# Patient Record
Sex: FEMALE | Race: BLACK OR AFRICAN AMERICAN | Employment: UNEMPLOYED | URBAN - METROPOLITAN AREA
[De-identification: names, ages, dates, MRNs, and addresses within clinical notes are randomized per-mention and may not be internally consistent; named-entity substitution may affect disease eponyms.]

---

## 2017-09-20 ENCOUNTER — HOSPITAL ENCOUNTER (EMERGENCY)
Age: 27
Discharge: HOME | End: 2017-09-20
Attending: EMERGENCY MEDICINE
Payer: COMMERCIAL

## 2017-09-20 ENCOUNTER — APPOINTMENT (OUTPATIENT)
Dept: GENERAL RADIOLOGY | Age: 27
End: 2017-09-20
Payer: COMMERCIAL

## 2017-09-20 VITALS
WEIGHT: 210 LBS | OXYGEN SATURATION: 100 % | BODY MASS INDEX: 33.89 KG/M2 | HEART RATE: 84 BPM | DIASTOLIC BLOOD PRESSURE: 77 MMHG | RESPIRATION RATE: 14 BRPM | TEMPERATURE: 97.7 F | SYSTOLIC BLOOD PRESSURE: 125 MMHG

## 2017-09-20 DIAGNOSIS — Z71.1 CONCERN ABOUT STD IN FEMALE WITHOUT DIAGNOSIS: ICD-10-CM

## 2017-09-20 DIAGNOSIS — B96.89 GARDNERELLA ASSOCIATED VAGINAL DISCHARGE: ICD-10-CM

## 2017-09-20 DIAGNOSIS — R42 LIGHT HEADED: Primary | ICD-10-CM

## 2017-09-20 DIAGNOSIS — R68.2 DRY MOUTH: ICD-10-CM

## 2017-09-20 DIAGNOSIS — N76.0 GARDNERELLA ASSOCIATED VAGINAL DISCHARGE: ICD-10-CM

## 2017-09-20 DIAGNOSIS — R51.9 ACUTE NONINTRACTABLE HEADACHE, UNSPECIFIED HEADACHE TYPE: ICD-10-CM

## 2017-09-20 LAB
-: ABNORMAL
ABSOLUTE EOS #: 0.3 K/UL (ref 0–0.4)
ABSOLUTE LYMPH #: 1.8 K/UL (ref 1–4.8)
ABSOLUTE MONO #: 1.2 K/UL (ref 0.1–1.2)
AMORPHOUS: ABNORMAL
ANION GAP SERPL CALCULATED.3IONS-SCNC: 13 MMOL/L (ref 9–17)
BACTERIA: ABNORMAL
BASOPHILS # BLD: 0 %
BASOPHILS ABSOLUTE: 0 K/UL (ref 0–0.2)
BILIRUBIN URINE: NEGATIVE
BUN BLDV-MCNC: 7 MG/DL (ref 6–20)
BUN/CREAT BLD: ABNORMAL (ref 9–20)
CALCIUM SERPL-MCNC: 9.1 MG/DL (ref 8.6–10.4)
CASTS UA: ABNORMAL /LPF (ref 0–2)
CHLORIDE BLD-SCNC: 102 MMOL/L (ref 98–107)
CO2: 25 MMOL/L (ref 20–31)
COLOR: YELLOW
COMMENT UA: ABNORMAL
CREAT SERPL-MCNC: 0.67 MG/DL (ref 0.5–0.9)
CRYSTALS, UA: ABNORMAL /HPF
DIFFERENTIAL TYPE: NORMAL
DIRECT EXAM: ABNORMAL
EOSINOPHILS RELATIVE PERCENT: 3 %
EPITHELIAL CELLS UA: ABNORMAL /HPF (ref 0–5)
GFR AFRICAN AMERICAN: >60 ML/MIN
GFR NON-AFRICAN AMERICAN: >60 ML/MIN
GFR SERPL CREATININE-BSD FRML MDRD: ABNORMAL ML/MIN/{1.73_M2}
GFR SERPL CREATININE-BSD FRML MDRD: ABNORMAL ML/MIN/{1.73_M2}
GLUCOSE BLD-MCNC: 89 MG/DL (ref 70–99)
GLUCOSE URINE: NEGATIVE
HCG QUALITATIVE: NEGATIVE
HCT VFR BLD CALC: 43 % (ref 36–46)
HEMOGLOBIN: 14.4 G/DL (ref 12–16)
KETONES, URINE: NEGATIVE
LEUKOCYTE ESTERASE, URINE: ABNORMAL
LYMPHOCYTES # BLD: 18 %
Lab: ABNORMAL
MCH RBC QN AUTO: 30.1 PG (ref 26–34)
MCHC RBC AUTO-ENTMCNC: 33.4 G/DL (ref 31–37)
MCV RBC AUTO: 90 FL (ref 80–100)
MONOCYTES # BLD: 11 %
MUCUS: ABNORMAL
NITRITE, URINE: NEGATIVE
OTHER OBSERVATIONS UA: ABNORMAL
PDW BLD-RTO: 13.7 % (ref 12.5–15.4)
PH UA: 7 (ref 5–8)
PLATELET # BLD: 180 K/UL (ref 140–450)
PLATELET ESTIMATE: NORMAL
PMV BLD AUTO: 10.4 FL (ref 6–12)
POTASSIUM SERPL-SCNC: 3 MMOL/L (ref 3.7–5.3)
PROTEIN UA: ABNORMAL
RBC # BLD: 4.78 M/UL (ref 4–5.2)
RBC # BLD: NORMAL 10*6/UL
RBC UA: ABNORMAL /HPF (ref 0–2)
RENAL EPITHELIAL, UA: ABNORMAL /HPF
SEG NEUTROPHILS: 68 %
SEGMENTED NEUTROPHILS ABSOLUTE COUNT: 6.9 K/UL (ref 1.8–7.7)
SODIUM BLD-SCNC: 140 MMOL/L (ref 135–144)
SPECIFIC GRAVITY UA: 1.02 (ref 1–1.03)
SPECIMEN DESCRIPTION: ABNORMAL
STATUS: ABNORMAL
TRICHOMONAS: ABNORMAL
TURBIDITY: CLEAR
URINE HGB: NEGATIVE
UROBILINOGEN, URINE: NORMAL
WBC # BLD: 10.2 K/UL (ref 3.5–11)
WBC # BLD: NORMAL 10*3/UL
WBC UA: ABNORMAL /HPF (ref 0–5)
YEAST: ABNORMAL

## 2017-09-20 PROCEDURE — 71020 XR CHEST STANDARD TWO VW: CPT

## 2017-09-20 PROCEDURE — 87491 CHLMYD TRACH DNA AMP PROBE: CPT

## 2017-09-20 PROCEDURE — 99284 EMERGENCY DEPT VISIT MOD MDM: CPT

## 2017-09-20 PROCEDURE — 87591 N.GONORRHOEAE DNA AMP PROB: CPT

## 2017-09-20 PROCEDURE — 87480 CANDIDA DNA DIR PROBE: CPT

## 2017-09-20 PROCEDURE — 6370000000 HC RX 637 (ALT 250 FOR IP): Performed by: PHYSICIAN ASSISTANT

## 2017-09-20 PROCEDURE — 2580000003 HC RX 258: Performed by: PHYSICIAN ASSISTANT

## 2017-09-20 PROCEDURE — 93005 ELECTROCARDIOGRAM TRACING: CPT

## 2017-09-20 PROCEDURE — 80048 BASIC METABOLIC PNL TOTAL CA: CPT

## 2017-09-20 PROCEDURE — 81001 URINALYSIS AUTO W/SCOPE: CPT

## 2017-09-20 PROCEDURE — 84703 CHORIONIC GONADOTROPIN ASSAY: CPT

## 2017-09-20 PROCEDURE — 87510 GARDNER VAG DNA DIR PROBE: CPT

## 2017-09-20 PROCEDURE — 85025 COMPLETE CBC W/AUTO DIFF WBC: CPT

## 2017-09-20 PROCEDURE — 87660 TRICHOMONAS VAGIN DIR PROBE: CPT

## 2017-09-20 RX ORDER — 0.9 % SODIUM CHLORIDE 0.9 %
1000 INTRAVENOUS SOLUTION INTRAVENOUS ONCE
Status: COMPLETED | OUTPATIENT
Start: 2017-09-20 | End: 2017-09-20

## 2017-09-20 RX ORDER — MECLIZINE HCL 12.5 MG/1
25 TABLET ORAL ONCE
Status: COMPLETED | OUTPATIENT
Start: 2017-09-20 | End: 2017-09-20

## 2017-09-20 RX ORDER — METRONIDAZOLE 500 MG/1
500 TABLET ORAL 2 TIMES DAILY
Qty: 14 TABLET | Refills: 0 | Status: SHIPPED | OUTPATIENT
Start: 2017-09-20 | End: 2017-09-27

## 2017-09-20 RX ORDER — ACETAMINOPHEN 325 MG/1
650 TABLET ORAL EVERY 6 HOURS PRN
Qty: 30 TABLET | Refills: 0 | Status: SHIPPED | OUTPATIENT
Start: 2017-09-20 | End: 2021-12-13

## 2017-09-20 RX ORDER — ACETAMINOPHEN 325 MG/1
650 TABLET ORAL ONCE
Status: COMPLETED | OUTPATIENT
Start: 2017-09-20 | End: 2017-09-20

## 2017-09-20 RX ADMIN — SODIUM CHLORIDE 1000 ML: 9 INJECTION, SOLUTION INTRAVENOUS at 09:50

## 2017-09-20 RX ADMIN — ACETAMINOPHEN 650 MG: 325 TABLET ORAL at 11:04

## 2017-09-20 RX ADMIN — MECLIZINE HCL 25 MG: 12.5 TABLET ORAL at 11:04

## 2017-09-20 ASSESSMENT — PAIN SCALES - GENERAL
PAINLEVEL_OUTOF10: 6
PAINLEVEL_OUTOF10: 7

## 2017-09-20 ASSESSMENT — PAIN DESCRIPTION - PAIN TYPE: TYPE: ACUTE PAIN

## 2017-09-21 LAB
C TRACH DNA GENITAL QL NAA+PROBE: NEGATIVE
EKG ATRIAL RATE: 72 BPM
EKG P AXIS: 57 DEGREES
EKG P-R INTERVAL: 192 MS
EKG Q-T INTERVAL: 418 MS
EKG QRS DURATION: 74 MS
EKG QTC CALCULATION (BAZETT): 457 MS
EKG R AXIS: 26 DEGREES
EKG T AXIS: 21 DEGREES
EKG VENTRICULAR RATE: 72 BPM
N. GONORRHOEAE DNA: NEGATIVE

## 2017-10-26 ENCOUNTER — APPOINTMENT (OUTPATIENT)
Dept: ULTRASOUND IMAGING | Age: 27
End: 2017-10-26
Payer: COMMERCIAL

## 2017-10-26 ENCOUNTER — HOSPITAL ENCOUNTER (EMERGENCY)
Age: 27
Discharge: HOME | End: 2017-10-26
Attending: EMERGENCY MEDICINE
Payer: COMMERCIAL

## 2017-10-26 VITALS
RESPIRATION RATE: 16 BRPM | SYSTOLIC BLOOD PRESSURE: 146 MMHG | HEIGHT: 60 IN | DIASTOLIC BLOOD PRESSURE: 92 MMHG | HEART RATE: 78 BPM | TEMPERATURE: 97.9 F | OXYGEN SATURATION: 99 %

## 2017-10-26 DIAGNOSIS — N76.0 BACTERIAL VAGINOSIS: ICD-10-CM

## 2017-10-26 DIAGNOSIS — B96.89 BACTERIAL VAGINOSIS: ICD-10-CM

## 2017-10-26 DIAGNOSIS — O21.9 VOMITING OF PREGNANCY, ANTEPARTUM: Primary | ICD-10-CM

## 2017-10-26 DIAGNOSIS — O26.891 ABDOMINAL PAIN DURING PREGNANCY IN FIRST TRIMESTER: ICD-10-CM

## 2017-10-26 DIAGNOSIS — R10.9 ABDOMINAL PAIN DURING PREGNANCY IN FIRST TRIMESTER: ICD-10-CM

## 2017-10-26 LAB
-: ABNORMAL
AMORPHOUS: ABNORMAL
BACTERIA: ABNORMAL
BILIRUBIN URINE: NEGATIVE
CASTS UA: ABNORMAL /LPF (ref 0–8)
COLOR: YELLOW
CRYSTALS, UA: ABNORMAL /HPF
DIRECT EXAM: ABNORMAL
EPITHELIAL CELLS UA: ABNORMAL /HPF (ref 0–5)
GLUCOSE URINE: NEGATIVE
HCG QUANTITATIVE: 309 IU/L
HCG(URINE) PREGNANCY TEST: POSITIVE
KETONES, URINE: NEGATIVE
LEUKOCYTE ESTERASE, URINE: NEGATIVE
Lab: ABNORMAL
MUCUS: ABNORMAL
NITRITE, URINE: NEGATIVE
OTHER OBSERVATIONS UA: ABNORMAL
PH UA: 6 (ref 5–8)
PROTEIN UA: ABNORMAL
RBC UA: ABNORMAL /HPF (ref 0–4)
RENAL EPITHELIAL, UA: ABNORMAL /HPF
SPECIFIC GRAVITY UA: 1.02 (ref 1–1.03)
SPECIMEN DESCRIPTION: ABNORMAL
STATUS: ABNORMAL
TRICHOMONAS: ABNORMAL
TURBIDITY: CLEAR
URINE HGB: NEGATIVE
UROBILINOGEN, URINE: NORMAL
WBC UA: ABNORMAL /HPF (ref 0–5)
YEAST: ABNORMAL

## 2017-10-26 PROCEDURE — 87491 CHLMYD TRACH DNA AMP PROBE: CPT

## 2017-10-26 PROCEDURE — 87480 CANDIDA DNA DIR PROBE: CPT

## 2017-10-26 PROCEDURE — 6360000002 HC RX W HCPCS: Performed by: EMERGENCY MEDICINE

## 2017-10-26 PROCEDURE — 6370000000 HC RX 637 (ALT 250 FOR IP): Performed by: EMERGENCY MEDICINE

## 2017-10-26 PROCEDURE — 81001 URINALYSIS AUTO W/SCOPE: CPT

## 2017-10-26 PROCEDURE — 76815 OB US LIMITED FETUS(S): CPT

## 2017-10-26 PROCEDURE — G0383 LEV 4 HOSP TYPE B ED VISIT: HCPCS

## 2017-10-26 PROCEDURE — 87591 N.GONORRHOEAE DNA AMP PROB: CPT

## 2017-10-26 PROCEDURE — 87510 GARDNER VAG DNA DIR PROBE: CPT

## 2017-10-26 PROCEDURE — 84702 CHORIONIC GONADOTROPIN TEST: CPT

## 2017-10-26 PROCEDURE — 76817 TRANSVAGINAL US OBSTETRIC: CPT

## 2017-10-26 PROCEDURE — 87660 TRICHOMONAS VAGIN DIR PROBE: CPT

## 2017-10-26 PROCEDURE — 84703 CHORIONIC GONADOTROPIN ASSAY: CPT

## 2017-10-26 RX ORDER — LANOLIN ALCOHOL/MO/W.PET/CERES
50 CREAM (GRAM) TOPICAL DAILY
Qty: 30 TABLET | Refills: 3 | Status: SHIPPED | OUTPATIENT
Start: 2017-10-26 | End: 2018-12-05

## 2017-10-26 RX ORDER — METRONIDAZOLE 7.5 MG/G
GEL TOPICAL ONCE
Status: COMPLETED | OUTPATIENT
Start: 2017-10-26 | End: 2017-10-26

## 2017-10-26 RX ORDER — ONDANSETRON 4 MG/1
4 TABLET, FILM COATED ORAL ONCE
Status: COMPLETED | OUTPATIENT
Start: 2017-10-26 | End: 2017-10-26

## 2017-10-26 RX ADMIN — ONDANSETRON 4 MG: 4 TABLET, FILM COATED ORAL at 16:04

## 2017-10-26 RX ADMIN — METRONIDAZOLE: 7.5 GEL TOPICAL at 21:26

## 2017-10-26 ASSESSMENT — ENCOUNTER SYMPTOMS
DIARRHEA: 0
SHORTNESS OF BREATH: 0
RHINORRHEA: 0
CONSTIPATION: 0
NAUSEA: 1
VOMITING: 1
EYE PAIN: 0
ABDOMINAL PAIN: 1
COUGH: 0

## 2017-10-26 ASSESSMENT — PAIN DESCRIPTION - PAIN TYPE: TYPE: ACUTE PAIN

## 2017-10-26 ASSESSMENT — PAIN DESCRIPTION - LOCATION: LOCATION: ABDOMEN

## 2017-10-26 ASSESSMENT — PAIN SCALES - GENERAL: PAINLEVEL_OUTOF10: 6

## 2017-10-26 ASSESSMENT — PAIN DESCRIPTION - ONSET: ONSET: PROGRESSIVE

## 2017-10-26 ASSESSMENT — PAIN DESCRIPTION - PROGRESSION: CLINICAL_PROGRESSION: GRADUALLY WORSENING

## 2017-10-26 ASSESSMENT — PAIN DESCRIPTION - ORIENTATION: ORIENTATION: LOWER

## 2017-10-26 ASSESSMENT — PAIN DESCRIPTION - DESCRIPTORS: DESCRIPTORS: CRAMPING

## 2017-10-26 NOTE — ED PROVIDER NOTES
[facility] ED  Emergency Department Encounter  Emergency Medicine Resident     Pt Name: Jimmie Case  MRN: [id number]  [place] 1990  Date of evaluation: 10/26/17  PCP:  No primary care provider on file. CHIEF COMPLAINT       Chief Complaint   Patient presents with    Emesis     pt with c/o nausea and vomiting x two days with abdominal cramping. pt concerned for pregnancy, lmp 2017. pt did not take home pregnancy test. denies urinary symptoms.  Pregnancy Test       HISTORY OF PRESENT ILLNESS  (Location/Symptom, Timing/Onset, Context/Setting, Quality, Duration, Modifying Factors, Severity.)      Jimmie Case is a 32 y.o. female who presents with nausea, multiple episodes of nonbloody emesis, and crampy abdominal pain for the past 2 days. Patient states abdominal cramping is diffuse but mainly in the suprapubic region. Patient denies any dysuria or frequency of urination. Patient denies any vaginal discharge or vaginal bleeding. Patient states the first day last menstrual period was on 2017. Patient states that her periods are occasionally irregular. Patient is concerned she may be pregnant. Patient is sexually active with one male partner and states she does not use protection. Patient denies any change in vaginal discharge. Patient also does mention she feels dizzy and lightheaded but states she has not eaten today. PAST MEDICAL / SURGICAL / SOCIAL / FAMILY HISTORY      has a past medical history of Headache(784.0) and Trichomonas. has a past surgical history that includes  section and Cholecystectomy (). Social History     Social History    Marital status: Single     Spouse name: N/A    Number of children: N/A    Years of education: N/A     Occupational History    Not on file.      Social History Main Topics    Smoking status: Current Every Day Smoker     Packs/day: 0.50     Years: 10.00     Types: Cigarettes    Smokeless tobacco: Not on file    Alcohol use Yes      Comment: occasionally    Drug use: No      Comment: stopped using approx 1 yr ago    Sexual activity: Not on file     Other Topics Concern    Not on file     Social History Narrative    No narrative on file       Family History   Problem Relation Age of Onset    High Blood Pressure Mother        Allergies:  Review of patient's allergies indicates no known allergies. Home Medications:  Prior to Admission medications    Medication Sig Start Date End Date Taking? Authorizing Provider   acetaminophen (TYLENOL) 325 MG tablet Take 2 tablets by mouth every 6 hours as needed for Pain 9/20/17   Carol Escobar PA-C   HYDROcodone-acetaminophen [facility]) 5-325 MG per tablet Take 1 tablet by mouth every 6 hours as needed for Pain 9/5/15   Reginald Vera MD   ibuprofen (ADVIL;MOTRIN) 800 MG tablet Take 1 tablet by mouth every 8 hours as needed for Pain 9/5/15   Reginald Vera MD   misoprostol (CYTOTEC) 200 MCG tablet Take 1 tablet by mouth once for 1 dose Take one tablet at 9 am the day prior to the procedure and the second tablet at 9 pm the night prior to the procedure. 9/5/15 9/5/15  Reginald Vera MD   azithromycin (ZITHROMAX) 250 MG tablet Take 1 tablet by mouth daily 8/25/15   Js Bourgeois DO   ondansetron (ZOFRAN ODT) 4 MG disintegrating tablet Take 1 tablet by mouth every 8 hours as needed for Nausea. 6/3/13   Nikki Rodriguez MD   Prenatal Vitamins (DIS) TABS Take 1 tablet by mouth daily. 6/3/13   Nikki Rodriguez MD       REVIEW OF SYSTEMS    (2-9 systems for level 4, 10 or more for level 5)      Review of Systems   Constitutional: Negative for chills and fever. HENT: Negative for congestion and rhinorrhea. Eyes: Negative for pain and visual disturbance. Respiratory: Negative for cough and shortness of breath. Cardiovascular: Negative for chest pain and palpitations. Gastrointestinal: Positive for abdominal pain, nausea and vomiting. Negative for constipation and diarrhea. Genitourinary: Negative for difficulty urinating and dysuria. Musculoskeletal: Negative for gait problem and neck pain. Skin: Negative for rash and wound. Neurological: Positive for light-headedness. Negative for dizziness, weakness and numbness. PHYSICAL EXAM   (up to 7 for level 4, 8 or more for level 5)      INITIAL VITALS:   BP (!) 146/92   Pulse 78   Temp 97.9 °F (36.6 °C) (Oral)   Resp 16   Ht 5' (1.524 m)   LMP 09/25/2017   SpO2 99%     Physical Exam   Constitutional: She is oriented to person, place, and time. She appears well-developed and well-nourished. No distress. HENT:   Head: Normocephalic and atraumatic. Mouth/Throat: Oropharynx is clear and moist.   Eyes: Conjunctivae and EOM are normal.   Cardiovascular: Normal rate, regular rhythm, normal heart sounds and intact distal pulses. Exam reveals no gallop and no friction rub. No murmur heard. Pulmonary/Chest: Effort normal and breath sounds normal. No respiratory distress. She has no wheezes. She has no rales. Abdominal: Soft. There is no tenderness. There is no rebound and no guarding. Genitourinary: Cervix exhibits discharge. Cervix exhibits no motion tenderness and no friability. Right adnexum displays no mass, no tenderness and no fullness. Left adnexum displays no mass, no tenderness and no fullness. No erythema in the vagina. No signs of injury around the vagina. Musculoskeletal: She exhibits no edema, tenderness or deformity. Neurological: She is alert and oriented to person, place, and time. No cranial nerve deficit. She exhibits normal muscle tone. Skin: Skin is warm and dry. No rash noted. She is not diaphoretic.        DIFFERENTIAL  DIAGNOSIS     PLAN (LABS / IMAGING / EKG):  Orders Placed This Encounter   Procedures    C.trachomatis N.gonorrhoeae DNA    VAGINITIS DNA PROBE    US PELVIS COMPLETE    Urinalysis with microscopic    Pregnancy, Urine    HCG, QUANTITATIVE, PREGNANCY    Vaginal exam    Vaginal exam       MEDICATIONS ORDERED:  Orders Placed This Encounter   Medications    ondansetron (ZOFRAN) tablet 4 mg    metroNIDAZOLE (METROGEL) 0.75 % gel       DDX: IUP, ectopic pregnancy, UTI, STI, BV, yeast infection    DIAGNOSTIC RESULTS / EMERGENCY DEPARTMENT COURSE / MDM     LABS:  Results for orders placed or performed during the hospital encounter of 10/26/17   VAGINITIS DNA PROBE   Result Value Ref Range    Specimen Description . VAGINA     Special Requests NOT REPORTED     Direct Exam NEGATIVE for Candida sp. Direct Exam POSITIVE for Gardnerella vaginalis. (A)     Direct Exam NEGATIVE for Trichomonas vaginalis     Direct Exam       Method of testing is a DNA probe intended for detection and identification of    Direct Exam        Candida species, Gardnerella vaginalis, and Trichomonas vaginalis nucleic acid    Direct Exam        in vaginal fluid specimens from patients with symptoms of vaginitis/vaginosis.     Direct Exam       [facility] [id number] [place] [address], [address] [place] [address] [phone]    Status FINAL 10/26/2017    Urinalysis with microscopic   Result Value Ref Range    Color, UA YELLOW YEL    Turbidity UA CLEAR CLEAR    Glucose, Ur NEGATIVE NEG    Bilirubin Urine NEGATIVE NEG    Ketones, Urine NEGATIVE NEG    Specific Gravity, UA 1.022 1.005 - 1.030    Urine Hgb NEGATIVE NEG    pH, UA 6.0 5.0 - 8.0    Protein, UA 1+ (A) NEG    Urobilinogen, Urine Normal NORM    Nitrite, Urine NEGATIVE NEG    Leukocyte Esterase, Urine NEGATIVE NEG    -          WBC, UA 0 TO 2 0 - 5 /HPF    RBC, UA None 0 - 4 /HPF    Casts UA 2 TO 5 HYALINE 0 - 8 /LPF    Crystals UA NOT REPORTED NONE /HPF    Epithelial Cells UA 2 TO 5 0 - 5 /HPF    Renal Epithelial, Urine NOT REPORTED 0 /HPF    Bacteria, UA NOT REPORTED NONE    Mucus, UA NOT REPORTED NONE    Trichomonas, UA NOT REPORTED NONE    Amorphous, UA NOT REPORTED NONE    Other Observations UA NOT REPORTED NREQ    Yeast, UA NOT REPORTED NONE   Pregnancy, Urine   Result Value Ref Range    HCG(Urine) Pregnancy Test POSITIVE (A) NEG   HCG, QUANTITATIVE, PREGNANCY   Result Value Ref Range    hCG Quant 309 (H) <5 IU/L       IMPRESSION: Pregnancy, nausea and vomiting of pregnancy, bacterial vaginosis    RADIOLOGY:  None    EKG  None    All EKG's are interpreted by the Emergency Department Physician who either signs or Co-signs this chart in the absence of a cardiologist.    EMERGENCY DEPARTMENT COURSE:  3:59 PM: Patient evaluated by myself and attending physician. Patient appears no acute distress. Patient mildly hypertensive at 146/92 with otherwise normal vital signs. Heart rate normal with regular rhythm. Lungs clear to auscultation bilaterally. Abdomen soft and nontender to palpation. Patient with no focal neurologic deficits on physical exam.  We'll continue with urinalysis, urine pregnancy, and pelvic exam.  We'll obtain GC/chlamydia. 5:32 PM: Patient tested positive for pregnancy. Urine. Unable to visualize on bedside ultrasound and IUP. Patient did complain of abdominal pain. We'll get a pelvic ultrasound to rule out ectopic    PROCEDURES:  None    CONSULTS:  None    CRITICAL CARE:  None    FINAL IMPRESSION      1. Vomiting of pregnancy, antepartum    2. Bacterial vaginosis          DISPOSITION / PLAN     DISPOSITION   Plan for discharge pending pelvic ultrasound to rule out ectopic pregnancy. Plan for discharge with antinausea medication as well as treatment for bacterial vaginosis. PATIENT REFERRED TO:  No follow-up provider specified.     DISCHARGE MEDICATIONS:  New Prescriptions    No medications on file       Alejandra Gill DO  Emergency Medicine Resident    (Please note that portions of this note were completed with a voice recognition program.  Efforts were made to edit the dictations but occasionally words are mis-transcribed.)       Alejandra Gill DO  10/26/17 2832

## 2017-10-26 NOTE — ED PROVIDER NOTES
[facility]  ED  Emergency Department  Emergency Medicine Resident Sign-out     Care of Liliya Garza was assumed from Dr. Zander Brown and is being seen for Emesis (pt with c/o nausea and vomiting x two days with abdominal cramping. pt concerned for pregnancy, lmp 09/25/2017. pt did not take home pregnancy test. denies urinary symptoms. ) and Pregnancy Test  .  The patient's initial evaluation and plan have been discussed with the prior provider who initially evaluated the patient. EMERGENCY DEPARTMENT COURSE / MEDICAL DECISION MAKING:       MEDICATIONS GIVEN:  Orders Placed This Encounter   Medications    ondansetron (ZOFRAN) tablet 4 mg    metroNIDAZOLE (METROGEL) 0.75 % gel    doxyLAMINE succinate (GNP SLEEP AID) 25 MG tablet     Sig: Take 1 tablet by mouth as needed for Sleep (nausea)     Dispense:  30 tablet     Refill:  0    vitamin B-6 (PYRIDOXINE) 50 MG tablet     Sig: Take 1 tablet by mouth daily     Dispense:  30 tablet     Refill:  3       LABS / RADIOLOGY:     Labs Reviewed   VAGINITIS DNA PROBE - Abnormal; Notable for the following:        Result Value    Direct Exam POSITIVE for Gardnerella vaginalis. (*)     All other components within normal limits   URINALYSIS WITH MICROSCOPIC - Abnormal; Notable for the following:     Protein, UA 1+ (*)     All other components within normal limits   PREGNANCY, URINE - Abnormal; Notable for the following:     HCG(Urine) Pregnancy Test POSITIVE (*)     All other components within normal limits   HCG, QUANTITATIVE, PREGNANCY - Abnormal; Notable for the following:     hCG Quant 309 (*)     All other components within normal limits   C.TRACHOMATIS N.GONORRHOEAE DNA       No results found. RECENT VITALS:     Temp: 97.9 °F (36.6 °C),  Pulse: 78, Resp: 16, BP: (!) 146/92, SpO2: 99 %    This patient is a 32 y.o. Female with Lower abdominal pain and concern for pregnancy. Patient was found to be pregnant here.   She's had follow-up studies to confirm an intrauterine pregnancy as bedside ultrasound did not see an intrauterine gestational sac. Vaginitis probe significant for Gardnerella. Will treat patient for BV on discharge. Will follow up on ultrasound. OUTSTANDING TASKS / RECOMMENDATIONS:    1. Follow up ultrasound  2. Contact OB/Discharge based on results     FINAL IMPRESSION:     1. Vomiting of pregnancy, antepartum    2. Bacterial vaginosis    3.  Abdominal pain during pregnancy in first trimester        DISPOSITION:         DISPOSITION:  [x]  Discharge   []  Transfer -    []  Admission -     []  Against Medical Advice   []  Eloped   FOLLOW-UP: [address] [place] [id number] [phone]  Schedule an appointment as soon as possible for a visit   repeat ultrasound in 2 weeks     DISCHARGE MEDICATIONS: Discharge Medication List as of 10/26/2017  9:18 PM      START taking these medications    Details   doxyLAMINE succinate (GNP SLEEP AID) 25 MG tablet Take 1 tablet by mouth as needed for Sleep (nausea), Disp-30 tablet, R-0Print      vitamin B-6 (PYRIDOXINE) 50 MG tablet Take 1 tablet by mouth daily, Disp-30 tablet, R-3Print                Kay Wright DO  Emergency Medicine Resident  [facility], [place]  Resident  10/26/17 7390

## 2017-10-27 LAB
C TRACH DNA GENITAL QL NAA+PROBE: NEGATIVE
N. GONORRHOEAE DNA: NEGATIVE

## 2017-11-02 ENCOUNTER — HOSPITAL ENCOUNTER (OUTPATIENT)
Age: 27
Setting detail: SPECIMEN
Discharge: HOME | End: 2017-11-02
Payer: COMMERCIAL

## 2017-11-02 ENCOUNTER — OFFICE VISIT (OUTPATIENT)
Dept: OBGYN | Age: 27
End: 2017-11-02
Payer: COMMERCIAL

## 2017-11-02 VITALS
SYSTOLIC BLOOD PRESSURE: 116 MMHG | WEIGHT: 202 LBS | HEIGHT: 66 IN | HEART RATE: 77 BPM | DIASTOLIC BLOOD PRESSURE: 73 MMHG | BODY MASS INDEX: 32.47 KG/M2

## 2017-11-02 DIAGNOSIS — Z34.90 PREGNANCY, UNSPECIFIED GESTATIONAL AGE: Primary | ICD-10-CM

## 2017-11-02 DIAGNOSIS — Z32.01 POSITIVE PREGNANCY TEST: ICD-10-CM

## 2017-11-02 DIAGNOSIS — Z34.90 PREGNANCY, UNSPECIFIED GESTATIONAL AGE: ICD-10-CM

## 2017-11-02 LAB — HCG QUANTITATIVE: 4723 IU/L

## 2017-11-02 PROCEDURE — 36415 COLL VENOUS BLD VENIPUNCTURE: CPT

## 2017-11-02 PROCEDURE — G8417 CALC BMI ABV UP PARAM F/U: HCPCS | Performed by: OBSTETRICS & GYNECOLOGY

## 2017-11-02 PROCEDURE — 4004F PT TOBACCO SCREEN RCVD TLK: CPT | Performed by: OBSTETRICS & GYNECOLOGY

## 2017-11-02 PROCEDURE — 84702 CHORIONIC GONADOTROPIN TEST: CPT

## 2017-11-02 PROCEDURE — 99212 OFFICE O/P EST SF 10 MIN: CPT | Performed by: OBSTETRICS & GYNECOLOGY

## 2017-11-02 PROCEDURE — G8428 CUR MEDS NOT DOCUMENT: HCPCS | Performed by: OBSTETRICS & GYNECOLOGY

## 2017-11-02 PROCEDURE — G8484 FLU IMMUNIZE NO ADMIN: HCPCS | Performed by: OBSTETRICS & GYNECOLOGY

## 2017-11-02 NOTE — PROGRESS NOTES
Patient seen and examined. She is here secondary to positive pregnancy in ED. She had abdominal cramping and nausea and vomiting at that time. She states the pain is resolved but she occasionally has some nausea. She denies any vaginal bleeding.      Vitals:    11/02/17 1257   BP: 116/73   Pulse: 77   Weight: 202 lb (91.6 kg)   Height: 5' 6\" (1.676 m)     REVIEW OF SYSTEMS:  Constitutional: negative fever, negative chills  HEENT: negative visual disturbances, negative headaches  Respiratory: negative dyspnea, negative cough  Cardiovascular: negative chest pain,  negative palpitations  Gastrointestinal: negative abdominal pain, negative RUQ pain, negative N/V, negative diarrhea, negative constipation  Genitourinary: negative dysuria, negative vaginal discharge  Dermatological: negative rash  Hematologic: negative bruising  Immunologic/Lymphatic: negative recent illness, negative recent sick contact  Musculoskeletal: negative back pain, negative myalgias, negative arthralgias  Neurological:  negative dizziness, negative weakness  Behavior/Psych: negative depression, negative anxiety      PHYSICAL EXAM:  General appearance:  no apparent distress, alert and cooperative  Neurologic:  alert, oriented, normal speech, no focal findings or movement disorder noted  Lungs:  No increased work of breathing, good air exchange, clear to auscultation bilaterally, no crackles or wheezing  Heart:  regular rate and rhythm and no murmur    Abdomen:  soft, non-tender, non-distended  Extremities:  no calf tenderness, non edematous, DTR's: normal    Pelvic Exam: declined by patient    RESULTS:  hCG Quant 309   <5 IU/L Final 10/26/2017  5:41  [address]     EXAMINATION:   FIRST TRIMESTER OBSTETRIC ULTRASOUND       10/26/2017       TECHNIQUE:   Transabdominal and transvaginal first trimester obstetric pelvic ultrasound   was performed with color Doppler flow evaluation.       COMPARISON:   None       HISTORY:   ORDERING SYSTEM PROVIDED HISTORY: rule out ectopic. abdominal pain and pos   pregnancy. unable to visualize on bedside transabdominal ultrasound       FINDINGS:   Uterus is anteverted and measures 9.9 x 4.3 x 6.8 cm in dimensions. Irregular   oval sonolucency is noted in the fundus the uterus, likely an early   gestational sac. No yolk sac or fetal pole is identified. Ultrasound   gestational age would be 5 weeks and 5 days (+/- 4 days) based on the size of   the presumed gestational sac (1.1 cm).       Endometrial thickness is 14.5 mm.       Right ovary measures 3.3 x 2.9 x 3.2 cm. Left ovary measures 3.0 x 2.6 x 2.7   cm in dimensions. No ovarian cyst or mass. Color Doppler flow study   demonstrates arterial and venous flow to bilateral ovaries. No free fluid in   the cul-de-sac.           Impression   Probable irregular gestational sac in the fundus the uterus. No yolk sac or   fetal pole are identified. Recommend correlation with serum beta HCG levels   and follow-up Ob ultrasound in 2 weeks         A/P:  1.  Positive pregnancy test- HCG quant 309 on 10/26/17   - US on 10/26/17 with possible irregular gestational sac   - Will repeat HCG quant today   - Pending results of above will plan for repeat US    - No evidence of acute abdomen or ectopic pregnancy today    Ino Matthews DO  33/9/3280, 1:49 PM  OBGYN, PGY-4  Pager [phone]

## 2017-11-03 ENCOUNTER — TELEPHONE (OUTPATIENT)
Dept: OBGYN | Age: 27
End: 2017-11-03

## 2017-11-03 NOTE — TELEPHONE ENCOUNTER
Patient walk in to the office request RX for prenatal vitamins. After being seen in the ED and given a current list of medications and she noticed Prenatal Vitamins on the list.    Patient stated,\"I don't remember anyone given me a RX for Prenatal Vitamins. Patient last prescribed Prental Vitamins back in 2013. Old medication removed. Patient request a updated Prenatal RX request.    Please advise. FYI:      Patient last seen in the office 11/2/17, positive pregnancy obtained. Patient also scheduled for US 11/15/17. New OB appointment scheduled 11/30/17.

## 2017-11-09 ENCOUNTER — TELEPHONE (OUTPATIENT)
Dept: OBGYN | Age: 27
End: 2017-11-09

## 2017-11-09 RX ORDER — PRENATAL WITH FERROUS FUM AND FOLIC ACID 3080; 920; 120; 400; 22; 1.84; 3; 20; 10; 1; 12; 200; 27; 25; 2 [IU]/1; [IU]/1; MG/1; [IU]/1; MG/1; MG/1; MG/1; MG/1; MG/1; MG/1; UG/1; MG/1; MG/1; MG/1; MG/1
1 TABLET ORAL DAILY
Qty: 30 TABLET | Refills: 6 | Status: SHIPPED | OUTPATIENT
Start: 2017-11-09 | End: 2018-04-19 | Stop reason: SDUPTHER

## 2017-11-09 NOTE — TELEPHONE ENCOUNTER
11/9/17 called pt @ 1:19pm regarding her medication request left VM for to call office if she has any questions/concerns regarding this matter.

## 2017-11-09 NOTE — TELEPHONE ENCOUNTER
Rx sent for prenatal vitamins.      Claudette Ho DO  19/6/6797, 1:12 PM  OBGYN, PGY-4  Pager [phone]

## 2017-12-08 ENCOUNTER — HOSPITAL ENCOUNTER (EMERGENCY)
Age: 27
Discharge: HOME | End: 2017-12-08
Attending: EMERGENCY MEDICINE
Payer: COMMERCIAL

## 2017-12-08 VITALS
SYSTOLIC BLOOD PRESSURE: 116 MMHG | DIASTOLIC BLOOD PRESSURE: 70 MMHG | BODY MASS INDEX: 32.53 KG/M2 | HEIGHT: 66 IN | HEART RATE: 91 BPM | OXYGEN SATURATION: 100 % | RESPIRATION RATE: 16 BRPM | TEMPERATURE: 98.2 F | WEIGHT: 202.4 LBS

## 2017-12-08 DIAGNOSIS — L42 PITYRIASIS ROSEA: Primary | ICD-10-CM

## 2017-12-08 LAB — T. PALLIDUM, IGG: NONREACTIVE

## 2017-12-08 PROCEDURE — 86780 TREPONEMA PALLIDUM: CPT

## 2017-12-08 PROCEDURE — 99283 EMERGENCY DEPT VISIT LOW MDM: CPT

## 2017-12-08 RX ORDER — CETIRIZINE HYDROCHLORIDE 10 MG/1
10 TABLET, CHEWABLE ORAL DAILY
Qty: 15 TABLET | Refills: 0 | Status: SHIPPED | OUTPATIENT
Start: 2017-12-08 | End: 2018-05-30

## 2017-12-08 ASSESSMENT — ENCOUNTER SYMPTOMS
RHINORRHEA: 0
SHORTNESS OF BREATH: 0
DIARRHEA: 0
CONSTIPATION: 0
NAUSEA: 0
SORE THROAT: 0
ABDOMINAL PAIN: 0
COUGH: 0

## 2017-12-08 NOTE — ED PROVIDER NOTES
I performed a history and physical examination of the patient and discussed management with the resident. I reviewed the residents note and agree with the documented findings and plan of care. Any areas of disagreement are noted on the chart. I was personally present for the key portions of any procedures. I have documented in the chart those procedures where I was not present during the key portions. I have reviewed the emergency nurses triage note. I agree with the chief complaint, past medical history, past surgical history, allergies, medications, social and family history as documented unless otherwise noted below. Documentation of the HPI, Physical Exam and Medical Decision Making performed by medical students or scribes is based on my personal performance of the HPI, PE and MDM. For Phys Assistant/ Nurse Practitioner cases/documentation I have personally evaluated this patient and have completed at least one if not all key elements of the E/M (history, physical exam, and MDM). I find the patient's history and physical exam are consistent with the NP/PA documentation. I agree with the care provided, treatment rendered, disposition and followup plan. Additional findings are as noted. Quinten Reed. Suhas De La Rosa MD  Attending Emergency  Physician    C/O Elvis. ALSO APPROX 8WKS PREGNANT. ON PRENATAL VITAMINS. NO VAG BLEEDING/DISCHARGE. NAUSEA, NO VOMITING. NO TRAUMA. UPCOMING PRENATAL VISIT NEXT WEEK. VAG PROBE POS FOR BV 5WKS AGO. AWAKE, ALERT, COOP, RESPONSIVE. CUTANEOUS-DIFFUSE, ERYTHEMATOUS, BLANCHING MACULOPAPULAR LESIONS ON TRUNK AND PROX EXTREMTIES, SOME WITH FINE SCALE ON BORDERS. MORPHOLOGY AND DISTRIBUTION CONSISTENT WITH PITYRIASIS ROSEA. WILL CHECK VDRL. IMP-PITYRIASIS ROSEA, PREGNANCY. PLAN-DISCHARGE, RX CETIRIZINE. F/U WITH OB CLINIC AS SCHEDULED. RETURN IF SX WORSEN OR PROGRESS.            Arpita Vela MD  12/08/17 7626

## 2017-12-18 ENCOUNTER — INITIAL PRENATAL (OUTPATIENT)
Dept: OBGYN | Age: 27
End: 2017-12-18
Payer: COMMERCIAL

## 2017-12-18 ENCOUNTER — HOSPITAL ENCOUNTER (OUTPATIENT)
Age: 27
Setting detail: SPECIMEN
Discharge: HOME | End: 2017-12-18
Payer: COMMERCIAL

## 2017-12-18 VITALS
BODY MASS INDEX: 32.93 KG/M2 | WEIGHT: 204 LBS | DIASTOLIC BLOOD PRESSURE: 58 MMHG | SYSTOLIC BLOOD PRESSURE: 110 MMHG | HEART RATE: 80 BPM

## 2017-12-18 DIAGNOSIS — F12.10 MILD TETRAHYDROCANNABINOL (THC) ABUSE: ICD-10-CM

## 2017-12-18 DIAGNOSIS — Z87.51 HISTORY OF PRETERM DELIVERY: ICD-10-CM

## 2017-12-18 DIAGNOSIS — Z64.1 GRAND MULTIPARITY: ICD-10-CM

## 2017-12-18 DIAGNOSIS — O09.91 HIGH-RISK PREGNANCY, FIRST TRIMESTER: ICD-10-CM

## 2017-12-18 DIAGNOSIS — Z98.891 HISTORY OF VBAC: ICD-10-CM

## 2017-12-18 DIAGNOSIS — L42 PITYRIASIS ROSEA: ICD-10-CM

## 2017-12-18 DIAGNOSIS — F17.200 SMOKER: ICD-10-CM

## 2017-12-18 DIAGNOSIS — O09.91 HIGH-RISK PREGNANCY, FIRST TRIMESTER: Primary | ICD-10-CM

## 2017-12-18 DIAGNOSIS — O34.219 DELIVERY WITH HISTORY OF C-SECTION: ICD-10-CM

## 2017-12-18 PROBLEM — Z32.01 POSITIVE PREGNANCY TEST: Status: RESOLVED | Noted: 2017-11-02 | Resolved: 2017-12-18

## 2017-12-18 PROBLEM — E66.9 OBESITY: Status: ACTIVE | Noted: 2017-12-18

## 2017-12-18 LAB
-: ABNORMAL
ABO/RH: NORMAL
ABSOLUTE EOS #: 0.26 K/UL (ref 0–0.44)
ABSOLUTE IMMATURE GRANULOCYTE: 0.05 K/UL (ref 0–0.3)
ABSOLUTE LYMPH #: 2.33 K/UL (ref 1.1–3.7)
ABSOLUTE MONO #: 1.11 K/UL (ref 0.1–1.2)
AMORPHOUS: ABNORMAL
AMPHETAMINE SCREEN URINE: NEGATIVE
ANTIBODY SCREEN: NEGATIVE
BACTERIA: ABNORMAL
BARBITURATE SCREEN URINE: NEGATIVE
BASOPHILS # BLD: 0 % (ref 0–2)
BASOPHILS ABSOLUTE: <0.03 K/UL (ref 0–0.2)
BENZODIAZEPINE SCREEN, URINE: NEGATIVE
BILIRUBIN URINE: NEGATIVE
BUPRENORPHINE URINE: ABNORMAL
CANNABINOID SCREEN URINE: POSITIVE
CASTS UA: ABNORMAL /LPF (ref 0–8)
COCAINE METABOLITE, URINE: NEGATIVE
COLOR: YELLOW
CRYSTALS, UA: ABNORMAL /HPF
DIFFERENTIAL TYPE: ABNORMAL
EOSINOPHILS RELATIVE PERCENT: 2 % (ref 1–4)
EPITHELIAL CELLS UA: ABNORMAL /HPF (ref 0–5)
GLUCOSE URINE: NEGATIVE
HCT VFR BLD CALC: 39.6 % (ref 36.3–47.1)
HEMOGLOBIN: 12.8 G/DL (ref 11.9–15.1)
HEPATITIS B SURFACE ANTIGEN: NONREACTIVE
HIV AG/AB: NONREACTIVE
IMMATURE GRANULOCYTES: 0 %
KETONES, URINE: NEGATIVE
LEUKOCYTE ESTERASE, URINE: ABNORMAL
LYMPHOCYTES # BLD: 20 % (ref 24–43)
MCH RBC QN AUTO: 29.6 PG (ref 25.2–33.5)
MCHC RBC AUTO-ENTMCNC: 32.3 G/DL (ref 28.4–34.8)
MCV RBC AUTO: 91.5 FL (ref 82.6–102.9)
MDMA URINE: ABNORMAL
METHADONE SCREEN, URINE: NEGATIVE
METHAMPHETAMINE, URINE: ABNORMAL
MONOCYTES # BLD: 10 % (ref 3–12)
MUCUS: ABNORMAL
NITRITE, URINE: NEGATIVE
OPIATES, URINE: NEGATIVE
OTHER OBSERVATIONS UA: ABNORMAL
OXYCODONE SCREEN URINE: NEGATIVE
PDW BLD-RTO: 13.2 % (ref 11.8–14.4)
PH UA: 7 (ref 5–8)
PHENCYCLIDINE, URINE: NEGATIVE
PLATELET # BLD: 281 K/UL (ref 138–453)
PLATELET ESTIMATE: ABNORMAL
PMV BLD AUTO: 11.5 FL (ref 8.1–13.5)
PROPOXYPHENE, URINE: ABNORMAL
PROTEIN UA: ABNORMAL
RBC # BLD: 4.33 M/UL (ref 3.95–5.11)
RBC # BLD: ABNORMAL 10*6/UL
RBC UA: ABNORMAL /HPF (ref 0–4)
RENAL EPITHELIAL, UA: ABNORMAL /HPF
RUBV IGG SER QL: 141.7 IU/ML
SEG NEUTROPHILS: 68 % (ref 36–65)
SEGMENTED NEUTROPHILS ABSOLUTE COUNT: 7.81 K/UL (ref 1.5–8.1)
SPECIFIC GRAVITY UA: 1.02 (ref 1–1.03)
T. PALLIDUM, IGG: NONREACTIVE
TEST INFORMATION: ABNORMAL
TRICHOMONAS: ABNORMAL
TRICYCLIC ANTIDEPRESSANTS, UR: ABNORMAL
TURBIDITY: CLEAR
URINE HGB: NEGATIVE
UROBILINOGEN, URINE: NORMAL
WBC # BLD: 11.6 K/UL (ref 3.5–11.3)
WBC # BLD: ABNORMAL 10*3/UL
WBC UA: ABNORMAL /HPF (ref 0–5)
YEAST: ABNORMAL

## 2017-12-18 PROCEDURE — 86850 RBC ANTIBODY SCREEN: CPT

## 2017-12-18 PROCEDURE — 80307 DRUG TEST PRSMV CHEM ANLYZR: CPT

## 2017-12-18 PROCEDURE — 86901 BLOOD TYPING SEROLOGIC RH(D): CPT

## 2017-12-18 PROCEDURE — 36415 COLL VENOUS BLD VENIPUNCTURE: CPT

## 2017-12-18 PROCEDURE — 87086 URINE CULTURE/COLONY COUNT: CPT

## 2017-12-18 PROCEDURE — H1000 PRENATAL CARE ATRISK ASSESSM: HCPCS | Performed by: OBSTETRICS & GYNECOLOGY

## 2017-12-18 PROCEDURE — G8417 CALC BMI ABV UP PARAM F/U: HCPCS | Performed by: OBSTETRICS & GYNECOLOGY

## 2017-12-18 PROCEDURE — G8427 DOCREV CUR MEDS BY ELIG CLIN: HCPCS | Performed by: OBSTETRICS & GYNECOLOGY

## 2017-12-18 PROCEDURE — 86900 BLOOD TYPING SEROLOGIC ABO: CPT

## 2017-12-18 PROCEDURE — 87340 HEPATITIS B SURFACE AG IA: CPT

## 2017-12-18 PROCEDURE — 99211 OFF/OP EST MAY X REQ PHY/QHP: CPT | Performed by: OBSTETRICS & GYNECOLOGY

## 2017-12-18 PROCEDURE — 85025 COMPLETE CBC W/AUTO DIFF WBC: CPT

## 2017-12-18 PROCEDURE — 59899 UNLISTED PX MAT CARE&DLVR: CPT | Performed by: OBSTETRICS & GYNECOLOGY

## 2017-12-18 PROCEDURE — 86762 RUBELLA ANTIBODY: CPT

## 2017-12-18 PROCEDURE — 86780 TREPONEMA PALLIDUM: CPT

## 2017-12-18 PROCEDURE — 81001 URINALYSIS AUTO W/SCOPE: CPT

## 2017-12-18 PROCEDURE — 87389 HIV-1 AG W/HIV-1&-2 AB AG IA: CPT

## 2017-12-18 NOTE — PROGRESS NOTES
History- yes- THC  Depression/Anxiety History- no  Domestic Abuse History- no  Dental Care- pt has appt today   Reviewed how to reach Ob/Gyn Resident afterhours-  Pt verb understanding

## 2017-12-19 ENCOUNTER — TELEPHONE (OUTPATIENT)
Dept: OBGYN | Age: 27
End: 2017-12-19

## 2017-12-19 ENCOUNTER — HOSPITAL ENCOUNTER (EMERGENCY)
Age: 27
Discharge: HOME | End: 2017-12-19
Attending: EMERGENCY MEDICINE
Payer: COMMERCIAL

## 2017-12-19 VITALS
TEMPERATURE: 98.8 F | HEIGHT: 66 IN | HEART RATE: 86 BPM | DIASTOLIC BLOOD PRESSURE: 67 MMHG | BODY MASS INDEX: 32.78 KG/M2 | SYSTOLIC BLOOD PRESSURE: 115 MMHG | OXYGEN SATURATION: 98 % | WEIGHT: 204 LBS | RESPIRATION RATE: 16 BRPM

## 2017-12-19 DIAGNOSIS — Z20.2 POSSIBLE EXPOSURE TO STD: Primary | ICD-10-CM

## 2017-12-19 DIAGNOSIS — Z3A.12 12 WEEKS GESTATION OF PREGNANCY: ICD-10-CM

## 2017-12-19 LAB
-: ABNORMAL
AMORPHOUS: ABNORMAL
BACTERIA: ABNORMAL
BILIRUBIN URINE: NEGATIVE
CASTS UA: ABNORMAL /LPF (ref 0–2)
COLOR: YELLOW
COMMENT UA: ABNORMAL
CRYSTALS, UA: ABNORMAL /HPF
CULTURE: NORMAL
CULTURE: NORMAL
DIRECT EXAM: ABNORMAL
EPITHELIAL CELLS UA: ABNORMAL /HPF (ref 0–5)
GLUCOSE URINE: NEGATIVE
KETONES, URINE: NEGATIVE
LEUKOCYTE ESTERASE, URINE: NEGATIVE
Lab: ABNORMAL
Lab: NORMAL
MUCUS: ABNORMAL
NITRITE, URINE: NEGATIVE
OTHER OBSERVATIONS UA: ABNORMAL
PH UA: 7 (ref 5–8)
PROTEIN UA: ABNORMAL
RBC UA: ABNORMAL /HPF (ref 0–2)
RENAL EPITHELIAL, UA: ABNORMAL /HPF
SPECIFIC GRAVITY UA: 1.02 (ref 1–1.03)
SPECIMEN DESCRIPTION: ABNORMAL
SPECIMEN DESCRIPTION: NORMAL
STATUS: ABNORMAL
STATUS: NORMAL
TRICHOMONAS: ABNORMAL
TURBIDITY: ABNORMAL
URINE HGB: NEGATIVE
UROBILINOGEN, URINE: NORMAL
WBC UA: ABNORMAL /HPF (ref 0–5)
YEAST: ABNORMAL

## 2017-12-19 PROCEDURE — 87510 GARDNER VAG DNA DIR PROBE: CPT

## 2017-12-19 PROCEDURE — 81001 URINALYSIS AUTO W/SCOPE: CPT

## 2017-12-19 PROCEDURE — 87491 CHLMYD TRACH DNA AMP PROBE: CPT

## 2017-12-19 PROCEDURE — 87660 TRICHOMONAS VAGIN DIR PROBE: CPT

## 2017-12-19 PROCEDURE — 6360000002 HC RX W HCPCS: Performed by: STUDENT IN AN ORGANIZED HEALTH CARE EDUCATION/TRAINING PROGRAM

## 2017-12-19 PROCEDURE — 6370000000 HC RX 637 (ALT 250 FOR IP): Performed by: STUDENT IN AN ORGANIZED HEALTH CARE EDUCATION/TRAINING PROGRAM

## 2017-12-19 PROCEDURE — 96372 THER/PROPH/DIAG INJ SC/IM: CPT

## 2017-12-19 PROCEDURE — 87591 N.GONORRHOEAE DNA AMP PROB: CPT

## 2017-12-19 PROCEDURE — 87480 CANDIDA DNA DIR PROBE: CPT

## 2017-12-19 PROCEDURE — 99284 EMERGENCY DEPT VISIT MOD MDM: CPT

## 2017-12-19 PROCEDURE — 87086 URINE CULTURE/COLONY COUNT: CPT

## 2017-12-19 RX ORDER — AZITHROMYCIN 250 MG/1
1000 TABLET, FILM COATED ORAL ONCE
Status: COMPLETED | OUTPATIENT
Start: 2017-12-19 | End: 2017-12-19

## 2017-12-19 RX ORDER — CEFTRIAXONE SODIUM 250 MG/1
250 INJECTION, POWDER, FOR SOLUTION INTRAMUSCULAR; INTRAVENOUS ONCE
Status: COMPLETED | OUTPATIENT
Start: 2017-12-19 | End: 2017-12-19

## 2017-12-19 RX ORDER — CEPHALEXIN 500 MG/1
500 CAPSULE ORAL 4 TIMES DAILY
Qty: 40 CAPSULE | Refills: 0 | Status: SHIPPED | OUTPATIENT
Start: 2017-12-19 | End: 2017-12-29

## 2017-12-19 RX ADMIN — CEFTRIAXONE SODIUM 250 MG: 250 INJECTION, POWDER, FOR SOLUTION INTRAMUSCULAR; INTRAVENOUS at 11:08

## 2017-12-19 RX ADMIN — AZITHROMYCIN 1000 MG: 250 TABLET, FILM COATED ORAL at 11:08

## 2017-12-19 ASSESSMENT — ENCOUNTER SYMPTOMS
PHOTOPHOBIA: 0
VOMITING: 0
NAUSEA: 0
BACK PAIN: 0
BLOOD IN STOOL: 0
ABDOMINAL DISTENTION: 0
RHINORRHEA: 0
WHEEZING: 0
ABDOMINAL PAIN: 0
COUGH: 0
SHORTNESS OF BREATH: 0
SORE THROAT: 0

## 2017-12-19 NOTE — ED NOTES
Pt is 12 weeks pregnant and boyfriend recently treated for STD, pt states having vaginal discharge and discomfort now. Denies any urinary symptoms, fever, or chills.       Mark Coleman RN  12/19/17 5206

## 2017-12-19 NOTE — TELEPHONE ENCOUNTER
Sw met with pt to complete the initial pathways assessment. Pt reports that she would like to look for better housing. In addition, pt reports needing social support. Pt reports that she smokes tobacco products (filter tips) but that she has cut down from her norm. Pt also reports daily use of marijuana. Pt reports a diagnosis of OCD, Depression, anxiety, bi-polar manic, and split personality. Pt [facility]   Pt was referred to asha Kellee Dietrich

## 2017-12-19 NOTE — ED PROVIDER NOTES
[facility]  ED  Emergency Department Encounter  Emergency Medicine Resident     Pt Name: Chance Doty  MRN: [id number]  [place] 1990  Date of evaluation: 17  PCP:  No primary care provider on file. CHIEF COMPLAINT       Chief Complaint   Patient presents with    Vaginal Discharge       HISTORY OF PRESENT ILLNESS  (Location/Symptom, Timing/Onset, Context/Setting, Quality, Duration, Modifying Factors, Severity.)      Chance Doty is a 32 y.o. female who presents with Vaginal discharge and concern for STD. Patient states her boyfriend and was seen here last night and treated for sexual transmitted infection and that she would like to be treated too. She does admit to vaginal discharge as well as some mild suprapubic abdominal pain. She states she's had this for the past several days. She is well-appearing and nontoxic. Patient is currently 12 weeks pregnant and denies any leakage of fluids or vaginal bleeding. She denies any nausea or vomiting fever or chills. PAST MEDICAL / SURGICAL / SOCIAL / FAMILY HISTORY      has a past medical history of Headache(784.0); Pityriasis rosea; and Trichomonas. has a past surgical history that includes Cholecystectomy () and  section (). Social History     Social History    Marital status: Single     Spouse name: N/A    Number of children: N/A    Years of education: N/A     Occupational History    Not on file.      Social History Main Topics    Smoking status: Current Every Day Smoker     Packs/day: 0.50     Years: 10.00     Types: Cigarettes    Smokeless tobacco: Never Used      Comment: 2 cig per day   pt attempting to cut back     Alcohol use No      Comment: occasionally when not preg     Drug use: Yes     Types: Marijuana      Comment: used end of 2017    Sexual activity: Yes     Partners: Male      Comment: New Partner      Other Topics Concern    Not on file     Social History Narrative    No narrative on file       Family History   Problem Relation Age of Onset    High Blood Pressure Mother        Allergies:  Review of patient's allergies indicates no known allergies. Home Medications:  Prior to Admission medications    Medication Sig Start Date End Date Taking? Authorizing Provider   cephALEXin (KEFLEX) 500 MG capsule Take 1 capsule by mouth 4 times daily for 10 days 12/19/17 12/29/17 Yes Andrew Guzmán, DO   cetirizine (ZYRTEC) 10 MG chewable tablet Take 1 tablet by mouth daily 12/8/17   Lorena Joe MD   Prenatal Vit-Fe Fumarate-FA (PRENATAL VITAMIN) 27-1 MG TABS tablet Take 1 tablet by mouth daily 66/8/21   Troy Mcleod,    doxyLAMINE succinate (GNP SLEEP AID) 25 MG tablet Take 1 tablet by mouth as needed for Sleep (nausea) 10/26/17   Brianne Lizarraga, DO   vitamin B-6 (PYRIDOXINE) 50 MG tablet Take 1 tablet by mouth daily 10/26/17   Brianne Lizarraga, DO   acetaminophen (TYLENOL) 325 MG tablet Take 2 tablets by mouth every 6 hours as needed for Pain 9/20/17   Mercedes Styles PA-C       REVIEW OF SYSTEMS    (2-9 systems for level 4, 10 or more for level 5)      Review of Systems   Constitutional: Negative for appetite change, chills, diaphoresis, fatigue, fever and unexpected weight change. HENT: Negative for congestion, rhinorrhea and sore throat. Eyes: Negative for photophobia. Respiratory: Negative for cough, shortness of breath and wheezing. Cardiovascular: Negative for chest pain, palpitations and leg swelling. Gastrointestinal: Negative for abdominal distention, abdominal pain, blood in stool, nausea and vomiting. Genitourinary: Positive for pelvic pain and vaginal discharge. Negative for difficulty urinating, dysuria, flank pain and hematuria. Musculoskeletal: Negative for arthralgias, back pain and myalgias. Neurological: Negative for dizziness, syncope, weakness, numbness and headaches. Psychiatric/Behavioral: Negative for confusion.        PHYSICAL EXAM and Rocephin, pelvic exam, and urinalysis. 12:30 PM  Patient told nurse that she wanted to leave, did not want to wait for results of vaginitis swab. Will discharge with keflex for bacteria in the urine. Encouraged her to follow up with her ObGyn within the next week and to return with any new or worsening signs or symptoms. She has no questions or concerns at this time. EARLY PREGNANCY ULTRASOUND:  A limited, bedside pelvic ultrasound was performed using a transabdominal probe. The medical necessity was to evaluate for signs of an intrauterine versus ectopic pregnancy. The structures studied were the uterus and its contents, bladder, ovaries, vesicouterine space, and rectouterine space. FINDINGS:  Evidence for an IUP was seen. 12 week empty sac  The study was technically adequate.     Pelvic exam procedure:    Patient consented to Pelvic exam performed. Nurse assisting and chaperoning. EXAM:  Vagina:   -noted physiologic discharge, did not appear copious, malodorous, white in color, thin   -no tenderness noted, no CMT  -no bleeding/ blood   -no foreign bodies   Cervix:   -os closed, no bleeding, no lacerations  External:  -no lesions, abrasions, lacerations, normal labia majora and minora    Patient tolerated procedure well without complications. FINAL IMPRESSION      1.  Possible exposure to STD    2. 12 weeks gestation of pregnancy          DISPOSITION / PLAN     DISPOSITION Decision to Discharge    PATIENT REFERRED TO:  Tejal Torres MD  [address] [place] [address] [address] [phone]    Schedule an appointment as soon as possible for a visit in 1 week        DISCHARGE MEDICATIONS:  Discharge Medication List as of 12/19/2017 12:37 PM      START taking these medications    Details   cephALEXin (KEFLEX) 500 MG capsule Take 1 capsule by mouth 4 times daily for 10 days, Disp-40 capsule, R-0Print             Mohinder Kim DO  Emergency Medicine Resident    (Please note that portions of this note were completed with a voice recognition program.  Efforts were made to edit the dictations but occasionally words are mis-transcribed.)       Caroline Guo DO  Resident  12/19/17 [id number]

## 2017-12-19 NOTE — ED PROVIDER NOTES
[facility]     Emergency Department     Faculty Attestation    I performed a history and physical examination of the patient and discussed management with the resident. I have reviewed and agree with the residents findings including all diagnostic interpretations, and treatment plans as written. Any areas of disagreement are noted on the chart. I was personally present for the key portions of any procedures. I have documented in the chart those procedures where I was not present during the key portions. I have reviewed the emergency nurses triage note. I agree with the chief complaint, past medical history, past surgical history, allergies, medications, social and family history as documented unless otherwise noted below. Documentation of the HPI, Physical Exam and Medical Decision Making performed by scribhoang is based on my personal performance of the HPI, PE and MDM. For Physician Assistant/ Nurse Practitioner cases/documentation I have personally evaluated this patient and have completed at least one if not all key elements of the E/M (history, physical exam, and MDM). Additional findings are as noted. 3year-old female, 12 weeks gestation noted vaginal discharge. Patient believes she was exposed to essentially transmitted disease. No abdominal pain, no vaginal bleeding. Patient does follow with obstetrics. Appetite remains good. No fever. Physical exam.  No peritoneal findings. No rebound, guarding or rigidity. Abdomen benign. Fetal heart tones WERE STABLE. PLAN TO TREAT    Pre-hypertension/Hypertension: The patient has been informed that they may have pre-hypertension or Hypertension based on a blood pressure reading in the emergency department.  I recommend that the patient call the primary care provider listed on their discharge instructions or a physician of their choice this week to arrange follow up for further evaluation of possible pre-hypertension or Hypertension.         [facility] 24, DO  12/19/17 [id number]

## 2017-12-20 ENCOUNTER — HOSPITAL ENCOUNTER (OUTPATIENT)
Age: 27
Setting detail: SPECIMEN
Discharge: HOME | End: 2017-12-20
Payer: COMMERCIAL

## 2017-12-20 DIAGNOSIS — O09.91 HIGH-RISK PREGNANCY, FIRST TRIMESTER: ICD-10-CM

## 2017-12-20 LAB
C TRACH DNA GENITAL QL NAA+PROBE: NEGATIVE
CULTURE: NORMAL
CULTURE: NORMAL
GLUCOSE ADMINISTRATION: NORMAL
GLUCOSE TOLERANCE SCREEN 50G: 130 MG/DL (ref 70–135)
Lab: NORMAL
N. GONORRHOEAE DNA: ABNORMAL
SPECIMEN DESCRIPTION: NORMAL
STATUS: NORMAL

## 2017-12-20 PROCEDURE — 82950 GLUCOSE TEST: CPT

## 2017-12-20 PROCEDURE — 36415 COLL VENOUS BLD VENIPUNCTURE: CPT

## 2017-12-21 ENCOUNTER — ROUTINE PRENATAL (OUTPATIENT)
Dept: PERINATAL CARE | Age: 27
End: 2017-12-21
Payer: COMMERCIAL

## 2017-12-21 VITALS
BODY MASS INDEX: 32.47 KG/M2 | WEIGHT: 202 LBS | SYSTOLIC BLOOD PRESSURE: 111 MMHG | HEIGHT: 66 IN | RESPIRATION RATE: 16 BRPM | HEART RATE: 72 BPM | DIASTOLIC BLOOD PRESSURE: 73 MMHG | TEMPERATURE: 98.1 F

## 2017-12-21 DIAGNOSIS — Z36.9 FIRST TRIMESTER SCREENING: Primary | ICD-10-CM

## 2017-12-21 DIAGNOSIS — Z3A.12 12 WEEKS GESTATION OF PREGNANCY: ICD-10-CM

## 2017-12-21 DIAGNOSIS — O09.41 GRAND MULTIPARITY WITH CURRENT PREGNANCY IN FIRST TRIMESTER: ICD-10-CM

## 2017-12-21 DIAGNOSIS — O34.219 PREVIOUS CESAREAN DELIVERY, ANTEPARTUM CONDITION OR COMPLICATION: ICD-10-CM

## 2017-12-21 DIAGNOSIS — O36.80X0 ENCOUNTER TO DETERMINE FETAL VIABILITY OF PREGNANCY, SINGLE OR UNSPECIFIED FETUS: ICD-10-CM

## 2017-12-21 DIAGNOSIS — O99.810 ABNORMAL MATERNAL GLUCOSE TOLERANCE, ANTEPARTUM: ICD-10-CM

## 2017-12-21 DIAGNOSIS — O99.211 OBESITY AFFECTING PREGNANCY IN FIRST TRIMESTER: ICD-10-CM

## 2017-12-21 PROCEDURE — 76801 OB US < 14 WKS SINGLE FETUS: CPT | Performed by: OBSTETRICS & GYNECOLOGY

## 2017-12-21 PROCEDURE — 76813 OB US NUCHAL MEAS 1 GEST: CPT | Performed by: OBSTETRICS & GYNECOLOGY

## 2017-12-22 DIAGNOSIS — O99.810 ABNORMAL GLUCOSE TOLERANCE TEST IN PREGNANCY: Primary | ICD-10-CM

## 2017-12-27 ENCOUNTER — HOSPITAL ENCOUNTER (OUTPATIENT)
Age: 27
Setting detail: SPECIMEN
Discharge: HOME | End: 2017-12-27
Payer: COMMERCIAL

## 2017-12-27 DIAGNOSIS — O99.810 ABNORMAL GLUCOSE TOLERANCE TEST IN PREGNANCY: ICD-10-CM

## 2017-12-27 LAB
3 HR GLUCOSE: 45 MG/DL (ref 65–139)
AMOUNT GLUCOSE GIVEN: 100 G
GLUCOSE FASTING: 72 MG/DL (ref 65–94)
GLUCOSE TOLERANCE TEST 1 HOUR: 166 MG/DL (ref 65–179)
GLUCOSE TOLERANCE TEST 2 HOUR: 137 MG/DL (ref 65–154)

## 2017-12-27 PROCEDURE — 82951 GLUCOSE TOLERANCE TEST (GTT): CPT

## 2017-12-27 PROCEDURE — 36415 COLL VENOUS BLD VENIPUNCTURE: CPT

## 2017-12-27 PROCEDURE — 82952 GTT-ADDED SAMPLES: CPT

## 2018-01-02 ENCOUNTER — TELEPHONE (OUTPATIENT)
Dept: OBGYN | Age: 28
End: 2018-01-02

## 2018-01-02 NOTE — TELEPHONE ENCOUNTER
----- Message from [facility] sent at 1/2/2018 10:33 AM EST -----  Contact: patient  Patient called to cancel appt today, please contact her @ [phone] to resched.

## 2018-01-09 ENCOUNTER — HOSPITAL ENCOUNTER (OUTPATIENT)
Age: 28
Setting detail: SPECIMEN
Discharge: HOME | End: 2018-01-09
Payer: COMMERCIAL

## 2018-01-09 ENCOUNTER — INITIAL PRENATAL (OUTPATIENT)
Dept: OBGYN | Age: 28
End: 2018-01-09
Payer: COMMERCIAL

## 2018-01-09 ENCOUNTER — HOSPITAL ENCOUNTER (EMERGENCY)
Age: 28
Discharge: HOME | End: 2018-01-09
Attending: EMERGENCY MEDICINE
Payer: COMMERCIAL

## 2018-01-09 VITALS
RESPIRATION RATE: 16 BRPM | DIASTOLIC BLOOD PRESSURE: 79 MMHG | WEIGHT: 206 LBS | BODY MASS INDEX: 33.25 KG/M2 | HEART RATE: 71 BPM | OXYGEN SATURATION: 97 % | TEMPERATURE: 97.9 F | SYSTOLIC BLOOD PRESSURE: 124 MMHG

## 2018-01-09 VITALS
WEIGHT: 205 LBS | BODY MASS INDEX: 33.09 KG/M2 | DIASTOLIC BLOOD PRESSURE: 64 MMHG | SYSTOLIC BLOOD PRESSURE: 117 MMHG | HEART RATE: 73 BPM

## 2018-01-09 DIAGNOSIS — O09.92 HIGH-RISK PREGNANCY IN SECOND TRIMESTER: Primary | ICD-10-CM

## 2018-01-09 DIAGNOSIS — Z34.91 PRESENCE OF FETAL HEART SOUNDS IN FIRST TRIMESTER: Primary | ICD-10-CM

## 2018-01-09 DIAGNOSIS — Z3A.15 15 WEEKS GESTATION OF PREGNANCY: ICD-10-CM

## 2018-01-09 PROBLEM — Z86.19 HISTORY OF TRICHOMONAL VAGINITIS: Status: ACTIVE | Noted: 2018-01-09

## 2018-01-09 PROBLEM — O09.899 H/O MATERNAL GONORRHEA, CURRENTLY PREGNANT: Status: ACTIVE | Noted: 2018-01-09

## 2018-01-09 PROBLEM — O09.899 H/O PRETERM DELIVERY, CURRENTLY PREGNANT: Status: ACTIVE | Noted: 2018-01-09

## 2018-01-09 LAB
DIRECT EXAM: ABNORMAL
Lab: ABNORMAL
SPECIMEN DESCRIPTION: ABNORMAL
STATUS: ABNORMAL

## 2018-01-09 PROCEDURE — 99213 OFFICE O/P EST LOW 20 MIN: CPT | Performed by: OBSTETRICS & GYNECOLOGY

## 2018-01-09 PROCEDURE — G8427 DOCREV CUR MEDS BY ELIG CLIN: HCPCS | Performed by: OBSTETRICS & GYNECOLOGY

## 2018-01-09 PROCEDURE — G8484 FLU IMMUNIZE NO ADMIN: HCPCS | Performed by: OBSTETRICS & GYNECOLOGY

## 2018-01-09 PROCEDURE — 4004F PT TOBACCO SCREEN RCVD TLK: CPT | Performed by: OBSTETRICS & GYNECOLOGY

## 2018-01-09 PROCEDURE — G0382 LEV 3 HOSP TYPE B ED VISIT: HCPCS

## 2018-01-09 PROCEDURE — G8417 CALC BMI ABV UP PARAM F/U: HCPCS | Performed by: OBSTETRICS & GYNECOLOGY

## 2018-01-09 ASSESSMENT — ENCOUNTER SYMPTOMS
RHINORRHEA: 0
SHORTNESS OF BREATH: 0
ABDOMINAL PAIN: 0

## 2018-01-09 NOTE — ED PROVIDER NOTES
[facility] ED  Emergency Department Encounter  Emergency Medicine Resident     Pt Name: Kt Wilkinson  MRN: [id number]  [place] 1990  Date of evaluation: 18  PCP:  No primary care provider on file. CHIEF COMPLAINT       Chief Complaint   Patient presents with    Other     was at [facility] today, no fetal heart tones       HISTORY OF PRESENT ILLNESS  (Location/Symptom, Timing/Onset, Context/Setting, Quality, Duration, Modifying Factors, Severity, Associated signs/symptoms)     Kt Wilkinson is a 32 y.o. female who presents With concerns that her fetus is not viable given that there were no fetal heart tones present at [facility] visit today. States that they did a bedside Doppler and could not get fetal heart tones. She became concerned and felt unconsoled and wanted to be evaluated by the emergency department. She states that she has not felt baby moving. She denies any symptoms at this time including any abdominal pain, vaginal discharge, vaginal bleeding, because she is a fluid. Patient otherwise entirely asymptomatic. She states that her last menstrual period was in September and she has been using an application on her phone and she is approximately 15 weeks gestation. Denies having any ultrasound formally done through the vagina or on the abdomen. Patient states otherwise that this is her seventh pregnancy. She has 5 living children. The other 2 were miscarriages. PAST MEDICAL / SURGICAL / SOCIAL / FAMILY HISTORY      has a past medical history of Headache(784.0); Pityriasis rosea; and Trichomonas. has a past surgical history that includes Cholecystectomy () and  section (). Social History     Social History    Marital status: Single     Spouse name: N/A    Number of children: N/A    Years of education: N/A     Occupational History    Not on file.      Social History Main Topics    Smoking status: Current Every Day Smoker     Packs/day: 0.50     Years:

## 2018-01-09 NOTE — PROGRESS NOTES
[place]/2nd Weight Sex Delivery Anes PTL Lv   7 Current            6 SAB            5  14 36w0d  9 lb 1 oz (4.111 kg) F  EPI N PRANAY      Complications: Maternal fever affecting labor,Acute URI,Fetal tachycardia affecting care of mother, delivered   4 Term 09 37w0d  5 lb 8 oz (2.495 kg) F CS-LTranv Spinal N PRANAY   3 Term 08 40w0d  7 lb 15 oz (3.6 kg) F Vag-Spont EPI N PRANAY   2 Term 07 40w0d  10 lb (4.536 kg) M Vag-Spont EPI N PRANAY   1 Term 03 40w0d  6 lb 15 oz (3.147 kg) F Vag-Spont EPI N PRANAY      Obstetric Comments   New partner in current preg    G5- indicated  delivery d/t suspected chorioamnionitis       Past Medical History:   Diagnosis Date    Headache(784.0)     Pityriasis rosea     Trichomonas 2013    treated     Past Surgical History:   Procedure Laterality Date     SECTION      CHOLECYSTECTOMY        Social History     Social History    Marital status: Single     Spouse name: N/A    Number of children: N/A    Years of education: N/A     Occupational History    Not on file.      Social History Main Topics    Smoking status: Current Every Day Smoker     Packs/day: 0.50     Years: 10.00     Types: Cigarettes    Smokeless tobacco: Never Used      Comment: 2 cig per day   pt attempting to cut back     Alcohol use No      Comment: occasionally when not preg     Drug use: Yes     Types: Marijuana      Comment: used end of 2017    Sexual activity: Yes     Partners: Male      Comment: New Partner      Other Topics Concern    Not on file     Social History Narrative    No narrative on file     Family History   Problem Relation Age of Onset    High Blood Pressure Mother        MEDICATIONS:  Current Outpatient Prescriptions   Medication Sig Dispense Refill    Prenatal Vit-Fe Fumarate-FA (PRENATAL VITAMIN) 27-1 MG TABS tablet Take 1 tablet by mouth daily 30 tablet 6    cetirizine (ZYRTEC) 10 MG chewable tablet Take 1 tablet by mouth daily Pt verbalizes understanding.  Pityriasis rosea 2017     Pt prescribed Zyrtec for relief       Grand multiparity 2017            Plan:      Initial labs reviewed. GC/C and vaginitis swabs collected and sent. MSAFP ordered today. Prenatal vitamins. Problem list reviewed and updated. Role of ultrasound in pregnancy discussed; fetal survey: requests  Amniocentesis discussed: Not indicated  Cultures & Wet Prep Collected  Follow-up in 4 weeks  Repeat Annual every 1 year  Cervical Cytology Evaluation begins at 24years old. If Negative Cytology, Follow-up screening per current guidelines. COUNSELING COMPLETED:  INITIAL OBSTETRICAL VISIT EVALUATION:  The patient was seen full history and physical was completed/reviewed. Cytology was collected for patients over 24years of age. Cultures were collected. The patient was counseled on office policies and she was counseled on termination of pregnancy in the state of [place]. The patient was counseled on Toxoplasmosis, HIV, Tobacco Abuse, Group Beta Strep Infections, Cystic Fibrosis,  Labor precautions and Sickle Cell disease. The patient was counseled on the risks of tobacco abuse. Both maternal and fetal. She was instructed to stop smoking if currently using tobacco. Morbidity, mortality, and cessation programs were reviewed. The risks include but are not limited to increased risks of  labor,  delivery, premature rupture of membranes, intrauterine growth restriction, intrauterine fetal demise and abruptio placenta. Secondary smoke risks were also reviewed. Increases in cancer, respiratory problems, and sudden infant death syndrome were reviewed as well. The patient was informed of a 2-4% risk of congenital anomalies in the general population. She was also informed that karyotyping is the only way to evaluate the fetus for genetic problems and genetic lethal anomalies.  Chorionic villous sampling, amniocentesis and VeriFi were also discussed with morbidity rates in detail. She undecided the procedure. Route of delivery and counseling on vaginal, operative vaginal, and  sections were completed with the risks of each to both the patient as well as her baby. The possibility of a blood transfusion was discussed as well. The patient was not opposed to receiving a transfusion if needed. Nuchal translucency/Quad Evaluation and MSAFP single marker testing was reviewed in detail with attention to timing of testing and their windows. For patients beyond the gestational age for Nuchal translucency evaluation Quad testing was recommended. Timing for the Quad test was reviewed. Benefits of the above testing was reviewed. A second trimester amniocentesis was also made available to the patient. Risks, Benefits and non-invasive alternative testing was reviewed. The patient was questioned in detail regarding any genetic misnomer history, chromosomal abnormalities, or learning disabilities in  herself, the father of the baby or their families. SHE DENIED ANY HISTORY AS STATED ABOVE: Yes    Upon completion of the visit all questions were answered and the patients follow-up and testing schedule were reviewed. Prenatal vitamins were given. T-dap Vaccine recommendations reviewed with the patient. Patient notified of timing of vaccination 27-36 weeks gestation. Patient aware Vaccine is NOT Live. Yes.             See-Shelbi So, DO   OB/Gyn PGY4  Pager: [phone]  [facility] [place] [address] [facility]  2018 4:40 PM

## 2018-01-10 LAB
C TRACH DNA GENITAL QL NAA+PROBE: NEGATIVE
N. GONORRHOEAE DNA: NEGATIVE

## 2018-01-10 RX ORDER — METRONIDAZOLE 500 MG/1
500 TABLET ORAL 2 TIMES DAILY
Qty: 14 TABLET | Refills: 0 | Status: SHIPPED | OUTPATIENT
Start: 2018-01-10 | End: 2018-01-17

## 2018-01-10 NOTE — ED PROVIDER NOTES
[facility]     Emergency Department     Faculty Attestation    I performed a history and physical examination of the patient and discussed management with the resident. I have reviewed and agree with the residents findings including all diagnostic interpretations, and treatment plans as written. Any areas of disagreement are noted on the chart. I was personally present for the key portions of any procedures. I have documented in the chart those procedures where I was not present during the key portions. I have reviewed the emergency nurses triage note. I agree with the chief complaint, past medical history, past surgical history, allergies, medications, social and family history as documented unless otherwise noted below. Documentation of the HPI, Physical Exam and Medical Decision Making performed by scribhoang is based on my personal performance of the HPI, PE and MDM. For Physician Assistant/ Nurse Practitioner cases/documentation I have personally evaluated this patient and have completed at least one if not all key elements of the E/M (history, physical exam, and MDM). Additional findings are as noted. Primary Care Physician: No primary care provider on file. History: This is a 32 y.o. female who presents to the Emergency Department with complaint of having OB appointment today. Patient is  with 1 prior miscarriage. States she is approximately 15 weeks pregnant. Without the doctor's office today and they were unable to obtain Doppler fetal heart tones. Patient came to the hospital for reevaluation as she was very anxious. Patient denies any vaginal bleeding or abdominal pain, no nausea or vomiting. She has not yet felt any fetal movement. Physical:   weight is 206 lb (93.4 kg). Her oral temperature is 97.9 °F (36.6 °C). Her blood pressure is 124/79 and her pulse is 71. Her respiration is 16 and oxygen saturation is 97%.     Patient is

## 2018-01-10 NOTE — H&P
Adelaida Mclaughlin is a 33 yo M1 at 15 wks gestation by LMP who presents following an OB visit due to concern about fetal heart tones. Patient states that this was her first OB visit for this pregnancy and her OB had difficulty finding fetal heart tones. Pt was concerned about the health of the fetus and so presented to ED for US and reassurance. Pt recently treated for [facility]. She denies fever, chills, cramps, vaginal bleeding/discharge, abdominal pain/tenderness. Abdominal US showed actively moving fetus with heart tones in the 140s, measuring at 15 wks by BPD.

## 2018-01-19 ENCOUNTER — HOSPITAL ENCOUNTER (OUTPATIENT)
Age: 28
Setting detail: SPECIMEN
Discharge: HOME | End: 2018-01-19
Payer: COMMERCIAL

## 2018-01-19 DIAGNOSIS — O09.92 HIGH-RISK PREGNANCY IN SECOND TRIMESTER: ICD-10-CM

## 2018-01-19 DIAGNOSIS — Z3A.15 15 WEEKS GESTATION OF PREGNANCY: ICD-10-CM

## 2018-01-19 PROCEDURE — 36415 COLL VENOUS BLD VENIPUNCTURE: CPT

## 2018-01-19 PROCEDURE — 82105 ALPHA-FETOPROTEIN SERUM: CPT

## 2018-01-21 LAB
AFP INTERPRETATION: NORMAL
AFP MOM: 0.87
AFP SPECIMEN: NORMAL
AFP: 27 NG/ML
DATE OF BIRTH: NORMAL
DATING METHOD: NORMAL
DETERMINED BY: NORMAL
DIABETIC: NO
DUE DATE: NORMAL
ESTIMATED DUE DATE: NORMAL
FAMILY HISTORY NTD: NO
GESTATIONAL AGE: 16.43 WEEKS
HISTORY OF ANEUPLOIDY?: NO
INSULIN REQ DIABETES: NO
LAST MENSTRUAL PERIOD: NORMAL
MATERNAL AGE AT EDD: 29.9 YR
MATERNAL WEIGHT: 205
NUMBER OF FETUSES: NORMAL
PATIENT WEIGHT: 205 LBS
PHYSICIAN: NORMAL
RACE (MATERNAL): NORMAL
RACE: NORMAL
ZZ NTE CLEAN UP: HISTORY: NO

## 2018-01-22 ENCOUNTER — TELEPHONE (OUTPATIENT)
Dept: OBGYN | Age: 28
End: 2018-01-22

## 2018-01-26 ENCOUNTER — APPOINTMENT (OUTPATIENT)
Dept: GENERAL RADIOLOGY | Age: 28
End: 2018-01-26
Payer: COMMERCIAL

## 2018-01-26 ENCOUNTER — HOSPITAL ENCOUNTER (EMERGENCY)
Age: 28
Discharge: HOME HEALTH | End: 2018-01-26
Attending: EMERGENCY MEDICINE
Payer: COMMERCIAL

## 2018-01-26 VITALS
SYSTOLIC BLOOD PRESSURE: 122 MMHG | HEART RATE: 81 BPM | RESPIRATION RATE: 18 BRPM | TEMPERATURE: 97.3 F | OXYGEN SATURATION: 98 % | DIASTOLIC BLOOD PRESSURE: 80 MMHG
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
## 2018-01-26 PROCEDURE — 99284 EMERGENCY DEPT VISIT MOD MDM: CPT

## 2018-01-26 PROCEDURE — 73562 X-RAY EXAM OF KNEE 3: CPT

## 2018-01-26 PROCEDURE — 72100 X-RAY EXAM L-S SPINE 2/3 VWS: CPT

## 2018-01-26 PROCEDURE — 6370000000 HC RX 637 (ALT 250 FOR IP): Performed by: EMERGENCY MEDICINE

## 2018-01-26 RX ORDER — ACETAMINOPHEN 500 MG
1000 TABLET ORAL ONCE
Status: COMPLETED | OUTPATIENT
Start: 2018-01-26 | End: 2018-01-26

## 2018-01-26 RX ADMIN — ACETAMINOPHEN 1000 MG: 500 TABLET ORAL at 13:12

## 2018-01-26 ASSESSMENT — ENCOUNTER SYMPTOMS
NAUSEA: 0
SHORTNESS OF BREATH: 0
VOMITING: 0
BACK PAIN: 1
RHINORRHEA: 0
ABDOMINAL PAIN: 0

## 2018-01-26 ASSESSMENT — PAIN SCALES - GENERAL
PAINLEVEL_OUTOF10: 7
PAINLEVEL_OUTOF10: 7

## 2018-01-26 ASSESSMENT — PAIN SCALES - WONG BAKER: WONGBAKER_NUMERICALRESPONSE: 6

## 2018-01-26 ASSESSMENT — PAIN DESCRIPTION - DESCRIPTORS: DESCRIPTORS: CONSTANT;CRAMPING

## 2018-01-26 ASSESSMENT — PAIN DESCRIPTION - PAIN TYPE: TYPE: ACUTE PAIN

## 2018-01-26 NOTE — ED PROVIDER NOTES
[facility]     Emergency Department     Faculty Attestation    I performed a history and physical examination of the patient and discussed management with the resident. I reviewed the residents note and agree with the documented findings and plan of care. Any areas of disagreement are noted on the chart. I was personally present for the key portions of any procedures. I have documented in the chart those procedures where I was not present during the key portions. I have reviewed the emergency nurses triage note. I agree with the chief complaint, past medical history, past surgical history, allergies, medications, social and family history as documented unless otherwise noted below. For Physician Assistant/ Nurse Practitioner cases/documentation I have personally evaluated this patient and have completed at least one if not all key elements of the E/M (history, physical exam, and MDM). Additional findings are as noted. Patient pregnant, trip and fall at home, 19 weeks by dates. No loss of consciousness. Did hit her abdomen. Some cramping but no bleeding or fluid. On exammild abdominal tenderness no rebound or guarding, abdomen is gravid just at the umbilicus. Bedside ultrasound, call OB, imaging.     Critical Care     none    Princess Daniel MD, Vini Rendon  Attending Emergency  Physician             Princess Daniel MD  01/26/18 [id number]

## 2018-01-29 ENCOUNTER — ROUTINE PRENATAL (OUTPATIENT)
Dept: OBGYN | Age: 28
End: 2018-01-29
Payer: COMMERCIAL

## 2018-01-29 VITALS
HEART RATE: 74 BPM | WEIGHT: 205 LBS | SYSTOLIC BLOOD PRESSURE: 111 MMHG | DIASTOLIC BLOOD PRESSURE: 68 MMHG | BODY MASS INDEX: 33.09 KG/M2

## 2018-01-29 DIAGNOSIS — Z3A.17 17 WEEKS GESTATION OF PREGNANCY: ICD-10-CM

## 2018-01-29 DIAGNOSIS — O09.92 HIGH-RISK PREGNANCY IN SECOND TRIMESTER: Primary | ICD-10-CM

## 2018-01-29 PROCEDURE — G8427 DOCREV CUR MEDS BY ELIG CLIN: HCPCS | Performed by: OBSTETRICS & GYNECOLOGY

## 2018-01-29 PROCEDURE — G8417 CALC BMI ABV UP PARAM F/U: HCPCS | Performed by: OBSTETRICS & GYNECOLOGY

## 2018-01-29 PROCEDURE — G8484 FLU IMMUNIZE NO ADMIN: HCPCS | Performed by: OBSTETRICS & GYNECOLOGY

## 2018-01-29 PROCEDURE — 99213 OFFICE O/P EST LOW 20 MIN: CPT | Performed by: OBSTETRICS & GYNECOLOGY

## 2018-01-29 PROCEDURE — 4004F PT TOBACCO SCREEN RCVD TLK: CPT | Performed by: OBSTETRICS & GYNECOLOGY

## 2018-01-29 NOTE — PROGRESS NOTES
c/s x1,  h/o  , h/o  3    Last pap 16- NEG  Cultures at next visit. Pt agreeable to Flu and TDAP   1hr gtt given with initial prenatal labs for early diabetic screen   Pt using Zrytec for relief due to Pityriasis Rosea  Pt desires first trimester screen/msafp- 17- MFM referral placed for screen and anatomy scan           Assessment:  1. Chinyere Chew is a 32 y.o. female  2. [id number]  3. 17w6d    Patient Active Problem List    Diagnosis Date Noted    H/O gonorrhea (CAMPOS sent 18) 2018     Treated in the ED 17      Remote H/O trichomonas 2018    H/O indicated  delivery 2018     Suspected chorioamnionitis      Obesity affecting pregnancy in first trimester 2017    Abnml early 1h GTT, nml early 3h GTT 2017     Plan for routine 28wk GTT      Previous  delivery, antepartum condition or complication     Grand multiparity with current pregnancy in first trimester 2017    High-risk pregnancy, first trimester 2017- MFM referral placed for first trimester screen and anatomy scan u/s      H/O  x1 (breech) 2017     H/O successful  x1  Desires TOLAC this pregnacy      H/O successful  2017     2014      Obesity 2017     1hr gtt lab- Early diabetic screeing       Smoker 2017     Pt counseled on maternal/fetal risk factors. Pt verbalizes understanding       Mild tetrahydrocannabinol (THC) abuse 2017     Pt counseled not recommended in pregnancy. Maternal/Fetal risks reviewed. Pt verbalizes understanding.  Pityriasis rosea 2017     Pt prescribed Zyrtec for relief       Grand multiparity 2017       1.  High-risk pregnancy in second trimester     2. 17 weeks gestation of pregnancy           Plan:  Pt to follow up as scheduled for next [facility] visit  Pt to go to ER with any severe cramping/vaginal bleeding  Encouraged pt to drink 10-12 glasses of water a

## 2018-02-05 ENCOUNTER — ROUTINE PRENATAL (OUTPATIENT)
Dept: OBGYN | Age: 28
End: 2018-02-05
Payer: COMMERCIAL

## 2018-02-05 VITALS
DIASTOLIC BLOOD PRESSURE: 75 MMHG | HEART RATE: 81 BPM | SYSTOLIC BLOOD PRESSURE: 117 MMHG | BODY MASS INDEX: 33.73 KG/M2 | WEIGHT: 209 LBS

## 2018-02-05 DIAGNOSIS — F17.200 SMOKER: ICD-10-CM

## 2018-02-05 DIAGNOSIS — Z3A.18 18 WEEKS GESTATION OF PREGNANCY: ICD-10-CM

## 2018-02-05 DIAGNOSIS — O09.92 HIGH-RISK PREGNANCY IN SECOND TRIMESTER: Primary | ICD-10-CM

## 2018-02-05 DIAGNOSIS — O34.219 DELIVERY WITH HISTORY OF C-SECTION: ICD-10-CM

## 2018-02-05 DIAGNOSIS — O99.810 ABNORMAL MATERNAL GLUCOSE TOLERANCE, ANTEPARTUM: ICD-10-CM

## 2018-02-05 DIAGNOSIS — O09.899: ICD-10-CM

## 2018-02-05 PROCEDURE — G8427 DOCREV CUR MEDS BY ELIG CLIN: HCPCS | Performed by: OBSTETRICS & GYNECOLOGY

## 2018-02-05 PROCEDURE — G8417 CALC BMI ABV UP PARAM F/U: HCPCS | Performed by: OBSTETRICS & GYNECOLOGY

## 2018-02-05 PROCEDURE — 4004F PT TOBACCO SCREEN RCVD TLK: CPT | Performed by: OBSTETRICS & GYNECOLOGY

## 2018-02-05 PROCEDURE — 90471 IMMUNIZATION ADMIN: CPT | Performed by: OBSTETRICS & GYNECOLOGY

## 2018-02-05 PROCEDURE — G8484 FLU IMMUNIZE NO ADMIN: HCPCS | Performed by: OBSTETRICS & GYNECOLOGY

## 2018-02-05 PROCEDURE — 99213 OFFICE O/P EST LOW 20 MIN: CPT | Performed by: OBSTETRICS & GYNECOLOGY

## 2018-02-05 PROCEDURE — 90688 IIV4 VACCINE SPLT 0.5 ML IM: CPT | Performed by: OBSTETRICS & GYNECOLOGY

## 2018-02-05 NOTE — PROGRESS NOTES
Michela Prasad is a 32 y.o. female 18w6d     [id number]    OB History    Para Term  AB Living   7 5 4 1 1 5   SAB TAB Ectopic Molar Multiple Live Births   1 0 0   0 5      # Outcome Date GA Lbr [place]/2nd Weight Sex Delivery Anes PTL Lv   7 Current            6 SAB            5  14 36w0d  9 lb 1 oz (4.111 kg) F  EPI N PRANAY      Complications: Maternal fever affecting labor,Acute URI,Fetal tachycardia affecting care of mother, delivered   4 Term 09 37w0d  5 lb 8 oz (2.495 kg) F CS-LTranv Spinal N PRANAY      Birth Comments: Breech, SROM, FOB#2   3 Term 08 40w0d  7 lb 15 oz (3.6 kg) F Vag-Spont EPI N PRANAY      Birth Comments: FOB#3   2 Term 07 40w0d  10 lb (4.536 kg) M Vag-Spont EPI N PRANAY      Birth Comments: FOB#2   1 Term 03 40w0d  6 lb 15 oz (3.147 kg) F Vag-Spont EPI N PRANAY      Birth Comments: FOB#1      Obstetric Comments   New partner in current preg    G5- indicated  delivery d/t suspected chorioamnionitis       Vitals  BP: 117/75  Weight: 209 lb (94.8 kg)  Pulse: 81  Patient Position: Sitting  Albumin: Trace  Glucose: Negative  Fetal Heart Rate: 154    The patient was seen and evaluated. There was positive fetal movements. No contractions or leakage of fluid. Signs and symptoms of  labor as well as labor were reviewed. The Nuchal Translucency testing was reviewed and found to be normal A single marker MSAFP was reviewed and found to be normal. The patients anatomy ultrasound has been scheduled with [facility] on 2/15/18. [facility] Reviewed. The S/S of Pre-Eclampsia were reviewed with the patient in detail. She is to report any of these if they occur. She currently denies any of these. The patient is RH positive Rhogam Ordered no    The patient was instructed on fetal kick counts and was given a kick sheet to complete every 8 hours. This is to begin at 28 weeks gestation.  She was instructed that the baby should move at a minimum of ten times within one hour after a meal. The patient was instructed to lay down on her left side twenty minutes after eating and count movements for up to one hour with a target value of ten movements. She was instructed to notify the office if she did not make that target after two attempts or if after any attempt there was less than four movements. Risk Factors- Grand Multip,  New partner, obesity, smoker, THC use, Pityriasis Rosea, H/O c/s x1,  h/o  , h/o  3    Last pap 16- NEG  Cultures at next visit. Pt agreeable to Flu and TDAP   1hr gtt given with initial prenatal labs for early diabetic screen   Pt using Zrytec for relief due to Pityriasis Rosea  Pt desires first trimester screen/msafp- 17- MFM referral placed for screen and anatomy scan         Assessment:  1Anuja Bermudez is a 32 y.o. female  2. [id number]  3. 18w6d    Patient Active Problem List    Diagnosis Date Noted    H/O gonorrhea (CAMPOS sent 18) 2018     Treated in the ED 17      Remote H/O trichomonas 2018    H/O indicated  delivery 2018     Suspected chorioamnionitis      Obesity affecting pregnancy in first trimester 2017    Abnml early 1h GTT, nml early 3h GTT 2017     Plan for routine 28wk GTT      Previous  delivery, antepartum condition or complication     Grand multiparity with current pregnancy in first trimester 2017    High-risk pregnancy, first trimester 2017- MFM referral placed for first trimester screen and anatomy scan u/s      H/O  x1 (breech) 2017     H/O successful  x1  Desires TOLAC this pregnacy      H/O successful  2017     2014      Obesity 2017     1hr gtt lab- Early diabetic screeing       Smoker 2017     Pt counseled on maternal/fetal risk factors.   Pt verbalizes understanding       Mild tetrahydrocannabinol (THC) abuse 2017     Pt counseled not recommended in

## 2018-02-15 ENCOUNTER — ROUTINE PRENATAL (OUTPATIENT)
Dept: PERINATAL CARE | Age: 28
End: 2018-02-15
Payer: COMMERCIAL

## 2018-02-15 VITALS
WEIGHT: 211 LBS | SYSTOLIC BLOOD PRESSURE: 124 MMHG | HEART RATE: 83 BPM | TEMPERATURE: 97.5 F | HEIGHT: 66 IN | BODY MASS INDEX: 33.91 KG/M2 | DIASTOLIC BLOOD PRESSURE: 62 MMHG | RESPIRATION RATE: 18 BRPM

## 2018-02-15 DIAGNOSIS — Z36.86 ENCOUNTER FOR SCREENING FOR RISK OF PRE-TERM LABOR: ICD-10-CM

## 2018-02-15 DIAGNOSIS — O99.212 OBESITY AFFECTING PREGNANCY IN SECOND TRIMESTER: Primary | ICD-10-CM

## 2018-02-15 DIAGNOSIS — O09.42 GRAND MULTIPARITY WITH CURRENT PREGNANCY IN SECOND TRIMESTER: ICD-10-CM

## 2018-02-15 DIAGNOSIS — O99.810 ABNORMAL MATERNAL GLUCOSE TOLERANCE, ANTEPARTUM: ICD-10-CM

## 2018-02-15 DIAGNOSIS — O99.330 TOBACCO USE DISORDER COMPLICATING PREGNANCY, CHILDBIRTH, OR PUERPERIUM, ANTEPARTUM: ICD-10-CM

## 2018-02-15 DIAGNOSIS — O34.219 PREVIOUS CESAREAN DELIVERY, ANTEPARTUM CONDITION OR COMPLICATION: ICD-10-CM

## 2018-02-15 DIAGNOSIS — Z3A.20 20 WEEKS GESTATION OF PREGNANCY: ICD-10-CM

## 2018-02-15 LAB
ABDOMINAL CIRCUMFERENCE: NORMAL CM
BIPARIETAL DIAMETER: NORMAL CM
ESTIMATED FETAL WEIGHT: NORMAL GRAMS
FEMORAL DIAMETER: NORMAL CM
HC/AC: NORMAL
HEAD CIRCUMFERENCE: NORMAL CM

## 2018-02-15 PROCEDURE — 76811 OB US DETAILED SNGL FETUS: CPT | Performed by: OBSTETRICS & GYNECOLOGY

## 2018-02-15 PROCEDURE — 76817 TRANSVAGINAL US OBSTETRIC: CPT | Performed by: OBSTETRICS & GYNECOLOGY

## 2018-04-05 ENCOUNTER — ROUTINE PRENATAL (OUTPATIENT)
Dept: OBGYN | Age: 28
End: 2018-04-05
Payer: COMMERCIAL

## 2018-04-05 VITALS
SYSTOLIC BLOOD PRESSURE: 124 MMHG | HEART RATE: 94 BPM | DIASTOLIC BLOOD PRESSURE: 86 MMHG | WEIGHT: 217 LBS | BODY MASS INDEX: 35.02 KG/M2

## 2018-04-05 DIAGNOSIS — Z3A.27 27 WEEKS GESTATION OF PREGNANCY: ICD-10-CM

## 2018-04-05 DIAGNOSIS — O09.92 HRP (HIGH RISK PREGNANCY), SECOND TRIMESTER: Primary | ICD-10-CM

## 2018-04-05 PROCEDURE — 4004F PT TOBACCO SCREEN RCVD TLK: CPT | Performed by: OBSTETRICS & GYNECOLOGY

## 2018-04-05 PROCEDURE — 99212 OFFICE O/P EST SF 10 MIN: CPT | Performed by: OBSTETRICS & GYNECOLOGY

## 2018-04-05 PROCEDURE — G8417 CALC BMI ABV UP PARAM F/U: HCPCS | Performed by: OBSTETRICS & GYNECOLOGY

## 2018-04-05 PROCEDURE — G8427 DOCREV CUR MEDS BY ELIG CLIN: HCPCS | Performed by: OBSTETRICS & GYNECOLOGY

## 2018-04-05 PROCEDURE — 99213 OFFICE O/P EST LOW 20 MIN: CPT | Performed by: OBSTETRICS & GYNECOLOGY

## 2018-04-05 PROCEDURE — 90715 TDAP VACCINE 7 YRS/> IM: CPT | Performed by: OBSTETRICS & GYNECOLOGY

## 2018-04-05 PROCEDURE — 90715 TDAP VACCINE 7 YRS/> IM: CPT

## 2018-04-05 RX ORDER — GUAIFENESIN 100 MG/5ML
100 LIQUID (ML) ORAL 4 TIMES DAILY PRN
Qty: 100 ML | Refills: 0 | Status: SHIPPED | OUTPATIENT
Start: 2018-04-05 | End: 2018-05-30

## 2018-04-06 ENCOUNTER — HOSPITAL ENCOUNTER (OUTPATIENT)
Age: 28
Setting detail: SPECIMEN
Discharge: HOME | End: 2018-04-06
Payer: COMMERCIAL

## 2018-04-06 DIAGNOSIS — Z3A.27 27 WEEKS GESTATION OF PREGNANCY: ICD-10-CM

## 2018-04-06 LAB
GLUCOSE ADMINISTRATION: ABNORMAL
GLUCOSE TOLERANCE SCREEN 50G: 153 MG/DL (ref 70–135)
HCT VFR BLD CALC: 36.2 % (ref 36.3–47.1)
HEMOGLOBIN: 11.5 G/DL (ref 11.9–15.1)

## 2018-04-06 PROCEDURE — 85014 HEMATOCRIT: CPT

## 2018-04-06 PROCEDURE — 36415 COLL VENOUS BLD VENIPUNCTURE: CPT

## 2018-04-06 PROCEDURE — 82950 GLUCOSE TEST: CPT

## 2018-04-06 PROCEDURE — 85018 HEMOGLOBIN: CPT

## 2018-04-10 ENCOUNTER — TELEPHONE (OUTPATIENT)
Dept: OBGYN | Age: 28
End: 2018-04-10

## 2018-04-19 ENCOUNTER — ROUTINE PRENATAL (OUTPATIENT)
Dept: OBGYN | Age: 28
End: 2018-04-19
Payer: COMMERCIAL

## 2018-04-19 VITALS
WEIGHT: 216 LBS | DIASTOLIC BLOOD PRESSURE: 75 MMHG | BODY MASS INDEX: 34.86 KG/M2 | HEART RATE: 94 BPM | SYSTOLIC BLOOD PRESSURE: 126 MMHG

## 2018-04-19 DIAGNOSIS — O99.810 ABNORMAL GLUCOSE TOLERANCE AFFECTING PREGNANCY, ANTEPARTUM: ICD-10-CM

## 2018-04-19 DIAGNOSIS — O09.93 HIGH-RISK PREGNANCY IN THIRD TRIMESTER: Primary | ICD-10-CM

## 2018-04-19 DIAGNOSIS — Z3A.29 29 WEEKS GESTATION OF PREGNANCY: ICD-10-CM

## 2018-04-19 DIAGNOSIS — M54.31 RIGHT SCIATIC NERVE PAIN: ICD-10-CM

## 2018-04-19 PROCEDURE — G8427 DOCREV CUR MEDS BY ELIG CLIN: HCPCS | Performed by: OBSTETRICS & GYNECOLOGY

## 2018-04-19 PROCEDURE — G8417 CALC BMI ABV UP PARAM F/U: HCPCS | Performed by: OBSTETRICS & GYNECOLOGY

## 2018-04-19 PROCEDURE — 4004F PT TOBACCO SCREEN RCVD TLK: CPT | Performed by: OBSTETRICS & GYNECOLOGY

## 2018-04-19 PROCEDURE — 99213 OFFICE O/P EST LOW 20 MIN: CPT | Performed by: OBSTETRICS & GYNECOLOGY

## 2018-04-19 PROCEDURE — 99212 OFFICE O/P EST SF 10 MIN: CPT

## 2018-04-19 RX ORDER — PRENATAL WITH FERROUS FUM AND FOLIC ACID 3080; 920; 120; 400; 22; 1.84; 3; 20; 10; 1; 12; 200; 27; 25; 2 [IU]/1; [IU]/1; MG/1; [IU]/1; MG/1; MG/1; MG/1; MG/1; MG/1; MG/1; UG/1; MG/1; MG/1; MG/1; MG/1
1 TABLET ORAL DAILY
Qty: 30 TABLET | Refills: 6 | Status: SHIPPED | OUTPATIENT
Start: 2018-04-19 | End: 2018-10-22 | Stop reason: SDUPTHER

## 2018-04-27 ENCOUNTER — HOSPITAL ENCOUNTER (OUTPATIENT)
Age: 28
Setting detail: SPECIMEN
Discharge: HOME | End: 2018-04-27
Payer: COMMERCIAL

## 2018-04-27 ENCOUNTER — ROUTINE PRENATAL (OUTPATIENT)
Dept: OBGYN | Age: 28
End: 2018-04-27
Payer: COMMERCIAL

## 2018-04-27 VITALS
WEIGHT: 219.1 LBS | DIASTOLIC BLOOD PRESSURE: 69 MMHG | HEART RATE: 96 BPM | BODY MASS INDEX: 35.36 KG/M2 | SYSTOLIC BLOOD PRESSURE: 121 MMHG

## 2018-04-27 DIAGNOSIS — Z3A.30 30 WEEKS GESTATION OF PREGNANCY: ICD-10-CM

## 2018-04-27 DIAGNOSIS — O09.93 HIGH-RISK PREGNANCY IN THIRD TRIMESTER: Primary | ICD-10-CM

## 2018-04-27 PROCEDURE — 99213 OFFICE O/P EST LOW 20 MIN: CPT | Performed by: STUDENT IN AN ORGANIZED HEALTH CARE EDUCATION/TRAINING PROGRAM

## 2018-04-27 PROCEDURE — G8417 CALC BMI ABV UP PARAM F/U: HCPCS | Performed by: STUDENT IN AN ORGANIZED HEALTH CARE EDUCATION/TRAINING PROGRAM

## 2018-04-27 PROCEDURE — 87086 URINE CULTURE/COLONY COUNT: CPT | Performed by: STUDENT IN AN ORGANIZED HEALTH CARE EDUCATION/TRAINING PROGRAM

## 2018-04-27 PROCEDURE — 4004F PT TOBACCO SCREEN RCVD TLK: CPT | Performed by: STUDENT IN AN ORGANIZED HEALTH CARE EDUCATION/TRAINING PROGRAM

## 2018-04-27 PROCEDURE — 99212 OFFICE O/P EST SF 10 MIN: CPT | Performed by: STUDENT IN AN ORGANIZED HEALTH CARE EDUCATION/TRAINING PROGRAM

## 2018-04-27 PROCEDURE — G8427 DOCREV CUR MEDS BY ELIG CLIN: HCPCS | Performed by: STUDENT IN AN ORGANIZED HEALTH CARE EDUCATION/TRAINING PROGRAM

## 2018-04-28 LAB
CULTURE: NORMAL
CULTURE: NORMAL
Lab: NORMAL
SPECIMEN DESCRIPTION: NORMAL
STATUS: NORMAL

## 2018-04-30 ENCOUNTER — HOSPITAL ENCOUNTER (OUTPATIENT)
Age: 28
Setting detail: SPECIMEN
Discharge: HOME | End: 2018-04-30
Payer: COMMERCIAL

## 2018-05-01 LAB
CULTURE: NORMAL
CULTURE: NORMAL
Lab: NORMAL
SPECIMEN DESCRIPTION: NORMAL
STATUS: NORMAL

## 2018-05-07 ENCOUNTER — HOSPITAL ENCOUNTER (OUTPATIENT)
Age: 28
Setting detail: SPECIMEN
Discharge: HOME | End: 2018-05-07
Payer: COMMERCIAL

## 2018-05-07 DIAGNOSIS — O99.810 ABNORMAL GLUCOSE TOLERANCE AFFECTING PREGNANCY, ANTEPARTUM: ICD-10-CM

## 2018-05-07 DIAGNOSIS — O09.93 HIGH-RISK PREGNANCY IN THIRD TRIMESTER: ICD-10-CM

## 2018-05-07 LAB
AMOUNT GLUCOSE GIVEN: 100 G
GLUCOSE FASTING: 106 MG/DL (ref 65–99)
GLUCOSE TOLERANCE TEST 1 HOUR: 234 MG/DL (ref 65–184)
GLUCOSE TOLERANCE TEST 2 HOUR: 232 MG/DL (ref 65–139)
GLUCOSE TOLERANCE TEST 3 HOUR: 149 MG/DL (ref 65–130)

## 2018-05-07 PROCEDURE — 82952 GTT-ADDED SAMPLES: CPT

## 2018-05-07 PROCEDURE — 36415 COLL VENOUS BLD VENIPUNCTURE: CPT

## 2018-05-07 PROCEDURE — 82951 GLUCOSE TOLERANCE TEST (GTT): CPT

## 2018-05-08 ENCOUNTER — TELEPHONE (OUTPATIENT)
Dept: OBGYN | Age: 28
End: 2018-05-08

## 2018-05-15 ENCOUNTER — ROUTINE PRENATAL (OUTPATIENT)
Dept: OBGYN | Age: 28
End: 2018-05-15
Payer: COMMERCIAL

## 2018-05-15 VITALS
WEIGHT: 217 LBS | HEART RATE: 90 BPM | SYSTOLIC BLOOD PRESSURE: 124 MMHG | BODY MASS INDEX: 35.02 KG/M2 | DIASTOLIC BLOOD PRESSURE: 75 MMHG

## 2018-05-15 DIAGNOSIS — O24.419 GESTATIONAL DIABETES MELLITUS (GDM) IN THIRD TRIMESTER, GESTATIONAL DIABETES METHOD OF CONTROL UNSPECIFIED: ICD-10-CM

## 2018-05-15 DIAGNOSIS — Z3A.33 33 WEEKS GESTATION OF PREGNANCY: ICD-10-CM

## 2018-05-15 DIAGNOSIS — O09.93 HRP (HIGH RISK PREGNANCY), THIRD TRIMESTER: Primary | ICD-10-CM

## 2018-05-15 PROCEDURE — G8417 CALC BMI ABV UP PARAM F/U: HCPCS | Performed by: OBSTETRICS & GYNECOLOGY

## 2018-05-15 PROCEDURE — 4004F PT TOBACCO SCREEN RCVD TLK: CPT | Performed by: OBSTETRICS & GYNECOLOGY

## 2018-05-15 PROCEDURE — 99212 OFFICE O/P EST SF 10 MIN: CPT | Performed by: OBSTETRICS & GYNECOLOGY

## 2018-05-15 PROCEDURE — 99213 OFFICE O/P EST LOW 20 MIN: CPT | Performed by: OBSTETRICS & GYNECOLOGY

## 2018-05-15 PROCEDURE — G8427 DOCREV CUR MEDS BY ELIG CLIN: HCPCS | Performed by: OBSTETRICS & GYNECOLOGY

## 2018-05-17 ENCOUNTER — TELEPHONE (OUTPATIENT)
Dept: PERINATAL CARE | Age: 28
End: 2018-05-17

## 2018-05-17 DIAGNOSIS — O24.419 GESTATIONAL DIABETES MELLITUS (GDM) IN THIRD TRIMESTER, GESTATIONAL DIABETES METHOD OF CONTROL UNSPECIFIED: Primary | ICD-10-CM

## 2018-05-18 ENCOUNTER — HOSPITAL ENCOUNTER (OUTPATIENT)
Age: 28
Discharge: HOME | End: 2018-05-18
Attending: OBSTETRICS & GYNECOLOGY | Admitting: OBSTETRICS & GYNECOLOGY
Payer: COMMERCIAL

## 2018-05-18 VITALS
TEMPERATURE: 98.4 F | RESPIRATION RATE: 18 BRPM | SYSTOLIC BLOOD PRESSURE: 118 MMHG | HEART RATE: 107 BPM | DIASTOLIC BLOOD PRESSURE: 70 MMHG

## 2018-05-18 PROBLEM — O09.93 HRP (HIGH RISK PREGNANCY), THIRD TRIMESTER: Status: ACTIVE | Noted: 2018-05-18

## 2018-05-18 LAB
BILIRUBIN URINE: NEGATIVE
COLOR: YELLOW
COMMENT UA: ABNORMAL
GLUCOSE BLD-MCNC: 123 MG/DL (ref 65–105)
GLUCOSE URINE: NEGATIVE
KETONES, URINE: ABNORMAL
LEUKOCYTE ESTERASE, URINE: NEGATIVE
NITRITE, URINE: NEGATIVE
PH UA: 8 (ref 5–8)
PROTEIN UA: NEGATIVE
SPECIFIC GRAVITY UA: 1.01 (ref 1–1.03)
TURBIDITY: CLEAR
URINE HGB: NEGATIVE
UROBILINOGEN, URINE: NORMAL

## 2018-05-18 PROCEDURE — 87086 URINE CULTURE/COLONY COUNT: CPT

## 2018-05-18 PROCEDURE — 81003 URINALYSIS AUTO W/O SCOPE: CPT

## 2018-05-18 PROCEDURE — 99235 HOSP IP/OBS SAME DATE MOD 70: CPT | Performed by: OBSTETRICS & GYNECOLOGY

## 2018-05-18 PROCEDURE — 82947 ASSAY GLUCOSE BLOOD QUANT: CPT

## 2018-05-18 PROCEDURE — 99213 OFFICE O/P EST LOW 20 MIN: CPT

## 2018-05-18 RX ORDER — ONDANSETRON 4 MG/1
4 TABLET, FILM COATED ORAL DAILY PRN
Qty: 30 TABLET | Refills: 0 | Status: SHIPPED | OUTPATIENT
Start: 2018-05-18 | End: 2018-12-05

## 2018-05-18 RX ORDER — SODIUM CHLORIDE 0.9 % (FLUSH) 0.9 %
10 SYRINGE (ML) INJECTION PRN
Status: DISCONTINUED | OUTPATIENT
Start: 2018-05-18 | End: 2018-05-18 | Stop reason: HOSPADM

## 2018-05-18 RX ORDER — SODIUM CHLORIDE 0.9 % (FLUSH) 0.9 %
10 SYRINGE (ML) INJECTION EVERY 12 HOURS SCHEDULED
Status: DISCONTINUED | OUTPATIENT
Start: 2018-05-18 | End: 2018-05-18 | Stop reason: HOSPADM

## 2018-05-19 LAB
CULTURE: NORMAL
CULTURE: NORMAL
Lab: NORMAL
SPECIMEN DESCRIPTION: NORMAL
STATUS: NORMAL

## 2018-05-21 ENCOUNTER — HOSPITAL ENCOUNTER (INPATIENT)
Age: 28
LOS: 2 days | Discharge: HOME | DRG: 566 | End: 2018-05-24
Attending: OBSTETRICS & GYNECOLOGY | Admitting: OBSTETRICS & GYNECOLOGY
Payer: COMMERCIAL

## 2018-05-21 DIAGNOSIS — O24.419 GESTATIONAL DIABETES MELLITUS (GDM) IN THIRD TRIMESTER, GESTATIONAL DIABETES METHOD OF CONTROL UNSPECIFIED: Primary | ICD-10-CM

## 2018-05-21 PROBLEM — O09.93 HRP (HIGH RISK PREGNANCY), THIRD TRIMESTER: Status: ACTIVE | Noted: 2018-05-21

## 2018-05-21 LAB
-: ABNORMAL
ABSOLUTE EOS #: 0.2 K/UL (ref 0–0.44)
ABSOLUTE IMMATURE GRANULOCYTE: 0.12 K/UL (ref 0–0.3)
ABSOLUTE LYMPH #: 2.17 K/UL (ref 1.1–3.7)
ABSOLUTE MONO #: 1.37 K/UL (ref 0.1–1.2)
ALBUMIN SERPL-MCNC: 3.3 G/DL (ref 3.5–5.2)
ALBUMIN SERPL-MCNC: 3.5 G/DL (ref 3.5–5.2)
ALBUMIN/GLOBULIN RATIO: 1.1 (ref 1–2.5)
ALBUMIN/GLOBULIN RATIO: 1.2 (ref 1–2.5)
ALLEN TEST: ABNORMAL
ALP BLD-CCNC: 105 U/L (ref 35–104)
ALP BLD-CCNC: 93 U/L (ref 35–104)
ALT SERPL-CCNC: 58 U/L (ref 5–33)
ALT SERPL-CCNC: 63 U/L (ref 5–33)
AMORPHOUS: ABNORMAL
AMYLASE: 60 U/L (ref 28–100)
ANION GAP SERPL CALCULATED.3IONS-SCNC: 19 MMOL/L (ref 9–17)
ANION GAP SERPL CALCULATED.3IONS-SCNC: 19 MMOL/L (ref 9–17)
AST SERPL-CCNC: 60 U/L
AST SERPL-CCNC: 64 U/L
BACTERIA: ABNORMAL
BASOPHILS # BLD: 0 % (ref 0–2)
BASOPHILS ABSOLUTE: 0.03 K/UL (ref 0–0.2)
BETA-HYDROXYBUTYRATE: 1.82 MMOL/L (ref 0.02–0.27)
BILIRUB SERPL-MCNC: 0.71 MG/DL (ref 0.3–1.2)
BILIRUB SERPL-MCNC: 0.75 MG/DL (ref 0.3–1.2)
BILIRUBIN URINE: NEGATIVE
BUN BLDV-MCNC: 2 MG/DL (ref 6–20)
BUN BLDV-MCNC: 2 MG/DL (ref 6–20)
BUN/CREAT BLD: ABNORMAL (ref 9–20)
BUN/CREAT BLD: ABNORMAL (ref 9–20)
CALCIUM SERPL-MCNC: 8.5 MG/DL (ref 8.6–10.4)
CALCIUM SERPL-MCNC: 9 MG/DL (ref 8.6–10.4)
CARBOXYHEMOGLOBIN: 2.5 % (ref 0–5)
CASTS UA: ABNORMAL /LPF (ref 0–8)
CHLORIDE BLD-SCNC: 97 MMOL/L (ref 98–107)
CHLORIDE BLD-SCNC: 99 MMOL/L (ref 98–107)
CO2: 22 MMOL/L (ref 20–31)
CO2: 23 MMOL/L (ref 20–31)
COLOR: YELLOW
COMMENT UA: ABNORMAL
CREAT SERPL-MCNC: 0.43 MG/DL (ref 0.5–0.9)
CREAT SERPL-MCNC: 0.47 MG/DL (ref 0.5–0.9)
CRYSTALS, UA: ABNORMAL /HPF
DIFFERENTIAL TYPE: ABNORMAL
EOSINOPHILS RELATIVE PERCENT: 2 % (ref 1–4)
EPITHELIAL CELLS UA: ABNORMAL /HPF (ref 0–5)
FIO2: ABNORMAL
GFR AFRICAN AMERICAN: >60 ML/MIN
GFR AFRICAN AMERICAN: >60 ML/MIN
GFR NON-AFRICAN AMERICAN: >60 ML/MIN
GFR NON-AFRICAN AMERICAN: >60 ML/MIN
GFR SERPL CREATININE-BSD FRML MDRD: ABNORMAL ML/MIN/{1.73_M2}
GLUCOSE BLD-MCNC: 78 MG/DL (ref 70–99)
GLUCOSE BLD-MCNC: 91 MG/DL (ref 70–99)
GLUCOSE URINE: NEGATIVE
HCO3 VENOUS: 24.9 MMOL/L (ref 24–30)
HCT VFR BLD CALC: 37.5 % (ref 36.3–47.1)
HEMOGLOBIN: 12.2 G/DL (ref 11.9–15.1)
IMMATURE GRANULOCYTES: 1 %
KETONES, URINE: ABNORMAL
LACTIC ACID, WHOLE BLOOD: 1.2 MMOL/L (ref 0.7–2.1)
LEUKOCYTE ESTERASE, URINE: ABNORMAL
LIPASE: 21 U/L (ref 13–60)
LYMPHOCYTES # BLD: 17 % (ref 24–43)
MAGNESIUM: 1.7 MG/DL (ref 1.6–2.6)
MCH RBC QN AUTO: 28.8 PG (ref 25.2–33.5)
MCHC RBC AUTO-ENTMCNC: 32.5 G/DL (ref 28.4–34.8)
MCV RBC AUTO: 88.4 FL (ref 82.6–102.9)
METHEMOGLOBIN: ABNORMAL % (ref 0–1.5)
MODE: ABNORMAL
MONOCYTES # BLD: 11 % (ref 3–12)
MUCUS: ABNORMAL
NEGATIVE BASE EXCESS, VEN: ABNORMAL MMOL/L (ref 0–2)
NITRITE, URINE: NEGATIVE
NOTIFICATION TIME: ABNORMAL
NOTIFICATION: ABNORMAL
NRBC AUTOMATED: 0 PER 100 WBC
O2 DEVICE/FLOW/%: ABNORMAL
O2 SAT, VEN: 83.2 % (ref 60–85)
OTHER OBSERVATIONS UA: ABNORMAL
OXYHEMOGLOBIN: ABNORMAL % (ref 95–98)
PATIENT TEMP: 37
PCO2, VEN, TEMP ADJ: ABNORMAL MMHG (ref 39–55)
PCO2, VEN: 35.5 (ref 39–55)
PDW BLD-RTO: 13.7 % (ref 11.8–14.4)
PEEP/CPAP: ABNORMAL
PH UA: 7.5 (ref 5–8)
PH VENOUS: 7.46 (ref 7.32–7.42)
PH, VEN, TEMP ADJ: ABNORMAL (ref 7.32–7.42)
PLATELET # BLD: 252 K/UL (ref 138–453)
PLATELET ESTIMATE: ABNORMAL
PMV BLD AUTO: 11.5 FL (ref 8.1–13.5)
PO2, VEN, TEMP ADJ: ABNORMAL MMHG (ref 30–50)
PO2, VEN: 45.1 (ref 30–50)
POSITIVE BASE EXCESS, VEN: 1.7 MMOL/L (ref 0–2)
POTASSIUM SERPL-SCNC: 2.3 MMOL/L (ref 3.7–5.3)
POTASSIUM SERPL-SCNC: 2.5 MMOL/L (ref 3.7–5.3)
PROTEIN UA: NEGATIVE
PSV: ABNORMAL
PT. POSITION: ABNORMAL
RBC # BLD: 4.24 M/UL (ref 3.95–5.11)
RBC # BLD: ABNORMAL 10*6/UL
RBC UA: ABNORMAL /HPF (ref 0–4)
RENAL EPITHELIAL, UA: ABNORMAL /HPF
RESPIRATORY RATE: ABNORMAL
SAMPLE SITE: ABNORMAL
SEG NEUTROPHILS: 69 % (ref 36–65)
SEGMENTED NEUTROPHILS ABSOLUTE COUNT: 9.19 K/UL (ref 1.5–8.1)
SET RATE: ABNORMAL
SODIUM BLD-SCNC: 138 MMOL/L (ref 135–144)
SODIUM BLD-SCNC: 141 MMOL/L (ref 135–144)
SPECIFIC GRAVITY UA: 1.01 (ref 1–1.03)
TEXT FOR RESPIRATORY: ABNORMAL
TOTAL HB: ABNORMAL G/DL (ref 12–16)
TOTAL PROTEIN: 6.1 G/DL (ref 6.4–8.3)
TOTAL PROTEIN: 6.8 G/DL (ref 6.4–8.3)
TOTAL RATE: ABNORMAL
TRICHOMONAS: ABNORMAL
TURBIDITY: CLEAR
URINE HGB: NEGATIVE
UROBILINOGEN, URINE: NORMAL
VT: ABNORMAL
WBC # BLD: 13.1 K/UL (ref 3.5–11.3)
WBC # BLD: ABNORMAL 10*3/UL
WBC UA: ABNORMAL /HPF (ref 0–5)
YEAST: ABNORMAL

## 2018-05-21 PROCEDURE — 82805 BLOOD GASES W/O2 SATURATION: CPT

## 2018-05-21 PROCEDURE — 83690 ASSAY OF LIPASE: CPT

## 2018-05-21 PROCEDURE — 80053 COMPREHEN METABOLIC PANEL: CPT

## 2018-05-21 PROCEDURE — 87086 URINE CULTURE/COLONY COUNT: CPT

## 2018-05-21 PROCEDURE — 82150 ASSAY OF AMYLASE: CPT

## 2018-05-21 PROCEDURE — 83605 ASSAY OF LACTIC ACID: CPT

## 2018-05-21 PROCEDURE — 99223 1ST HOSP IP/OBS HIGH 75: CPT | Performed by: OBSTETRICS & GYNECOLOGY

## 2018-05-21 PROCEDURE — 83735 ASSAY OF MAGNESIUM: CPT

## 2018-05-21 PROCEDURE — 2580000003 HC RX 258: Performed by: STUDENT IN AN ORGANIZED HEALTH CARE EDUCATION/TRAINING PROGRAM

## 2018-05-21 PROCEDURE — 6360000002 HC RX W HCPCS: Performed by: STUDENT IN AN ORGANIZED HEALTH CARE EDUCATION/TRAINING PROGRAM

## 2018-05-21 PROCEDURE — 36415 COLL VENOUS BLD VENIPUNCTURE: CPT

## 2018-05-21 PROCEDURE — 6360000002 HC RX W HCPCS: Performed by: OBSTETRICS & GYNECOLOGY

## 2018-05-21 PROCEDURE — 82010 KETONE BODYS QUAN: CPT

## 2018-05-21 PROCEDURE — 81001 URINALYSIS AUTO W/SCOPE: CPT

## 2018-05-21 PROCEDURE — 85025 COMPLETE CBC W/AUTO DIFF WBC: CPT

## 2018-05-21 RX ORDER — SODIUM CHLORIDE, SODIUM LACTATE, POTASSIUM CHLORIDE, CALCIUM CHLORIDE 600; 310; 30; 20 MG/100ML; MG/100ML; MG/100ML; MG/100ML
INJECTION, SOLUTION INTRAVENOUS ONCE
Status: COMPLETED | OUTPATIENT
Start: 2018-05-21 | End: 2018-05-21

## 2018-05-21 RX ORDER — MAGNESIUM SULFATE 1 G/100ML
1 INJECTION INTRAVENOUS
Status: DISPENSED | OUTPATIENT
Start: 2018-05-21 | End: 2018-05-21

## 2018-05-21 RX ORDER — SODIUM CHLORIDE, SODIUM LACTATE, POTASSIUM CHLORIDE, CALCIUM CHLORIDE 600; 310; 30; 20 MG/100ML; MG/100ML; MG/100ML; MG/100ML
INJECTION, SOLUTION INTRAVENOUS CONTINUOUS
Status: DISCONTINUED | OUTPATIENT
Start: 2018-05-21 | End: 2018-05-22

## 2018-05-21 RX ORDER — ACETAMINOPHEN 500 MG
1000 TABLET ORAL EVERY 6 HOURS PRN
Status: DISCONTINUED | OUTPATIENT
Start: 2018-05-21 | End: 2018-05-24 | Stop reason: HOSPADM

## 2018-05-21 RX ORDER — MAGNESIUM SULFATE 1 G/100ML
1 INJECTION INTRAVENOUS
Status: COMPLETED | OUTPATIENT
Start: 2018-05-21 | End: 2018-05-22

## 2018-05-21 RX ORDER — POTASSIUM CHLORIDE 7.45 MG/ML
40 INJECTION INTRAVENOUS ONCE
Status: DISCONTINUED | OUTPATIENT
Start: 2018-05-21 | End: 2018-05-21

## 2018-05-21 RX ORDER — PROMETHAZINE HYDROCHLORIDE 25 MG/ML
25 INJECTION, SOLUTION INTRAMUSCULAR; INTRAVENOUS ONCE
Status: COMPLETED | OUTPATIENT
Start: 2018-05-21 | End: 2018-05-21

## 2018-05-21 RX ADMIN — POTASSIUM CHLORIDE 40 MEQ: 149 INJECTION, SOLUTION, CONCENTRATE INTRAVENOUS at 18:03

## 2018-05-21 RX ADMIN — PROMETHAZINE HYDROCHLORIDE 25 MG: 25 INJECTION INTRAMUSCULAR; INTRAVENOUS at 15:33

## 2018-05-21 RX ADMIN — SODIUM CHLORIDE, POTASSIUM CHLORIDE, SODIUM LACTATE AND CALCIUM CHLORIDE: 600; 310; 30; 20 INJECTION, SOLUTION INTRAVENOUS at 16:50

## 2018-05-21 RX ADMIN — MAGNESIUM SULFATE HEPTAHYDRATE 1 G: 1 INJECTION, SOLUTION INTRAVENOUS at 22:41

## 2018-05-21 RX ADMIN — SODIUM CHLORIDE, POTASSIUM CHLORIDE, SODIUM LACTATE AND CALCIUM CHLORIDE: 600; 310; 30; 20 INJECTION, SOLUTION INTRAVENOUS at 15:50

## 2018-05-22 PROBLEM — O09.90 HRP (HIGH RISK PREGNANCY): Status: ACTIVE | Noted: 2018-05-22

## 2018-05-22 LAB
-: ABNORMAL
ABSOLUTE EOS #: 0.19 K/UL (ref 0–0.44)
ABSOLUTE IMMATURE GRANULOCYTE: 0.1 K/UL (ref 0–0.3)
ABSOLUTE LYMPH #: 1.9 K/UL (ref 1.1–3.7)
ABSOLUTE MONO #: 1.14 K/UL (ref 0.1–1.2)
ALBUMIN SERPL-MCNC: 2.6 G/DL (ref 3.5–5.2)
ALBUMIN SERPL-MCNC: 2.8 G/DL (ref 3.5–5.2)
ALBUMIN/GLOBULIN RATIO: 0.9 (ref 1–2.5)
ALBUMIN/GLOBULIN RATIO: 0.9 (ref 1–2.5)
ALLEN TEST: ABNORMAL
ALP BLD-CCNC: 103 U/L (ref 35–104)
ALP BLD-CCNC: 98 U/L (ref 35–104)
ALT SERPL-CCNC: 55 U/L (ref 5–33)
ALT SERPL-CCNC: 56 U/L (ref 5–33)
AMORPHOUS: ABNORMAL
ANION GAP SERPL CALCULATED.3IONS-SCNC: 13 MMOL/L (ref 9–17)
ANION GAP SERPL CALCULATED.3IONS-SCNC: 15 MMOL/L (ref 9–17)
AST SERPL-CCNC: 53 U/L
AST SERPL-CCNC: 55 U/L
BACTERIA: ABNORMAL
BASOPHILS # BLD: 0 % (ref 0–2)
BASOPHILS ABSOLUTE: 0 K/UL (ref 0–0.2)
BETA-HYDROXYBUTYRATE: 0.64 MMOL/L (ref 0.02–0.27)
BILIRUB SERPL-MCNC: 0.5 MG/DL (ref 0.3–1.2)
BILIRUB SERPL-MCNC: 0.53 MG/DL (ref 0.3–1.2)
BILIRUBIN URINE: NEGATIVE
BUN BLDV-MCNC: 2 MG/DL (ref 6–20)
BUN BLDV-MCNC: 2 MG/DL (ref 6–20)
BUN/CREAT BLD: ABNORMAL (ref 9–20)
BUN/CREAT BLD: ABNORMAL (ref 9–20)
CALCIUM SERPL-MCNC: 8.2 MG/DL (ref 8.6–10.4)
CALCIUM SERPL-MCNC: 8.3 MG/DL (ref 8.6–10.4)
CARBOXYHEMOGLOBIN: 1.6 % (ref 0–5)
CASTS UA: ABNORMAL /LPF (ref 0–8)
CHLORIDE BLD-SCNC: 101 MMOL/L (ref 98–107)
CHLORIDE BLD-SCNC: 107 MMOL/L (ref 98–107)
CO2: 22 MMOL/L (ref 20–31)
CO2: 23 MMOL/L (ref 20–31)
COLOR: YELLOW
COMMENT UA: ABNORMAL
CREAT SERPL-MCNC: 0.5 MG/DL (ref 0.5–0.9)
CREAT SERPL-MCNC: 0.52 MG/DL (ref 0.5–0.9)
CRYSTALS, UA: ABNORMAL /HPF
CULTURE: NORMAL
CULTURE: NORMAL
DIFFERENTIAL TYPE: ABNORMAL
EOSINOPHILS RELATIVE PERCENT: 2 % (ref 1–4)
EPITHELIAL CELLS UA: ABNORMAL /HPF (ref 0–5)
FIO2: ABNORMAL
GFR AFRICAN AMERICAN: >60 ML/MIN
GFR AFRICAN AMERICAN: >60 ML/MIN
GFR NON-AFRICAN AMERICAN: >60 ML/MIN
GFR NON-AFRICAN AMERICAN: >60 ML/MIN
GFR SERPL CREATININE-BSD FRML MDRD: ABNORMAL ML/MIN/{1.73_M2}
GLUCOSE BLD-MCNC: 106 MG/DL (ref 65–105)
GLUCOSE BLD-MCNC: 107 MG/DL (ref 70–99)
GLUCOSE BLD-MCNC: 110 MG/DL (ref 65–105)
GLUCOSE BLD-MCNC: 111 MG/DL (ref 65–105)
GLUCOSE BLD-MCNC: 124 MG/DL (ref 65–105)
GLUCOSE BLD-MCNC: 130 MG/DL (ref 70–99)
GLUCOSE BLD-MCNC: 201 MG/DL (ref 65–105)
GLUCOSE URINE: NEGATIVE
HCO3 VENOUS: 26 MMOL/L (ref 24–30)
HCT VFR BLD CALC: 33.3 % (ref 36.3–47.1)
HEMOGLOBIN: 10.5 G/DL (ref 11.9–15.1)
IMMATURE GRANULOCYTES: 1 %
KETONES, URINE: NEGATIVE
LEUKOCYTE ESTERASE, URINE: ABNORMAL
LYMPHOCYTES # BLD: 20 % (ref 24–43)
Lab: NORMAL
MAGNESIUM: 2.1 MG/DL (ref 1.6–2.6)
MCH RBC QN AUTO: 29.4 PG (ref 25.2–33.5)
MCHC RBC AUTO-ENTMCNC: 31.5 G/DL (ref 28.4–34.8)
MCV RBC AUTO: 93.3 FL (ref 82.6–102.9)
METHEMOGLOBIN: ABNORMAL % (ref 0–1.5)
MODE: ABNORMAL
MONOCYTES # BLD: 12 % (ref 3–12)
MORPHOLOGY: NORMAL
MUCUS: ABNORMAL
NEGATIVE BASE EXCESS, VEN: ABNORMAL MMOL/L (ref 0–2)
NITRITE, URINE: NEGATIVE
NOTIFICATION TIME: ABNORMAL
NOTIFICATION: ABNORMAL
NRBC AUTOMATED: 0 PER 100 WBC
O2 DEVICE/FLOW/%: ABNORMAL
O2 SAT, VEN: 89 % (ref 60–85)
OTHER OBSERVATIONS UA: ABNORMAL
OXYHEMOGLOBIN: ABNORMAL % (ref 95–98)
PATIENT TEMP: 37
PCO2, VEN, TEMP ADJ: ABNORMAL MMHG (ref 39–55)
PCO2, VEN: 37.8 (ref 39–55)
PDW BLD-RTO: 13.8 % (ref 11.8–14.4)
PEEP/CPAP: ABNORMAL
PH UA: 7.5 (ref 5–8)
PH VENOUS: 7.45 (ref 7.32–7.42)
PH, VEN, TEMP ADJ: ABNORMAL (ref 7.32–7.42)
PLATELET # BLD: 220 K/UL (ref 138–453)
PLATELET ESTIMATE: ABNORMAL
PMV BLD AUTO: 11.9 FL (ref 8.1–13.5)
PO2, VEN, TEMP ADJ: ABNORMAL MMHG (ref 30–50)
PO2, VEN: 56.4 (ref 30–50)
POSITIVE BASE EXCESS, VEN: 2.5 MMOL/L (ref 0–2)
POTASSIUM SERPL-SCNC: 2.5 MMOL/L (ref 3.7–5.3)
POTASSIUM SERPL-SCNC: 2.9 MMOL/L (ref 3.7–5.3)
PROTEIN UA: NEGATIVE
PSV: ABNORMAL
PT. POSITION: ABNORMAL
RBC # BLD: 3.57 M/UL (ref 3.95–5.11)
RBC # BLD: ABNORMAL 10*6/UL
RBC UA: ABNORMAL /HPF (ref 0–4)
RENAL EPITHELIAL, UA: ABNORMAL /HPF
RESPIRATORY RATE: ABNORMAL
SAMPLE SITE: ABNORMAL
SEG NEUTROPHILS: 65 % (ref 36–65)
SEGMENTED NEUTROPHILS ABSOLUTE COUNT: 6.17 K/UL (ref 1.5–8.1)
SET RATE: ABNORMAL
SODIUM BLD-SCNC: 137 MMOL/L (ref 135–144)
SODIUM BLD-SCNC: 144 MMOL/L (ref 135–144)
SPECIFIC GRAVITY UA: 1 (ref 1–1.03)
SPECIMEN DESCRIPTION: NORMAL
STATUS: NORMAL
TEXT FOR RESPIRATORY: ABNORMAL
TOTAL HB: ABNORMAL G/DL (ref 12–16)
TOTAL PROTEIN: 5.5 G/DL (ref 6.4–8.3)
TOTAL PROTEIN: 5.9 G/DL (ref 6.4–8.3)
TOTAL RATE: ABNORMAL
TRICHOMONAS: ABNORMAL
TURBIDITY: CLEAR
URINE HGB: NEGATIVE
UROBILINOGEN, URINE: NORMAL
VT: ABNORMAL
WBC # BLD: 9.5 K/UL (ref 3.5–11.3)
WBC # BLD: ABNORMAL 10*3/UL
WBC UA: ABNORMAL /HPF (ref 0–5)
YEAST: ABNORMAL

## 2018-05-22 PROCEDURE — 36415 COLL VENOUS BLD VENIPUNCTURE: CPT

## 2018-05-22 PROCEDURE — 82010 KETONE BODYS QUAN: CPT

## 2018-05-22 PROCEDURE — 2580000003 HC RX 258: Performed by: STUDENT IN AN ORGANIZED HEALTH CARE EDUCATION/TRAINING PROGRAM

## 2018-05-22 PROCEDURE — 6360000002 HC RX W HCPCS: Performed by: STUDENT IN AN ORGANIZED HEALTH CARE EDUCATION/TRAINING PROGRAM

## 2018-05-22 PROCEDURE — 83735 ASSAY OF MAGNESIUM: CPT

## 2018-05-22 PROCEDURE — 2580000003 HC RX 258: Performed by: OBSTETRICS & GYNECOLOGY

## 2018-05-22 PROCEDURE — 99232 SBSQ HOSP IP/OBS MODERATE 35: CPT | Performed by: OBSTETRICS & GYNECOLOGY

## 2018-05-22 PROCEDURE — 80053 COMPREHEN METABOLIC PANEL: CPT

## 2018-05-22 PROCEDURE — 6370000000 HC RX 637 (ALT 250 FOR IP): Performed by: OBSTETRICS & GYNECOLOGY

## 2018-05-22 PROCEDURE — 6360000002 HC RX W HCPCS: Performed by: OBSTETRICS & GYNECOLOGY

## 2018-05-22 PROCEDURE — G0108 DIAB MANAGE TRN  PER INDIV: HCPCS

## 2018-05-22 PROCEDURE — 82805 BLOOD GASES W/O2 SATURATION: CPT

## 2018-05-22 PROCEDURE — 82947 ASSAY GLUCOSE BLOOD QUANT: CPT

## 2018-05-22 PROCEDURE — 2500000003 HC RX 250 WO HCPCS: Performed by: OBSTETRICS & GYNECOLOGY

## 2018-05-22 PROCEDURE — 81001 URINALYSIS AUTO W/SCOPE: CPT

## 2018-05-22 PROCEDURE — 1220000000 HC SEMI PRIVATE OB R&B

## 2018-05-22 PROCEDURE — 85025 COMPLETE CBC W/AUTO DIFF WBC: CPT

## 2018-05-22 RX ORDER — PROMETHAZINE HYDROCHLORIDE 25 MG/ML
25 INJECTION, SOLUTION INTRAMUSCULAR; INTRAVENOUS ONCE
Status: COMPLETED | OUTPATIENT
Start: 2018-05-22 | End: 2018-05-22

## 2018-05-22 RX ORDER — POTASSIUM CHLORIDE 20 MEQ/1
40 TABLET, EXTENDED RELEASE ORAL ONCE
Status: COMPLETED | OUTPATIENT
Start: 2018-05-22 | End: 2018-05-22

## 2018-05-22 RX ORDER — POTASSIUM CHLORIDE 20 MEQ/1
40 TABLET, EXTENDED RELEASE ORAL ONCE
Status: DISCONTINUED | OUTPATIENT
Start: 2018-05-22 | End: 2018-05-23

## 2018-05-22 RX ADMIN — POTASSIUM CHLORIDE 40 MEQ: 1500 TABLET, EXTENDED RELEASE ORAL at 09:42

## 2018-05-22 RX ADMIN — POTASSIUM CHLORIDE 40 MEQ: 149 INJECTION, SOLUTION, CONCENTRATE INTRAVENOUS at 19:55

## 2018-05-22 RX ADMIN — FOLIC ACID: 5 INJECTION, SOLUTION INTRAMUSCULAR; INTRAVENOUS; SUBCUTANEOUS at 12:10

## 2018-05-22 RX ADMIN — PROMETHAZINE HYDROCHLORIDE 25 MG: 25 INJECTION INTRAMUSCULAR; INTRAVENOUS at 19:52

## 2018-05-22 RX ADMIN — SODIUM CHLORIDE, POTASSIUM CHLORIDE, SODIUM LACTATE AND CALCIUM CHLORIDE: 600; 310; 30; 20 INJECTION, SOLUTION INTRAVENOUS at 09:42

## 2018-05-22 RX ADMIN — MAGNESIUM SULFATE HEPTAHYDRATE 1 G: 1 INJECTION, SOLUTION INTRAVENOUS at 00:54

## 2018-05-22 RX ADMIN — FOLIC ACID: 5 INJECTION, SOLUTION INTRAMUSCULAR; INTRAVENOUS; SUBCUTANEOUS at 03:06

## 2018-05-23 LAB
ALBUMIN SERPL-MCNC: 2.6 G/DL (ref 3.5–5.2)
ALBUMIN SERPL-MCNC: 2.6 G/DL (ref 3.5–5.2)
ALBUMIN/GLOBULIN RATIO: 0.9 (ref 1–2.5)
ALBUMIN/GLOBULIN RATIO: 1 (ref 1–2.5)
ALLEN TEST: ABNORMAL
ALP BLD-CCNC: 84 U/L (ref 35–104)
ALP BLD-CCNC: 89 U/L (ref 35–104)
ALT SERPL-CCNC: 51 U/L (ref 5–33)
ALT SERPL-CCNC: 63 U/L (ref 5–33)
ANION GAP SERPL CALCULATED.3IONS-SCNC: 10 MMOL/L (ref 9–17)
ANION GAP SERPL CALCULATED.3IONS-SCNC: 10 MMOL/L (ref 9–17)
AST SERPL-CCNC: 53 U/L
AST SERPL-CCNC: 60 U/L
BILIRUB SERPL-MCNC: 0.49 MG/DL (ref 0.3–1.2)
BILIRUB SERPL-MCNC: 0.51 MG/DL (ref 0.3–1.2)
BUN BLDV-MCNC: 2 MG/DL (ref 6–20)
BUN BLDV-MCNC: 2 MG/DL (ref 6–20)
BUN/CREAT BLD: ABNORMAL (ref 9–20)
BUN/CREAT BLD: ABNORMAL (ref 9–20)
CALCIUM SERPL-MCNC: 8.1 MG/DL (ref 8.6–10.4)
CALCIUM SERPL-MCNC: 8.2 MG/DL (ref 8.6–10.4)
CARBOXYHEMOGLOBIN: 1.4 % (ref 0–5)
CHLORIDE BLD-SCNC: 107 MMOL/L (ref 98–107)
CHLORIDE BLD-SCNC: 107 MMOL/L (ref 98–107)
CO2: 23 MMOL/L (ref 20–31)
CO2: 24 MMOL/L (ref 20–31)
CREAT SERPL-MCNC: 0.51 MG/DL (ref 0.5–0.9)
CREAT SERPL-MCNC: 0.58 MG/DL (ref 0.5–0.9)
FIO2: ABNORMAL
GFR AFRICAN AMERICAN: >60 ML/MIN
GFR AFRICAN AMERICAN: >60 ML/MIN
GFR NON-AFRICAN AMERICAN: >60 ML/MIN
GFR NON-AFRICAN AMERICAN: >60 ML/MIN
GFR SERPL CREATININE-BSD FRML MDRD: ABNORMAL ML/MIN/{1.73_M2}
GLUCOSE BLD-MCNC: 107 MG/DL (ref 70–99)
GLUCOSE BLD-MCNC: 109 MG/DL (ref 70–99)
GLUCOSE BLD-MCNC: 110 MG/DL (ref 65–105)
GLUCOSE BLD-MCNC: 143 MG/DL (ref 65–105)
GLUCOSE BLD-MCNC: 169 MG/DL (ref 65–105)
HCO3 VENOUS: 23.1 MMOL/L (ref 24–30)
MAGNESIUM: 1.6 MG/DL (ref 1.6–2.6)
MAGNESIUM: 2 MG/DL (ref 1.6–2.6)
METHEMOGLOBIN: ABNORMAL % (ref 0–1.5)
MODE: ABNORMAL
NEGATIVE BASE EXCESS, VEN: ABNORMAL MMOL/L (ref 0–2)
NOTIFICATION TIME: ABNORMAL
NOTIFICATION: ABNORMAL
O2 DEVICE/FLOW/%: ABNORMAL
O2 SAT, VEN: 99.5 % (ref 60–85)
OXYHEMOGLOBIN: ABNORMAL % (ref 95–98)
PATIENT TEMP: 37
PCO2, VEN, TEMP ADJ: ABNORMAL MMHG (ref 39–55)
PCO2, VEN: 31.8 (ref 39–55)
PEEP/CPAP: ABNORMAL
PH VENOUS: 7.47 (ref 7.32–7.42)
PH, VEN, TEMP ADJ: ABNORMAL (ref 7.32–7.42)
PO2, VEN, TEMP ADJ: ABNORMAL MMHG (ref 30–50)
PO2, VEN: 130 (ref 30–50)
POSITIVE BASE EXCESS, VEN: 0.3 MMOL/L (ref 0–2)
POTASSIUM SERPL-SCNC: 2.9 MMOL/L (ref 3.7–5.3)
POTASSIUM SERPL-SCNC: 3.1 MMOL/L (ref 3.7–5.3)
PSV: ABNORMAL
PT. POSITION: ABNORMAL
RESPIRATORY RATE: ABNORMAL
SAMPLE SITE: ABNORMAL
SET RATE: ABNORMAL
SODIUM BLD-SCNC: 140 MMOL/L (ref 135–144)
SODIUM BLD-SCNC: 141 MMOL/L (ref 135–144)
TEXT FOR RESPIRATORY: ABNORMAL
TOTAL HB: ABNORMAL G/DL (ref 12–16)
TOTAL PROTEIN: 5.1 G/DL (ref 6.4–8.3)
TOTAL PROTEIN: 5.4 G/DL (ref 6.4–8.3)
TOTAL RATE: ABNORMAL
VT: ABNORMAL

## 2018-05-23 PROCEDURE — 6360000002 HC RX W HCPCS: Performed by: STUDENT IN AN ORGANIZED HEALTH CARE EDUCATION/TRAINING PROGRAM

## 2018-05-23 PROCEDURE — 82805 BLOOD GASES W/O2 SATURATION: CPT

## 2018-05-23 PROCEDURE — 82947 ASSAY GLUCOSE BLOOD QUANT: CPT

## 2018-05-23 PROCEDURE — 2580000003 HC RX 258: Performed by: STUDENT IN AN ORGANIZED HEALTH CARE EDUCATION/TRAINING PROGRAM

## 2018-05-23 PROCEDURE — 83735 ASSAY OF MAGNESIUM: CPT

## 2018-05-23 PROCEDURE — 80053 COMPREHEN METABOLIC PANEL: CPT

## 2018-05-23 PROCEDURE — 36415 COLL VENOUS BLD VENIPUNCTURE: CPT

## 2018-05-23 PROCEDURE — 1220000000 HC SEMI PRIVATE OB R&B

## 2018-05-23 RX ORDER — PROMETHAZINE HYDROCHLORIDE 25 MG/ML
25 INJECTION, SOLUTION INTRAMUSCULAR; INTRAVENOUS ONCE
Status: COMPLETED | OUTPATIENT
Start: 2018-05-23 | End: 2018-05-23

## 2018-05-23 RX ORDER — DOCUSATE SODIUM 100 MG/1
100 CAPSULE, LIQUID FILLED ORAL DAILY
Status: DISCONTINUED | OUTPATIENT
Start: 2018-05-24 | End: 2018-05-24

## 2018-05-23 RX ORDER — MAGNESIUM SULFATE 1 G/100ML
1 INJECTION INTRAVENOUS ONCE
Status: COMPLETED | OUTPATIENT
Start: 2018-05-23 | End: 2018-05-23

## 2018-05-23 RX ORDER — POTASSIUM CHLORIDE 7.45 MG/ML
80 INJECTION INTRAVENOUS ONCE
Status: DISCONTINUED | OUTPATIENT
Start: 2018-05-23 | End: 2018-05-23

## 2018-05-23 RX ADMIN — POTASSIUM CHLORIDE 40 MEQ: 149 INJECTION, SOLUTION, CONCENTRATE INTRAVENOUS at 20:30

## 2018-05-23 RX ADMIN — SODIUM CHLORIDE 40 MEQ: 9 INJECTION, SOLUTION INTRAVENOUS at 08:50

## 2018-05-23 RX ADMIN — PROMETHAZINE HYDROCHLORIDE 25 MG: 25 INJECTION INTRAMUSCULAR; INTRAVENOUS at 04:14

## 2018-05-23 RX ADMIN — MAGNESIUM SULFATE HEPTAHYDRATE 1 G: 1 INJECTION, SOLUTION INTRAVENOUS at 11:45

## 2018-05-23 RX ADMIN — Medication 40 MEQ: at 13:36

## 2018-05-23 RX ADMIN — PROMETHAZINE HYDROCHLORIDE 25 MG: 25 INJECTION INTRAMUSCULAR; INTRAVENOUS at 21:24

## 2018-05-24 VITALS
SYSTOLIC BLOOD PRESSURE: 137 MMHG | WEIGHT: 217 LBS | BODY MASS INDEX: 34.87 KG/M2 | DIASTOLIC BLOOD PRESSURE: 84 MMHG | TEMPERATURE: 98.4 F | RESPIRATION RATE: 20 BRPM | HEIGHT: 66 IN | HEART RATE: 85 BPM

## 2018-05-24 LAB
ALBUMIN SERPL-MCNC: 2.6 G/DL (ref 3.5–5.2)
ALBUMIN/GLOBULIN RATIO: 1 (ref 1–2.5)
ALP BLD-CCNC: 88 U/L (ref 35–104)
ALT SERPL-CCNC: 70 U/L (ref 5–33)
ANION GAP SERPL CALCULATED.3IONS-SCNC: 12 MMOL/L (ref 9–17)
AST SERPL-CCNC: 65 U/L
BILIRUB SERPL-MCNC: 0.5 MG/DL (ref 0.3–1.2)
BUN BLDV-MCNC: <2 MG/DL (ref 6–20)
BUN/CREAT BLD: ABNORMAL (ref 9–20)
CALCIUM SERPL-MCNC: 8.1 MG/DL (ref 8.6–10.4)
CHLORIDE BLD-SCNC: 108 MMOL/L (ref 98–107)
CO2: 21 MMOL/L (ref 20–31)
CREAT SERPL-MCNC: 0.4 MG/DL (ref 0.5–0.9)
GFR AFRICAN AMERICAN: >60 ML/MIN
GFR NON-AFRICAN AMERICAN: >60 ML/MIN
GFR SERPL CREATININE-BSD FRML MDRD: ABNORMAL ML/MIN/{1.73_M2}
GFR SERPL CREATININE-BSD FRML MDRD: ABNORMAL ML/MIN/{1.73_M2}
GLUCOSE BLD-MCNC: 104 MG/DL (ref 65–105)
GLUCOSE BLD-MCNC: 114 MG/DL (ref 65–105)
GLUCOSE BLD-MCNC: 125 MG/DL (ref 70–99)
POTASSIUM SERPL-SCNC: 3.3 MMOL/L (ref 3.7–5.3)
SODIUM BLD-SCNC: 141 MMOL/L (ref 135–144)
TOTAL PROTEIN: 5.3 G/DL (ref 6.4–8.3)

## 2018-05-24 PROCEDURE — 80053 COMPREHEN METABOLIC PANEL: CPT

## 2018-05-24 PROCEDURE — 96360 HYDRATION IV INFUSION INIT: CPT

## 2018-05-24 PROCEDURE — 99238 HOSP IP/OBS DSCHRG MGMT 30/<: CPT | Performed by: OBSTETRICS & GYNECOLOGY

## 2018-05-24 PROCEDURE — 82947 ASSAY GLUCOSE BLOOD QUANT: CPT

## 2018-05-24 PROCEDURE — 36415 COLL VENOUS BLD VENIPUNCTURE: CPT

## 2018-05-24 PROCEDURE — 96372 THER/PROPH/DIAG INJ SC/IM: CPT

## 2018-05-24 PROCEDURE — 6360000002 HC RX W HCPCS: Performed by: STUDENT IN AN ORGANIZED HEALTH CARE EDUCATION/TRAINING PROGRAM

## 2018-05-24 PROCEDURE — 99215 OFFICE O/P EST HI 40 MIN: CPT

## 2018-05-24 PROCEDURE — 96361 HYDRATE IV INFUSION ADD-ON: CPT

## 2018-05-24 RX ORDER — MAGNESIUM SULFATE 1 G/100ML
1 INJECTION INTRAVENOUS ONCE
Status: COMPLETED | OUTPATIENT
Start: 2018-05-24 | End: 2018-05-24

## 2018-05-24 RX ORDER — POTASSIUM CHLORIDE 20 MEQ/1
20 TABLET, EXTENDED RELEASE ORAL DAILY
Qty: 60 TABLET | Refills: 3 | Status: SHIPPED | OUTPATIENT
Start: 2018-05-24 | End: 2018-05-24

## 2018-05-24 RX ORDER — POTASSIUM CHLORIDE 20 MEQ/1
20 TABLET, EXTENDED RELEASE ORAL DAILY
Qty: 60 TABLET | Refills: 3 | Status: ON HOLD | OUTPATIENT
Start: 2018-05-24 | End: 2018-06-16

## 2018-05-24 RX ORDER — DOCUSATE SODIUM 100 MG/1
100 CAPSULE, LIQUID FILLED ORAL 2 TIMES DAILY PRN
Status: DISCONTINUED | OUTPATIENT
Start: 2018-05-24 | End: 2018-05-24 | Stop reason: HOSPADM

## 2018-05-24 RX ADMIN — MAGNESIUM SULFATE HEPTAHYDRATE 1 G: 1 INJECTION, SOLUTION INTRAVENOUS at 02:34

## 2018-05-29 ENCOUNTER — TELEPHONE (OUTPATIENT)
Dept: OBGYN | Age: 28
End: 2018-05-29

## 2018-05-30 ENCOUNTER — ROUTINE PRENATAL (OUTPATIENT)
Dept: PERINATAL CARE | Age: 28
End: 2018-05-30
Payer: COMMERCIAL

## 2018-05-30 VITALS
TEMPERATURE: 98.3 F | WEIGHT: 227 LBS | SYSTOLIC BLOOD PRESSURE: 137 MMHG | RESPIRATION RATE: 16 BRPM | HEART RATE: 118 BPM | HEIGHT: 66 IN | DIASTOLIC BLOOD PRESSURE: 85 MMHG | BODY MASS INDEX: 36.48 KG/M2

## 2018-05-30 DIAGNOSIS — O24.419 GESTATIONAL DIABETES MELLITUS (GDM), ANTEPARTUM, GESTATIONAL DIABETES METHOD OF CONTROL UNSPECIFIED: Primary | ICD-10-CM

## 2018-05-30 DIAGNOSIS — O99.213 OBESITY AFFECTING PREGNANCY IN THIRD TRIMESTER: ICD-10-CM

## 2018-05-30 DIAGNOSIS — Z36.4 ANTENATAL SCREENING FOR FETAL GROWTH RETARDATION USING ULTRASONICS: ICD-10-CM

## 2018-05-30 DIAGNOSIS — Z13.89 ENCOUNTER FOR ROUTINE SCREENING FOR MALFORMATION USING ULTRASONICS: ICD-10-CM

## 2018-05-30 DIAGNOSIS — Z3A.35 35 WEEKS GESTATION OF PREGNANCY: ICD-10-CM

## 2018-05-30 DIAGNOSIS — O09.43 GRAND MULTIPARITY WITH CURRENT PREGNANCY IN THIRD TRIMESTER: ICD-10-CM

## 2018-05-30 PROCEDURE — 76818 FETAL BIOPHYS PROFILE W/NST: CPT | Performed by: OBSTETRICS & GYNECOLOGY

## 2018-05-30 PROCEDURE — G8417 CALC BMI ABV UP PARAM F/U: HCPCS | Performed by: OBSTETRICS & GYNECOLOGY

## 2018-05-30 PROCEDURE — 76805 OB US >/= 14 WKS SNGL FETUS: CPT | Performed by: OBSTETRICS & GYNECOLOGY

## 2018-05-30 PROCEDURE — G8427 DOCREV CUR MEDS BY ELIG CLIN: HCPCS | Performed by: OBSTETRICS & GYNECOLOGY

## 2018-05-30 PROCEDURE — 99242 OFF/OP CONSLTJ NEW/EST SF 20: CPT | Performed by: OBSTETRICS & GYNECOLOGY

## 2018-06-03 ENCOUNTER — HOSPITAL ENCOUNTER (OUTPATIENT)
Age: 28
Setting detail: OBSERVATION
Discharge: HOME | End: 2018-06-03
Attending: OBSTETRICS & GYNECOLOGY | Admitting: OBSTETRICS & GYNECOLOGY
Payer: COMMERCIAL

## 2018-06-03 VITALS
TEMPERATURE: 98.4 F | SYSTOLIC BLOOD PRESSURE: 124 MMHG | RESPIRATION RATE: 18 BRPM | DIASTOLIC BLOOD PRESSURE: 76 MMHG | HEART RATE: 92 BPM

## 2018-06-03 PROBLEM — O9A.213 TRAUMATIC INJURY DURING PREGNANCY IN THIRD TRIMESTER: Status: ACTIVE | Noted: 2018-06-03

## 2018-06-03 LAB
% FETAL BLEED: NORMAL %
ABSOLUTE EOS #: 0.2 K/UL (ref 0–0.44)
ABSOLUTE IMMATURE GRANULOCYTE: 0.19 K/UL (ref 0–0.3)
ABSOLUTE LYMPH #: 2.16 K/UL (ref 1.1–3.7)
ABSOLUTE MONO #: 1.49 K/UL (ref 0.1–1.2)
ALBUMIN SERPL-MCNC: 3.2 G/DL (ref 3.5–5.2)
ALBUMIN/GLOBULIN RATIO: 1.1 (ref 1–2.5)
ALP BLD-CCNC: 133 U/L (ref 35–104)
ALT SERPL-CCNC: 53 U/L (ref 5–33)
AMPHETAMINE SCREEN URINE: NEGATIVE
ANION GAP SERPL CALCULATED.3IONS-SCNC: 14 MMOL/L (ref 9–17)
AST SERPL-CCNC: 39 U/L
BARBITURATE SCREEN URINE: NEGATIVE
BASOPHILS # BLD: 0 % (ref 0–2)
BASOPHILS ABSOLUTE: 0.04 K/UL (ref 0–0.2)
BENZODIAZEPINE SCREEN, URINE: NEGATIVE
BILIRUB SERPL-MCNC: 0.61 MG/DL (ref 0.3–1.2)
BILIRUBIN URINE: NEGATIVE
BUN BLDV-MCNC: 3 MG/DL (ref 6–20)
BUN/CREAT BLD: ABNORMAL (ref 9–20)
BUPRENORPHINE URINE: NORMAL
CALCIUM SERPL-MCNC: 9 MG/DL (ref 8.6–10.4)
CANNABINOID SCREEN URINE: NEGATIVE
CHLORIDE BLD-SCNC: 99 MMOL/L (ref 98–107)
CO2: 23 MMOL/L (ref 20–31)
COCAINE METABOLITE, URINE: NEGATIVE
COLOR: YELLOW
COMMENT UA: ABNORMAL
CREAT SERPL-MCNC: 0.55 MG/DL (ref 0.5–0.9)
DIFFERENTIAL TYPE: ABNORMAL
DIRECT EXAM: ABNORMAL
EOSINOPHILS RELATIVE PERCENT: 1 % (ref 1–4)
FETAL BLEED VOLUME: NORMAL ML
FIBRINOGEN: 564 MG/DL (ref 140–420)
GFR AFRICAN AMERICAN: >60 ML/MIN
GFR NON-AFRICAN AMERICAN: >60 ML/MIN
GFR SERPL CREATININE-BSD FRML MDRD: ABNORMAL ML/MIN/{1.73_M2}
GFR SERPL CREATININE-BSD FRML MDRD: ABNORMAL ML/MIN/{1.73_M2}
GLUCOSE BLD-MCNC: 143 MG/DL (ref 70–99)
GLUCOSE URINE: NEGATIVE
HCT VFR BLD CALC: 33.6 % (ref 36.3–47.1)
HEMOGLOBIN: 10.8 G/DL (ref 11.9–15.1)
IMMATURE GRANULOCYTES: 1 %
INR BLD: 0.9
KETONES, URINE: NEGATIVE
LEUKOCYTE ESTERASE, URINE: NEGATIVE
LYMPHOCYTES # BLD: 14 % (ref 24–43)
Lab: ABNORMAL
MCH RBC QN AUTO: 28.7 PG (ref 25.2–33.5)
MCHC RBC AUTO-ENTMCNC: 32.1 G/DL (ref 28.4–34.8)
MCV RBC AUTO: 89.4 FL (ref 82.6–102.9)
MDMA URINE: NORMAL
METHADONE SCREEN, URINE: NEGATIVE
METHAMPHETAMINE, URINE: NORMAL
MONOCYTES # BLD: 10 % (ref 3–12)
NITRITE, URINE: NEGATIVE
NRBC AUTOMATED: 0.2 PER 100 WBC
OPIATES, URINE: NEGATIVE
OXYCODONE SCREEN URINE: NEGATIVE
PARTIAL THROMBOPLASTIN TIME: 23.7 SEC (ref 20.5–30.5)
PDW BLD-RTO: 13.7 % (ref 11.8–14.4)
PH UA: 8 (ref 5–8)
PHENCYCLIDINE, URINE: NEGATIVE
PLATELET # BLD: 244 K/UL (ref 138–453)
PLATELET ESTIMATE: ABNORMAL
PMV BLD AUTO: 10.6 FL (ref 8.1–13.5)
POTASSIUM SERPL-SCNC: 3 MMOL/L (ref 3.7–5.3)
PROPOXYPHENE, URINE: NORMAL
PROTEIN UA: NEGATIVE
PROTHROMBIN TIME: 9.7 SEC (ref 9–12)
RBC # BLD: 3.76 M/UL (ref 3.95–5.11)
RBC # BLD: ABNORMAL 10*6/UL
RHOGAM DOSES REQUIRED: NORMAL
SEG NEUTROPHILS: 74 % (ref 36–65)
SEGMENTED NEUTROPHILS ABSOLUTE COUNT: 10.98 K/UL (ref 1.5–8.1)
SODIUM BLD-SCNC: 136 MMOL/L (ref 135–144)
SPECIFIC GRAVITY UA: 1 (ref 1–1.03)
SPECIMEN DESCRIPTION: ABNORMAL
STATUS: ABNORMAL
TEST INFORMATION: NORMAL
TOTAL PROTEIN: 6.2 G/DL (ref 6.4–8.3)
TRICYCLIC ANTIDEPRESSANTS, UR: NORMAL
TURBIDITY: CLEAR
URINE HGB: NEGATIVE
UROBILINOGEN, URINE: NORMAL
WBC # BLD: 15.1 K/UL (ref 3.5–11.3)
WBC # BLD: ABNORMAL 10*3/UL

## 2018-06-03 PROCEDURE — 96361 HYDRATE IV INFUSION ADD-ON: CPT

## 2018-06-03 PROCEDURE — 80053 COMPREHEN METABOLIC PANEL: CPT

## 2018-06-03 PROCEDURE — 87491 CHLMYD TRACH DNA AMP PROBE: CPT

## 2018-06-03 PROCEDURE — 6370000000 HC RX 637 (ALT 250 FOR IP): Performed by: STUDENT IN AN ORGANIZED HEALTH CARE EDUCATION/TRAINING PROGRAM

## 2018-06-03 PROCEDURE — 87086 URINE CULTURE/COLONY COUNT: CPT

## 2018-06-03 PROCEDURE — 6370000000 HC RX 637 (ALT 250 FOR IP): Performed by: OBSTETRICS & GYNECOLOGY

## 2018-06-03 PROCEDURE — 87480 CANDIDA DNA DIR PROBE: CPT

## 2018-06-03 PROCEDURE — 81003 URINALYSIS AUTO W/O SCOPE: CPT

## 2018-06-03 PROCEDURE — 87660 TRICHOMONAS VAGIN DIR PROBE: CPT

## 2018-06-03 PROCEDURE — 85730 THROMBOPLASTIN TIME PARTIAL: CPT

## 2018-06-03 PROCEDURE — 85384 FIBRINOGEN ACTIVITY: CPT

## 2018-06-03 PROCEDURE — 83986 ASSAY PH BODY FLUID NOS: CPT

## 2018-06-03 PROCEDURE — 2580000003 HC RX 258: Performed by: OBSTETRICS & GYNECOLOGY

## 2018-06-03 PROCEDURE — 87081 CULTURE SCREEN ONLY: CPT

## 2018-06-03 PROCEDURE — G0378 HOSPITAL OBSERVATION PER HR: HCPCS

## 2018-06-03 PROCEDURE — 85610 PROTHROMBIN TIME: CPT

## 2018-06-03 PROCEDURE — 87591 N.GONORRHOEAE DNA AMP PROB: CPT

## 2018-06-03 PROCEDURE — 99213 OFFICE O/P EST LOW 20 MIN: CPT

## 2018-06-03 PROCEDURE — 87510 GARDNER VAG DNA DIR PROBE: CPT

## 2018-06-03 PROCEDURE — 85460 HEMOGLOBIN FETAL: CPT

## 2018-06-03 PROCEDURE — 80307 DRUG TEST PRSMV CHEM ANLYZR: CPT

## 2018-06-03 PROCEDURE — 96360 HYDRATION IV INFUSION INIT: CPT

## 2018-06-03 PROCEDURE — 99232 SBSQ HOSP IP/OBS MODERATE 35: CPT | Performed by: OBSTETRICS & GYNECOLOGY

## 2018-06-03 PROCEDURE — 85025 COMPLETE CBC W/AUTO DIFF WBC: CPT

## 2018-06-03 RX ORDER — ACETAMINOPHEN 500 MG
1000 TABLET ORAL EVERY 6 HOURS PRN
Status: DISCONTINUED | OUTPATIENT
Start: 2018-06-03 | End: 2018-06-03 | Stop reason: HOSPADM

## 2018-06-03 RX ORDER — METRONIDAZOLE 500 MG/1
500 TABLET ORAL EVERY 12 HOURS SCHEDULED
Status: DISCONTINUED | OUTPATIENT
Start: 2018-06-03 | End: 2018-06-03 | Stop reason: HOSPADM

## 2018-06-03 RX ORDER — POTASSIUM CHLORIDE 20 MEQ/1
40 TABLET, EXTENDED RELEASE ORAL ONCE
Status: COMPLETED | OUTPATIENT
Start: 2018-06-03 | End: 2018-06-03

## 2018-06-03 RX ORDER — SODIUM CHLORIDE 0.9 % (FLUSH) 0.9 %
10 SYRINGE (ML) INJECTION EVERY 12 HOURS SCHEDULED
Status: DISCONTINUED | OUTPATIENT
Start: 2018-06-03 | End: 2018-06-03 | Stop reason: HOSPADM

## 2018-06-03 RX ORDER — SODIUM CHLORIDE 9 MG/ML
INJECTION, SOLUTION INTRAVENOUS CONTINUOUS
Status: DISCONTINUED | OUTPATIENT
Start: 2018-06-03 | End: 2018-06-03 | Stop reason: HOSPADM

## 2018-06-03 RX ORDER — SODIUM CHLORIDE 0.9 % (FLUSH) 0.9 %
10 SYRINGE (ML) INJECTION PRN
Status: DISCONTINUED | OUTPATIENT
Start: 2018-06-03 | End: 2018-06-03 | Stop reason: HOSPADM

## 2018-06-03 RX ORDER — METRONIDAZOLE 500 MG/1
500 TABLET ORAL 2 TIMES DAILY
Qty: 14 TABLET | Refills: 0 | Status: SHIPPED | OUTPATIENT
Start: 2018-06-03 | End: 2018-06-10

## 2018-06-03 RX ADMIN — SODIUM CHLORIDE: 9 INJECTION, SOLUTION INTRAVENOUS at 06:00

## 2018-06-03 RX ADMIN — METRONIDAZOLE 500 MG: 500 TABLET, FILM COATED ORAL at 09:11

## 2018-06-03 RX ADMIN — POTASSIUM CHLORIDE 40 MEQ: 1500 TABLET, EXTENDED RELEASE ORAL at 09:11

## 2018-06-03 RX ADMIN — ACETAMINOPHEN 1000 MG: 500 TABLET ORAL at 05:58

## 2018-06-03 ASSESSMENT — PAIN SCALES - GENERAL: PAINLEVEL_OUTOF10: 10

## 2018-06-04 ENCOUNTER — ROUTINE PRENATAL (OUTPATIENT)
Dept: OBGYN | Age: 28
End: 2018-06-04
Payer: COMMERCIAL

## 2018-06-04 VITALS
SYSTOLIC BLOOD PRESSURE: 133 MMHG | WEIGHT: 226 LBS | HEART RATE: 95 BPM | DIASTOLIC BLOOD PRESSURE: 86 MMHG | BODY MASS INDEX: 36.48 KG/M2

## 2018-06-04 DIAGNOSIS — O09.93 HRP (HIGH RISK PREGNANCY), THIRD TRIMESTER: Primary | ICD-10-CM

## 2018-06-04 DIAGNOSIS — A56.8 CHLAMYDIA TRACHOMATIS INFECTION IN MOTHER DURING THIRD TRIMESTER OF PREGNANCY: ICD-10-CM

## 2018-06-04 DIAGNOSIS — Z3A.35 35 WEEKS GESTATION OF PREGNANCY: ICD-10-CM

## 2018-06-04 DIAGNOSIS — O98.313 CHLAMYDIA TRACHOMATIS INFECTION IN MOTHER DURING THIRD TRIMESTER OF PREGNANCY: ICD-10-CM

## 2018-06-04 DIAGNOSIS — O24.419 GESTATIONAL DIABETES MELLITUS (GDM) IN THIRD TRIMESTER, GESTATIONAL DIABETES METHOD OF CONTROL UNSPECIFIED: ICD-10-CM

## 2018-06-04 DIAGNOSIS — O98.813 CHLAMYDIA INFECTION AFFECTING PREGNANCY IN THIRD TRIMESTER: ICD-10-CM

## 2018-06-04 DIAGNOSIS — A74.9 CHLAMYDIA INFECTION AFFECTING PREGNANCY IN THIRD TRIMESTER: ICD-10-CM

## 2018-06-04 PROBLEM — O98.819 CHLAMYDIA INFECTION AFFECTING PREGNANCY: Status: ACTIVE | Noted: 2018-06-04

## 2018-06-04 LAB
C TRACH DNA GENITAL QL NAA+PROBE: ABNORMAL
CULTURE: NORMAL
CULTURE: NORMAL
FERN TESTING (POC): NORMAL
Lab: NORMAL
N. GONORRHOEAE DNA: NEGATIVE
NITRAZINE PH (POC): NEGATIVE
SPECIMEN DESCRIPTION: NORMAL
STATUS: NORMAL

## 2018-06-04 PROCEDURE — 99212 OFFICE O/P EST SF 10 MIN: CPT | Performed by: OBSTETRICS & GYNECOLOGY

## 2018-06-04 PROCEDURE — G8417 CALC BMI ABV UP PARAM F/U: HCPCS | Performed by: OBSTETRICS & GYNECOLOGY

## 2018-06-04 PROCEDURE — G8427 DOCREV CUR MEDS BY ELIG CLIN: HCPCS | Performed by: OBSTETRICS & GYNECOLOGY

## 2018-06-04 PROCEDURE — 1111F DSCHRG MED/CURRENT MED MERGE: CPT | Performed by: OBSTETRICS & GYNECOLOGY

## 2018-06-04 PROCEDURE — 99213 OFFICE O/P EST LOW 20 MIN: CPT | Performed by: OBSTETRICS & GYNECOLOGY

## 2018-06-04 PROCEDURE — 1036F TOBACCO NON-USER: CPT | Performed by: OBSTETRICS & GYNECOLOGY

## 2018-06-04 RX ORDER — AZITHROMYCIN 500 MG/1
1000 TABLET, FILM COATED ORAL ONCE
Qty: 2 TABLET | Refills: 0 | Status: SHIPPED | OUTPATIENT
Start: 2018-06-04 | End: 2018-06-04 | Stop reason: CLARIF

## 2018-06-04 RX ORDER — AZITHROMYCIN 500 MG/1
1000 TABLET, FILM COATED ORAL ONCE
Qty: 2 TABLET | Refills: 0 | Status: SHIPPED | OUTPATIENT
Start: 2018-06-04 | End: 2018-06-04

## 2018-06-05 ENCOUNTER — HOSPITAL ENCOUNTER (OUTPATIENT)
Age: 28
Discharge: LEFT BEFORE DISCHARGE | End: 2018-06-05
Attending: OBSTETRICS & GYNECOLOGY | Admitting: OBSTETRICS & GYNECOLOGY
Payer: COMMERCIAL

## 2018-06-05 VITALS
TEMPERATURE: 98.6 F | RESPIRATION RATE: 18 BRPM | HEART RATE: 97 BPM | DIASTOLIC BLOOD PRESSURE: 90 MMHG | SYSTOLIC BLOOD PRESSURE: 136 MMHG

## 2018-06-05 PROBLEM — O09.93 HRP (HIGH RISK PREGNANCY), THIRD TRIMESTER: Status: RESOLVED | Noted: 2018-05-21 | Resolved: 2018-06-05

## 2018-06-05 PROBLEM — O09.93 HRP (HIGH RISK PREGNANCY), THIRD TRIMESTER: Status: ACTIVE | Noted: 2018-06-05

## 2018-06-05 PROCEDURE — 99213 OFFICE O/P EST LOW 20 MIN: CPT

## 2018-06-05 RX ORDER — ACETAMINOPHEN 500 MG
1000 TABLET ORAL EVERY 6 HOURS PRN
Status: DISCONTINUED | OUTPATIENT
Start: 2018-06-05 | End: 2018-06-06 | Stop reason: HOSPADM

## 2018-06-05 RX ORDER — SODIUM CHLORIDE 0.9 % (FLUSH) 0.9 %
10 SYRINGE (ML) INJECTION EVERY 12 HOURS SCHEDULED
Status: DISCONTINUED | OUTPATIENT
Start: 2018-06-05 | End: 2018-06-06 | Stop reason: HOSPADM

## 2018-06-05 RX ORDER — SODIUM CHLORIDE 0.9 % (FLUSH) 0.9 %
10 SYRINGE (ML) INJECTION PRN
Status: DISCONTINUED | OUTPATIENT
Start: 2018-06-05 | End: 2018-06-06 | Stop reason: HOSPADM

## 2018-06-06 LAB
CULTURE: NORMAL
CULTURE: NORMAL
Lab: NORMAL
SPECIMEN DESCRIPTION: NORMAL
STATUS: NORMAL

## 2018-06-07 ENCOUNTER — ROUTINE PRENATAL (OUTPATIENT)
Dept: PERINATAL CARE | Age: 28
End: 2018-06-07
Payer: COMMERCIAL

## 2018-06-07 VITALS
SYSTOLIC BLOOD PRESSURE: 134 MMHG | RESPIRATION RATE: 16 BRPM | DIASTOLIC BLOOD PRESSURE: 84 MMHG | TEMPERATURE: 97.5 F | WEIGHT: 225 LBS | HEIGHT: 66 IN | HEART RATE: 87 BPM | BODY MASS INDEX: 36.16 KG/M2

## 2018-06-07 DIAGNOSIS — O24.419 GESTATIONAL DIABETES MELLITUS (GDM), ANTEPARTUM, GESTATIONAL DIABETES METHOD OF CONTROL UNSPECIFIED: Primary | ICD-10-CM

## 2018-06-07 DIAGNOSIS — O99.213 OBESITY AFFECTING PREGNANCY IN THIRD TRIMESTER: ICD-10-CM

## 2018-06-07 DIAGNOSIS — Z3A.36 36 WEEKS GESTATION OF PREGNANCY: ICD-10-CM

## 2018-06-07 DIAGNOSIS — O09.43 GRAND MULTIPARITY WITH CURRENT PREGNANCY IN THIRD TRIMESTER: ICD-10-CM

## 2018-06-07 PROCEDURE — 76819 FETAL BIOPHYS PROFIL W/O NST: CPT | Performed by: OBSTETRICS & GYNECOLOGY

## 2018-06-10 ENCOUNTER — HOSPITAL ENCOUNTER (OUTPATIENT)
Age: 28
Discharge: HOME | End: 2018-06-10
Attending: OBSTETRICS & GYNECOLOGY | Admitting: OBSTETRICS & GYNECOLOGY
Payer: COMMERCIAL

## 2018-06-10 VITALS
DIASTOLIC BLOOD PRESSURE: 86 MMHG | HEIGHT: 66 IN | WEIGHT: 225 LBS | BODY MASS INDEX: 36.16 KG/M2 | RESPIRATION RATE: 18 BRPM | HEART RATE: 97 BPM | TEMPERATURE: 97.7 F | SYSTOLIC BLOOD PRESSURE: 139 MMHG

## 2018-06-10 LAB
BILIRUBIN URINE: NEGATIVE
COLOR: YELLOW
COMMENT UA: ABNORMAL
GLUCOSE URINE: NEGATIVE
KETONES, URINE: NEGATIVE
LEUKOCYTE ESTERASE, URINE: NEGATIVE
NITRITE, URINE: NEGATIVE
PH UA: 8 (ref 5–8)
PROTEIN UA: NEGATIVE
SPECIFIC GRAVITY UA: 1 (ref 1–1.03)
TURBIDITY: CLEAR
URINE HGB: NEGATIVE
UROBILINOGEN, URINE: NORMAL

## 2018-06-10 PROCEDURE — 81003 URINALYSIS AUTO W/O SCOPE: CPT

## 2018-06-10 PROCEDURE — 6370000000 HC RX 637 (ALT 250 FOR IP): Performed by: STUDENT IN AN ORGANIZED HEALTH CARE EDUCATION/TRAINING PROGRAM

## 2018-06-10 PROCEDURE — 6370000000 HC RX 637 (ALT 250 FOR IP)

## 2018-06-10 PROCEDURE — 87591 N.GONORRHOEAE DNA AMP PROB: CPT

## 2018-06-10 PROCEDURE — 87491 CHLMYD TRACH DNA AMP PROBE: CPT

## 2018-06-10 PROCEDURE — 99219 PR INITIAL OBSERVATION CARE/DAY 50 MINUTES: CPT | Performed by: OBSTETRICS & GYNECOLOGY

## 2018-06-10 PROCEDURE — 2580000003 HC RX 258: Performed by: STUDENT IN AN ORGANIZED HEALTH CARE EDUCATION/TRAINING PROGRAM

## 2018-06-10 PROCEDURE — 96360 HYDRATION IV INFUSION INIT: CPT

## 2018-06-10 PROCEDURE — 96361 HYDRATE IV INFUSION ADD-ON: CPT

## 2018-06-10 PROCEDURE — 6360000002 HC RX W HCPCS: Performed by: STUDENT IN AN ORGANIZED HEALTH CARE EDUCATION/TRAINING PROGRAM

## 2018-06-10 PROCEDURE — 99213 OFFICE O/P EST LOW 20 MIN: CPT

## 2018-06-10 RX ORDER — 0.9 % SODIUM CHLORIDE 0.9 %
1000 INTRAVENOUS SOLUTION INTRAVENOUS ONCE
Status: COMPLETED | OUTPATIENT
Start: 2018-06-10 | End: 2018-06-10

## 2018-06-10 RX ORDER — CALCIUM CARBONATE 200(500)MG
500 TABLET,CHEWABLE ORAL ONCE
Status: COMPLETED | OUTPATIENT
Start: 2018-06-10 | End: 2018-06-10

## 2018-06-10 RX ORDER — AZITHROMYCIN 250 MG/1
1000 TABLET, FILM COATED ORAL ONCE
Status: COMPLETED | OUTPATIENT
Start: 2018-06-10 | End: 2018-06-10

## 2018-06-10 RX ORDER — AZITHROMYCIN 250 MG/1
500 TABLET, FILM COATED ORAL DAILY
Status: DISCONTINUED | OUTPATIENT
Start: 2018-06-10 | End: 2018-06-10

## 2018-06-10 RX ORDER — ACETAMINOPHEN 500 MG
1000 TABLET ORAL EVERY 6 HOURS PRN
Status: DISCONTINUED | OUTPATIENT
Start: 2018-06-10 | End: 2018-06-10 | Stop reason: HOSPADM

## 2018-06-10 RX ORDER — CALCIUM CARBONATE 200(500)MG
TABLET,CHEWABLE ORAL
Status: COMPLETED
Start: 2018-06-10 | End: 2018-06-10

## 2018-06-10 RX ORDER — ONDANSETRON 4 MG/1
4 TABLET, FILM COATED ORAL ONCE
Status: COMPLETED | OUTPATIENT
Start: 2018-06-10 | End: 2018-06-10

## 2018-06-10 RX ADMIN — SODIUM CHLORIDE 1000 ML: 9 INJECTION, SOLUTION INTRAVENOUS at 17:28

## 2018-06-10 RX ADMIN — ANTACID TABLETS 500 MG: 500 TABLET, CHEWABLE ORAL at 18:58

## 2018-06-10 RX ADMIN — AZITHROMYCIN 1000 MG: 250 TABLET, FILM COATED ORAL at 19:20

## 2018-06-10 RX ADMIN — Medication 500 MG: at 18:58

## 2018-06-10 RX ADMIN — ONDANSETRON HYDROCHLORIDE 4 MG: 4 TABLET, FILM COATED ORAL at 19:09

## 2018-06-10 RX ADMIN — ACETAMINOPHEN 1000 MG: 500 TABLET ORAL at 17:27

## 2018-06-10 ASSESSMENT — PAIN SCALES - GENERAL: PAINLEVEL_OUTOF10: 3

## 2018-06-11 LAB
C. TRACHOMATIS DNA ,URINE: ABNORMAL
N. GONORRHOEAE DNA, URINE: NEGATIVE

## 2018-06-12 ENCOUNTER — TELEPHONE (OUTPATIENT)
Dept: DIABETES SERVICES | Age: 28
End: 2018-06-12

## 2018-06-12 ENCOUNTER — TELEPHONE (OUTPATIENT)
Dept: OBGYN | Age: 28
End: 2018-06-12

## 2018-06-13 ENCOUNTER — HOSPITAL ENCOUNTER (EMERGENCY)
Age: 28
Discharge: HOME | DRG: 540 | End: 2018-06-13
Attending: EMERGENCY MEDICINE
Payer: COMMERCIAL

## 2018-06-13 ENCOUNTER — HOSPITAL ENCOUNTER (INPATIENT)
Age: 28
LOS: 5 days | Discharge: HOME | DRG: 540 | End: 2018-06-18
Attending: OBSTETRICS & GYNECOLOGY | Admitting: OBSTETRICS & GYNECOLOGY
Payer: COMMERCIAL

## 2018-06-13 ENCOUNTER — TELEPHONE (OUTPATIENT)
Dept: OBGYN | Age: 28
End: 2018-06-13

## 2018-06-13 ENCOUNTER — HOSPITAL ENCOUNTER (OUTPATIENT)
Age: 28
Discharge: LEFT BEFORE DISCHARGE | DRG: 540 | End: 2018-06-13
Attending: OBSTETRICS & GYNECOLOGY | Admitting: OBSTETRICS & GYNECOLOGY
Payer: COMMERCIAL

## 2018-06-13 VITALS
BODY MASS INDEX: 36.16 KG/M2 | SYSTOLIC BLOOD PRESSURE: 131 MMHG | HEIGHT: 66 IN | RESPIRATION RATE: 16 BRPM | DIASTOLIC BLOOD PRESSURE: 91 MMHG | WEIGHT: 225 LBS | HEART RATE: 109 BPM | TEMPERATURE: 97.9 F | OXYGEN SATURATION: 97 %

## 2018-06-13 VITALS
TEMPERATURE: 97.4 F | HEART RATE: 88 BPM | SYSTOLIC BLOOD PRESSURE: 146 MMHG | RESPIRATION RATE: 18 BRPM | DIASTOLIC BLOOD PRESSURE: 95 MMHG

## 2018-06-13 DIAGNOSIS — I10 HYPERTENSION, UNSPECIFIED TYPE: ICD-10-CM

## 2018-06-13 DIAGNOSIS — Z98.891 S/P CESAREAN SECTION: Primary | ICD-10-CM

## 2018-06-13 DIAGNOSIS — A64 STD (SEXUALLY TRANSMITTED DISEASE): Primary | ICD-10-CM

## 2018-06-13 PROBLEM — R03.0 ELEVATED BP WITHOUT DIAGNOSIS OF HYPERTENSION: Status: ACTIVE | Noted: 2018-06-13

## 2018-06-13 PROBLEM — O09.93 HRP (HIGH RISK PREGNANCY), THIRD TRIMESTER: Status: ACTIVE | Noted: 2018-06-13

## 2018-06-13 LAB
-: ABNORMAL
ABO/RH: NORMAL
ABSOLUTE EOS #: 0.21 K/UL (ref 0–0.44)
ABSOLUTE IMMATURE GRANULOCYTE: 0.11 K/UL (ref 0–0.3)
ABSOLUTE LYMPH #: 2.34 K/UL (ref 1.1–3.7)
ABSOLUTE MONO #: 1.18 K/UL (ref 0.1–1.2)
ALBUMIN SERPL-MCNC: 3.2 G/DL (ref 3.5–5.2)
ALBUMIN/GLOBULIN RATIO: 1 (ref 1–2.5)
ALP BLD-CCNC: 162 U/L (ref 35–104)
ALT SERPL-CCNC: 59 U/L (ref 5–33)
AMORPHOUS: ABNORMAL
AMPHETAMINE SCREEN URINE: NEGATIVE
ANION GAP SERPL CALCULATED.3IONS-SCNC: 14 MMOL/L (ref 9–17)
ANTIBODY SCREEN: NEGATIVE
ARM BAND NUMBER: NORMAL
AST SERPL-CCNC: 61 U/L
BACTERIA: ABNORMAL
BARBITURATE SCREEN URINE: NEGATIVE
BASOPHILS # BLD: 0 % (ref 0–2)
BASOPHILS ABSOLUTE: 0.04 K/UL (ref 0–0.2)
BENZODIAZEPINE SCREEN, URINE: NEGATIVE
BILIRUB SERPL-MCNC: 0.81 MG/DL (ref 0.3–1.2)
BILIRUBIN URINE: NEGATIVE
BUN BLDV-MCNC: 2 MG/DL (ref 6–20)
BUN/CREAT BLD: ABNORMAL (ref 9–20)
BUPRENORPHINE URINE: NORMAL
CALCIUM SERPL-MCNC: 8.7 MG/DL (ref 8.6–10.4)
CANNABINOID SCREEN URINE: NEGATIVE
CASTS UA: ABNORMAL /LPF (ref 0–2)
CHLORIDE BLD-SCNC: 101 MMOL/L (ref 98–107)
CO2: 23 MMOL/L (ref 20–31)
COCAINE METABOLITE, URINE: NEGATIVE
COLOR: ABNORMAL
COMMENT UA: ABNORMAL
CREAT SERPL-MCNC: 0.68 MG/DL (ref 0.5–0.9)
CREATININE URINE: 171.8 MG/DL (ref 28–217)
CRYSTALS, UA: ABNORMAL /HPF
DIFFERENTIAL TYPE: ABNORMAL
EOSINOPHILS RELATIVE PERCENT: 2 % (ref 1–4)
EPITHELIAL CELLS UA: ABNORMAL /HPF (ref 0–5)
EXPIRATION DATE: NORMAL
GFR AFRICAN AMERICAN: >60 ML/MIN
GFR NON-AFRICAN AMERICAN: >60 ML/MIN
GFR SERPL CREATININE-BSD FRML MDRD: ABNORMAL ML/MIN/{1.73_M2}
GFR SERPL CREATININE-BSD FRML MDRD: ABNORMAL ML/MIN/{1.73_M2}
GLUCOSE BLD-MCNC: 105 MG/DL (ref 70–99)
GLUCOSE BLD-MCNC: 171 MG/DL (ref 65–105)
GLUCOSE BLD-MCNC: 99 MG/DL (ref 65–105)
GLUCOSE URINE: NEGATIVE
HCT VFR BLD CALC: 35.6 % (ref 36.3–47.1)
HEMOGLOBIN: 11.1 G/DL (ref 11.9–15.1)
IMMATURE GRANULOCYTES: 1 %
KETONES, URINE: ABNORMAL
LACTATE DEHYDROGENASE: 226 U/L (ref 135–214)
LEUKOCYTE ESTERASE, URINE: ABNORMAL
LYMPHOCYTES # BLD: 19 % (ref 24–43)
MCH RBC QN AUTO: 28.9 PG (ref 25.2–33.5)
MCHC RBC AUTO-ENTMCNC: 31.2 G/DL (ref 28.4–34.8)
MCV RBC AUTO: 92.7 FL (ref 82.6–102.9)
MDMA URINE: NORMAL
METHADONE SCREEN, URINE: NEGATIVE
METHAMPHETAMINE, URINE: NORMAL
MONOCYTES # BLD: 10 % (ref 3–12)
MUCUS: ABNORMAL
NITRITE, URINE: NEGATIVE
NRBC AUTOMATED: 0.3 PER 100 WBC
OPIATES, URINE: NEGATIVE
OTHER OBSERVATIONS UA: ABNORMAL
OXYCODONE SCREEN URINE: NEGATIVE
PDW BLD-RTO: 13.9 % (ref 11.8–14.4)
PH UA: 8.5 (ref 5–8)
PHENCYCLIDINE, URINE: NEGATIVE
PLATELET # BLD: 238 K/UL (ref 138–453)
PLATELET ESTIMATE: ABNORMAL
PMV BLD AUTO: 11.1 FL (ref 8.1–13.5)
POTASSIUM SERPL-SCNC: 2.8 MMOL/L (ref 3.7–5.3)
PROPOXYPHENE, URINE: NORMAL
PROTEIN UA: ABNORMAL
RBC # BLD: 3.84 M/UL (ref 3.95–5.11)
RBC # BLD: ABNORMAL 10*6/UL
RBC UA: ABNORMAL /HPF (ref 0–2)
RENAL EPITHELIAL, UA: ABNORMAL /HPF
SEG NEUTROPHILS: 68 % (ref 36–65)
SEGMENTED NEUTROPHILS ABSOLUTE COUNT: 8.36 K/UL (ref 1.5–8.1)
SODIUM BLD-SCNC: 138 MMOL/L (ref 135–144)
SPECIFIC GRAVITY UA: 1.01 (ref 1–1.03)
T. PALLIDUM, IGG: NONREACTIVE
TEST INFORMATION: NORMAL
TOTAL PROTEIN, URINE: 68 MG/DL
TOTAL PROTEIN: 6.3 G/DL (ref 6.4–8.3)
TRICHOMONAS: ABNORMAL
TRICYCLIC ANTIDEPRESSANTS, UR: NORMAL
TURBIDITY: CLEAR
URIC ACID: 6.4 MG/DL (ref 2.4–5.7)
URINE HGB: NEGATIVE
URINE TOTAL PROTEIN CREATININE RATIO: 0.4 (ref 0–0.2)
UROBILINOGEN, URINE: NORMAL
WBC # BLD: 12.2 K/UL (ref 3.5–11.3)
WBC # BLD: ABNORMAL 10*3/UL
WBC UA: ABNORMAL /HPF (ref 0–5)
YEAST: ABNORMAL

## 2018-06-13 PROCEDURE — 86900 BLOOD TYPING SEROLOGIC ABO: CPT

## 2018-06-13 PROCEDURE — 80053 COMPREHEN METABOLIC PANEL: CPT

## 2018-06-13 PROCEDURE — 82570 ASSAY OF URINE CREATININE: CPT

## 2018-06-13 PROCEDURE — 6360000002 HC RX W HCPCS: Performed by: STUDENT IN AN ORGANIZED HEALTH CARE EDUCATION/TRAINING PROGRAM

## 2018-06-13 PROCEDURE — 6360000002 HC RX W HCPCS: Performed by: EMERGENCY MEDICINE

## 2018-06-13 PROCEDURE — 86901 BLOOD TYPING SEROLOGIC RH(D): CPT

## 2018-06-13 PROCEDURE — 85025 COMPLETE CBC W/AUTO DIFF WBC: CPT

## 2018-06-13 PROCEDURE — 83615 LACTATE (LD) (LDH) ENZYME: CPT

## 2018-06-13 PROCEDURE — 84550 ASSAY OF BLOOD/URIC ACID: CPT

## 2018-06-13 PROCEDURE — 86780 TREPONEMA PALLIDUM: CPT

## 2018-06-13 PROCEDURE — 96366 THER/PROPH/DIAG IV INF ADDON: CPT

## 2018-06-13 PROCEDURE — 1220000000 HC SEMI PRIVATE OB R&B

## 2018-06-13 PROCEDURE — 99283 EMERGENCY DEPT VISIT LOW MDM: CPT

## 2018-06-13 PROCEDURE — 84156 ASSAY OF PROTEIN URINE: CPT

## 2018-06-13 PROCEDURE — 86850 RBC ANTIBODY SCREEN: CPT

## 2018-06-13 PROCEDURE — 6360000002 HC RX W HCPCS

## 2018-06-13 PROCEDURE — 96372 THER/PROPH/DIAG INJ SC/IM: CPT

## 2018-06-13 PROCEDURE — 2580000003 HC RX 258: Performed by: STUDENT IN AN ORGANIZED HEALTH CARE EDUCATION/TRAINING PROGRAM

## 2018-06-13 PROCEDURE — 99213 OFFICE O/P EST LOW 20 MIN: CPT

## 2018-06-13 PROCEDURE — 82947 ASSAY GLUCOSE BLOOD QUANT: CPT

## 2018-06-13 PROCEDURE — 87086 URINE CULTURE/COLONY COUNT: CPT

## 2018-06-13 PROCEDURE — 36415 COLL VENOUS BLD VENIPUNCTURE: CPT

## 2018-06-13 PROCEDURE — 6370000000 HC RX 637 (ALT 250 FOR IP): Performed by: EMERGENCY MEDICINE

## 2018-06-13 PROCEDURE — 96365 THER/PROPH/DIAG IV INF INIT: CPT

## 2018-06-13 PROCEDURE — 81001 URINALYSIS AUTO W/SCOPE: CPT

## 2018-06-13 PROCEDURE — 80307 DRUG TEST PRSMV CHEM ANLYZR: CPT

## 2018-06-13 PROCEDURE — 6370000000 HC RX 637 (ALT 250 FOR IP): Performed by: STUDENT IN AN ORGANIZED HEALTH CARE EDUCATION/TRAINING PROGRAM

## 2018-06-13 RX ORDER — SODIUM CHLORIDE 0.9 % (FLUSH) 0.9 %
10 SYRINGE (ML) INJECTION EVERY 12 HOURS SCHEDULED
Status: DISCONTINUED | OUTPATIENT
Start: 2018-06-13 | End: 2018-06-14

## 2018-06-13 RX ORDER — DOCUSATE SODIUM 100 MG/1
100 CAPSULE, LIQUID FILLED ORAL 2 TIMES DAILY
Status: DISCONTINUED | OUTPATIENT
Start: 2018-06-13 | End: 2018-06-14

## 2018-06-13 RX ORDER — NALBUPHINE HCL 10 MG/ML
AMPUL (ML) INJECTION
Status: COMPLETED
Start: 2018-06-13 | End: 2018-06-13

## 2018-06-13 RX ORDER — POTASSIUM CHLORIDE 20 MEQ/1
40 TABLET, EXTENDED RELEASE ORAL PRN
Status: DISCONTINUED | OUTPATIENT
Start: 2018-06-13 | End: 2018-06-18 | Stop reason: HOSPADM

## 2018-06-13 RX ORDER — PROMETHAZINE HYDROCHLORIDE 25 MG/ML
12.5 INJECTION, SOLUTION INTRAMUSCULAR; INTRAVENOUS EVERY 4 HOURS PRN
Status: DISCONTINUED | OUTPATIENT
Start: 2018-06-13 | End: 2018-06-14

## 2018-06-13 RX ORDER — HYDRALAZINE HYDROCHLORIDE 20 MG/ML
10 INJECTION INTRAMUSCULAR; INTRAVENOUS
Status: ACTIVE | OUTPATIENT
Start: 2018-06-13 | End: 2018-06-13

## 2018-06-13 RX ORDER — DEXTROSE MONOHYDRATE 25 G/50ML
12.5 INJECTION, SOLUTION INTRAVENOUS PRN
Status: DISCONTINUED | OUTPATIENT
Start: 2018-06-13 | End: 2018-06-14

## 2018-06-13 RX ORDER — ACETAMINOPHEN 500 MG
1000 TABLET ORAL ONCE
Status: DISCONTINUED | OUTPATIENT
Start: 2018-06-13 | End: 2018-06-13 | Stop reason: HOSPADM

## 2018-06-13 RX ORDER — NICOTINE POLACRILEX 4 MG
15 LOZENGE BUCCAL PRN
Status: DISCONTINUED | OUTPATIENT
Start: 2018-06-13 | End: 2018-06-14

## 2018-06-13 RX ORDER — GLUCAGON 1 MG/ML
1 KIT INJECTION PRN
Status: DISCONTINUED | OUTPATIENT
Start: 2018-06-13 | End: 2018-06-14

## 2018-06-13 RX ORDER — POTASSIUM CHLORIDE 7.45 MG/ML
10 INJECTION INTRAVENOUS PRN
Status: DISCONTINUED | OUTPATIENT
Start: 2018-06-13 | End: 2018-06-18 | Stop reason: HOSPADM

## 2018-06-13 RX ORDER — HYDRALAZINE HYDROCHLORIDE 20 MG/ML
5 INJECTION INTRAMUSCULAR; INTRAVENOUS
Status: ACTIVE | OUTPATIENT
Start: 2018-06-13 | End: 2018-06-13

## 2018-06-13 RX ORDER — DEXTROSE MONOHYDRATE 50 MG/ML
100 INJECTION, SOLUTION INTRAVENOUS PRN
Status: DISCONTINUED | OUTPATIENT
Start: 2018-06-13 | End: 2018-06-14

## 2018-06-13 RX ORDER — NALBUPHINE HCL 10 MG/ML
10 AMPUL (ML) INJECTION ONCE
Status: COMPLETED | OUTPATIENT
Start: 2018-06-13 | End: 2018-06-13

## 2018-06-13 RX ORDER — SODIUM CHLORIDE 0.9 % (FLUSH) 0.9 %
10 SYRINGE (ML) INJECTION PRN
Status: DISCONTINUED | OUTPATIENT
Start: 2018-06-13 | End: 2018-06-14

## 2018-06-13 RX ORDER — MAGNESIUM SULFATE IN WATER 40 MG/ML
4 INJECTION, SOLUTION INTRAVENOUS ONCE
Status: COMPLETED | OUTPATIENT
Start: 2018-06-13 | End: 2018-06-13

## 2018-06-13 RX ORDER — AZITHROMYCIN 250 MG/1
1000 TABLET, FILM COATED ORAL ONCE
Status: COMPLETED | OUTPATIENT
Start: 2018-06-13 | End: 2018-06-13

## 2018-06-13 RX ORDER — PROMETHAZINE HYDROCHLORIDE 25 MG/ML
12.5 INJECTION, SOLUTION INTRAMUSCULAR; INTRAVENOUS ONCE
Status: COMPLETED | OUTPATIENT
Start: 2018-06-13 | End: 2018-06-13

## 2018-06-13 RX ORDER — SODIUM CHLORIDE, SODIUM LACTATE, POTASSIUM CHLORIDE, CALCIUM CHLORIDE 600; 310; 30; 20 MG/100ML; MG/100ML; MG/100ML; MG/100ML
INJECTION, SOLUTION INTRAVENOUS CONTINUOUS
Status: DISCONTINUED | OUTPATIENT
Start: 2018-06-13 | End: 2018-06-13

## 2018-06-13 RX ORDER — POTASSIUM CHLORIDE 20MEQ/15ML
40 LIQUID (ML) ORAL PRN
Status: DISCONTINUED | OUTPATIENT
Start: 2018-06-13 | End: 2018-06-18 | Stop reason: HOSPADM

## 2018-06-13 RX ORDER — ACETAMINOPHEN 325 MG/1
650 TABLET ORAL EVERY 4 HOURS PRN
Status: DISCONTINUED | OUTPATIENT
Start: 2018-06-13 | End: 2018-06-14

## 2018-06-13 RX ORDER — ONDANSETRON 2 MG/ML
4 INJECTION INTRAMUSCULAR; INTRAVENOUS EVERY 6 HOURS PRN
Status: DISCONTINUED | OUTPATIENT
Start: 2018-06-13 | End: 2018-06-14

## 2018-06-13 RX ORDER — DIPHENHYDRAMINE HCL 25 MG
25 TABLET ORAL EVERY 4 HOURS PRN
Status: DISCONTINUED | OUTPATIENT
Start: 2018-06-13 | End: 2018-06-14

## 2018-06-13 RX ORDER — SODIUM CHLORIDE 9 MG/ML
INJECTION, SOLUTION INTRAVENOUS CONTINUOUS
Status: DISCONTINUED | OUTPATIENT
Start: 2018-06-13 | End: 2018-06-14

## 2018-06-13 RX ADMIN — Medication 1 MILLI-UNITS/MIN: at 18:53

## 2018-06-13 RX ADMIN — POTASSIUM CHLORIDE 10 MEQ: 10 INJECTION, SOLUTION INTRAVENOUS at 22:09

## 2018-06-13 RX ADMIN — PROMETHAZINE HYDROCHLORIDE 12.5 MG: 25 INJECTION INTRAMUSCULAR; INTRAVENOUS at 17:12

## 2018-06-13 RX ADMIN — INSULIN LISPRO 1 UNITS: 100 INJECTION, SOLUTION INTRAVENOUS; SUBCUTANEOUS at 23:43

## 2018-06-13 RX ADMIN — POTASSIUM CHLORIDE 10 MEQ: 10 INJECTION, SOLUTION INTRAVENOUS at 23:14

## 2018-06-13 RX ADMIN — AZITHROMYCIN 1000 MG: 250 TABLET, FILM COATED ORAL at 11:24

## 2018-06-13 RX ADMIN — MAGNESIUM SULFATE IN WATER 2 G/HR: 40 INJECTION, SOLUTION INTRAVENOUS at 19:17

## 2018-06-13 RX ADMIN — Medication 10 MG: at 23:43

## 2018-06-13 RX ADMIN — MAGNESIUM SULFATE IN WATER 4 G: 40 INJECTION, SOLUTION INTRAVENOUS at 18:54

## 2018-06-13 RX ADMIN — POTASSIUM CHLORIDE 10 MEQ: 10 INJECTION, SOLUTION INTRAVENOUS at 20:59

## 2018-06-13 RX ADMIN — NALBUPHINE HYDROCHLORIDE 10 MG: 10 INJECTION, SOLUTION INTRAMUSCULAR; INTRAVENOUS; SUBCUTANEOUS at 23:43

## 2018-06-13 RX ADMIN — SODIUM CHLORIDE: 9 INJECTION, SOLUTION INTRAVENOUS at 18:23

## 2018-06-13 RX ADMIN — PROMETHAZINE HYDROCHLORIDE 12.5 MG: 25 INJECTION INTRAMUSCULAR; INTRAVENOUS at 11:24

## 2018-06-13 ASSESSMENT — PAIN SCALES - GENERAL: PAINLEVEL_OUTOF10: 7

## 2018-06-13 ASSESSMENT — ENCOUNTER SYMPTOMS
SHORTNESS OF BREATH: 0
BACK PAIN: 0
SORE THROAT: 0
ABDOMINAL PAIN: 0

## 2018-06-14 ENCOUNTER — ANESTHESIA (OUTPATIENT)
Dept: LABOR AND DELIVERY | Age: 28
DRG: 540 | End: 2018-06-14
Payer: COMMERCIAL

## 2018-06-14 ENCOUNTER — ANESTHESIA EVENT (OUTPATIENT)
Dept: LABOR AND DELIVERY | Age: 28
DRG: 540 | End: 2018-06-14
Payer: COMMERCIAL

## 2018-06-14 VITALS — DIASTOLIC BLOOD PRESSURE: 102 MMHG | SYSTOLIC BLOOD PRESSURE: 148 MMHG | OXYGEN SATURATION: 99 %

## 2018-06-14 PROBLEM — Z98.891 S/P CESAREAN SECTION: Status: ACTIVE | Noted: 2018-06-14

## 2018-06-14 LAB
CULTURE: NO GROWTH
CULTURE: NORMAL
GLUCOSE BLD-MCNC: 96 MG/DL (ref 65–105)
Lab: NORMAL
MAGNESIUM: 0.4 MG/DL (ref 1.6–2.6)
MAGNESIUM: 4.3 MG/DL (ref 1.6–2.6)
MAGNESIUM: 4.9 MG/DL (ref 1.6–2.6)
MAGNESIUM: 5.4 MG/DL (ref 1.6–2.6)
POTASSIUM SERPL-SCNC: 3.1 MMOL/L (ref 3.7–5.3)
SPECIMEN DESCRIPTION: NORMAL
STATUS: NORMAL

## 2018-06-14 PROCEDURE — 84132 ASSAY OF SERUM POTASSIUM: CPT

## 2018-06-14 PROCEDURE — 6360000002 HC RX W HCPCS: Performed by: STUDENT IN AN ORGANIZED HEALTH CARE EDUCATION/TRAINING PROGRAM

## 2018-06-14 PROCEDURE — 6360000002 HC RX W HCPCS: Performed by: NURSE ANESTHETIST, CERTIFIED REGISTERED

## 2018-06-14 PROCEDURE — 2580000003 HC RX 258: Performed by: STUDENT IN AN ORGANIZED HEALTH CARE EDUCATION/TRAINING PROGRAM

## 2018-06-14 PROCEDURE — 3700000001 HC ADD 15 MINUTES (ANESTHESIA): Performed by: OBSTETRICS & GYNECOLOGY

## 2018-06-14 PROCEDURE — 6360000002 HC RX W HCPCS: Performed by: ANESTHESIOLOGY

## 2018-06-14 PROCEDURE — 36415 COLL VENOUS BLD VENIPUNCTURE: CPT

## 2018-06-14 PROCEDURE — 2500000003 HC RX 250 WO HCPCS: Performed by: NURSE ANESTHETIST, CERTIFIED REGISTERED

## 2018-06-14 PROCEDURE — 59514 CESAREAN DELIVERY ONLY: CPT | Performed by: OBSTETRICS & GYNECOLOGY

## 2018-06-14 PROCEDURE — 6370000000 HC RX 637 (ALT 250 FOR IP): Performed by: STUDENT IN AN ORGANIZED HEALTH CARE EDUCATION/TRAINING PROGRAM

## 2018-06-14 PROCEDURE — 88307 TISSUE EXAM BY PATHOLOGIST: CPT

## 2018-06-14 PROCEDURE — 3700000025 ANESTHESIA EPIDURAL BLOCK: Performed by: ANESTHESIOLOGY

## 2018-06-14 PROCEDURE — 82947 ASSAY GLUCOSE BLOOD QUANT: CPT

## 2018-06-14 PROCEDURE — 3700000000 HC ANESTHESIA ATTENDED CARE: Performed by: OBSTETRICS & GYNECOLOGY

## 2018-06-14 PROCEDURE — 7100000001 HC PACU RECOVERY - ADDTL 15 MIN: Performed by: OBSTETRICS & GYNECOLOGY

## 2018-06-14 PROCEDURE — 83735 ASSAY OF MAGNESIUM: CPT

## 2018-06-14 PROCEDURE — 6370000000 HC RX 637 (ALT 250 FOR IP)

## 2018-06-14 PROCEDURE — 1220000000 HC SEMI PRIVATE OB R&B

## 2018-06-14 PROCEDURE — 7100000000 HC PACU RECOVERY - FIRST 15 MIN: Performed by: OBSTETRICS & GYNECOLOGY

## 2018-06-14 PROCEDURE — 3609079900 HC CESAREAN SECTION: Performed by: OBSTETRICS & GYNECOLOGY

## 2018-06-14 PROCEDURE — 2580000003 HC RX 258: Performed by: NURSE ANESTHETIST, CERTIFIED REGISTERED

## 2018-06-14 RX ORDER — ONDANSETRON 2 MG/ML
4 INJECTION INTRAMUSCULAR; INTRAVENOUS EVERY 6 HOURS PRN
Status: DISCONTINUED | OUTPATIENT
Start: 2018-06-14 | End: 2018-06-14

## 2018-06-14 RX ORDER — KETOROLAC TROMETHAMINE 30 MG/ML
30 INJECTION, SOLUTION INTRAMUSCULAR; INTRAVENOUS EVERY 6 HOURS
Status: DISCONTINUED | OUTPATIENT
Start: 2018-06-14 | End: 2018-06-15

## 2018-06-14 RX ORDER — LIDOCAINE HYDROCHLORIDE AND EPINEPHRINE 15; 5 MG/ML; UG/ML
INJECTION, SOLUTION EPIDURAL PRN
Status: DISCONTINUED | OUTPATIENT
Start: 2018-06-14 | End: 2018-06-14 | Stop reason: SDUPTHER

## 2018-06-14 RX ORDER — POLYETHYLENE GLYCOL 3350 17 G/17G
17 POWDER, FOR SOLUTION ORAL DAILY PRN
Status: DISCONTINUED | OUTPATIENT
Start: 2018-06-14 | End: 2018-06-18 | Stop reason: HOSPADM

## 2018-06-14 RX ORDER — ONDANSETRON 2 MG/ML
INJECTION INTRAMUSCULAR; INTRAVENOUS PRN
Status: DISCONTINUED | OUTPATIENT
Start: 2018-06-14 | End: 2018-06-14 | Stop reason: SDUPTHER

## 2018-06-14 RX ORDER — ROPIVACAINE HYDROCHLORIDE 2 MG/ML
INJECTION, SOLUTION EPIDURAL; INFILTRATION; PERINEURAL PRN
Status: DISCONTINUED | OUTPATIENT
Start: 2018-06-14 | End: 2018-06-14 | Stop reason: SDUPTHER

## 2018-06-14 RX ORDER — SODIUM CHLORIDE 0.9 % (FLUSH) 0.9 %
10 SYRINGE (ML) INJECTION EVERY 12 HOURS SCHEDULED
Status: DISCONTINUED | OUTPATIENT
Start: 2018-06-14 | End: 2018-06-15

## 2018-06-14 RX ORDER — ROPIVACAINE HYDROCHLORIDE 2 MG/ML
INJECTION, SOLUTION EPIDURAL; INFILTRATION; PERINEURAL
Status: COMPLETED
Start: 2018-06-14 | End: 2018-06-14

## 2018-06-14 RX ORDER — OXYCODONE HYDROCHLORIDE AND ACETAMINOPHEN 5; 325 MG/1; MG/1
1 TABLET ORAL EVERY 4 HOURS PRN
Status: DISCONTINUED | OUTPATIENT
Start: 2018-06-15 | End: 2018-06-18 | Stop reason: HOSPADM

## 2018-06-14 RX ORDER — DOCUSATE SODIUM 100 MG/1
100 CAPSULE, LIQUID FILLED ORAL 2 TIMES DAILY
Status: DISCONTINUED | OUTPATIENT
Start: 2018-06-14 | End: 2018-06-18 | Stop reason: HOSPADM

## 2018-06-14 RX ORDER — NALBUPHINE HCL 10 MG/ML
5 AMPUL (ML) INJECTION EVERY 4 HOURS PRN
Status: DISCONTINUED | OUTPATIENT
Start: 2018-06-14 | End: 2018-06-14

## 2018-06-14 RX ORDER — OXYCODONE HYDROCHLORIDE AND ACETAMINOPHEN 5; 325 MG/1; MG/1
2 TABLET ORAL EVERY 4 HOURS PRN
Status: DISCONTINUED | OUTPATIENT
Start: 2018-06-15 | End: 2018-06-18 | Stop reason: HOSPADM

## 2018-06-14 RX ORDER — CHLOROPROCAINE HYDROCHLORIDE 30 MG/ML
INJECTION, SOLUTION EPIDURAL; INFILTRATION; INTRACAUDAL; PERINEURAL PRN
Status: DISCONTINUED | OUTPATIENT
Start: 2018-06-14 | End: 2018-06-14 | Stop reason: SDUPTHER

## 2018-06-14 RX ORDER — IBUPROFEN 800 MG/1
800 TABLET ORAL EVERY 8 HOURS PRN
Status: DISCONTINUED | OUTPATIENT
Start: 2018-06-15 | End: 2018-06-14

## 2018-06-14 RX ORDER — DIPHENHYDRAMINE HYDROCHLORIDE 50 MG/ML
25 INJECTION INTRAMUSCULAR; INTRAVENOUS EVERY 6 HOURS PRN
Status: DISCONTINUED | OUTPATIENT
Start: 2018-06-14 | End: 2018-06-18 | Stop reason: HOSPADM

## 2018-06-14 RX ORDER — NALOXONE HYDROCHLORIDE 0.4 MG/ML
0.4 INJECTION, SOLUTION INTRAMUSCULAR; INTRAVENOUS; SUBCUTANEOUS PRN
Status: DISCONTINUED | OUTPATIENT
Start: 2018-06-14 | End: 2018-06-14

## 2018-06-14 RX ORDER — SODIUM CHLORIDE, SODIUM LACTATE, POTASSIUM CHLORIDE, CALCIUM CHLORIDE 600; 310; 30; 20 MG/100ML; MG/100ML; MG/100ML; MG/100ML
INJECTION, SOLUTION INTRAVENOUS CONTINUOUS PRN
Status: DISCONTINUED | OUTPATIENT
Start: 2018-06-14 | End: 2018-06-14 | Stop reason: SDUPTHER

## 2018-06-14 RX ORDER — ONDANSETRON 2 MG/ML
4 INJECTION INTRAMUSCULAR; INTRAVENOUS EVERY 6 HOURS PRN
Status: DISCONTINUED | OUTPATIENT
Start: 2018-06-14 | End: 2018-06-18 | Stop reason: HOSPADM

## 2018-06-14 RX ORDER — BISACODYL 10 MG
10 SUPPOSITORY, RECTAL RECTAL DAILY PRN
Status: DISCONTINUED | OUTPATIENT
Start: 2018-06-14 | End: 2018-06-18 | Stop reason: HOSPADM

## 2018-06-14 RX ORDER — TRISODIUM CITRATE DIHYDRATE AND CITRIC ACID MONOHYDRATE 500; 334 MG/5ML; MG/5ML
30 SOLUTION ORAL ONCE
Status: COMPLETED | OUTPATIENT
Start: 2018-06-14 | End: 2018-06-14

## 2018-06-14 RX ORDER — OXYCODONE HYDROCHLORIDE AND ACETAMINOPHEN 5; 325 MG/1; MG/1
2 TABLET ORAL EVERY 4 HOURS PRN
Status: DISCONTINUED | OUTPATIENT
Start: 2018-06-14 | End: 2018-06-14

## 2018-06-14 RX ORDER — ACETAMINOPHEN 325 MG/1
650 TABLET ORAL EVERY 4 HOURS PRN
Status: DISCONTINUED | OUTPATIENT
Start: 2018-06-14 | End: 2018-06-18 | Stop reason: HOSPADM

## 2018-06-14 RX ORDER — NALBUPHINE HCL 10 MG/ML
5 AMPUL (ML) INJECTION EVERY 4 HOURS PRN
Status: DISCONTINUED | OUTPATIENT
Start: 2018-06-14 | End: 2018-06-14 | Stop reason: HOSPADM

## 2018-06-14 RX ORDER — SIMETHICONE 80 MG
80 TABLET,CHEWABLE ORAL EVERY 6 HOURS PRN
Status: DISCONTINUED | OUTPATIENT
Start: 2018-06-14 | End: 2018-06-18 | Stop reason: HOSPADM

## 2018-06-14 RX ORDER — IBUPROFEN 800 MG/1
800 TABLET ORAL EVERY 8 HOURS PRN
Status: DISCONTINUED | OUTPATIENT
Start: 2018-06-15 | End: 2018-06-18 | Stop reason: HOSPADM

## 2018-06-14 RX ORDER — SODIUM CHLORIDE, SODIUM LACTATE, POTASSIUM CHLORIDE, CALCIUM CHLORIDE 600; 310; 30; 20 MG/100ML; MG/100ML; MG/100ML; MG/100ML
INJECTION, SOLUTION INTRAVENOUS CONTINUOUS
Status: DISCONTINUED | OUTPATIENT
Start: 2018-06-14 | End: 2018-06-14

## 2018-06-14 RX ORDER — SODIUM CHLORIDE 0.9 % (FLUSH) 0.9 %
10 SYRINGE (ML) INJECTION PRN
Status: DISCONTINUED | OUTPATIENT
Start: 2018-06-14 | End: 2018-06-18 | Stop reason: HOSPADM

## 2018-06-14 RX ORDER — CHLORHEXIDINE GLUCONATE 4 G/100ML
SOLUTION TOPICAL
Status: COMPLETED
Start: 2018-06-14 | End: 2018-06-14

## 2018-06-14 RX ORDER — ROPIVACAINE HYDROCHLORIDE 2 MG/ML
INJECTION, SOLUTION EPIDURAL; INFILTRATION; PERINEURAL CONTINUOUS PRN
Status: DISCONTINUED | OUTPATIENT
Start: 2018-06-14 | End: 2018-06-14 | Stop reason: SDUPTHER

## 2018-06-14 RX ORDER — MORPHINE SULFATE 4 MG/ML
INJECTION, SOLUTION INTRAMUSCULAR; INTRAVENOUS PRN
Status: DISCONTINUED | OUTPATIENT
Start: 2018-06-14 | End: 2018-06-14 | Stop reason: SDUPTHER

## 2018-06-14 RX ORDER — NALOXONE HYDROCHLORIDE 0.4 MG/ML
0.4 INJECTION, SOLUTION INTRAMUSCULAR; INTRAVENOUS; SUBCUTANEOUS PRN
Status: DISCONTINUED | OUTPATIENT
Start: 2018-06-14 | End: 2018-06-18 | Stop reason: HOSPADM

## 2018-06-14 RX ORDER — BUPIVACAINE HYDROCHLORIDE 2.5 MG/ML
INJECTION, SOLUTION EPIDURAL; INFILTRATION; INTRACAUDAL PRN
Status: DISCONTINUED | OUTPATIENT
Start: 2018-06-14 | End: 2018-06-14 | Stop reason: SDUPTHER

## 2018-06-14 RX ORDER — LANOLIN 100 %
OINTMENT (GRAM) TOPICAL
Status: DISCONTINUED | OUTPATIENT
Start: 2018-06-14 | End: 2018-06-18 | Stop reason: HOSPADM

## 2018-06-14 RX ORDER — OXYCODONE HYDROCHLORIDE AND ACETAMINOPHEN 5; 325 MG/1; MG/1
1 TABLET ORAL EVERY 4 HOURS PRN
Status: DISCONTINUED | OUTPATIENT
Start: 2018-06-14 | End: 2018-06-14

## 2018-06-14 RX ADMIN — KETOROLAC TROMETHAMINE 30 MG: 30 INJECTION, SOLUTION INTRAMUSCULAR; INTRAVENOUS at 09:46

## 2018-06-14 RX ADMIN — MORPHINE SULFATE 3 MG: 4 INJECTION, SOLUTION INTRAMUSCULAR; INTRAVENOUS at 08:57

## 2018-06-14 RX ADMIN — PHENYLEPHRINE HYDROCHLORIDE 100 MCG: 10 INJECTION INTRAVENOUS at 08:01

## 2018-06-14 RX ADMIN — POTASSIUM CHLORIDE 10 MEQ: 10 INJECTION, SOLUTION INTRAVENOUS at 00:14

## 2018-06-14 RX ADMIN — Medication 1 MILLI-UNITS/MIN: at 07:55

## 2018-06-14 RX ADMIN — SODIUM CITRATE AND CITRIC ACID MONOHYDRATE 30 ML: 500; 334 SOLUTION ORAL at 07:20

## 2018-06-14 RX ADMIN — DOCUSATE SODIUM 100 MG: 100 CAPSULE ORAL at 20:45

## 2018-06-14 RX ADMIN — POTASSIUM CHLORIDE 10 MEQ: 10 INJECTION, SOLUTION INTRAVENOUS at 03:18

## 2018-06-14 RX ADMIN — ROPIVACAINE HYDROCHLORIDE 10 ML/HR: 2 INJECTION, SOLUTION EPIDURAL; INFILTRATION at 04:18

## 2018-06-14 RX ADMIN — MORPHINE SULFATE 3 MG: 4 INJECTION, SOLUTION INTRAMUSCULAR; INTRAVENOUS at 08:48

## 2018-06-14 RX ADMIN — CHLOROPROCAINE HYDROCHLORIDE 12 ML: 30 INJECTION, SOLUTION EPIDURAL; INFILTRATION; INTRACAUDAL; PERINEURAL at 07:05

## 2018-06-14 RX ADMIN — SODIUM CHLORIDE: 9 INJECTION, SOLUTION INTRAVENOUS at 20:45

## 2018-06-14 RX ADMIN — CHLORHEXIDINE GLUCONATE: 4 SOLUTION TOPICAL at 11:30

## 2018-06-14 RX ADMIN — CHLOROPROCAINE HYDROCHLORIDE 5 ML: 30 INJECTION, SOLUTION EPIDURAL; INFILTRATION; INTRACAUDAL; PERINEURAL at 07:32

## 2018-06-14 RX ADMIN — Medication 2 G: at 07:20

## 2018-06-14 RX ADMIN — Medication 500 ML: at 07:52

## 2018-06-14 RX ADMIN — DIPHENHYDRAMINE HYDROCHLORIDE 25 MG: 50 INJECTION, SOLUTION INTRAMUSCULAR; INTRAVENOUS at 11:54

## 2018-06-14 RX ADMIN — POTASSIUM CHLORIDE 10 MEQ: 10 INJECTION, SOLUTION INTRAVENOUS at 01:32

## 2018-06-14 RX ADMIN — CHLOROPROCAINE HYDROCHLORIDE 3 ML: 30 INJECTION, SOLUTION EPIDURAL; INFILTRATION; INTRACAUDAL; PERINEURAL at 07:56

## 2018-06-14 RX ADMIN — LIDOCAINE HYDROCHLORIDE,EPINEPHRINE BITARTRATE 3 ML: 15; .005 INJECTION, SOLUTION EPIDURAL; INFILTRATION; INTRACAUDAL; PERINEURAL at 04:14

## 2018-06-14 RX ADMIN — ROPIVACAINE HYDROCHLORIDE 10 ML: 2 INJECTION, SOLUTION EPIDURAL; INFILTRATION at 04:18

## 2018-06-14 RX ADMIN — KETOROLAC TROMETHAMINE 30 MG: 30 INJECTION, SOLUTION INTRAMUSCULAR; INTRAVENOUS at 22:15

## 2018-06-14 RX ADMIN — PHENYLEPHRINE HYDROCHLORIDE 100 MCG: 10 INJECTION INTRAVENOUS at 07:58

## 2018-06-14 RX ADMIN — CHLOROPROCAINE HYDROCHLORIDE 3 ML: 30 INJECTION, SOLUTION EPIDURAL; INFILTRATION; INTRACAUDAL; PERINEURAL at 08:20

## 2018-06-14 RX ADMIN — Medication 500 ML: at 08:20

## 2018-06-14 RX ADMIN — MAGNESIUM SULFATE IN WATER 2 G/HR: 40 INJECTION, SOLUTION INTRAVENOUS at 04:41

## 2018-06-14 RX ADMIN — BUPIVACAINE HYDROCHLORIDE 3 ML: 2.5 INJECTION, SOLUTION EPIDURAL; INFILTRATION; INTRACAUDAL; PERINEURAL at 08:48

## 2018-06-14 RX ADMIN — MAGNESIUM SULFATE IN WATER 2 G/HR: 40 INJECTION, SOLUTION INTRAVENOUS at 16:02

## 2018-06-14 RX ADMIN — Medication 10 ML/HR: at 04:30

## 2018-06-14 RX ADMIN — MORPHINE SULFATE 4 MG: 4 INJECTION, SOLUTION INTRAMUSCULAR; INTRAVENOUS at 08:52

## 2018-06-14 RX ADMIN — PHENYLEPHRINE HYDROCHLORIDE 100 MCG: 10 INJECTION INTRAVENOUS at 07:53

## 2018-06-14 RX ADMIN — SODIUM CHLORIDE, POTASSIUM CHLORIDE, SODIUM LACTATE AND CALCIUM CHLORIDE: 600; 310; 30; 20 INJECTION, SOLUTION INTRAVENOUS at 07:23

## 2018-06-14 RX ADMIN — DEXTROSE 500 MG: 5 SOLUTION INTRAVENOUS at 07:00

## 2018-06-14 RX ADMIN — ONDANSETRON 4 MG: 2 INJECTION, SOLUTION INTRAMUSCULAR; INTRAVENOUS at 08:13

## 2018-06-14 RX ADMIN — SODIUM CHLORIDE: 9 INJECTION, SOLUTION INTRAVENOUS at 04:41

## 2018-06-14 ASSESSMENT — PULMONARY FUNCTION TESTS
PIF_VALUE: 0
PIF_VALUE: 1
PIF_VALUE: 0
PIF_VALUE: 1
PIF_VALUE: 0
PIF_VALUE: 1
PIF_VALUE: 0
PIF_VALUE: 1
PIF_VALUE: 0
PIF_VALUE: 0
PIF_VALUE: 1
PIF_VALUE: 0
PIF_VALUE: 1
PIF_VALUE: 0
PIF_VALUE: 1
PIF_VALUE: 0

## 2018-06-14 ASSESSMENT — PAIN SCALES - GENERAL
PAINLEVEL_OUTOF10: 8
PAINLEVEL_OUTOF10: 10
PAINLEVEL_OUTOF10: 5

## 2018-06-14 ASSESSMENT — PAIN DESCRIPTION - LOCATION: LOCATION: ABDOMEN

## 2018-06-15 ENCOUNTER — APPOINTMENT (OUTPATIENT)
Dept: ULTRASOUND IMAGING | Age: 28
DRG: 540 | End: 2018-06-15
Payer: COMMERCIAL

## 2018-06-15 LAB
ABSOLUTE EOS #: 0.18 K/UL (ref 0–0.44)
ABSOLUTE IMMATURE GRANULOCYTE: 0.18 K/UL (ref 0–0.3)
ABSOLUTE LYMPH #: 1.81 K/UL (ref 1.1–3.7)
ABSOLUTE MONO #: 2.35 K/UL (ref 0.1–1.2)
ALBUMIN SERPL-MCNC: 2.3 G/DL (ref 3.5–5.2)
ALBUMIN/GLOBULIN RATIO: 0.9 (ref 1–2.5)
ALP BLD-CCNC: 126 U/L (ref 35–104)
ALT SERPL-CCNC: 33 U/L (ref 5–33)
ANION GAP SERPL CALCULATED.3IONS-SCNC: 12 MMOL/L (ref 9–17)
AST SERPL-CCNC: 25 U/L
BASOPHILS # BLD: 0 % (ref 0–2)
BASOPHILS ABSOLUTE: 0 K/UL (ref 0–0.2)
BILIRUB SERPL-MCNC: 0.48 MG/DL (ref 0.3–1.2)
BUN BLDV-MCNC: <2 MG/DL (ref 6–20)
BUN/CREAT BLD: ABNORMAL (ref 9–20)
CALCIUM SERPL-MCNC: 6.9 MG/DL (ref 8.6–10.4)
CHLORIDE BLD-SCNC: 103 MMOL/L (ref 98–107)
CO2: 23 MMOL/L (ref 20–31)
CREAT SERPL-MCNC: 0.63 MG/DL (ref 0.5–0.9)
DIFFERENTIAL TYPE: ABNORMAL
EOSINOPHILS RELATIVE PERCENT: 1 % (ref 1–4)
GFR AFRICAN AMERICAN: >60 ML/MIN
GFR NON-AFRICAN AMERICAN: >60 ML/MIN
GFR SERPL CREATININE-BSD FRML MDRD: ABNORMAL ML/MIN/{1.73_M2}
GFR SERPL CREATININE-BSD FRML MDRD: ABNORMAL ML/MIN/{1.73_M2}
GLUCOSE BLD-MCNC: 116 MG/DL (ref 65–105)
GLUCOSE BLD-MCNC: 147 MG/DL (ref 70–99)
HCT VFR BLD CALC: 27.8 % (ref 36.3–47.1)
HEMOGLOBIN: 8.7 G/DL (ref 11.9–15.1)
IMMATURE GRANULOCYTES: 1 %
LYMPHOCYTES # BLD: 10 % (ref 24–43)
MAGNESIUM: 4.7 MG/DL (ref 1.6–2.6)
MAGNESIUM: 4.8 MG/DL (ref 1.6–2.6)
MCH RBC QN AUTO: 28.5 PG (ref 25.2–33.5)
MCHC RBC AUTO-ENTMCNC: 31.3 G/DL (ref 28.4–34.8)
MCV RBC AUTO: 91.1 FL (ref 82.6–102.9)
MONOCYTES # BLD: 13 % (ref 3–12)
MORPHOLOGY: NORMAL
NRBC AUTOMATED: 0.1 PER 100 WBC
PDW BLD-RTO: 13.5 % (ref 11.8–14.4)
PLATELET # BLD: 206 K/UL (ref 138–453)
PLATELET ESTIMATE: ABNORMAL
PMV BLD AUTO: 11.8 FL (ref 8.1–13.5)
POTASSIUM SERPL-SCNC: 2.9 MMOL/L (ref 3.7–5.3)
POTASSIUM SERPL-SCNC: 3.1 MMOL/L (ref 3.7–5.3)
RBC # BLD: 3.05 M/UL (ref 3.95–5.11)
RBC # BLD: ABNORMAL 10*6/UL
SEG NEUTROPHILS: 75 % (ref 36–65)
SEGMENTED NEUTROPHILS ABSOLUTE COUNT: 13.58 K/UL (ref 1.5–8.1)
SODIUM BLD-SCNC: 138 MMOL/L (ref 135–144)
TOTAL PROTEIN: 5 G/DL (ref 6.4–8.3)
WBC # BLD: 18.1 K/UL (ref 3.5–11.3)
WBC # BLD: ABNORMAL 10*3/UL

## 2018-06-15 PROCEDURE — 10907ZC DRAINAGE OF AMNIOTIC FLUID, THERAPEUTIC FROM PRODUCTS OF CONCEPTION, VIA NATURAL OR ARTIFICIAL OPENING: ICD-10-PCS | Performed by: OBSTETRICS & GYNECOLOGY

## 2018-06-15 PROCEDURE — 36415 COLL VENOUS BLD VENIPUNCTURE: CPT

## 2018-06-15 PROCEDURE — 83735 ASSAY OF MAGNESIUM: CPT

## 2018-06-15 PROCEDURE — 84132 ASSAY OF SERUM POTASSIUM: CPT

## 2018-06-15 PROCEDURE — 82947 ASSAY GLUCOSE BLOOD QUANT: CPT

## 2018-06-15 PROCEDURE — 99253 IP/OBS CNSLTJ NEW/EST LOW 45: CPT | Performed by: INTERNAL MEDICINE

## 2018-06-15 PROCEDURE — 6370000000 HC RX 637 (ALT 250 FOR IP): Performed by: STUDENT IN AN ORGANIZED HEALTH CARE EDUCATION/TRAINING PROGRAM

## 2018-06-15 PROCEDURE — 80053 COMPREHEN METABOLIC PANEL: CPT

## 2018-06-15 PROCEDURE — 85025 COMPLETE CBC W/AUTO DIFF WBC: CPT

## 2018-06-15 PROCEDURE — 6360000002 HC RX W HCPCS: Performed by: STUDENT IN AN ORGANIZED HEALTH CARE EDUCATION/TRAINING PROGRAM

## 2018-06-15 PROCEDURE — 6370000000 HC RX 637 (ALT 250 FOR IP): Performed by: OBSTETRICS & GYNECOLOGY

## 2018-06-15 PROCEDURE — 3E033VJ INTRODUCTION OF OTHER HORMONE INTO PERIPHERAL VEIN, PERCUTANEOUS APPROACH: ICD-10-PCS | Performed by: OBSTETRICS & GYNECOLOGY

## 2018-06-15 PROCEDURE — 6370000000 HC RX 637 (ALT 250 FOR IP): Performed by: INTERNAL MEDICINE

## 2018-06-15 PROCEDURE — 76775 US EXAM ABDO BACK WALL LIM: CPT

## 2018-06-15 PROCEDURE — 1220000000 HC SEMI PRIVATE OB R&B

## 2018-06-15 RX ORDER — POTASSIUM CHLORIDE 20 MEQ/1
40 TABLET, EXTENDED RELEASE ORAL ONCE
Status: COMPLETED | OUTPATIENT
Start: 2018-06-15 | End: 2018-06-15

## 2018-06-15 RX ORDER — POTASSIUM CHLORIDE 20 MEQ/1
60 TABLET, EXTENDED RELEASE ORAL ONCE
Status: COMPLETED | OUTPATIENT
Start: 2018-06-15 | End: 2018-06-15

## 2018-06-15 RX ADMIN — OXYCODONE HYDROCHLORIDE AND ACETAMINOPHEN 2 TABLET: 5; 325 TABLET ORAL at 20:49

## 2018-06-15 RX ADMIN — DOCUSATE SODIUM 100 MG: 100 CAPSULE ORAL at 08:44

## 2018-06-15 RX ADMIN — POTASSIUM CHLORIDE 10 MEQ: 10 INJECTION, SOLUTION INTRAVENOUS at 10:37

## 2018-06-15 RX ADMIN — POTASSIUM CHLORIDE 60 MEQ: 1500 TABLET, EXTENDED RELEASE ORAL at 13:15

## 2018-06-15 RX ADMIN — OXYCODONE HYDROCHLORIDE AND ACETAMINOPHEN 2 TABLET: 5; 325 TABLET ORAL at 06:20

## 2018-06-15 RX ADMIN — IBUPROFEN 800 MG: 800 TABLET ORAL at 06:20

## 2018-06-15 RX ADMIN — IBUPROFEN 800 MG: 800 TABLET ORAL at 22:27

## 2018-06-15 RX ADMIN — POTASSIUM CHLORIDE 40 MEQ: 1500 TABLET, EXTENDED RELEASE ORAL at 20:49

## 2018-06-15 RX ADMIN — MAGNESIUM SULFATE IN WATER 2 G/HR: 40 INJECTION, SOLUTION INTRAVENOUS at 02:12

## 2018-06-15 RX ADMIN — OXYCODONE HYDROCHLORIDE AND ACETAMINOPHEN 2 TABLET: 5; 325 TABLET ORAL at 10:29

## 2018-06-15 RX ADMIN — IBUPROFEN 800 MG: 800 TABLET ORAL at 14:22

## 2018-06-15 RX ADMIN — OXYCODONE HYDROCHLORIDE AND ACETAMINOPHEN 2 TABLET: 5; 325 TABLET ORAL at 15:24

## 2018-06-15 ASSESSMENT — PAIN SCALES - GENERAL
PAINLEVEL_OUTOF10: 8
PAINLEVEL_OUTOF10: 8
PAINLEVEL_OUTOF10: 5
PAINLEVEL_OUTOF10: 10
PAINLEVEL_OUTOF10: 8
PAINLEVEL_OUTOF10: 8

## 2018-06-16 LAB — POTASSIUM SERPL-SCNC: 3.6 MMOL/L (ref 3.7–5.3)

## 2018-06-16 PROCEDURE — 1220000000 HC SEMI PRIVATE OB R&B

## 2018-06-16 PROCEDURE — 99232 SBSQ HOSP IP/OBS MODERATE 35: CPT | Performed by: INTERNAL MEDICINE

## 2018-06-16 PROCEDURE — 6370000000 HC RX 637 (ALT 250 FOR IP): Performed by: INTERNAL MEDICINE

## 2018-06-16 PROCEDURE — 36415 COLL VENOUS BLD VENIPUNCTURE: CPT

## 2018-06-16 PROCEDURE — 84132 ASSAY OF SERUM POTASSIUM: CPT

## 2018-06-16 PROCEDURE — 6370000000 HC RX 637 (ALT 250 FOR IP): Performed by: OBSTETRICS & GYNECOLOGY

## 2018-06-16 RX ORDER — POTASSIUM CHLORIDE 20 MEQ/1
20 TABLET, EXTENDED RELEASE ORAL DAILY
Qty: 60 TABLET | Refills: 3 | Status: SHIPPED | OUTPATIENT
Start: 2018-06-16 | End: 2018-12-05

## 2018-06-16 RX ORDER — OXYCODONE HYDROCHLORIDE AND ACETAMINOPHEN 5; 325 MG/1; MG/1
1 TABLET ORAL EVERY 4 HOURS PRN
Qty: 30 TABLET | Refills: 0 | Status: SHIPPED | OUTPATIENT
Start: 2018-06-16 | End: 2018-06-23

## 2018-06-16 RX ORDER — POTASSIUM CHLORIDE 20 MEQ/1
40 TABLET, EXTENDED RELEASE ORAL ONCE
Status: COMPLETED | OUTPATIENT
Start: 2018-06-16 | End: 2018-06-16

## 2018-06-16 RX ORDER — IBUPROFEN 800 MG/1
800 TABLET ORAL EVERY 8 HOURS PRN
Qty: 60 TABLET | Refills: 0 | Status: SHIPPED | OUTPATIENT
Start: 2018-06-16 | End: 2018-12-05

## 2018-06-16 RX ORDER — PSEUDOEPHEDRINE HCL 30 MG
100 TABLET ORAL 2 TIMES DAILY
Qty: 60 CAPSULE | Refills: 0 | Status: SHIPPED | OUTPATIENT
Start: 2018-06-17 | End: 2018-12-05

## 2018-06-16 RX ADMIN — IBUPROFEN 800 MG: 800 TABLET ORAL at 14:48

## 2018-06-16 RX ADMIN — IBUPROFEN 800 MG: 800 TABLET ORAL at 23:10

## 2018-06-16 RX ADMIN — OXYCODONE HYDROCHLORIDE AND ACETAMINOPHEN 2 TABLET: 5; 325 TABLET ORAL at 05:54

## 2018-06-16 RX ADMIN — OXYCODONE HYDROCHLORIDE AND ACETAMINOPHEN 2 TABLET: 5; 325 TABLET ORAL at 18:43

## 2018-06-16 RX ADMIN — OXYCODONE HYDROCHLORIDE AND ACETAMINOPHEN 2 TABLET: 5; 325 TABLET ORAL at 01:12

## 2018-06-16 RX ADMIN — OXYCODONE HYDROCHLORIDE AND ACETAMINOPHEN 2 TABLET: 5; 325 TABLET ORAL at 14:48

## 2018-06-16 RX ADMIN — OXYCODONE HYDROCHLORIDE AND ACETAMINOPHEN 2 TABLET: 5; 325 TABLET ORAL at 10:39

## 2018-06-16 RX ADMIN — IBUPROFEN 800 MG: 800 TABLET ORAL at 05:59

## 2018-06-16 RX ADMIN — OXYCODONE HYDROCHLORIDE AND ACETAMINOPHEN 2 TABLET: 5; 325 TABLET ORAL at 23:10

## 2018-06-16 RX ADMIN — POTASSIUM CHLORIDE 40 MEQ: 1500 TABLET, EXTENDED RELEASE ORAL at 10:39

## 2018-06-16 ASSESSMENT — PAIN SCALES - GENERAL
PAINLEVEL_OUTOF10: 7
PAINLEVEL_OUTOF10: 10
PAINLEVEL_OUTOF10: 8
PAINLEVEL_OUTOF10: 10
PAINLEVEL_OUTOF10: 10
PAINLEVEL_OUTOF10: 7
PAINLEVEL_OUTOF10: 8

## 2018-06-17 LAB — POTASSIUM SERPL-SCNC: 3.9 MMOL/L (ref 3.7–5.3)

## 2018-06-17 PROCEDURE — 99232 SBSQ HOSP IP/OBS MODERATE 35: CPT | Performed by: INTERNAL MEDICINE

## 2018-06-17 PROCEDURE — 84132 ASSAY OF SERUM POTASSIUM: CPT

## 2018-06-17 PROCEDURE — 6370000000 HC RX 637 (ALT 250 FOR IP): Performed by: OBSTETRICS & GYNECOLOGY

## 2018-06-17 PROCEDURE — 6370000000 HC RX 637 (ALT 250 FOR IP): Performed by: INTERNAL MEDICINE

## 2018-06-17 PROCEDURE — 36415 COLL VENOUS BLD VENIPUNCTURE: CPT

## 2018-06-17 PROCEDURE — 1220000000 HC SEMI PRIVATE OB R&B

## 2018-06-17 RX ORDER — POTASSIUM CHLORIDE 20 MEQ/1
20 TABLET, EXTENDED RELEASE ORAL ONCE
Status: COMPLETED | OUTPATIENT
Start: 2018-06-17 | End: 2018-06-17

## 2018-06-17 RX ADMIN — OXYCODONE HYDROCHLORIDE AND ACETAMINOPHEN 2 TABLET: 5; 325 TABLET ORAL at 03:30

## 2018-06-17 RX ADMIN — OXYCODONE HYDROCHLORIDE AND ACETAMINOPHEN 2 TABLET: 5; 325 TABLET ORAL at 13:56

## 2018-06-17 RX ADMIN — OXYCODONE HYDROCHLORIDE AND ACETAMINOPHEN 2 TABLET: 5; 325 TABLET ORAL at 23:39

## 2018-06-17 RX ADMIN — POTASSIUM CHLORIDE 20 MEQ: 1500 TABLET, EXTENDED RELEASE ORAL at 13:55

## 2018-06-17 RX ADMIN — OXYCODONE HYDROCHLORIDE AND ACETAMINOPHEN 2 TABLET: 5; 325 TABLET ORAL at 08:10

## 2018-06-17 RX ADMIN — IBUPROFEN 800 MG: 800 TABLET ORAL at 16:54

## 2018-06-17 RX ADMIN — OXYCODONE HYDROCHLORIDE AND ACETAMINOPHEN 2 TABLET: 5; 325 TABLET ORAL at 18:17

## 2018-06-17 RX ADMIN — IBUPROFEN 800 MG: 800 TABLET ORAL at 08:10

## 2018-06-17 ASSESSMENT — PAIN SCALES - GENERAL
PAINLEVEL_OUTOF10: 10
PAINLEVEL_OUTOF10: 8
PAINLEVEL_OUTOF10: 9
PAINLEVEL_OUTOF10: 9

## 2018-06-18 VITALS
OXYGEN SATURATION: 100 % | BODY MASS INDEX: 36.16 KG/M2 | HEART RATE: 87 BPM | DIASTOLIC BLOOD PRESSURE: 92 MMHG | RESPIRATION RATE: 20 BRPM | TEMPERATURE: 98.2 F | HEIGHT: 66 IN | WEIGHT: 225 LBS | SYSTOLIC BLOOD PRESSURE: 136 MMHG

## 2018-06-18 LAB
POTASSIUM SERPL-SCNC: 3.7 MMOL/L (ref 3.7–5.3)
SURGICAL PATHOLOGY REPORT: NORMAL

## 2018-06-18 PROCEDURE — 84132 ASSAY OF SERUM POTASSIUM: CPT

## 2018-06-18 PROCEDURE — 36415 COLL VENOUS BLD VENIPUNCTURE: CPT

## 2018-06-18 PROCEDURE — 6370000000 HC RX 637 (ALT 250 FOR IP): Performed by: OBSTETRICS & GYNECOLOGY

## 2018-06-18 PROCEDURE — 6360000002 HC RX W HCPCS: Performed by: OBSTETRICS & GYNECOLOGY

## 2018-06-18 RX ORDER — MEDROXYPROGESTERONE ACETATE 150 MG/ML
150 INJECTION, SUSPENSION INTRAMUSCULAR ONCE
Status: COMPLETED | OUTPATIENT
Start: 2018-06-18 | End: 2018-06-18

## 2018-06-18 RX ADMIN — OXYCODONE HYDROCHLORIDE AND ACETAMINOPHEN 2 TABLET: 5; 325 TABLET ORAL at 18:15

## 2018-06-18 RX ADMIN — MEDROXYPROGESTERONE ACETATE 150 MG: 150 INJECTION, SUSPENSION INTRAMUSCULAR at 07:56

## 2018-06-18 RX ADMIN — OXYCODONE HYDROCHLORIDE AND ACETAMINOPHEN 2 TABLET: 5; 325 TABLET ORAL at 13:33

## 2018-06-18 RX ADMIN — IBUPROFEN 800 MG: 800 TABLET ORAL at 13:33

## 2018-06-18 RX ADMIN — OXYCODONE HYDROCHLORIDE AND ACETAMINOPHEN 2 TABLET: 5; 325 TABLET ORAL at 03:28

## 2018-06-18 RX ADMIN — IBUPROFEN 800 MG: 800 TABLET ORAL at 03:27

## 2018-06-18 RX ADMIN — OXYCODONE HYDROCHLORIDE AND ACETAMINOPHEN 2 TABLET: 5; 325 TABLET ORAL at 07:56

## 2018-06-18 ASSESSMENT — PAIN SCALES - GENERAL
PAINLEVEL_OUTOF10: 8
PAINLEVEL_OUTOF10: 8
PAINLEVEL_OUTOF10: 10
PAINLEVEL_OUTOF10: 10

## 2018-06-21 ENCOUNTER — OFFICE VISIT (OUTPATIENT)
Dept: OBGYN | Age: 28
End: 2018-06-21
Payer: COMMERCIAL

## 2018-06-21 VITALS
SYSTOLIC BLOOD PRESSURE: 134 MMHG | BODY MASS INDEX: 32.56 KG/M2 | WEIGHT: 202.6 LBS | HEIGHT: 66 IN | TEMPERATURE: 97 F | DIASTOLIC BLOOD PRESSURE: 85 MMHG | RESPIRATION RATE: 16 BRPM | HEART RATE: 65 BPM

## 2018-06-21 DIAGNOSIS — Z09 POSTOPERATIVE EXAMINATION: Primary | ICD-10-CM

## 2018-06-21 PROCEDURE — 99212 OFFICE O/P EST SF 10 MIN: CPT | Performed by: OBSTETRICS & GYNECOLOGY

## 2018-06-21 PROCEDURE — 99024 POSTOP FOLLOW-UP VISIT: CPT | Performed by: OBSTETRICS & GYNECOLOGY

## 2018-07-25 ENCOUNTER — POSTPARTUM VISIT (OUTPATIENT)
Dept: OBGYN | Age: 28
End: 2018-07-25
Payer: COMMERCIAL

## 2018-07-25 VITALS
WEIGHT: 196 LBS | SYSTOLIC BLOOD PRESSURE: 127 MMHG | BODY MASS INDEX: 31.5 KG/M2 | DIASTOLIC BLOOD PRESSURE: 80 MMHG | RESPIRATION RATE: 18 BRPM | TEMPERATURE: 98.1 F | HEIGHT: 66 IN | HEART RATE: 92 BPM

## 2018-07-25 PROCEDURE — 99212 OFFICE O/P EST SF 10 MIN: CPT | Performed by: OBSTETRICS & GYNECOLOGY
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
## 2018-08-29 ENCOUNTER — HOSPITAL ENCOUNTER (EMERGENCY)
Age: 28
Discharge: LEFT BEFORE DISCHARGE | End: 2018-08-29
Payer: COMMERCIAL

## 2018-09-05 ENCOUNTER — NURSE ONLY (OUTPATIENT)
Dept: OBGYN | Age: 28
End: 2018-09-05
Payer: COMMERCIAL

## 2018-09-05 VITALS — BODY MASS INDEX: 31.77 KG/M2 | HEIGHT: 67 IN | RESPIRATION RATE: 22 BRPM | WEIGHT: 202.4 LBS

## 2018-09-05 DIAGNOSIS — Z30.09 FAMILY PLANNING: Primary | ICD-10-CM

## 2018-09-05 PROCEDURE — 96372 THER/PROPH/DIAG INJ SC/IM: CPT | Performed by: OBSTETRICS & GYNECOLOGY

## 2018-09-05 PROCEDURE — 99211 OFF/OP EST MAY X REQ PHY/QHP: CPT | Performed by: OBSTETRICS & GYNECOLOGY

## 2018-09-05 RX ORDER — MEDROXYPROGESTERONE ACETATE 150 MG/ML
150 INJECTION, SUSPENSION INTRAMUSCULAR ONCE
Status: COMPLETED | OUTPATIENT
Start: 2018-09-05 | End: 2018-09-05

## 2018-09-05 RX ORDER — MEDROXYPROGESTERONE ACETATE 150 MG/ML
150 INJECTION, SUSPENSION INTRAMUSCULAR ONCE
Qty: 1 ML | Refills: 3 | Status: SHIPPED | OUTPATIENT
Start: 2018-09-05 | End: 2018-12-05

## 2018-09-05 RX ADMIN — MEDROXYPROGESTERONE ACETATE 150 MG: 150 INJECTION, SUSPENSION, EXTENDED RELEASE INTRAMUSCULAR at 10:57

## 2018-09-05 NOTE — PROGRESS NOTES
Patient here for Depo-Provera. Patient denies having issues due to depo injection. Per provider order patient given and tolerated well. Patient will return in 11-12 weeks for next injection.

## 2018-09-05 NOTE — PATIENT INSTRUCTIONS
DEPO-PROVERA  INSTRUCTIONS FOR PATIENTS        (MEDROXYPROGESTERONE ACETATE)    1. It is required to have an annual exam every year. This is to update your health risk, or your Depo-Provera may NOT be refilled. 2. You are responsible to check with your pharmacy to be sure refills are available PRIOR to your visit. 3. You MUST bring your medication with you to your appointment. 4. PLEASE arrive on time for your appointment. If you are early, you may NOT be seen early. 5. If your are 15 minutes late or more for your appointment, your appointment may be rescheduled. YOU are responsible to reschedule your appointment. 6. You are asked to remain in the waiting room area. If you leave for any reason, your appointment will be rescheduled. YOU are responsible to reschedule the appointment. 7. Anyone attending appointment with you, MUST either remain in the waiting room, or must stay in patient room at all times. 8. MINORS-MUST HAVE A PARENT AND/OR LEGAL GAURDIAN WITH THEM. THERE MUST ALSO BE A SIGNED CONSENT BY THE PATIENT'S PARENT AND/OR LEGAL GUARDIAN ON FILE FOR TREATMENT. If your parent and/or legal Guardian on file cannot be present, please call our clinic 2 days prior to your appointment so we can obtain consent over the phone so that you can get your Depo-Provera.

## 2018-09-18 ENCOUNTER — TELEPHONE (OUTPATIENT)
Dept: OBGYN | Age: 28
End: 2018-09-18

## 2018-09-19 ENCOUNTER — OFFICE VISIT (OUTPATIENT)
Dept: OBGYN | Age: 28
End: 2018-09-19
Payer: COMMERCIAL

## 2018-09-19 VITALS
DIASTOLIC BLOOD PRESSURE: 97 MMHG | WEIGHT: 201 LBS | HEART RATE: 66 BPM | BODY MASS INDEX: 32.3 KG/M2 | HEIGHT: 66 IN | SYSTOLIC BLOOD PRESSURE: 153 MMHG

## 2018-09-19 DIAGNOSIS — N93.9 ABNORMAL UTERINE BLEEDING: ICD-10-CM

## 2018-09-19 DIAGNOSIS — R10.9 ABDOMINAL PAIN, UNSPECIFIED ABDOMINAL LOCATION: ICD-10-CM

## 2018-09-19 DIAGNOSIS — N93.9 ABNORMAL UTERINE BLEEDING (AUB): Primary | ICD-10-CM

## 2018-09-19 PROCEDURE — 1036F TOBACCO NON-USER: CPT | Performed by: OBSTETRICS & GYNECOLOGY

## 2018-09-19 PROCEDURE — G8427 DOCREV CUR MEDS BY ELIG CLIN: HCPCS | Performed by: OBSTETRICS & GYNECOLOGY

## 2018-09-19 PROCEDURE — G8417 CALC BMI ABV UP PARAM F/U: HCPCS | Performed by: OBSTETRICS & GYNECOLOGY

## 2018-09-19 PROCEDURE — 99213 OFFICE O/P EST LOW 20 MIN: CPT | Performed by: OBSTETRICS & GYNECOLOGY

## 2018-09-19 RX ORDER — TRANEXAMIC ACID 650 1/1
1300 TABLET ORAL 3 TIMES DAILY
Qty: 60 TABLET | Refills: 0 | Status: SHIPPED | OUTPATIENT
Start: 2018-09-19 | End: 2018-12-05

## 2018-10-01 ENCOUNTER — TELEPHONE (OUTPATIENT)
Dept: OBGYN | Age: 28
End: 2018-10-01

## 2018-10-02 DIAGNOSIS — N93.9 ABNORMAL UTERINE BLEEDING (AUB): Primary | ICD-10-CM

## 2018-10-17 ENCOUNTER — ANCILLARY PROCEDURE (OUTPATIENT)
Dept: OBGYN | Age: 28
End: 2018-10-17
Payer: COMMERCIAL

## 2018-10-17 DIAGNOSIS — N93.9 ABNORMAL UTERINE BLEEDING (AUB): ICD-10-CM

## 2018-10-17 PROCEDURE — 76856 US EXAM PELVIC COMPLETE: CPT | Performed by: OBSTETRICS & GYNECOLOGY

## 2018-10-17 PROCEDURE — 76856 US EXAM PELVIC COMPLETE: CPT | Performed by: RADIOLOGY

## 2018-10-22 DIAGNOSIS — O09.93 HIGH-RISK PREGNANCY IN THIRD TRIMESTER: ICD-10-CM

## 2018-10-22 RX ORDER — PRENATAL WITH FERROUS FUM AND FOLIC ACID 3080; 920; 120; 400; 22; 1.84; 3; 20; 10; 1; 12; 200; 27; 25; 2 [IU]/1; [IU]/1; MG/1; [IU]/1; MG/1; MG/1; MG/1; MG/1; MG/1; MG/1; UG/1; MG/1; MG/1; MG/1; MG/1
1 TABLET ORAL DAILY
Qty: 30 TABLET | Refills: 11 | Status: SHIPPED | OUTPATIENT
Start: 2018-10-22 | End: 2018-12-03 | Stop reason: SDUPTHER

## 2018-10-23 ENCOUNTER — OFFICE VISIT (OUTPATIENT)
Dept: OBGYN | Age: 28
End: 2018-10-23
Payer: COMMERCIAL

## 2018-10-23 VITALS
SYSTOLIC BLOOD PRESSURE: 140 MMHG | DIASTOLIC BLOOD PRESSURE: 100 MMHG | BODY MASS INDEX: 34.38 KG/M2 | HEART RATE: 78 BPM | WEIGHT: 213 LBS

## 2018-10-23 DIAGNOSIS — Z86.32 HISTORY OF GESTATIONAL DIABETES: ICD-10-CM

## 2018-10-23 DIAGNOSIS — N93.9 ABNORMAL UTERINE BLEEDING: Primary | ICD-10-CM

## 2018-10-23 PROCEDURE — G8484 FLU IMMUNIZE NO ADMIN: HCPCS | Performed by: OBSTETRICS & GYNECOLOGY

## 2018-10-23 PROCEDURE — G8428 CUR MEDS NOT DOCUMENT: HCPCS | Performed by: OBSTETRICS & GYNECOLOGY

## 2018-10-23 PROCEDURE — G8417 CALC BMI ABV UP PARAM F/U: HCPCS | Performed by: OBSTETRICS & GYNECOLOGY

## 2018-10-23 PROCEDURE — 1036F TOBACCO NON-USER: CPT | Performed by: OBSTETRICS & GYNECOLOGY

## 2018-10-23 PROCEDURE — 99212 OFFICE O/P EST SF 10 MIN: CPT | Performed by: OBSTETRICS & GYNECOLOGY

## 2018-10-23 PROCEDURE — 99213 OFFICE O/P EST LOW 20 MIN: CPT | Performed by: OBSTETRICS & GYNECOLOGY

## 2018-11-08 ENCOUNTER — HOSPITAL ENCOUNTER (OUTPATIENT)
Age: 28
Setting detail: SPECIMEN
Discharge: HOME | End: 2018-11-08
Payer: COMMERCIAL

## 2018-11-08 DIAGNOSIS — N93.9 ABNORMAL UTERINE BLEEDING: ICD-10-CM

## 2018-11-08 LAB
ABSOLUTE EOS #: 0.36 K/UL (ref 0–0.44)
ABSOLUTE IMMATURE GRANULOCYTE: <0.03 K/UL (ref 0–0.3)
ABSOLUTE LYMPH #: 2.11 K/UL (ref 1.1–3.7)
ABSOLUTE MONO #: 0.88 K/UL (ref 0.1–1.2)
BASOPHILS # BLD: 0 % (ref 0–2)
BASOPHILS ABSOLUTE: 0.03 K/UL (ref 0–0.2)
DIFFERENTIAL TYPE: ABNORMAL
EOSINOPHILS RELATIVE PERCENT: 5 % (ref 1–4)
HCG QUANTITATIVE: <1 IU/L
HCT VFR BLD CALC: 46.2 % (ref 36.3–47.1)
HEMOGLOBIN: 14.6 G/DL (ref 11.9–15.1)
IMMATURE GRANULOCYTES: 0 %
LYMPHOCYTES # BLD: 26 % (ref 24–43)
MCH RBC QN AUTO: 28.3 PG (ref 25.2–33.5)
MCHC RBC AUTO-ENTMCNC: 31.6 G/DL (ref 28.4–34.8)
MCV RBC AUTO: 89.7 FL (ref 82.6–102.9)
MONOCYTES # BLD: 11 % (ref 3–12)
NRBC AUTOMATED: 0 PER 100 WBC
PDW BLD-RTO: 14.5 % (ref 11.8–14.4)
PLATELET # BLD: 234 K/UL (ref 138–453)
PLATELET ESTIMATE: ABNORMAL
PMV BLD AUTO: 11.8 FL (ref 8.1–13.5)
PROLACTIN: 8.22 UG/L (ref 4.79–23.3)
RBC # BLD: 5.15 M/UL (ref 3.95–5.11)
RBC # BLD: ABNORMAL 10*6/UL
SEG NEUTROPHILS: 58 % (ref 36–65)
SEGMENTED NEUTROPHILS ABSOLUTE COUNT: 4.65 K/UL (ref 1.5–8.1)
TSH SERPL DL<=0.05 MIU/L-ACNC: 1.3 MIU/L (ref 0.3–5)
WBC # BLD: 8.1 K/UL (ref 3.5–11.3)
WBC # BLD: ABNORMAL 10*3/UL

## 2018-11-08 PROCEDURE — 85025 COMPLETE CBC W/AUTO DIFF WBC: CPT

## 2018-11-08 PROCEDURE — 84443 ASSAY THYROID STIM HORMONE: CPT

## 2018-11-08 PROCEDURE — 84702 CHORIONIC GONADOTROPIN TEST: CPT

## 2018-11-08 PROCEDURE — 84146 ASSAY OF PROLACTIN: CPT

## 2018-11-08 PROCEDURE — 36415 COLL VENOUS BLD VENIPUNCTURE: CPT

## 2018-11-27 ENCOUNTER — HOSPITAL ENCOUNTER (OUTPATIENT)
Age: 28
Setting detail: SPECIMEN
Discharge: HOME | End: 2018-11-27
Payer: COMMERCIAL

## 2018-11-27 DIAGNOSIS — Z86.32 HISTORY OF GESTATIONAL DIABETES: ICD-10-CM

## 2018-11-27 LAB
ABSOLUTE EOS #: 0.23 K/UL (ref 0–0.44)
ABSOLUTE IMMATURE GRANULOCYTE: <0.03 K/UL (ref 0–0.3)
ABSOLUTE LYMPH #: 2.34 K/UL (ref 1.1–3.7)
ABSOLUTE MONO #: 0.61 K/UL (ref 0.1–1.2)
AMOUNT GLUCOSE GIVEN: 75 G
BASOPHILS # BLD: 0 % (ref 0–2)
BASOPHILS ABSOLUTE: 0.04 K/UL (ref 0–0.2)
DIFFERENTIAL TYPE: NORMAL
EOSINOPHILS RELATIVE PERCENT: 3 % (ref 1–4)
GLUCOSE FASTING: 87 MG/DL (ref 65–99)
GLUCOSE TOLERANCE TEST 1 HOUR: 232 MG/DL (ref 65–184)
GLUCOSE TOLERANCE TEST 2 HOUR: 194 MG/DL (ref 65–139)
HCG QUANTITATIVE: <1 IU/L
HCT VFR BLD CALC: 43.8 % (ref 36.3–47.1)
HEMOGLOBIN: 13.5 G/DL (ref 11.9–15.1)
IMMATURE GRANULOCYTES: 0 %
LYMPHOCYTES # BLD: 26 % (ref 24–43)
MCH RBC QN AUTO: 28.1 PG (ref 25.2–33.5)
MCHC RBC AUTO-ENTMCNC: 30.8 G/DL (ref 28.4–34.8)
MCV RBC AUTO: 91.3 FL (ref 82.6–102.9)
MONOCYTES # BLD: 7 % (ref 3–12)
NRBC AUTOMATED: 0 PER 100 WBC
PDW BLD-RTO: 14.1 % (ref 11.8–14.4)
PLATELET # BLD: 248 K/UL (ref 138–453)
PLATELET ESTIMATE: NORMAL
PMV BLD AUTO: 12.6 FL (ref 8.1–13.5)
PROLACTIN: 6.74 UG/L (ref 4.79–23.3)
RBC # BLD: 4.8 M/UL (ref 3.95–5.11)
RBC # BLD: NORMAL 10*6/UL
SEG NEUTROPHILS: 64 % (ref 36–65)
SEGMENTED NEUTROPHILS ABSOLUTE COUNT: 5.68 K/UL (ref 1.5–8.1)
TSH SERPL DL<=0.05 MIU/L-ACNC: 1.18 MIU/L (ref 0.3–5)
WBC # BLD: 8.9 K/UL (ref 3.5–11.3)
WBC # BLD: NORMAL 10*3/UL

## 2018-11-27 PROCEDURE — 84443 ASSAY THYROID STIM HORMONE: CPT

## 2018-11-27 PROCEDURE — 82951 GLUCOSE TOLERANCE TEST (GTT): CPT

## 2018-11-27 PROCEDURE — 84702 CHORIONIC GONADOTROPIN TEST: CPT

## 2018-11-27 PROCEDURE — 85025 COMPLETE CBC W/AUTO DIFF WBC: CPT

## 2018-11-27 PROCEDURE — 84146 ASSAY OF PROLACTIN: CPT

## 2018-11-27 PROCEDURE — 36415 COLL VENOUS BLD VENIPUNCTURE: CPT

## 2018-11-28 ENCOUNTER — OFFICE VISIT (OUTPATIENT)
Dept: OBGYN | Age: 28
End: 2018-11-28
Payer: COMMERCIAL

## 2018-11-28 VITALS
WEIGHT: 210 LBS | DIASTOLIC BLOOD PRESSURE: 96 MMHG | HEART RATE: 80 BPM | SYSTOLIC BLOOD PRESSURE: 148 MMHG | BODY MASS INDEX: 33.75 KG/M2 | HEIGHT: 66 IN

## 2018-11-28 DIAGNOSIS — N93.9 ABNORMAL UTERINE BLEEDING: Primary | ICD-10-CM

## 2018-11-28 PROCEDURE — 99212 OFFICE O/P EST SF 10 MIN: CPT | Performed by: OBSTETRICS & GYNECOLOGY

## 2018-11-28 PROCEDURE — G8428 CUR MEDS NOT DOCUMENT: HCPCS | Performed by: OBSTETRICS & GYNECOLOGY

## 2018-11-28 PROCEDURE — 1036F TOBACCO NON-USER: CPT | Performed by: OBSTETRICS & GYNECOLOGY

## 2018-11-28 PROCEDURE — 99213 OFFICE O/P EST LOW 20 MIN: CPT | Performed by: OBSTETRICS & GYNECOLOGY

## 2018-11-28 PROCEDURE — G8484 FLU IMMUNIZE NO ADMIN: HCPCS | Performed by: OBSTETRICS & GYNECOLOGY

## 2018-11-28 PROCEDURE — G8417 CALC BMI ABV UP PARAM F/U: HCPCS | Performed by: OBSTETRICS & GYNECOLOGY

## 2018-12-03 DIAGNOSIS — O09.93 HIGH-RISK PREGNANCY IN THIRD TRIMESTER: ICD-10-CM

## 2018-12-03 RX ORDER — PRENATAL WITH FERROUS FUM AND FOLIC ACID 3080; 920; 120; 400; 22; 1.84; 3; 20; 10; 1; 12; 200; 27; 25; 2 [IU]/1; [IU]/1; MG/1; [IU]/1; MG/1; MG/1; MG/1; MG/1; MG/1; MG/1; UG/1; MG/1; MG/1; MG/1; MG/1
1 TABLET ORAL DAILY
Qty: 30 TABLET | Refills: 11 | Status: SHIPPED | OUTPATIENT
Start: 2018-12-03 | End: 2018-12-04 | Stop reason: SDUPTHER

## 2018-12-04 DIAGNOSIS — O09.93 HIGH-RISK PREGNANCY IN THIRD TRIMESTER: ICD-10-CM

## 2018-12-04 RX ORDER — PRENATAL WITH FERROUS FUM AND FOLIC ACID 3080; 920; 120; 400; 22; 1.84; 3; 20; 10; 1; 12; 200; 27; 25; 2 [IU]/1; [IU]/1; MG/1; [IU]/1; MG/1; MG/1; MG/1; MG/1; MG/1; MG/1; UG/1; MG/1; MG/1; MG/1; MG/1
1 TABLET ORAL DAILY
Qty: 30 TABLET | Refills: 11 | Status: SHIPPED | OUTPATIENT
Start: 2018-12-04 | End: 2018-12-26 | Stop reason: SDUPTHER

## 2018-12-05 ENCOUNTER — OFFICE VISIT (OUTPATIENT)
Dept: INTERNAL MEDICINE | Age: 28
End: 2018-12-05
Payer: COMMERCIAL

## 2018-12-05 VITALS
WEIGHT: 214.4 LBS | BODY MASS INDEX: 34.46 KG/M2 | SYSTOLIC BLOOD PRESSURE: 132 MMHG | DIASTOLIC BLOOD PRESSURE: 89 MMHG | HEART RATE: 81 BPM | HEIGHT: 66 IN

## 2018-12-05 DIAGNOSIS — Z83.3 FAMILY HISTORY OF DIABETES MELLITUS (DM): ICD-10-CM

## 2018-12-05 DIAGNOSIS — N92.1 MENORRHAGIA WITH IRREGULAR CYCLE: ICD-10-CM

## 2018-12-05 DIAGNOSIS — I10 ESSENTIAL HYPERTENSION: ICD-10-CM

## 2018-12-05 DIAGNOSIS — K21.9 GASTROESOPHAGEAL REFLUX DISEASE WITHOUT ESOPHAGITIS: Primary | ICD-10-CM

## 2018-12-05 DIAGNOSIS — Z23 NEED FOR INFLUENZA VACCINATION: ICD-10-CM

## 2018-12-05 LAB — HBA1C MFR BLD: 5.6 %

## 2018-12-05 PROCEDURE — 90686 IIV4 VACC NO PRSV 0.5 ML IM: CPT | Performed by: INTERNAL MEDICINE

## 2018-12-05 PROCEDURE — G8482 FLU IMMUNIZE ORDER/ADMIN: HCPCS | Performed by: INTERNAL MEDICINE

## 2018-12-05 PROCEDURE — G8417 CALC BMI ABV UP PARAM F/U: HCPCS | Performed by: INTERNAL MEDICINE

## 2018-12-05 PROCEDURE — G8427 DOCREV CUR MEDS BY ELIG CLIN: HCPCS | Performed by: INTERNAL MEDICINE

## 2018-12-05 PROCEDURE — 1036F TOBACCO NON-USER: CPT | Performed by: INTERNAL MEDICINE

## 2018-12-05 PROCEDURE — 99204 OFFICE O/P NEW MOD 45 MIN: CPT | Performed by: INTERNAL MEDICINE

## 2018-12-05 PROCEDURE — 83036 HEMOGLOBIN GLYCOSYLATED A1C: CPT | Performed by: INTERNAL MEDICINE

## 2018-12-05 PROCEDURE — 99211 OFF/OP EST MAY X REQ PHY/QHP: CPT | Performed by: INTERNAL MEDICINE

## 2018-12-05 RX ORDER — RANITIDINE 150 MG/1
150 TABLET ORAL 2 TIMES DAILY
Qty: 180 TABLET | Refills: 1 | Status: SHIPPED | OUTPATIENT
Start: 2018-12-05 | End: 2018-12-21 | Stop reason: SDUPTHER

## 2018-12-05 RX ORDER — HYDROCHLOROTHIAZIDE 12.5 MG/1
12.5 TABLET ORAL DAILY
Qty: 30 TABLET | Refills: 3 | Status: SHIPPED | OUTPATIENT
Start: 2018-12-05 | End: 2018-12-21 | Stop reason: SDUPTHER

## 2018-12-05 RX ORDER — BLOOD PRESSURE TEST KIT
KIT MISCELLANEOUS
Qty: 1 KIT | Refills: 0 | Status: SHIPPED | OUTPATIENT
Start: 2018-12-05 | End: 2020-11-16

## 2018-12-05 ASSESSMENT — PATIENT HEALTH QUESTIONNAIRE - PHQ9
2. FEELING DOWN, DEPRESSED OR HOPELESS: 0
SUM OF ALL RESPONSES TO PHQ9 QUESTIONS 1 & 2: 0
1. LITTLE INTEREST OR PLEASURE IN DOING THINGS: 0
SUM OF ALL RESPONSES TO PHQ QUESTIONS 1-9: 0
SUM OF ALL RESPONSES TO PHQ QUESTIONS 1-9: 0

## 2018-12-05 NOTE — PROGRESS NOTES
[facility]  [facility] [address] [facility]  [address]  Dept: [phone]      Today's Date: 2018  Patient Name: Frank Iqbal  Patient's age: 25 y.o., 1990        CHIEF COMPLAINT:    Chief Complaint   Patient presents with    New Patient    Establish Care    Vaginal Bleeding     pt states she gave birth to her daughter 6 months ago and is still bleeding,        History Obtained From:  patient    HISTORY OF PRESENT ILLNESS:      The patient is a 25 y.o. old  female and is here to  establish care for hypertension. She has been following with ObGyn for recurrent vaginal bleed. She had had blood work which shows her hemoglobin is in normal range. Her TSH is also normal.  She had ultrasound pelvis done which was unremarkable. She has been advised to give it a Mirena placed. She is concerned about her bleeding. We will check her coordination profile. Her blood pressure is upper side of normal today however in past her blood pressure has been elevated on several occasion. She has occasional headache however denies any chest pain, palpitation or leg edema, no shortness of breath. She also has history of heartburn. Denies any hematemesis or melena.     Patient Active Problem List   Diagnosis    Rh+/RI /GBSneg    Hx C/S x1 (breech)    H/O successful     Obesity    Smoker    THC abuse     Pityriasis rosea    Grand multiparity    Obesity affecting pregnancy in first trimester    Previous  delivery, antepartum condition or complication    Grand multiparity with current pregnancy in first trimester    H/O gonorrhea (CAMPOS neg)    Remote H/O trichomonas    H/O indicated  delivery    Right sciatic nerve pain    GDMA1    H/O Starvation ketoacidosis    H/O Hypokalemia    Nausea and vomiting    Gestational diabetes mellitus (GDM), antepartum    Obesity affecting pregnancy in third trimester    Grand

## 2018-12-21 DIAGNOSIS — I10 ESSENTIAL HYPERTENSION: ICD-10-CM

## 2018-12-21 DIAGNOSIS — K21.9 GASTROESOPHAGEAL REFLUX DISEASE WITHOUT ESOPHAGITIS: ICD-10-CM

## 2018-12-21 RX ORDER — HYDROCHLOROTHIAZIDE 12.5 MG/1
12.5 TABLET ORAL DAILY
Qty: 30 TABLET | Refills: 3 | Status: SHIPPED | OUTPATIENT
Start: 2018-12-21 | End: 2019-03-21 | Stop reason: SDUPTHER

## 2018-12-21 RX ORDER — RANITIDINE 150 MG/1
150 TABLET ORAL 2 TIMES DAILY
Qty: 180 TABLET | Refills: 1 | Status: SHIPPED | OUTPATIENT
Start: 2018-12-21 | End: 2019-05-12 | Stop reason: SDUPTHER

## 2018-12-26 DIAGNOSIS — O09.93 HIGH-RISK PREGNANCY IN THIRD TRIMESTER: ICD-10-CM

## 2018-12-26 RX ORDER — PRENATAL WITH FERROUS FUM AND FOLIC ACID 3080; 920; 120; 400; 22; 1.84; 3; 20; 10; 1; 12; 200; 27; 25; 2 [IU]/1; [IU]/1; MG/1; [IU]/1; MG/1; MG/1; MG/1; MG/1; MG/1; MG/1; UG/1; MG/1; MG/1; MG/1; MG/1
1 TABLET ORAL DAILY
Qty: 30 TABLET | Refills: 11 | Status: SHIPPED | OUTPATIENT
Start: 2018-12-26 | End: 2019-05-12 | Stop reason: SDUPTHER

## 2019-02-04 RX ORDER — ACETAMINOPHEN 500 MG
TABLET ORAL
Qty: 60 TABLET | Refills: 1 | Status: SHIPPED | OUTPATIENT
Start: 2019-02-04 | End: 2019-05-12 | Stop reason: SDUPTHER

## 2019-02-13 RX ORDER — NYSTATIN 100000 U/G
OINTMENT TOPICAL
COMMUNITY
Start: 2018-12-21 | End: 2020-08-10 | Stop reason: ALTCHOICE

## 2019-03-19 ENCOUNTER — TELEPHONE (OUTPATIENT)
Dept: INTERNAL MEDICINE | Age: 29
End: 2019-03-19

## 2019-03-21 DIAGNOSIS — I10 ESSENTIAL HYPERTENSION: ICD-10-CM

## 2019-03-21 RX ORDER — HYDROCHLOROTHIAZIDE 12.5 MG/1
12.5 TABLET ORAL DAILY
Qty: 30 TABLET | Refills: 3 | Status: SHIPPED | OUTPATIENT
Start: 2019-03-21 | End: 2019-05-12 | Stop reason: SDUPTHER

## 2019-04-16 ENCOUNTER — TELEPHONE (OUTPATIENT)
Dept: OBGYN | Age: 29
End: 2019-04-16

## 2019-04-16 NOTE — TELEPHONE ENCOUNTER
Called and left a message for patient to give the office a call back regarding her missed appointment.

## 2019-05-12 DIAGNOSIS — O09.93 HIGH-RISK PREGNANCY IN THIRD TRIMESTER: ICD-10-CM

## 2019-05-12 DIAGNOSIS — K21.9 GASTROESOPHAGEAL REFLUX DISEASE WITHOUT ESOPHAGITIS: ICD-10-CM

## 2019-05-12 DIAGNOSIS — I10 ESSENTIAL HYPERTENSION: ICD-10-CM
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
## 2019-05-14 RX ORDER — HYDROCHLOROTHIAZIDE 12.5 MG/1
12.5 TABLET ORAL DAILY
Qty: 30 TABLET | Refills: 3 | Status: SHIPPED | OUTPATIENT
Start: 2019-05-14 | End: 2020-08-23

## 2019-05-14 RX ORDER — RANITIDINE 150 MG/1
150 TABLET ORAL 2 TIMES DAILY
Qty: 180 TABLET | Refills: 1 | Status: SHIPPED | OUTPATIENT
Start: 2019-05-14 | End: 2020-01-21 | Stop reason: SDUPTHER

## 2019-05-15 RX ORDER — ACETAMINOPHEN 500 MG
500 TABLET ORAL EVERY 6 HOURS PRN
Qty: 60 TABLET | Refills: 1 | Status: SHIPPED | OUTPATIENT
Start: 2019-05-15 | End: 2020-08-10 | Stop reason: ALTCHOICE

## 2019-05-15 RX ORDER — PRENATAL WITH FERROUS FUM AND FOLIC ACID 3080; 920; 120; 400; 22; 1.84; 3; 20; 10; 1; 12; 200; 27; 25; 2 [IU]/1; [IU]/1; MG/1; [IU]/1; MG/1; MG/1; MG/1; MG/1; MG/1; MG/1; UG/1; MG/1; MG/1; MG/1; MG/1
1 TABLET ORAL DAILY
Qty: 30 TABLET | Refills: 11 | Status: SHIPPED | OUTPATIENT
Start: 2019-05-15 | End: 2019-10-07 | Stop reason: SDUPTHER

## 2019-06-25 RX ORDER — ACETAMINOPHEN 500 MG
500 TABLET ORAL EVERY 4 HOURS PRN
Qty: 60 TABLET | Refills: 1 | Status: SHIPPED | OUTPATIENT
Start: 2019-06-25 | End: 2019-08-28 | Stop reason: SDUPTHER

## 2019-08-28 RX ORDER — ACETAMINOPHEN 500 MG
500 TABLET ORAL EVERY 4 HOURS PRN
Qty: 60 TABLET | Refills: 0 | Status: SHIPPED | OUTPATIENT
Start: 2019-08-28 | End: 2019-09-17 | Stop reason: SDUPTHER
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
## 2019-09-18 RX ORDER — ACETAMINOPHEN 500 MG
500 TABLET ORAL EVERY 6 HOURS PRN
Qty: 60 TABLET | Refills: 0 | Status: SHIPPED | OUTPATIENT
Start: 2019-09-18 | End: 2020-02-10 | Stop reason: SDUPTHER

## 2019-09-18 NOTE — TELEPHONE ENCOUNTER
injury      Chlamydia in preg, needs CAMPOS     HRP (high risk pregnancy), third trimester     Pre-Eclampsia w/o SFs     RLTCS 6/14/18 F Apg 8/9 Wt 8#10     Abnormal uterine bleeding
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
## 2019-09-20 RX ORDER — PSEUDOEPHED/ACETAMINOPH/DIPHEN 30MG-500MG
TABLET ORAL
Qty: 60 TABLET | Refills: 0 | Status: SHIPPED | OUTPATIENT
Start: 2019-09-20 | End: 2019-11-14 | Stop reason: SDUPTHER

## 2019-10-07 DIAGNOSIS — O09.93 HIGH-RISK PREGNANCY IN THIRD TRIMESTER: ICD-10-CM

## 2019-10-08 RX ORDER — PRENATAL WITH FERROUS FUM AND FOLIC ACID 3080; 920; 120; 400; 22; 1.84; 3; 20; 10; 1; 12; 200; 27; 25; 2 [IU]/1; [IU]/1; MG/1; [IU]/1; MG/1; MG/1; MG/1; MG/1; MG/1; MG/1; UG/1; MG/1; MG/1; MG/1; MG/1
1 TABLET ORAL DAILY
Qty: 30 TABLET | Refills: 11 | Status: SHIPPED | OUTPATIENT
Start: 2019-10-08 | End: 2021-01-11 | Stop reason: SDUPTHER

## 2019-10-09 RX ORDER — ACETAMINOPHEN 500 MG
500 TABLET ORAL EVERY 6 HOURS PRN
Qty: 60 TABLET | Refills: 0 | OUTPATIENT
Start: 2019-10-09

## 2019-12-17 RX ORDER — ACETAMINOPHEN 500 MG
TABLET ORAL
Qty: 60 TABLET | Refills: 10 | Status: SHIPPED | OUTPATIENT
Start: 2019-12-17 | End: 2021-12-13

## 2020-01-21 RX ORDER — RANITIDINE 150 MG/1
150 TABLET ORAL 2 TIMES DAILY
Qty: 180 TABLET | Refills: 1 | Status: SHIPPED | OUTPATIENT
Start: 2020-01-21 | End: 2020-11-16 | Stop reason: ALTCHOICE

## 2020-02-10 RX ORDER — RANITIDINE 150 MG/1
150 TABLET ORAL 2 TIMES DAILY
Qty: 180 TABLET | Refills: 1 | OUTPATIENT
Start: 2020-02-10
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
## 2020-02-11 RX ORDER — ACETAMINOPHEN 500 MG
500 TABLET ORAL EVERY 6 HOURS PRN
Qty: 60 TABLET | Refills: 0 | Status: SHIPPED
Start: 2020-02-11 | End: 2020-08-10 | Stop reason: SDUPTHER

## 2020-04-22 ENCOUNTER — TELEPHONE (OUTPATIENT)
Dept: ADMINISTRATIVE | Age: 30
End: 2020-04-22

## 2020-05-28 ENCOUNTER — ANCILLARY PROCEDURE (OUTPATIENT)
Dept: OBGYN | Age: 30
End: 2020-05-28
Payer: COMMERCIAL

## 2020-05-28 PROCEDURE — 76805 OB US >/= 14 WKS SNGL FETUS: CPT | Performed by: RADIOLOGY

## 2020-07-09 ENCOUNTER — TELEPHONE (OUTPATIENT)
Dept: OBGYN | Age: 30
End: 2020-07-09

## 2020-07-09 NOTE — TELEPHONE ENCOUNTER
Meredith contacted pt regarding her missed prenatal appointment. Pt reports that she does not have childcare for her 9y and 2y old. However, she is willing to attend an early morning appointment if that means that she can bring her 2 children. Meredith informed pt that sw will speak with Fletcher Haney RN and get back to her. [address] [place] [address] states that she will look at doctor's schedule and place pt on the resident schedule for initial prenatal visit. Meredith sent a staff message to Fletcher Hdez.

## 2020-07-22 ENCOUNTER — HOSPITAL ENCOUNTER (OUTPATIENT)
Age: 30
Setting detail: SPECIMEN
Discharge: HOME | End: 2020-07-22
Payer: COMMERCIAL

## 2020-07-22 ENCOUNTER — INITIAL PRENATAL (OUTPATIENT)
Dept: OBGYN | Age: 30
End: 2020-07-22
Payer: COMMERCIAL

## 2020-07-22 VITALS
WEIGHT: 242 LBS | HEART RATE: 95 BPM | BODY MASS INDEX: 39.06 KG/M2 | SYSTOLIC BLOOD PRESSURE: 129 MMHG | DIASTOLIC BLOOD PRESSURE: 85 MMHG

## 2020-07-22 PROCEDURE — 99213 OFFICE O/P EST LOW 20 MIN: CPT | Performed by: STUDENT IN AN ORGANIZED HEALTH CARE EDUCATION/TRAINING PROGRAM

## 2020-07-22 RX ORDER — ACETAMINOPHEN 500 MG
1000 TABLET ORAL EVERY 6 HOURS PRN
Qty: 60 TABLET | Refills: 2 | Status: SHIPPED
Start: 2020-07-22 | End: 2020-08-10 | Stop reason: SDUPTHER

## 2020-07-22 RX ORDER — CALCIUM CARBONATE 200(500)MG
1 TABLET,CHEWABLE ORAL DAILY
Qty: 30 TABLET | Refills: 5 | Status: SHIPPED | OUTPATIENT
Start: 2020-07-22 | End: 2020-08-23 | Stop reason: HOSPADM

## 2020-07-22 RX ORDER — ASPIRIN 81 MG/1
81 TABLET ORAL DAILY
Qty: 90 TABLET | Refills: 1 | Status: SHIPPED | OUTPATIENT
Start: 2020-07-22 | End: 2020-08-25

## 2020-07-22 RX ORDER — ONDANSETRON 4 MG/1
4 TABLET, FILM COATED ORAL EVERY 8 HOURS PRN
Qty: 15 TABLET | Refills: 1 | Status: SHIPPED | OUTPATIENT
Start: 2020-07-22 | End: 2020-08-25

## 2020-07-22 RX ORDER — DOCOSAHEXAENOIC ACID 200 MG
1 CAPSULE ORAL DAILY
Qty: 30 CAPSULE | Refills: 12 | Status: SHIPPED | OUTPATIENT
Start: 2020-07-22 | End: 2020-08-25
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
## 2020-07-23 LAB
DIRECT EXAM: ABNORMAL
Lab: ABNORMAL
SPECIMEN DESCRIPTION: ABNORMAL

## 2020-07-23 NOTE — PROGRESS NOTES
Attending Physician Statement  I have discussed the care of Henrietta Atkinson, including pertinent history and exam findings,  with the resident. I have reviewed the key elements of all parts of the encounter with the resident. I agree with the assessment, plan and orders as documented by the resident.   (GE Modifier)

## 2020-07-24 LAB
C TRACH DNA GENITAL QL NAA+PROBE: NEGATIVE
N. GONORRHOEAE DNA: NEGATIVE
SPECIMEN DESCRIPTION: NORMAL

## 2020-07-27 LAB
HPV SAMPLE: NORMAL
HPV, GENOTYPE 16: NOT DETECTED
HPV, GENOTYPE 18: NOT DETECTED
HPV, HIGH RISK OTHER: NOT DETECTED
HPV, INTERPRETATION: NORMAL
SPECIMEN DESCRIPTION: NORMAL

## 2020-07-28 PROBLEM — Z87.891 FORMER SMOKER: Status: ACTIVE | Noted: 2017-12-18

## 2020-07-28 PROBLEM — O99.213 OBESITY AFFECTING PREGNANCY IN THIRD TRIMESTER: Status: RESOLVED | Noted: 2018-05-30 | Resolved: 2020-07-28

## 2020-07-28 PROBLEM — O09.91 HIGH-RISK PREGNANCY, FIRST TRIMESTER: Status: RESOLVED | Noted: 2017-12-18 | Resolved: 2020-07-28

## 2020-07-28 PROBLEM — O09.43 GRAND MULTIPARITY WITH CURRENT PREGNANCY IN THIRD TRIMESTER: Status: RESOLVED | Noted: 2018-05-30 | Resolved: 2020-07-28

## 2020-07-28 PROBLEM — O99.211 OBESITY AFFECTING PREGNANCY IN FIRST TRIMESTER: Status: RESOLVED | Noted: 2017-12-21 | Resolved: 2020-07-28

## 2020-07-28 PROBLEM — Z98.891 S/P CESAREAN SECTION: Status: RESOLVED | Noted: 2018-06-14 | Resolved: 2020-07-28

## 2020-07-28 PROBLEM — O09.92 HIGH-RISK PREGNANCY, SECOND TRIMESTER: Status: ACTIVE | Noted: 2020-07-28

## 2020-07-28 PROBLEM — O9A.213 TRAUMATIC INJURY DURING PREGNANCY IN THIRD TRIMESTER: Status: RESOLVED | Noted: 2018-06-03 | Resolved: 2020-07-28

## 2020-07-28 PROBLEM — O98.819 CHLAMYDIA INFECTION AFFECTING PREGNANCY: Status: RESOLVED | Noted: 2018-06-04 | Resolved: 2020-07-28

## 2020-07-28 PROBLEM — O09.93 HRP (HIGH RISK PREGNANCY), THIRD TRIMESTER: Status: RESOLVED | Noted: 2018-06-13 | Resolved: 2020-07-28

## 2020-07-28 PROBLEM — A74.9 CHLAMYDIA INFECTION AFFECTING PREGNANCY: Status: RESOLVED | Noted: 2018-06-04 | Resolved: 2020-07-28

## 2020-07-28 PROBLEM — O34.219 PREVIOUS CESAREAN DELIVERY, ANTEPARTUM CONDITION OR COMPLICATION: Status: RESOLVED | Noted: 2017-12-21 | Resolved: 2020-07-28

## 2020-07-28 PROBLEM — Z87.59 HISTORY OF PRE-ECLAMPSIA: Status: ACTIVE | Noted: 2018-06-13

## 2020-07-28 PROBLEM — O09.899 H/O MATERNAL GONORRHEA, CURRENTLY PREGNANT: Status: RESOLVED | Noted: 2018-01-09 | Resolved: 2020-07-28

## 2020-07-28 PROBLEM — O24.419 GESTATIONAL DIABETES MELLITUS (GDM), ANTEPARTUM: Status: RESOLVED | Noted: 2018-05-30 | Resolved: 2020-07-28

## 2020-07-28 PROBLEM — Z86.19 HISTORY OF TRICHOMONAL VAGINITIS: Status: RESOLVED | Noted: 2018-01-09 | Resolved: 2020-07-28

## 2020-07-28 PROBLEM — O09.41 GRAND MULTIPARITY WITH CURRENT PREGNANCY IN FIRST TRIMESTER: Status: RESOLVED | Noted: 2017-12-21 | Resolved: 2020-07-28

## 2020-07-28 PROBLEM — Z86.32 HISTORY OF GESTATIONAL DIABETES MELLITUS (GDM): Status: ACTIVE | Noted: 2018-05-15

## 2020-07-28 PROBLEM — M54.31 RIGHT SCIATIC NERVE PAIN: Status: RESOLVED | Noted: 2018-04-19 | Resolved: 2020-07-28

## 2020-07-28 PROBLEM — O09.32 LATE PRENATAL CARE AFFECTING PREGNANCY IN SECOND TRIMESTER: Status: ACTIVE | Noted: 2020-07-28

## 2020-07-28 LAB — CYTOLOGY REPORT: NORMAL

## 2020-07-28 RX ORDER — METRONIDAZOLE 500 MG/1
500 TABLET ORAL 2 TIMES DAILY
Qty: 14 TABLET | Refills: 0 | Status: SHIPPED | OUTPATIENT
Start: 2020-07-28 | End: 2020-08-04

## 2020-08-10 ENCOUNTER — ROUTINE PRENATAL (OUTPATIENT)
Dept: PERINATAL CARE | Age: 30
End: 2020-08-10
Payer: COMMERCIAL

## 2020-08-10 VITALS
TEMPERATURE: 98.1 F | DIASTOLIC BLOOD PRESSURE: 73 MMHG | RESPIRATION RATE: 16 BRPM | BODY MASS INDEX: 38.57 KG/M2 | HEART RATE: 90 BPM | SYSTOLIC BLOOD PRESSURE: 123 MMHG | WEIGHT: 240 LBS | HEIGHT: 66 IN

## 2020-08-10 PROCEDURE — 76811 OB US DETAILED SNGL FETUS: CPT | Performed by: OBSTETRICS & GYNECOLOGY

## 2020-08-10 PROCEDURE — 99242 OFF/OP CONSLTJ NEW/EST SF 20: CPT | Performed by: OBSTETRICS & GYNECOLOGY

## 2020-08-10 PROCEDURE — 76820 UMBILICAL ARTERY ECHO: CPT | Performed by: OBSTETRICS & GYNECOLOGY

## 2020-08-10 PROCEDURE — G8417 CALC BMI ABV UP PARAM F/U: HCPCS | Performed by: OBSTETRICS & GYNECOLOGY

## 2020-08-10 PROCEDURE — G8427 DOCREV CUR MEDS BY ELIG CLIN: HCPCS | Performed by: OBSTETRICS & GYNECOLOGY

## 2020-08-19 ENCOUNTER — TELEPHONE (OUTPATIENT)
Dept: OBGYN | Age: 30
End: 2020-08-19

## 2020-08-19 ENCOUNTER — HOSPITAL ENCOUNTER (INPATIENT)
Age: 30
LOS: 3 days | Discharge: HOME | DRG: 566 | End: 2020-08-23
Attending: OBSTETRICS & GYNECOLOGY | Admitting: OBSTETRICS & GYNECOLOGY
Payer: COMMERCIAL

## 2020-08-19 PROBLEM — I10 CHRONIC HYPERTENSION: Status: ACTIVE | Noted: 2020-08-19

## 2020-08-19 PROBLEM — Z3A.27 27 WEEKS GESTATION OF PREGNANCY: Status: ACTIVE | Noted: 2020-08-19

## 2020-08-19 PROBLEM — L42 PITYRIASIS ROSEA: Status: RESOLVED | Noted: 2017-12-18 | Resolved: 2020-08-19

## 2020-08-19 PROBLEM — O24.112 PRE-EXISTING TYPE 2 DIABETES MELLITUS DURING PREGNANCY IN SECOND TRIMESTER: Status: ACTIVE | Noted: 2020-08-19

## 2020-08-19 LAB
-: ABNORMAL
ABO/RH: NORMAL
ABSOLUTE EOS #: 0.34 K/UL (ref 0–0.44)
ABSOLUTE IMMATURE GRANULOCYTE: 0.06 K/UL (ref 0–0.3)
ABSOLUTE LYMPH #: 2.17 K/UL (ref 1.1–3.7)
ABSOLUTE MONO #: 0.95 K/UL (ref 0.1–1.2)
ALBUMIN SERPL-MCNC: 3.4 G/DL (ref 3.5–5.2)
ALBUMIN/GLOBULIN RATIO: 1.2 (ref 1–2.5)
ALP BLD-CCNC: 72 U/L (ref 35–104)
ALT SERPL-CCNC: 46 U/L (ref 5–33)
AMORPHOUS: ABNORMAL
ANION GAP SERPL CALCULATED.3IONS-SCNC: 18 MMOL/L (ref 9–17)
ANTIBODY SCREEN: NEGATIVE
ARM BAND NUMBER: NORMAL
AST SERPL-CCNC: 48 U/L
BACTERIA: ABNORMAL
BASOPHILS # BLD: 0 % (ref 0–2)
BASOPHILS ABSOLUTE: 0.05 K/UL (ref 0–0.2)
BETA-HYDROXYBUTYRATE: 0.4 MMOL/L (ref 0.02–0.27)
BILIRUB SERPL-MCNC: 0.25 MG/DL (ref 0.3–1.2)
BILIRUBIN URINE: NEGATIVE
BUN BLDV-MCNC: 4 MG/DL (ref 6–20)
BUN/CREAT BLD: ABNORMAL (ref 9–20)
CALCIUM SERPL-MCNC: 9.3 MG/DL (ref 8.6–10.4)
CASTS UA: ABNORMAL /LPF (ref 0–8)
CHLORIDE BLD-SCNC: 96 MMOL/L (ref 98–107)
CO2: 23 MMOL/L (ref 20–31)
COLOR: YELLOW
CREAT SERPL-MCNC: 0.56 MG/DL (ref 0.5–0.9)
CRYSTALS, UA: ABNORMAL /HPF
DIFFERENTIAL TYPE: ABNORMAL
EOSINOPHILS RELATIVE PERCENT: 3 % (ref 1–4)
EPITHELIAL CELLS UA: ABNORMAL /HPF (ref 0–5)
EXPIRATION DATE: NORMAL
GFR AFRICAN AMERICAN: >60 ML/MIN
GFR NON-AFRICAN AMERICAN: >60 ML/MIN
GFR SERPL CREATININE-BSD FRML MDRD: ABNORMAL ML/MIN/{1.73_M2}
GFR SERPL CREATININE-BSD FRML MDRD: ABNORMAL ML/MIN/{1.73_M2}
GLUCOSE BLD-MCNC: 208 MG/DL (ref 65–105)
GLUCOSE BLD-MCNC: 253 MG/DL (ref 70–99)
GLUCOSE URINE: ABNORMAL
HCT VFR BLD CALC: 36.4 % (ref 36.3–47.1)
HEMOGLOBIN: 11.9 G/DL (ref 11.9–15.1)
HEPATITIS B SURFACE ANTIGEN: NONREACTIVE
HEPATITIS C ANTIBODY: NONREACTIVE
HIV AG/AB: NONREACTIVE
IMMATURE GRANULOCYTES: 1 %
KETONES, URINE: ABNORMAL
LEUKOCYTE ESTERASE, URINE: NEGATIVE
LYMPHOCYTES # BLD: 18 % (ref 24–43)
MCH RBC QN AUTO: 29.3 PG (ref 25.2–33.5)
MCHC RBC AUTO-ENTMCNC: 32.7 G/DL (ref 28.4–34.8)
MCV RBC AUTO: 89.7 FL (ref 82.6–102.9)
MONOCYTES # BLD: 8 % (ref 3–12)
MUCUS: ABNORMAL
NITRITE, URINE: NEGATIVE
NRBC AUTOMATED: 0 PER 100 WBC
OTHER OBSERVATIONS UA: ABNORMAL
PDW BLD-RTO: 14.4 % (ref 11.8–14.4)
PH UA: 7 (ref 5–8)
PLATELET # BLD: 264 K/UL (ref 138–453)
PLATELET ESTIMATE: ABNORMAL
PMV BLD AUTO: 12.4 FL (ref 8.1–13.5)
POTASSIUM SERPL-SCNC: 2.4 MMOL/L (ref 3.7–5.3)
PROTEIN UA: ABNORMAL
RBC # BLD: 4.06 M/UL (ref 3.95–5.11)
RBC # BLD: ABNORMAL 10*6/UL
RBC UA: ABNORMAL /HPF (ref 0–4)
RENAL EPITHELIAL, UA: ABNORMAL /HPF
RUBV IGG SER QL: 112.3 IU/ML
SEG NEUTROPHILS: 70 % (ref 36–65)
SEGMENTED NEUTROPHILS ABSOLUTE COUNT: 8.46 K/UL (ref 1.5–8.1)
SODIUM BLD-SCNC: 137 MMOL/L (ref 135–144)
SPECIFIC GRAVITY UA: 1.01 (ref 1–1.03)
T. PALLIDUM, IGG: NONREACTIVE
TOTAL PROTEIN: 6.3 G/DL (ref 6.4–8.3)
TRICHOMONAS: ABNORMAL
TURBIDITY: CLEAR
URINE HGB: NEGATIVE
UROBILINOGEN, URINE: NORMAL
WBC # BLD: 12 K/UL (ref 3.5–11.3)
WBC # BLD: ABNORMAL 10*3/UL
WBC UA: ABNORMAL /HPF (ref 0–5)
YEAST: ABNORMAL

## 2020-08-19 PROCEDURE — 86762 RUBELLA ANTIBODY: CPT

## 2020-08-19 PROCEDURE — 80074 ACUTE HEPATITIS PANEL: CPT

## 2020-08-19 PROCEDURE — 93005 ELECTROCARDIOGRAM TRACING: CPT | Performed by: STUDENT IN AN ORGANIZED HEALTH CARE EDUCATION/TRAINING PROGRAM

## 2020-08-19 PROCEDURE — 86803 HEPATITIS C AB TEST: CPT

## 2020-08-19 PROCEDURE — 6370000000 HC RX 637 (ALT 250 FOR IP): Performed by: STUDENT IN AN ORGANIZED HEALTH CARE EDUCATION/TRAINING PROGRAM

## 2020-08-19 PROCEDURE — 87086 URINE CULTURE/COLONY COUNT: CPT

## 2020-08-19 PROCEDURE — 83986 ASSAY PH BODY FLUID NOS: CPT

## 2020-08-19 PROCEDURE — 6360000002 HC RX W HCPCS: Performed by: STUDENT IN AN ORGANIZED HEALTH CARE EDUCATION/TRAINING PROGRAM

## 2020-08-19 PROCEDURE — 85025 COMPLETE CBC W/AUTO DIFF WBC: CPT

## 2020-08-19 PROCEDURE — G0378 HOSPITAL OBSERVATION PER HR: HCPCS

## 2020-08-19 PROCEDURE — 86901 BLOOD TYPING SEROLOGIC RH(D): CPT

## 2020-08-19 PROCEDURE — 87389 HIV-1 AG W/HIV-1&-2 AB AG IA: CPT

## 2020-08-19 PROCEDURE — 87491 CHLMYD TRACH DNA AMP PROBE: CPT

## 2020-08-19 PROCEDURE — 86900 BLOOD TYPING SEROLOGIC ABO: CPT

## 2020-08-19 PROCEDURE — 82570 ASSAY OF URINE CREATININE: CPT

## 2020-08-19 PROCEDURE — 82010 KETONE BODYS QUAN: CPT

## 2020-08-19 PROCEDURE — 87591 N.GONORRHOEAE DNA AMP PROB: CPT

## 2020-08-19 PROCEDURE — 82947 ASSAY GLUCOSE BLOOD QUANT: CPT

## 2020-08-19 PROCEDURE — 87340 HEPATITIS B SURFACE AG IA: CPT

## 2020-08-19 PROCEDURE — 2580000003 HC RX 258: Performed by: STUDENT IN AN ORGANIZED HEALTH CARE EDUCATION/TRAINING PROGRAM

## 2020-08-19 PROCEDURE — 86850 RBC ANTIBODY SCREEN: CPT

## 2020-08-19 PROCEDURE — 80053 COMPREHEN METABOLIC PANEL: CPT

## 2020-08-19 PROCEDURE — 84156 ASSAY OF PROTEIN URINE: CPT

## 2020-08-19 PROCEDURE — 86780 TREPONEMA PALLIDUM: CPT

## 2020-08-19 PROCEDURE — 36415 COLL VENOUS BLD VENIPUNCTURE: CPT

## 2020-08-19 PROCEDURE — 87210 SMEAR WET MOUNT SALINE/INK: CPT

## 2020-08-19 PROCEDURE — 81001 URINALYSIS AUTO W/SCOPE: CPT

## 2020-08-19 PROCEDURE — 80307 DRUG TEST PRSMV CHEM ANLYZR: CPT

## 2020-08-19 RX ORDER — SODIUM CHLORIDE 9 MG/ML
INJECTION, SOLUTION INTRAVENOUS CONTINUOUS
Status: DISCONTINUED | OUTPATIENT
Start: 2020-08-19 | End: 2020-08-22

## 2020-08-19 RX ORDER — VITAMIN A, ASCORBIC ACID, CHOLECALCIFEROL, .ALPHA.-TOCOPHEROL ACETATE, DL-, THIAMINE MONONITRATE, RIBOFLAVIN, NIACINAMIDE, PYRIDOXINE HYDROCHLORIDE, FOLIC ACID, CYANOCOBALAMIN, CALCIUM CARBONATE, IRON, ZINC OXIDE, AND CUPRIC OXIDE 4000; 120; 400; 22; 1.84; 3; 20; 10; 1; 12; 200; 29; 25; 2 [IU]/1; MG/1; [IU]/1; [IU]/1; MG/1; MG/1; MG/1; MG/1; MG/1; UG/1; MG/1; MG/1; MG/1; MG/1
1 TABLET ORAL DAILY
Status: DISCONTINUED | OUTPATIENT
Start: 2020-08-20 | End: 2020-08-23 | Stop reason: HOSPADM

## 2020-08-19 RX ORDER — 0.9 % SODIUM CHLORIDE 0.9 %
1000 INTRAVENOUS SOLUTION INTRAVENOUS ONCE
Status: COMPLETED | OUTPATIENT
Start: 2020-08-19 | End: 2020-08-19

## 2020-08-19 RX ORDER — NICOTINE POLACRILEX 4 MG
15 LOZENGE BUCCAL PRN
Status: DISCONTINUED | OUTPATIENT
Start: 2020-08-19 | End: 2020-08-23 | Stop reason: HOSPADM

## 2020-08-19 RX ORDER — ONDANSETRON 4 MG/1
4 TABLET, ORALLY DISINTEGRATING ORAL ONCE
Status: DISCONTINUED | OUTPATIENT
Start: 2020-08-19 | End: 2020-08-19

## 2020-08-19 RX ORDER — ONDANSETRON 4 MG/1
4 TABLET, FILM COATED ORAL ONCE
Status: DISCONTINUED | OUTPATIENT
Start: 2020-08-19 | End: 2020-08-20

## 2020-08-19 RX ORDER — ACETAMINOPHEN 500 MG
1000 TABLET ORAL ONCE
Status: COMPLETED | OUTPATIENT
Start: 2020-08-19 | End: 2020-08-19

## 2020-08-19 RX ORDER — POTASSIUM CHLORIDE 20 MEQ/1
40 TABLET, EXTENDED RELEASE ORAL PRN
Status: DISCONTINUED | OUTPATIENT
Start: 2020-08-19 | End: 2020-08-22

## 2020-08-19 RX ORDER — ASPIRIN 81 MG/1
81 TABLET ORAL DAILY
Status: DISCONTINUED | OUTPATIENT
Start: 2020-08-20 | End: 2020-08-23 | Stop reason: HOSPADM

## 2020-08-19 RX ORDER — DEXTROSE MONOHYDRATE 25 G/50ML
12.5 INJECTION, SOLUTION INTRAVENOUS PRN
Status: DISCONTINUED | OUTPATIENT
Start: 2020-08-19 | End: 2020-08-23 | Stop reason: HOSPADM

## 2020-08-19 RX ORDER — POTASSIUM CHLORIDE 7.45 MG/ML
10 INJECTION INTRAVENOUS PRN
Status: DISCONTINUED | OUTPATIENT
Start: 2020-08-19 | End: 2020-08-22

## 2020-08-19 RX ORDER — GLUCAGON 1 MG/ML
1 KIT INJECTION PRN
Status: DISCONTINUED | OUTPATIENT
Start: 2020-08-19 | End: 2020-08-23 | Stop reason: HOSPADM

## 2020-08-19 RX ORDER — DEXTROSE MONOHYDRATE 50 MG/ML
100 INJECTION, SOLUTION INTRAVENOUS PRN
Status: DISCONTINUED | OUTPATIENT
Start: 2020-08-19 | End: 2020-08-23 | Stop reason: HOSPADM

## 2020-08-19 RX ADMIN — ONDANSETRON HYDROCHLORIDE 4 MG: 4 TABLET, FILM COATED ORAL at 16:39

## 2020-08-19 RX ADMIN — POTASSIUM CHLORIDE 10 MEQ: 7.46 INJECTION, SOLUTION INTRAVENOUS at 18:52

## 2020-08-19 RX ADMIN — SODIUM CHLORIDE 1000 ML: 9 INJECTION, SOLUTION INTRAVENOUS at 17:45

## 2020-08-19 RX ADMIN — SODIUM CHLORIDE: 9 INJECTION, SOLUTION INTRAVENOUS at 19:06

## 2020-08-19 RX ADMIN — POTASSIUM CHLORIDE 10 MEQ: 7.46 INJECTION, SOLUTION INTRAVENOUS at 21:01

## 2020-08-19 RX ADMIN — POTASSIUM CHLORIDE 10 MEQ: 7.46 INJECTION, SOLUTION INTRAVENOUS at 20:00

## 2020-08-19 RX ADMIN — POTASSIUM CHLORIDE 10 MEQ: 7.46 INJECTION, SOLUTION INTRAVENOUS at 22:14

## 2020-08-19 RX ADMIN — ACETAMINOPHEN 1000 MG: 500 TABLET ORAL at 18:58

## 2020-08-19 RX ADMIN — POTASSIUM CHLORIDE 10 MEQ: 7.46 INJECTION, SOLUTION INTRAVENOUS at 23:13

## 2020-08-19 ASSESSMENT — PAIN SCALES - GENERAL: PAINLEVEL_OUTOF10: 5

## 2020-08-19 NOTE — TELEPHONE ENCOUNTER
OB/GYN Resident Telephone Encounter    Contacted via [employer] about patient having contractions and vaginal pressure. Called patient, she reports increasing intensity and frequency of contractions since last night, she also reports increased vaginal pressure. Patient sounds very uncomfortable on the phone, breathing through contractions She reports feeling as if she \"has to have a lot of bowel movements. \" Patient denies leakage of fluid, denies vaginal bleeding. Reports good fetal movement. Patient advised to report to labor and delivery to evaluate for  labor.      Claudean Pines,  PGY1

## 2020-08-19 NOTE — DISCHARGE SUMMARY
Obstetric Discharge Summary  [facility]    Patient Name: Henrietta Atkinson  Patient : 1990  Primary Care Physician: No primary care provider on file. Admit Date: 2020    Principal Diagnosis: IUP at 27w6d, admitted for Observation for Cat II tracing and Concern for DKA    Her pregnancy has been complicated by:   Patient Active Problem List   Diagnosis    Hx C/S x2 (G4-breech, G7--NRFHT)    H/O successful     BMI 39.0-39.9,adult    Former smoker    History of THC abuse    [address] multiparity    H/O indicated  delivery    History of gestational diabetes mellitus (GDM)    H/O Starvation ketoacidosis    History of pre-eclampsia w/ SF--G7    Abnormal uterine bleeding    Late prenatal care affecting pregnancy in second trimester    cHTN (no meds)    Pre-Gestational DM (new dx)    Pre-existing type 2 diabetes mellitus during pregnancy, antepartum    Obesity affecting pregnancy in third trimester    Feeling pelvic pressure in pregnancy, antepartum    Threatened  labor, antepartum    Hx LSIL    History of anxiety    Hypokalemia       Infection Present?: No  Hospital Acquired: No    Consultations: Cardiology and IM    Pertinent Findings & Procedures:   Henrietta Atkinson is a 26 y.o. female [id number] at 31w5d admitted for Observation due to N/V with hyperglycemia (253) and newly diagnosed Pregestational DM concerning for DKA and FHT revealed three prolonged (3min) decelerations, BHB was elevated at 0.40, Hypokalemia (2.4), Elevated LFTs (ALT/AST 46/48), Pt received NS IVF, K replacement, Mg replacement x1. Baseline PreE labs (elv LFTs), otherwise normal P/C 0.29. HD#3 (): Repeat CMP showed glucose 243 and elevated LFTs 41/45, CBC unremarkable, Hepatitis Panel was negative and mag was 1.8 and replaced. VBG showed a normal pH of 7.39 with a low Bicarb of 23.5, otherwise normal VBG. Blood sugars revealed .  MFM scan 3/11/70  Cephalic, BPP 8/8, UADs and MCAs wnl, Cervix 36-38 mm. Cardiology was consulted and rec to keep K>4 and Mg>2. Repeat EKG 8/20: Normal sinus rhythm, QTc 479. Echo ordered with EF 55%. Blood sugars were reviewed and patient was unable to be started on NPH 30U AM and 20U PM, Novalog 15/10/10 due to repeat K at 2.7. Replaced K with 6 more bags IV. Serum Osmolality was increased at 298, Urine was normal at 505. K/Cr was found to be normal at 0.18, indicating hypokalemia is most likely due to transcellular potassium shifts. TSH was normal at 1.8 and Hbg A1c was 6.9. CMP showed LFTs continued to be elevated but stable w/ a K of 2.7, otherwise unremarkable. CBC was unremarkable. HD#4 (8/21): Labs: AG 16, ALT/AST 42/49, K 3.0, Mg 1.8. Received Mg and K replacement. Internal medicine was consulted to help with blood sugar and hypokalemia management recommending PO K replacement. Repeat K was 3.4 with Mag 1.8. NPH 20 nighttime dose was ordered. HD#5 (8/22): K down again at 2.9. Insulin regimen held at this time. Repeat K after PO and IV replacement was 3.3 with Mag 1.7. Pt was given Lantus 10U nighttime per IM recommendations as it does not have as much of an effect on her hypokalemia. HD#6 (8/23): K 3.2 with FBS at 138. TDAP was given. Pt left AMA and Rx for Lantus 10U hs sent to pharmacy. IM recommending repeat BMP in 2 days and no PO KCl replacement as outpatient and to f/u with IM as outpatient.      Course of patient: Poorly controlled Pregestational DM concerning for DKA, refractory hypokalemia    Discharge to: Home    Readmission planned: yes, for delivery    Recommendations on Discharge:     Medications:      Medication List      START taking these medications    insulin glargine 100 UNIT/ML injection vial  Commonly known as:  LANTUS  Inject 10 Units into the skin nightly        CONTINUE taking these medications    * acetaminophen 325 MG tablet  Commonly known as:  Tylenol  Take 2 tablets by mouth every 6 hours as needed for Pain     * acetaminophen 500 MG tablet  Commonly known as:  Acetaminophen Extra Strength  TAKE (1) TABLET BY MOUTH EVERY 4 HOURS AS NEEDED FOR PAIN     aspirin EC 81 MG EC tablet  Take 1 tablet by mouth daily     Blood Pressure Kit  Use as directed Dx HTN     mineral oil-hydrophilic petrolatum ointment     ondansetron 4 MG tablet  Commonly known as:  Zofran  Take 1 tablet by mouth every 8 hours as needed for Nausea     PreNatal  MG Caps  Generic drug:  Docosahexaenoic Acid  Take 1 capsule by mouth Daily     prenatal vitamin 27-1 MG Tabs tablet  Take 1 tablet by mouth daily     raNITIdine 150 MG tablet  Commonly known as:  ZANTAC  Take 1 tablet by mouth 2 times daily         * This list has 2 medication(s) that are the same as other medications prescribed for you. Read the directions carefully, and ask your doctor or other care provider to review them with you. STOP taking these medications    calcium carbonate 500 MG chewable tablet  Commonly known as: Antacid           Where to Get Your Medications      These medications were sent to [facility], [address]  [address]    Phone:  [phone]   · insulin glargine 100 UNIT/ML injection vial         Activity: no lifting greater than 15 lbs  Diet: diabetic diet  Follow up: 1-2 weeks     Condition on discharge: good    Discharge date: Pt left AMA on 8/23/20    Jonathan Monae DO  Ob/Gyn Resident    Comments:  Home care and follow-up care were reviewed. Attending Physician Statement  I have discussed the care of Emerita Diaz, including pertinent history and exam findings,  with the resident. I have seen and examined the patient and the key elements of all parts of the encounter have been performed by me. I agree with the assessment, plan and orders as documented by the resident.   [facility] Modifier)    Electronically signed by Jessica Lazar DO on 8/23/2020 at 11:36 AM

## 2020-08-19 NOTE — H&P
[facility]    Date: [facility]       Time: 2:41 PM   Patient Name: Emerita Diaz     Patient : 1990  Room/Bed: [facility]/[place] [address]    Admission Date/Time: 2020  2:09 PM      CC: Contractions, Pelvic pressure, N/V     HPI: Emerita Diaz is a 26 y.o. [id number] at 27w6d who presents with contractions that started yesterday along with pelvic pressure. They occur every 5 minutes about 1 minute long at a time. Increasing in frequency and strength. She denies anything in the vagina or any sexual intercourse, last being 2 weeks. She also reports n/v in the last few weeks that have unchanged since it started. She reports two episodes of emesis today. She was able to keep down a few bottles of water today though. She has not been eating much today or yesterday. She was previously on vitamin B6/unisom earlier in the pregnancy. She is currently not taking anything for her nausea in this pregnancy. The patient reports fetal movement is present, complains of contractions, denies loss of fluid but has been leaking possible urine when coughing/sneezing at times, denies vaginal bleeding. Patient denies headache, vision changes, fever, chills, shortness of breath, chest pain, RUQ pain, diarrhea, change in color/amount/odor of vaginal discharge, dysuria or, hematuria. Pregnancy is complicated by Pregestational DM (newly diagnosed in this pregnancy- previous 2hour GTT on 18 after her last pregnancy showed 2/3 values elevated), Hx of CS x 2 (G4 for breech, G6 for NRFHT), Hx of successful  (G5- Pt is declining TOLAC in this pregnancy), Hx indicated PTD x 1 (G5 @ 36w0d- suspected chorioamnionitis), Grand multiparity, Hx starvation ketoacidosis (G7), Hx PreE w/ SF (G7), Hx of GDMA1 (G7), AUB, Desires RRS, Former Smoker, THC use, BMI 38    DATING:  LMP: No LMP recorded. Patient is pregnant.   Estimated Date of Delivery: 20   Based on: early ultrasound, at 12 0/7 weeks GA    PREGNANCY RISK FACTORS:  Patient Active Problem List   Diagnosis    Hx C/S x1 (breech)    H/O successful     BMI 39.0-39.9,adult    Former smoker    History of THC abuse     Pityriasis rosea    [address] multiparity    H/O indicated  delivery    History of gestational diabetes mellitus (GDM)    H/O Starvation ketoacidosis    History of pre-eclampsia    Abnormal uterine bleeding    Late prenatal care affecting pregnancy in second trimester    High-risk pregnancy, second trimester    27 weeks gestation of pregnancy        Steroids Given In This Pregnancy:  no     REVIEW OF SYSTEMS:   Constitutional: negative fever, negative chills, negative weight changes   HEENT: negative visual disturbances, negative headaches, negative dizziness  Breast: negative breast abnormalities, negative breast lumps, negative nipple discharge  Respiratory: negative dyspnea, negative cough, negative SOB  Cardiovascular: negative chest pain,  negative palpitations, negative arrhythmia, negative syncope   Gastrointestinal: positive abdominal pain, negative RUQ pain, positive N/V, negative diarrhea, negative constipation, negative bowel changes  Genitourinary: negative dysuria, negative hematuria, negative urinary incontinence, negative vaginal discharge, negative vaginal bleeding  Dermatological: negative rash, negative pruritis, negative mole or other skin changes  Hematologic: negative bruising, negative personal/family history of DVT/PE  Immunologic/Lymphatic: negative recent illness, negative recent sick contact  Musculoskeletal: negative back pain, negative myalgias, negative arthralgias  Neurological:  negative dizziness, negative migraines, negative seizures, negative weakness  Behavior/Psych: negative depression, negative anxiety, negative SI, negative HI    OBSTETRICAL HISTORY:   OB History    Para Term  AB Living   8 6 5 1 1 6   SAB TAB Ectopic Molar Multiple Live Births   1 0 0 0 0 6      # Outcome Date GA Lbr [place]/2nd Weight Sex Delivery Anes PTL Lv   8 Current            7 Term 18 37w2d  8 lb 10.1 oz (3.915 kg) F CS-LTranv EPI N PRANAY      Complications: Fetal Intolerance      Name: Toby Horn: 8  Apgar5: 9   6 2015           5  14 36w0d  9 lb 1 oz (4.111 kg) F  EPI N PRANAY      Complications: Maternal fever affecting labor, Acute URI, Fetal tachycardia affecting care of mother, delivered   4 Term 09 37w0d  5 lb 8 oz (2.495 kg) F CS-LTranv Spinal N PRANAY      Birth Comments: Breech, SROM, FOB#2   3 Term 08 40w0d  7 lb 15 oz (3.6 kg) F Vag-Spont EPI N PRANAY      Birth Comments: FOB#3   2 Term 07 40w0d  10 lb (4.536 kg) M Vag-Spont EPI N PRANAY      Birth Comments: FOB#2   1 Term 03 40w0d  6 lb 15 oz (3.147 kg) F Vag-Spont EPI N PRANAY      Birth Comments: FOB#1      Obstetric Comments   New partner in current preg    G5- indicated  delivery d/t suspected chorioamnionitis       PAST MEDICAL HISTORY:   has a past medical history of GDMA1, Headache(784.0), Pityriasis rosea, Right sciatic nerve pain, THC abuse , THC abuse , Traumatic injury , and Trichomonas. PAST SURGICAL HISTORY:   has a past surgical history that includes Cholecystectomy ();  section (); and pr  delivery only (N/A, 2018). ALLERGIES:  has No Known Allergies. MEDICATIONS:  Prior to Admission medications    Medication Sig Start Date End Date Taking?  Authorizing Provider   Docosahexaenoic Acid (PRENATAL DHA) 200 MG CAPS Take 1 capsule by mouth Daily 20 Yes Mercedes L Osborn   calcium carbonate (ANTACID) 500 MG chewable tablet Take 1 tablet by mouth daily 20 Yes Mercedes L Osborn   aspirin EC 81 MG EC tablet Take 1 tablet by mouth daily 20  Yes Mercedes L Osborn   Prenatal Vit-Fe Fumarate-FA (PRENATAL VITAMIN) 27-1 MG TABS tablet Take 1 tablet by mouth daily   Yes Cyril Summers, DO ondansetron (ZOFRAN) 4 MG tablet Take 1 tablet by mouth every 8 hours as needed for Nausea  Patient not taking: Reported on 8/10/2020 7/22/20   Mercedes Osborn   ranitidine (ZANTAC) 150 MG tablet Take 1 tablet by mouth 2 times daily  Patient not taking: Reported on 7/22/2020 1/21/20   Pb Alvarado MD   acetaminophen (ACETAMINOPHEN EXTRA STRENGTH) 500 MG tablet TAKE (1) TABLET BY MOUTH EVERY 4 HOURS AS NEEDED FOR PAIN  Patient not taking: Reported on 7/22/2020 12/17/19   Pb Alvarado MD   hydrochlorothiazide (HYDRODIURIL) 12.5 MG tablet Take 1 tablet by mouth daily  Patient not taking: Reported on 7/22/2020 5/14/19   Ashley Manuel MD   mineral oil-hydrophilic petrolatum (AQUAPHOR) ointment  12/21/18   Historical Provider, MD   Blood Pressure KIT Use as directed Dx HTN  Patient not taking: Reported on 7/22/2020 12/5/18   Ashley Manuel MD   acetaminophen (TYLENOL) 325 MG tablet Take 2 tablets by mouth every 6 hours as needed for Pain  Patient not taking: Reported on 7/22/2020 9/20/17   Kristi Sky PA-C       FAMILY HISTORY:  family history includes High Blood Pressure in her mother. SOCIAL HISTORY:   reports that she has been smoking cigars. She has a 0.15 pack-year smoking history. She has never used smokeless tobacco. She reports that she does not drink alcohol or use drugs.     VITALS:  Vitals:    08/19/20 1429 08/19/20 1432   BP:  125/68   Pulse:  96   Resp: 16    Temp: 98.1 °F (36.7 °C)        PHYSICAL EXAM:  Fetal Heart Monitor:  Baseline Heart Rate 155, moderate variability, present accelerations, three prolonged decelerations  1432: 3 minute prolonged decel down to 140bpm with spontaneous return to baseline  1523: 3 minute prolonged decel down to 145bpm with spontaneous return to baseline  1608: 3 minute prolonged decel down to 130bpm with spontaneous return to baseline  [place]: rare contractions    General appearance:  no apparent distress, alert and cooperative  HEENT: head atraumatic, normocephalic, moist mucous membranes, trachea midline  Neurologic:  alert, oriented, normal speech, no focal findings or movement disorder noted  Lungs:  No increased work of breathing, good air exchange, clear to auscultation bilaterally, no crackles or wheezing  Heart:  regular rate and rhythm and no murmur, rubs, gallops  Abdomen:  soft, gravid, no rebound, guarding, rigidity, non-tender, no right upper quadrant tenderness, uterus non-tender, no signs of abruption and no signs of chorioamnionitis  Extremities:  no calf tenderness, non edematous  Musculoskeletal: Gross strength equal and intact throughout  Psychiatric: Mood appropriate, normal affect   Rectal Exam: not indicated   Pelvic exam:  Sterile Speculum Exam:   Urethral meatus: normal appearing   Vulva: Normal hair distribution, normal appearing vulva, no masses, tenderness or lesions, normal clitoris   Vagina: Normal appearing vaginal mucosa without lesions, physiologic vaginal discharge noted in the posterior vault, no lacerations   Cervix: Normal appearing cervix without lesions, external os visibly closed, no lacerations or abnormal lesions visualized    DATA:  Membranes Ruptured: No  Fern: negative  Nitrazine: Negative  Valsalva/Pooling: absent  Vaginal Bleeding: absent    Wet prep: Clue cells Absent , Trichomonas Absent   KOH:  Yeast or Hyphae Absent   Whiff Test: negative     OMM Structural Exam:  Chief Complaint:  Pregnancy    Anterior/ Posterior Spinal Curves: Lumbar Lordosis -  Increased  Scoliosis (Lateral Spinal Curves): None  Assessment Tool:  T= Tenderness, A= Asymmetry, R= Restricted Motion (A=Active, P=Passive), T=Tissue Texture Changes  Region Evaluated : Severity / Specific of Major Somatic Dysfunction  M99.03 Lumbar -  Minor TART - more than BG levels -   Major Correlations with:  Genitourinary  Structural Diagnosis: Increased lumbar lordosis  Treatment Plan: Outpatient     LIMITED BEDSIDE US:  Position: Cephalic  Placental Location: anterior  Fetal Heart Tones: Present  Fetal Movement: great fetal movement and tone  Amniotic Fluid Index/Volume: Adequate 2x2cm vertical pocket    PRENATAL LAB RESULTS:   Blood Type/Rh: B pos  Antibody Screen: unknown  Hemoglobin, Hematocrit, Platelets: not done  Rubella: not done  T.  Pallidum, IgG: not done   Hepatitis B Surface Antigen: not done   HIV: not done   Sickle Cell Screen: not done  Gonorrhea: negative  Chlamydia: negative  Urine culture: not done    Early 1 hour Glucose Tolerance Test: not done  Early 3 hour Glucose Tolerance Test: not done  1 hour Glucose Tolerance Test: not done  3 hour Glucose Tolerance Test: not done    Group B Strep: not done  Cystic Fibrosis Screen: negative  First Trimester Screen: not done  MSAFP/Multiple Markers: not done  Non-Invasive Prenatal Testing: not done  Anatomy US: breech presentation, anterior placenta, 3VC, male, normal anatomy    ASSESSMENT & PLAN:  Delvin Farah is a 26 y.o. female [id number] at 27w6d IUP   - GBS unknown / Rh positive / R unknown   - Pen G for GBS prophylaxis if delivery imminent    Contractions/pelvic pressure/LOF   - Overall Cat I except for three prolonged decelerations (3mins)   - VSS   - SSE: External cervical os visually closed   - Wet prep negative for Trich, negative for yeast, negative for BV     - Negative nitrazine/ferning/valsalva/pooling   - GC/C, UA/UCx collected and pending   - Low suspicion for PTL or ROM at this time    Prolonged decels   - Overall Cat I FHT   - BSUS: cephalic, anterior placenta, MVP 2x2cm pocket, good fetal movement   - CEFM/TOCO    N/V   - Will give zofran ODT x1 followed by tylenol 1000mg PO x 1 at this time   - Will PO challenge/encourage PO hydration when able     Noncompliance   - PN labwork ordered- pt agreeable to obtain these at this time   - Pt seen for initial PN visit in 7/2020    cHTN (no meds)   - Baseline PreE labs ordered to be drawn today   - Pt denies any s/s preE   - BP normotensive    Hx of CS x2, declining TOLAC   - G4: first primary  section for breech presentation   - G7: TOLAC but proceeded to repeat  due to NRFHT   - Plan for repeat CS in this pregnancy and patient requesting RRS (Medicaid form in the media)    Hx of  successful (G5)    Hx of indicated PTD x1 (G5 @ 36w0d)   - Due to suspected chorioamnionitis     Grand multiparity    Hx starvation ketoacidosis (G7)    Hx of GDMA1 (G7)   - 2hr GTT on 2018 in the PP period with 2/3 elevated values which makes patient meet criteria for Pregestational DM    - CMP ordered    Hx of PreE at term ([address] [facility])   - Pt reports ASA 81mg qd compliance    - Pt denies any s/s PreE    LSIL on pap smear 20    - Pt will need colposcopy either in this pregnancy or 6 weeks PP    Anxiety (no meds)   - Stable on no medications currently    Former smoker   - Encouraged continued cessation    THC use   - UDS ordered on admission     BMI 38      Patient Active Problem List    Diagnosis Date Noted    History of pre-eclampsia 2018     Priority: High    27 weeks gestation of pregnancy 2020    Late prenatal care affecting pregnancy in second trimester 2020    High-risk pregnancy, second trimester 2020- MFM referral placed for anatomy scan       Abnormal uterine bleeding 2018    H/O Starvation ketoacidosis     History of gestational diabetes mellitus (GDM) 05/15/2018     Passed early 1hr/3hr (at ~ 12wks), Failed late 1hr/3hr (~27 & 31wks)    18- Pt states fasting and 2 hr PPD are <95 and 120      H/O indicated  delivery 2018     Suspected chorioamnionitis      Hx C/S x1 (breech) 2017     H/O successful  x1  Desires TOLAC this pregnancy      H/O successful  2017      BMI 39.0-39.9,adult 2017    Former smoker 2017     Pt denies in current preg      History of THC abuse  2017     Denies in this preg      Pityriasis rosea 2017     Pt prescribed Zyrtec for relief       Grand multiparity 12/18/2017       Plan discussed with Dr. Santy Mcnulty, who is agreeable. Steroids given this admission: No    Risks, benefits, alternatives and possible complications have been discussed in detail with the patient. Admission, and post admission procedures and expectations were discussed in detail. All questions were answered.     Attending's Name: Dr. Tianna Gustafson DO  Ob/Gyn Resident  8/19/2020, 2:41 PM

## 2020-08-20 PROBLEM — Z86.59 HISTORY OF ANXIETY: Status: ACTIVE | Noted: 2020-08-20

## 2020-08-20 PROBLEM — Z87.42 HISTORY OF ABNORMAL CERVICAL PAP SMEAR: Status: ACTIVE | Noted: 2020-08-20

## 2020-08-20 PROBLEM — Z3A.28 28 WEEKS GESTATION OF PREGNANCY: Status: ACTIVE | Noted: 2020-08-20

## 2020-08-20 LAB
ABSOLUTE EOS #: 0.23 K/UL (ref 0–0.4)
ABSOLUTE IMMATURE GRANULOCYTE: 0 K/UL (ref 0–0.3)
ABSOLUTE LYMPH #: 1.87 K/UL (ref 1–4.8)
ABSOLUTE MONO #: 1.05 K/UL (ref 0.1–0.8)
ALBUMIN SERPL-MCNC: 2.9 G/DL (ref 3.5–5.2)
ALBUMIN SERPL-MCNC: 3.5 G/DL (ref 3.5–5.2)
ALBUMIN/GLOBULIN RATIO: 1.1 (ref 1–2.5)
ALBUMIN/GLOBULIN RATIO: 1.2 (ref 1–2.5)
ALLEN TEST: ABNORMAL
ALLEN TEST: ABNORMAL
ALP BLD-CCNC: 64 U/L (ref 35–104)
ALP BLD-CCNC: 72 U/L (ref 35–104)
ALT SERPL-CCNC: 41 U/L (ref 5–33)
ALT SERPL-CCNC: 47 U/L (ref 5–33)
AMPHETAMINE SCREEN URINE: NEGATIVE
ANION GAP SERPL CALCULATED.3IONS-SCNC: 16 MMOL/L (ref 9–17)
ANION GAP SERPL CALCULATED.3IONS-SCNC: 16 MMOL/L (ref 9–17)
AST SERPL-CCNC: 45 U/L
AST SERPL-CCNC: 55 U/L
BARBITURATE SCREEN URINE: NEGATIVE
BASOPHILS # BLD: 0 % (ref 0–2)
BASOPHILS ABSOLUTE: 0 K/UL (ref 0–0.2)
BENZODIAZEPINE SCREEN, URINE: NEGATIVE
BETA-HYDROXYBUTYRATE: 0.86 MMOL/L (ref 0.02–0.27)
BILIRUB SERPL-MCNC: 0.19 MG/DL (ref 0.3–1.2)
BILIRUB SERPL-MCNC: 0.28 MG/DL (ref 0.3–1.2)
BUN BLDV-MCNC: 5 MG/DL (ref 6–20)
BUN BLDV-MCNC: 5 MG/DL (ref 6–20)
BUN/CREAT BLD: ABNORMAL (ref 9–20)
BUN/CREAT BLD: ABNORMAL (ref 9–20)
BUPRENORPHINE URINE: NORMAL
CALCIUM SERPL-MCNC: 8.9 MG/DL (ref 8.6–10.4)
CALCIUM SERPL-MCNC: 9.4 MG/DL (ref 8.6–10.4)
CANNABINOID SCREEN URINE: NEGATIVE
CARBOXYHEMOGLOBIN: 0.6 % (ref 0–5)
CARBOXYHEMOGLOBIN: 1.1 % (ref 0–5)
CHLORIDE BLD-SCNC: 100 MMOL/L (ref 98–107)
CHLORIDE BLD-SCNC: 100 MMOL/L (ref 98–107)
CO2: 21 MMOL/L (ref 20–31)
CO2: 22 MMOL/L (ref 20–31)
COCAINE METABOLITE, URINE: NEGATIVE
CREAT SERPL-MCNC: 0.62 MG/DL (ref 0.5–0.9)
CREAT SERPL-MCNC: 0.67 MG/DL (ref 0.5–0.9)
CREATININE URINE: 73.4 MG/DL (ref 28–217)
CREATININE URINE: 91.5 MG/DL (ref 28–217)
CULTURE: NORMAL
DIFFERENTIAL TYPE: ABNORMAL
EKG ATRIAL RATE: 72 BPM
EKG ATRIAL RATE: 95 BPM
EKG P AXIS: 39 DEGREES
EKG P AXIS: 60 DEGREES
EKG P-R INTERVAL: 158 MS
EKG P-R INTERVAL: 170 MS
EKG Q-T INTERVAL: 438 MS
EKG Q-T INTERVAL: 460 MS
EKG QRS DURATION: 88 MS
EKG QRS DURATION: 88 MS
EKG QTC CALCULATION (BAZETT): 479 MS
EKG QTC CALCULATION (BAZETT): 578 MS
EKG R AXIS: 10 DEGREES
EKG R AXIS: 2 DEGREES
EKG T AXIS: 2 DEGREES
EKG VENTRICULAR RATE: 72 BPM
EKG VENTRICULAR RATE: 95 BPM
EOSINOPHILS RELATIVE PERCENT: 2 % (ref 1–4)
ESTIMATED AVERAGE GLUCOSE: 151 MG/DL
FIO2: ABNORMAL
FIO2: ABNORMAL
GFR AFRICAN AMERICAN: >60 ML/MIN
GFR AFRICAN AMERICAN: >60 ML/MIN
GFR NON-AFRICAN AMERICAN: >60 ML/MIN
GFR NON-AFRICAN AMERICAN: >60 ML/MIN
GFR SERPL CREATININE-BSD FRML MDRD: ABNORMAL ML/MIN/{1.73_M2}
GLUCOSE BLD-MCNC: 149 MG/DL (ref 65–105)
GLUCOSE BLD-MCNC: 164 MG/DL (ref 65–105)
GLUCOSE BLD-MCNC: 173 MG/DL (ref 70–99)
GLUCOSE BLD-MCNC: 192 MG/DL (ref 65–105)
GLUCOSE BLD-MCNC: 194 MG/DL (ref 65–105)
GLUCOSE BLD-MCNC: 243 MG/DL (ref 70–99)
HAV IGM SER IA-ACNC: NONREACTIVE
HBA1C MFR BLD: 6.9 % (ref 4–6)
HCO3 VENOUS: 23.5 MMOL/L (ref 24–30)
HCO3 VENOUS: 24.1 MMOL/L (ref 24–30)
HCT VFR BLD CALC: 36.4 % (ref 36.3–47.1)
HEMOGLOBIN: 11.7 G/DL (ref 11.9–15.1)
HEPATITIS B CORE IGM ANTIBODY: NONREACTIVE
HEPATITIS B SURFACE ANTIGEN: NONREACTIVE
HEPATITIS C ANTIBODY: NONREACTIVE
IMMATURE GRANULOCYTES: 0 %
LV EF: 55 %
LVEF MODALITY: NORMAL
LYMPHOCYTES # BLD: 16 % (ref 24–44)
Lab: NORMAL
MAGNESIUM: 1.8 MG/DL (ref 1.6–2.6)
MCH RBC QN AUTO: 29.1 PG (ref 25.2–33.5)
MCHC RBC AUTO-ENTMCNC: 32.1 G/DL (ref 28.4–34.8)
MCV RBC AUTO: 90.5 FL (ref 82.6–102.9)
MDMA URINE: NORMAL
METHADONE SCREEN, URINE: NEGATIVE
METHAMPHETAMINE, URINE: NORMAL
METHEMOGLOBIN: ABNORMAL % (ref 0–1.5)
METHEMOGLOBIN: ABNORMAL % (ref 0–1.5)
MODE: ABNORMAL
MODE: ABNORMAL
MONOCYTES # BLD: 9 % (ref 1–7)
MORPHOLOGY: NORMAL
NEGATIVE BASE EXCESS, VEN: 0.8 MMOL/L (ref 0–2)
NEGATIVE BASE EXCESS, VEN: ABNORMAL MMOL/L (ref 0–2)
NOTIFICATION TIME: ABNORMAL
NOTIFICATION TIME: ABNORMAL
NOTIFICATION: ABNORMAL
NOTIFICATION: ABNORMAL
NRBC AUTOMATED: 0 PER 100 WBC
O2 DEVICE/FLOW/%: ABNORMAL
O2 DEVICE/FLOW/%: ABNORMAL
O2 SAT, VEN: 58 % (ref 60–85)
O2 SAT, VEN: 80.1 % (ref 60–85)
OPIATES, URINE: NEGATIVE
OSMOLALITY URINE: 505 MOSM/KG (ref 80–1300)
OXYCODONE SCREEN URINE: NEGATIVE
OXYHEMOGLOBIN: ABNORMAL % (ref 95–98)
OXYHEMOGLOBIN: ABNORMAL % (ref 95–98)
PATIENT TEMP: 37
PATIENT TEMP: 37
PCO2, VEN, TEMP ADJ: ABNORMAL MMHG (ref 39–55)
PCO2, VEN, TEMP ADJ: ABNORMAL MMHG (ref 39–55)
PCO2, VEN: 36 (ref 39–55)
PCO2, VEN: 39.5 (ref 39–55)
PDW BLD-RTO: 14.8 % (ref 11.8–14.4)
PEEP/CPAP: ABNORMAL
PEEP/CPAP: ABNORMAL
PH VENOUS: 7.39 (ref 7.32–7.42)
PH VENOUS: 7.44 (ref 7.32–7.42)
PH, VEN, TEMP ADJ: ABNORMAL (ref 7.32–7.42)
PH, VEN, TEMP ADJ: ABNORMAL (ref 7.32–7.42)
PHENCYCLIDINE, URINE: NEGATIVE
PLATELET # BLD: 247 K/UL (ref 138–453)
PLATELET ESTIMATE: ABNORMAL
PMV BLD AUTO: 12.4 FL (ref 8.1–13.5)
PO2, VEN, TEMP ADJ: ABNORMAL MMHG (ref 30–50)
PO2, VEN, TEMP ADJ: ABNORMAL MMHG (ref 30–50)
PO2, VEN: 31.6 (ref 30–50)
PO2, VEN: 45.8 (ref 30–50)
POSITIVE BASE EXCESS, VEN: 0.7 MMOL/L (ref 0–2)
POSITIVE BASE EXCESS, VEN: ABNORMAL MMOL/L (ref 0–2)
POTASSIUM SERPL-SCNC: 2.7 MMOL/L (ref 3.7–5.3)
POTASSIUM SERPL-SCNC: 2.9 MMOL/L (ref 3.7–5.3)
POTASSIUM, UR: 17.3 MMOL/L
PROPOXYPHENE, URINE: NORMAL
PSV: ABNORMAL
PSV: ABNORMAL
PT. POSITION: ABNORMAL
PT. POSITION: ABNORMAL
RBC # BLD: 4.02 M/UL (ref 3.95–5.11)
RBC # BLD: ABNORMAL 10*6/UL
RESPIRATORY RATE: ABNORMAL
RESPIRATORY RATE: ABNORMAL
SAMPLE SITE: ABNORMAL
SAMPLE SITE: ABNORMAL
SEG NEUTROPHILS: 73 % (ref 36–66)
SEGMENTED NEUTROPHILS ABSOLUTE COUNT: 8.55 K/UL (ref 1.8–7.7)
SERUM OSMOLALITY: 298 MOSM/KG (ref 275–295)
SET RATE: ABNORMAL
SET RATE: ABNORMAL
SODIUM BLD-SCNC: 137 MMOL/L (ref 135–144)
SODIUM BLD-SCNC: 138 MMOL/L (ref 135–144)
SPECIMEN DESCRIPTION: NORMAL
TEST INFORMATION: NORMAL
TEXT FOR RESPIRATORY: ABNORMAL
TEXT FOR RESPIRATORY: ABNORMAL
TOTAL HB: ABNORMAL G/DL (ref 12–16)
TOTAL HB: ABNORMAL G/DL (ref 12–16)
TOTAL PROTEIN, URINE: 21 MG/DL
TOTAL PROTEIN: 5.6 G/DL (ref 6.4–8.3)
TOTAL PROTEIN: 6.4 G/DL (ref 6.4–8.3)
TOTAL RATE: ABNORMAL
TOTAL RATE: ABNORMAL
TRICYCLIC ANTIDEPRESSANTS, UR: NORMAL
TSH SERPL DL<=0.05 MIU/L-ACNC: 1.81 MIU/L (ref 0.3–5)
URINE TOTAL PROTEIN CREATININE RATIO: 0.29 (ref 0–0.2)
VT: ABNORMAL
VT: ABNORMAL
WBC # BLD: 11.7 K/UL (ref 3.5–11.3)
WBC # BLD: ABNORMAL 10*3/UL

## 2020-08-20 PROCEDURE — 76820 UMBILICAL ARTERY ECHO: CPT | Performed by: OBSTETRICS & GYNECOLOGY

## 2020-08-20 PROCEDURE — 82947 ASSAY GLUCOSE BLOOD QUANT: CPT

## 2020-08-20 PROCEDURE — 6370000000 HC RX 637 (ALT 250 FOR IP): Performed by: STUDENT IN AN ORGANIZED HEALTH CARE EDUCATION/TRAINING PROGRAM

## 2020-08-20 PROCEDURE — 93010 ELECTROCARDIOGRAM REPORT: CPT | Performed by: INTERNAL MEDICINE

## 2020-08-20 PROCEDURE — 82570 ASSAY OF URINE CREATININE: CPT

## 2020-08-20 PROCEDURE — 80074 ACUTE HEPATITIS PANEL: CPT

## 2020-08-20 PROCEDURE — 2580000003 HC RX 258: Performed by: STUDENT IN AN ORGANIZED HEALTH CARE EDUCATION/TRAINING PROGRAM

## 2020-08-20 PROCEDURE — 84443 ASSAY THYROID STIM HORMONE: CPT

## 2020-08-20 PROCEDURE — 83735 ASSAY OF MAGNESIUM: CPT

## 2020-08-20 PROCEDURE — 93306 TTE W/DOPPLER COMPLETE: CPT

## 2020-08-20 PROCEDURE — 83930 ASSAY OF BLOOD OSMOLALITY: CPT

## 2020-08-20 PROCEDURE — 82010 KETONE BODYS QUAN: CPT

## 2020-08-20 PROCEDURE — 80053 COMPREHEN METABOLIC PANEL: CPT

## 2020-08-20 PROCEDURE — 96365 THER/PROPH/DIAG IV INF INIT: CPT

## 2020-08-20 PROCEDURE — 1220000000 HC SEMI PRIVATE OB R&B

## 2020-08-20 PROCEDURE — 93005 ELECTROCARDIOGRAM TRACING: CPT | Performed by: STUDENT IN AN ORGANIZED HEALTH CARE EDUCATION/TRAINING PROGRAM

## 2020-08-20 PROCEDURE — 85025 COMPLETE CBC W/AUTO DIFF WBC: CPT

## 2020-08-20 PROCEDURE — 83935 ASSAY OF URINE OSMOLALITY: CPT

## 2020-08-20 PROCEDURE — 6360000002 HC RX W HCPCS: Performed by: STUDENT IN AN ORGANIZED HEALTH CARE EDUCATION/TRAINING PROGRAM

## 2020-08-20 PROCEDURE — 84133 ASSAY OF URINE POTASSIUM: CPT

## 2020-08-20 PROCEDURE — 83036 HEMOGLOBIN GLYCOSYLATED A1C: CPT

## 2020-08-20 PROCEDURE — 82805 BLOOD GASES W/O2 SATURATION: CPT

## 2020-08-20 PROCEDURE — 36415 COLL VENOUS BLD VENIPUNCTURE: CPT

## 2020-08-20 PROCEDURE — 76819 FETAL BIOPHYS PROFIL W/O NST: CPT | Performed by: OBSTETRICS & GYNECOLOGY

## 2020-08-20 PROCEDURE — 76817 TRANSVAGINAL US OBSTETRIC: CPT | Performed by: OBSTETRICS & GYNECOLOGY

## 2020-08-20 PROCEDURE — G0108 DIAB MANAGE TRN  PER INDIV: HCPCS

## 2020-08-20 RX ORDER — MAGNESIUM SULFATE 1 G/100ML
1 INJECTION INTRAVENOUS PRN
Status: DISCONTINUED | OUTPATIENT
Start: 2020-08-20 | End: 2020-08-23 | Stop reason: HOSPADM

## 2020-08-20 RX ORDER — CALCIUM CARBONATE 200(500)MG
1000 TABLET,CHEWABLE ORAL 3 TIMES DAILY PRN
Status: DISCONTINUED | OUTPATIENT
Start: 2020-08-20 | End: 2020-08-23 | Stop reason: HOSPADM

## 2020-08-20 RX ORDER — MECLIZINE HCL 12.5 MG/1
12.5 TABLET ORAL 3 TIMES DAILY PRN
Status: DISCONTINUED | OUTPATIENT
Start: 2020-08-20 | End: 2020-08-23 | Stop reason: HOSPADM

## 2020-08-20 RX ORDER — POTASSIUM CHLORIDE 20 MEQ/1
40 TABLET, EXTENDED RELEASE ORAL DAILY
Status: DISCONTINUED | OUTPATIENT
Start: 2020-08-21 | End: 2020-08-22

## 2020-08-20 RX ORDER — LANOLIN ALCOHOL/MO/W.PET/CERES
50 CREAM (GRAM) TOPICAL NIGHTLY
Status: DISCONTINUED | OUTPATIENT
Start: 2020-08-20 | End: 2020-08-23 | Stop reason: HOSPADM

## 2020-08-20 RX ADMIN — POTASSIUM CHLORIDE 10 MEQ: 7.46 INJECTION, SOLUTION INTRAVENOUS at 00:45

## 2020-08-20 RX ADMIN — POTASSIUM CHLORIDE 10 MEQ: 7.46 INJECTION, SOLUTION INTRAVENOUS at 23:41

## 2020-08-20 RX ADMIN — MAGNESIUM SULFATE HEPTAHYDRATE 2 G/HR: 40 INJECTION, SOLUTION INTRAVENOUS at 09:02

## 2020-08-20 RX ADMIN — POTASSIUM CHLORIDE 10 MEQ: 7.46 INJECTION, SOLUTION INTRAVENOUS at 20:10

## 2020-08-20 RX ADMIN — POTASSIUM CHLORIDE 40 MEQ: 1500 TABLET, EXTENDED RELEASE ORAL at 04:45

## 2020-08-20 RX ADMIN — Medication 1 TABLET: at 09:02

## 2020-08-20 RX ADMIN — POTASSIUM CHLORIDE 10 MEQ: 7.46 INJECTION, SOLUTION INTRAVENOUS at 21:44

## 2020-08-20 RX ADMIN — SODIUM CHLORIDE: 9 INJECTION, SOLUTION INTRAVENOUS at 14:35

## 2020-08-20 RX ADMIN — SODIUM CHLORIDE: 9 INJECTION, SOLUTION INTRAVENOUS at 04:48

## 2020-08-20 RX ADMIN — ASPIRIN 81 MG: 81 TABLET, COATED ORAL at 09:03

## 2020-08-20 RX ADMIN — DOXYLAMINE SUCCINATE 25 MG: 25 TABLET ORAL at 20:29

## 2020-08-20 RX ADMIN — Medication 50 MG: at 20:29

## 2020-08-20 NOTE — FLOWSHEET NOTE
RN in room to adjust monitor after patient done eating. Patient states a relative is here to bring her more food. Patient sitting up eating in bed again, will call out when done.

## 2020-08-20 NOTE — CONSULTS
Attestation signed by      Attending Physician Statement:    I have discussed the care of  Alexis Signs , including pertinent history and exam findings, with the Cardiology fellow/resident. I have seen and examined the patient and the key elements of all parts of the encounter have been performed by me. I agree with the assessment, plan and orders as documented by the fellow/resident, after I modified exam findings and plan of treatments, and the final version is my approved version of the assessment. Additional Comments: Consulted for prolonged QT which is now resolved. Was taking zofran. Avoid QT prolonging agent. ECG also suggestive for LVH and has history of HTN. Obtain TTE    Isadora Ron MD               [facility] Cardiology Cardiology    Consult / H&P               Today's Date: 2020  Patient Name: Alexis Richey  Date of admission: 2020  2:09 PM  Patient's age: 26 y.o., 1990  Admission Dx: 27 weeks gestation of pregnancy [Z3A.27]  27 weeks gestation of pregnancy [Z3A.27]  28 weeks gestation of pregnancy [Z3A.28]    Reason for Consult:  Cardiac evaluation    Requesting Physician: David Pierson DO    CHIEF COMPLAINT:  Nausea, vomiting    History Obtained From:  patient    HISTORY OF PRESENT ILLNESS:      The patient is a 26 y.o.  female who is admitted to the hospital for nausea and vomiting. Patient is 27 weeks of pregnancy and she is  8. She started having increased contractions and was admitted. Cardiology consulted for prolonged qtc of 578. Patient is also hypokalemic and hypomagnesemic. Has nausea and vomiting and has poor oral intake. Has pre gestational diabetes newly diagnosed. Patient gives h/o chest pain, midsternum going on the sides, off and on, non radiating to arms, gets relieved on its own, no cough, not associated with exertion. No shortness of breath or palpitations.       Past Medical History:   has a past medical history of cHTN (no meds), GDMA1, Headache(784.0), Pityriasis rosea, Right sciatic nerve pain, THC abuse , THC abuse , Traumatic injury , and Trichomonas. Past Surgical History:   has a past surgical history that includes Cholecystectomy ();  section (); and pr  delivery only (N/A, 2018). Home Medications:    Prior to Admission medications    Medication Sig Start Date End Date Taking?  Authorizing Provider   Docosahexaenoic Acid (PRENATAL DHA) 200 MG CAPS Take 1 capsule by mouth Daily 20 Yes Mercedes L Osborn   calcium carbonate (ANTACID) 500 MG chewable tablet Take 1 tablet by mouth daily 20 Yes Mercedes L Osborn   aspirin EC 81 MG EC tablet Take 1 tablet by mouth daily 20  Yes Mercedes L Osborn   Prenatal Vit-Fe Fumarate-FA (PRENATAL VITAMIN) 27-1 MG TABS tablet Take 1 tablet by mouth daily /  Yes Flaquito Deleone,    ondansetron (ZOFRAN) 4 MG tablet Take 1 tablet by mouth every 8 hours as needed for Nausea  Patient not taking: Reported on 8/10/2020 7/22/20   Mercedes Osborn   ranitidine (ZANTAC) 150 MG tablet Take 1 tablet by mouth 2 times daily  Patient not taking: Reported on 2020   Augustus Saavedra MD   acetaminophen (ACETAMINOPHEN EXTRA STRENGTH) 500 MG tablet TAKE (1) TABLET BY MOUTH EVERY 4 HOURS AS NEEDED FOR PAIN  Patient not taking: Reported on 19   Augustus Saavedra MD   hydrochlorothiazide (HYDRODIURIL) 12.5 MG tablet Take 1 tablet by mouth daily  Patient not taking: Reported on 2020   Mj Justin MD   mineral oil-hydrophilic petrolatum (AQUAPHOR) ointment  18   Historical Provider, MD   Blood Pressure KIT Use as directed Dx HTN  Patient not taking: Reported on 2020   Mj Justin MD   acetaminophen (TYLENOL) 325 MG tablet Take 2 tablets by mouth every 6 hours as needed for Pain  Patient not taking: Reported on 2020   Ramirez Mahajan PA-C      Current Facility-Administered Medications: magnesium sulfate 1 g in dextrose 5% 100 mL IVPB, 1 g, Intravenous, PRN  glucose (GLUTOSE) 40 % oral gel 15 g, 15 g, Oral, PRN  dextrose 50 % IV solution, 12.5 g, Intravenous, PRN  glucagon injection 1 mg, 1 mg, Intramuscular, PRN  dextrose 5 % solution, 100 mL/hr, Intravenous, PRN  0.9 % sodium chloride infusion, , Intravenous, Continuous  potassium chloride (KLOR-CON M) extended release tablet 40 mEq, 40 mEq, Oral, PRN **OR** potassium bicarb-citric acid (EFFER-K) effervescent tablet 40 mEq, 40 mEq, Oral, PRN **OR** potassium chloride 10 mEq/100 mL IVPB (Peripheral Line), 10 mEq, Intravenous, PRN  aspirin EC tablet 81 mg, 81 mg, Oral, Daily  prenatal vitamin plus iron 29-1 MG tablet 1 tablet, 1 tablet, Oral, Daily    Allergies:  Patient has no known allergies. Social History:   reports that she has been smoking cigars. She has a 0.15 pack-year smoking history. She has never used smokeless tobacco. She reports that she does not drink alcohol or use drugs. Family History: family history includes High Blood Pressure in her mother. No h/o sudden cardiac death. No for premature CAD    REVIEW OF SYSTEMS:    · Constitutional: there has been no unanticipated weight loss. There's been No change in energy level, No change in activity level. · Eyes: No visual changes or diplopia. No scleral icterus. · ENT: No Headaches  · Cardiovascular: No cardiac history  · Respiratory: No previous pulmonary problems, No cough  · Gastrointestinal: No abdominal pain. No change in bowel or bladder habits. · Genitourinary: No dysuria, trouble voiding, or hematuria. · Musculoskeletal:  No gait disturbance, No weakness or joint complaints. · Integumentary: No rash or pruritis. · Neurological: No headache, diplopia, change in muscle strength, numbness or tingling. No change in gait, balance, coordination, mood, affect, memory, mentation, behavior. · Psychiatric: No anxiety, or depression.   · Endocrine: No temperature intolerance. No excessive thirst, fluid intake, or urination. No tremor. · Hematologic/Lymphatic: No abnormal bruising or bleeding, blood clots or swollen lymph nodes. · Allergic/Immunologic: No nasal congestion or hives. PHYSICAL EXAM:      /67   Pulse 78   Temp 98 °F (36.7 °C) (Oral)   Resp 18   SpO2 99%    Constitutional and General Appearance: alert, cooperative, no distress and appears stated age  HEENT: PERRL, no cervical lymphadenopathy. No masses palpable. Normal oral mucosa  Respiratory:  · Normal excursion and expansion without use of accessory muscles  · No tenderness on exam on the chest  Cardiovascular:  · The apical impulse is not displaced  · Heart tones are crisp and normal. regular S1 and S2.  · Jugular venous pulsation Normal  · The carotid upstroke is normal in amplitude and contour without delay or bruit  · Peripheral pulses are symmetrical and full   Abdomen:   Gravid uterus  Extremities:  ·  No Cyanosis or Clubbing  ·  Lower extremity edema: No  ·  Skin: Warm and dry  Neurological:  · Alert and oriented. · Moves all extremities well  · No abnormalities of mood, affect, memory, mentation, or behavior are noted    DATA:    ekg- prolonged qtc 570s    Labs:     CBC:   Recent Labs     08/19/20  1550   WBC 12.0*   HGB 11.9   HCT 36.4        BMP:   Recent Labs     08/19/20  1550 08/20/20  0133    137   K 2.4* 2.9*   CO2 23 21   BUN 4* 5*   CREATININE 0.56 0.67   LABGLOM >60 >60   GLUCOSE 253* 243*     BNP: No results for input(s): BNP in the last 72 hours. PT/INR: No results for input(s): PROTIME, INR in the last 72 hours. APTT:No results for input(s): APTT in the last 72 hours. CARDIAC ENZYMES:No results for input(s): CKTOTAL, CKMB, CKMBINDEX, TROPONINI in the last 72 hours.   FASTING LIPID PANEL:No results found for: HDL, LDLDIRECT, LDLCALC, TRIG  LIVER PROFILE:  Recent Labs     08/19/20  1550 08/20/20  0133   AST 48* 45*   ALT 46* 41*   LABALBU 3.4* 2.9* IMPRESSION:    Week 27 of pregnancy  chest pain  Prolonged qtc  Hypokalemia  Hypomagnesemia  Poor oral intake  Dehydration  smoekr  hypertension    RECOMMENDATIONS:  1. Replace potassium and magnesium  2. Keep target of K>4, Mg>2  3. Avoid qt prolonging drugs  4. Diuretics contraindicated, discontinue HCTZ on discharge given her frequent hypokalemic episodes, patient states that she is not taking it during the pregnancy. 5. Will obtain an ECHO for ruling out any wall motion abnormality. Discussed with patient and Nurse.     Electronically signed by Maddie Olivo MD on 8/20/2020 at 11:07 AM

## 2020-08-20 NOTE — PROGRESS NOTES
Diabetes Education Progress Note    Zhou Simon was seen  for education about Diabetes and Pregnancy, she is an inpatient room 793. Teaching provided at this session included:   _X__ Definition of gestational diabetes      _X__ Self-monitoring of blood sugar (FBS and 2 hrs postprandial)   _X__ Blood sugar targets FBS 65-90 and <120 2 hrs postprandial)    Set up and used true metrix blood glucose meter. _X__ Insulin   _x__ Pen   __x_ Juancho Jacob --  Novolog and NPH role and action times    Observed and praciced use of pen method for insulin self administration. Meal planning:   _X_  Avoid fruit juice and sugary beverages. _X_  Aim to eat small frequent meals and snacks. (ie., 3 + 3)                _X__  Eat balanced meals according to divided dinner plate sample            Planned follow-up:    x__   Follow up planned for prior to discharge home - reinforce plan for insulin, diet and BGSM              _X_ Patient is to report blood glucose test results to physician as directed by  prescribing physician. Resource materials provided today include: RN class -Resource materials sent out : care booklet - \" Gestational Diabetes Mellitus ( GDM) toolkit form [place] gestational diabetes postpartum care learning collaborative 2018. \"Simple Guidelines for meal planning with gestational diabetes. SMBG sheets to fax back to M weekly. BD  healthy injection site selection and rotation with 6 mm insulin syringe and 4 mm pen needle. Gestational diabetes handout from [facility] 2016, Did you have gestational diabetes when you were pregnant? Handout from [facility]  April 2014    Set diabetes self management goals of SMBG- check fasting and 2 hours PP, eat 3 meals and 3 snacks/day, avoid sugary beverages.     Patient   practiced use of a home BG meter - provided true metrix brand as this is covered under [facility] unified drug plan - and [place] medicaid follows the [place] - Patient was able to use device correctly and get a BG of 157 before lunch on the meter. Patient  practiced  use of pen method for insulin self administration if ordered in the future. She initially did not like idea of insulin self adm, but after practice verbalized she could do injections, if she needed to.     Sean Burrell RN CDE

## 2020-08-20 NOTE — PROGRESS NOTES
Obstetric/Gynecology Resident Interval Note    Upon review of PN labwork, blood sugar noted to be 253, Hypokalemia at 2.4, Elevated LFTs (ALT/AST 46/48), and with N/V symptoms, BHB ordered and found to be elevated at 0.40. Will start IV with NL 1L bolus followed by 125ml/hr. EKG obtained for hypokalemia revealing prolonged QTc of 578 s/p zofran x1 given. Will avoid all QTc prolonging medications at this time. Will give K replacement per protocol, check F and 2hr PP blood sugars. Continue CEFM/TOCO due to prolonged decelerations. Results for orders placed or performed during the hospital encounter of 08/19/20   Urinalysis with Microscopic   Result Value Ref Range    Color, UA YELLOW YELLOW    Turbidity UA CLEAR CLEAR    Glucose, Ur 3+ (A) NEGATIVE    Bilirubin Urine NEGATIVE NEGATIVE    Ketones, Urine TRACE (A) NEGATIVE    Specific Gravity, UA 1.014 1.005 - 1.030    Urine Hgb NEGATIVE NEGATIVE    pH, UA 7.0 5.0 - 8.0    Protein, UA TRACE (A) NEGATIVE    Urobilinogen, Urine Normal Normal    Nitrite, Urine NEGATIVE NEGATIVE    Leukocyte Esterase, Urine NEGATIVE NEGATIVE    -          WBC, UA 2 TO 5 0 - 5 /HPF    RBC, UA None 0 - 4 /HPF    Casts UA NOT REPORTED 0 - 8 /LPF    Crystals, UA NOT REPORTED None /HPF    Epithelial Cells UA 2 TO 5 0 - 5 /HPF    Renal Epithelial, UA NOT REPORTED 0 /HPF    Bacteria, UA NOT REPORTED None    Mucus, UA NOT REPORTED None    Trichomonas, UA NOT REPORTED None    Amorphous, UA NOT REPORTED None    Other Observations UA NOT REPORTED NOT REQ.     Yeast, UA NOT REPORTED None   PRENATAL PROFILE I   Result Value Ref Range    WBC 12.0 (H) 3.5 - 11.3 k/uL    RBC 4.06 3.95 - 5.11 m/uL    Hemoglobin 11.9 11.9 - 15.1 g/dL    Hematocrit 36.4 36.3 - 47.1 %    MCV 89.7 82.6 - 102.9 fL    MCH 29.3 25.2 - 33.5 pg    MCHC 32.7 28.4 - 34.8 g/dL    RDW 14.4 11.8 - 14.4 %    Platelets 709 249 - 348 k/uL    MPV 12.4 8.1 - 13.5 fL    NRBC Automated 0.0 0.0 per 100 WBC    Differential Type NOT REPORTED Seg Neutrophils 70 (H) 36 - 65 %    Lymphocytes 18 (L) 24 - 43 %    Monocytes 8 3 - 12 %    Eosinophils % 3 1 - 4 %    Basophils 0 0 - 2 %    Immature Granulocytes 1 (H) 0 %    Segs Absolute 8.46 (H) 1.50 - 8.10 k/uL    Absolute Lymph # 2.17 1.10 - 3.70 k/uL    Absolute Mono # 0.95 0.10 - 1.20 k/uL    Absolute Eos # 0.34 0.00 - 0.44 k/uL    Basophils Absolute 0.05 0.00 - 0.20 k/uL    Absolute Immature Granulocyte 0.06 0.00 - 0.30 k/uL    WBC Morphology NOT REPORTED     RBC Morphology NOT REPORTED     Platelet Estimate NOT REPORTED     Hepatitis B Surface Ag NONREACTIVE NONREACTIVE    Rubella Antibody, .3 IU/mL    T. pallidum, IgG NONREACTIVE NONREACTIVE   HIV Screen   Result Value Ref Range    HIV Ag/Ab NONREACTIVE NONREACTIVE   Comprehensive Metabolic Panel   Result Value Ref Range    Glucose 253 (H) 70 - 99 mg/dL    BUN 4 (L) 6 - 20 mg/dL    CREATININE 0.56 0.50 - 0.90 mg/dL    Bun/Cre Ratio NOT REPORTED 9 - 20    Calcium 9.3 8.6 - 10.4 mg/dL    Sodium 137 135 - 144 mmol/L    Potassium 2.4 (LL) 3.7 - 5.3 mmol/L    Chloride 96 (L) 98 - 107 mmol/L    CO2 23 20 - 31 mmol/L    Anion Gap 18 (H) 9 - 17 mmol/L    Alkaline Phosphatase 72 35 - 104 U/L    ALT 46 (H) 5 - 33 U/L    AST 48 (H) <32 U/L    Total Bilirubin 0.25 (L) 0.3 - 1.2 mg/dL    Total Protein 6.3 (L) 6.4 - 8.3 g/dL    Alb 3.4 (L) 3.5 - 5.2 g/dL    Albumin/Globulin Ratio 1.2 1.0 - 2.5    GFR Non-African American >60 >60 mL/min    GFR African American >60 >60 mL/min    GFR Comment          GFR Staging NOT REPORTED    HEPATITIS C ANTIBODY   Result Value Ref Range    Hepatitis C Ab NONREACTIVE NONREACTIVE   Beta-Hydroxybutyrate   Result Value Ref Range    Beta-Hydroxybutyrate 0.40 (H) 0.02 - 0.27 mmol/L   EKG 12 Lead   Result Value Ref Range    Ventricular Rate 95 BPM    Atrial Rate 95 BPM    P-R Interval 158 ms    QRS Duration 88 ms    Q-T Interval 460 ms    QTc Calculation (Bazett) 578 ms    P Axis 39 degrees    R Axis 2 degrees    T Axis 2 degrees TYPE AND SCREEN   Result Value Ref Range    Expiration Date 08/22/2020,2359     Arm Band Number [id number]     ABO/Rh B POSITIVE     Antibody Screen NEGATIVE      Dr. Jessica Celeste updated and agreeable to plan.     Jonathan Monae DO  OBGYN Resident, PGY3  [facility]  8/19/2020, 5:00PM

## 2020-08-20 NOTE — PROGRESS NOTES
Ob/Gyn Interval Note     Patients labs reviewed. Recent Results (from the past 6 hour(s))   POC Glucose Fingerstick    Collection Time: 08/20/20  2:08 PM   Result Value Ref Range    POC Glucose 164 (H) 65 - 105 mg/dL   BETA-HYDROXYBUTERATE    Collection Time: 08/20/20  6:38 PM   Result Value Ref Range    Beta-Hydroxybutyrate 0.86 (H) 0.02 - 0.27 mmol/L   BLOOD GAS, VENOUS    Collection Time: 08/20/20  6:38 PM   Result Value Ref Range    pH, Lonnie 7.441 (H) 7.320 - 7.420    pCO2, Lonnie 36.0 (L) 39 - 55    pO2, Lonnie 31.6 30 - 50    HCO3, Venous 24.1 24 - 30 mmol/L    Positive Base Excess, Lonnie 0.7 0.0 - 2.0 mmol/L    Negative Base Excess, Lonnie NOT REPORTED 0.0 - 2.0 mmol/L    O2 Sat, Lonnie 58.0 (L) 60.0 - 85.0 %    Total Hb NOT REPORTED 12.0 - 16.0 g/dl    Oxyhemoglobin NOT REPORTED 95.0 - 98.0 %    Carboxyhemoglobin 0.6 0 - 5 %    Methemoglobin NOT REPORTED 0.0 - 1.5 %    Pt Temp 37.0     pH, Lonnie, Temp Adj NOT REPORTED 7.320 - 7.420    pCO2, Lonnie, Temp Adj NOT REPORTED 39 - 55 mmHg    pO2, Lonnie, Temp Adj NOT REPORTED 30 - 50 mmHg    O2 Device/Flow/% NOT REPORTED     Respiratory Rate NOT REPORTED     David Test NOT REPORTED     Sample Site NOT REPORTED     Pt.  Position NOT REPORTED     Mode NOT REPORTED     Set Rate NOT REPORTED     Total Rate NOT REPORTED     VT NOT REPORTED     FIO2 INFORMATION NOT PROVIDED     Peep/Cpap NOT REPORTED     PSV NOT REPORTED     Text for Respiratory NOT REPORTED     NOTIFICATION NOT REPORTED     NOTIFICATION TIME NOT REPORTED    CBC Auto Differential    Collection Time: 08/20/20  6:38 PM   Result Value Ref Range    WBC 11.7 (H) 3.5 - 11.3 k/uL    RBC 4.02 3.95 - 5.11 m/uL    Hemoglobin 11.7 (L) 11.9 - 15.1 g/dL    Hematocrit 36.4 36.3 - 47.1 %    MCV 90.5 82.6 - 102.9 fL    MCH 29.1 25.2 - 33.5 pg    MCHC 32.1 28.4 - 34.8 g/dL    RDW 14.8 (H) 11.8 - 14.4 %    Platelets 122 039 - 424 k/uL    MPV 12.4 8.1 - 13.5 fL    NRBC Automated 0.0 0.0 per 100 WBC    Differential Type NOT REPORTED     WBC Morphology NOT REPORTED     RBC Morphology NOT REPORTED     Platelet Estimate NOT REPORTED     Immature Granulocytes 0 0 %    Seg Neutrophils 73 (H) 36 - 66 %    Lymphocytes 16 (L) 24 - 44 %    Monocytes 9 (H) 1 - 7 %    Eosinophils % 2 1 - 4 %    Basophils 0 0 - 2 %    Absolute Immature Granulocyte 0.00 0.00 - 0.30 k/uL    Segs Absolute 8.55 (H) 1.8 - 7.7 k/uL    Absolute Lymph # 1.87 1.0 - 4.8 k/uL    Absolute Mono # 1.05 (H) 0.1 - 0.8 k/uL    Absolute Eos # 0.23 0.0 - 0.4 k/uL    Basophils Absolute 0.00 0.0 - 0.2 k/uL    Morphology Normal    OSMOLALITY    Collection Time: 08/20/20  6:38 PM   Result Value Ref Range    Serum Osmolality 298 (H) 275 - 295 mOsm/kg   Hemoglobin A1C    Collection Time: 08/20/20  6:38 PM   Result Value Ref Range    Hemoglobin A1C 6.9 (H) 4.0 - 6.0 %    Estimated Avg Glucose 151 mg/dL   Comprehensive Metabolic Panel    Collection Time: 08/20/20  6:38 PM   Result Value Ref Range    Glucose 173 (H) 70 - 99 mg/dL    BUN 5 (L) 6 - 20 mg/dL    CREATININE 0.62 0.50 - 0.90 mg/dL    Bun/Cre Ratio NOT REPORTED 9 - 20    Calcium 9.4 8.6 - 10.4 mg/dL    Sodium 138 135 - 144 mmol/L    Potassium 2.7 (LL) 3.7 - 5.3 mmol/L    Chloride 100 98 - 107 mmol/L    CO2 22 20 - 31 mmol/L    Anion Gap 16 9 - 17 mmol/L    Alkaline Phosphatase 72 35 - 104 U/L    ALT 47 (H) 5 - 33 U/L    AST 55 (H) <32 U/L    Total Bilirubin 0.28 (L) 0.3 - 1.2 mg/dL    Total Protein 6.4 6.4 - 8.3 g/dL    Alb 3.5 3.5 - 5.2 g/dL    Albumin/Globulin Ratio 1.2 1.0 - 2.5    GFR Non-African American >60 >60 mL/min    GFR African American >60 >60 mL/min    GFR Comment          GFR Staging NOT REPORTED    TSH with Reflex    Collection Time: 08/20/20  6:38 PM   Result Value Ref Range    TSH 1.81 0.30 - 5.00 mIU/L   POC Glucose Fingerstick    Collection Time: 08/20/20  7:17 PM   Result Value Ref Range    POC Glucose 149 (H) 65 - 105 mg/dL     Awaiting urine K/Cr and Urine osmolality at this time.  Will continue to monitor and replace K+ and 93 Chloe Gonzalez  OB/GYN Resident  [facility]  6/31/5990 7:53 PM

## 2020-08-20 NOTE — FLOWSHEET NOTE
Patient back from [facility] and requesting to shower and eat. Refusing monitoring until done. Patient instructed to call out as soon as she is out of shower to resume fetal monitoring and start iv infusion. Patient verbalized understanding.

## 2020-08-20 NOTE — PROGRESS NOTES
Ob/Gyn Interval Note     Patients labs reviewed    Recent Results (from the past 6 hour(s))   BLOOD GAS, VENOUS    Collection Time: 08/20/20  6:19 AM   Result Value Ref Range    pH, Lonnie 7.392 7.320 - 7.420    pCO2, Lonnie 39.5 39 - 55    pO2, Lonnie 45.8 30 - 50    HCO3, Venous 23.5 (L) 24 - 30 mmol/L    Positive Base Excess, Lonnie NOT REPORTED 0.0 - 2.0 mmol/L    Negative Base Excess, Lonnie 0.8 0.0 - 2.0 mmol/L    O2 Sat, Lonnie 80.1 60.0 - 85.0 %    Total Hb NOT REPORTED 12.0 - 16.0 g/dl    Oxyhemoglobin NOT REPORTED 95.0 - 98.0 %    Carboxyhemoglobin 1.1 0 - 5 %    Methemoglobin NOT REPORTED 0.0 - 1.5 %    Pt Temp 37.0     pH, Lonnie, Temp Adj NOT REPORTED 7.320 - 7.420    pCO2, Lonnie, Temp Adj NOT REPORTED 39 - 55 mmHg    pO2, Lonnie, Temp Adj NOT REPORTED 30 - 50 mmHg    O2 Device/Flow/% NOT REPORTED     Respiratory Rate NOT REPORTED     David Test NOT REPORTED     Sample Site NOT REPORTED     Pt.  Position NOT REPORTED     Mode NOT REPORTED     Set Rate NOT REPORTED     Total Rate NOT REPORTED     VT NOT REPORTED     FIO2 UNKNOWN     Peep/Cpap NOT REPORTED     PSV NOT REPORTED     Text for Respiratory NOT REPORTED     NOTIFICATION NOT REPORTED     NOTIFICATION TIME NOT REPORTED    POC Glucose Fingerstick    Collection Time: 08/20/20  7:30 AM   Result Value Ref Range    POC Glucose 194 (H) 65 - 105 mg/dL         Shiva Cancino 93 Resident  [address] [place] [address]  8/20/2020 7:01 AM

## 2020-08-20 NOTE — PROGRESS NOTES
OB/GYN PROGRESS NOTE    Aurelio Zhang is a 26 y.o. female [id number] at 28w0d, Hospital Day: 2    Subjective:   Patient has been seen and examined. Patient is doing well this morning. She is complaining of some nausea, no vomitting, and a mild headache. She states overall she is feeling much better than when she got here yesterday. She is having some mild pelvic cramping that she would not describe as contractions. Patient denies any vaginal discharge and any urinary complaints. The patient reports fetal movement is present, denies contractions, denies loss of fluid, denies vaginal bleeding. Patient denies headache, vision changes, nausea, vomiting, fever, chills, shortness of breath, chest pain, RUQ pain, abdominal pain, diarrhea, change in color/amount/odor of vaginal discharge, dysuria or, hematuria.      Objective:   Vitals:  Vitals:    08/19/20 1432 08/19/20 2105 08/20/20 0451 08/20/20 0730   BP: 125/68 132/77 (!) 146/89 138/80   Pulse: 96 86 85 85   Resp:  16  18   Temp:  98.2 °F (36.8 °C)  98 °F (36.7 °C)   TempSrc:  Oral  Oral         FHT: 145, moderate variability, accelerations present, decelerations absent  Contractions: none    Physical Exam:  General appearance:  no apparent distress, alert and cooperative  HEENT: head atraumatic, normocephalic, moist mucous membranes, trachea midline  Neurologic:  alert, oriented, normal speech, no focal findings or movement disorder noted  Lungs:  No increased work of breathing, good air exchange, clear to auscultation bilaterally, no crackles or wheezing  Heart:  regular rate and rhythm and no murmur    Abdomen:  soft, gravid and non-tender  Extremities:  no calf tenderness, non edematous  Musculoskeletal: Gross strength equal and intact throughout, no gross abnormalities, range of motion normal in hips, knees, shoulders and spine  Psychiatric: Mood appropriate, normal affect   Rectal Exam: not indicated    Assessment/Plan:  Aurelio Zhang is a 26 y.o. female [id number] at 28w0d IUP   - Rh +/ Rubella I/ GBS unknown   - No indication for GBS prophylaxis at this time   - Rhogam: not indicated   - Continue PNV, SCDs, ASA daily   - VSS   - Cat 1 FHT, TOCO quiet, some prolonged decelerations w/ spontaneous return to baseline overnight, but category 1 at this time   - MFM scan this morning     Pre-gestational DM   - Diagnosed by failed 2 hour GTT after last pregnancy   - Hyperglycemic on admission 263, most recently 253   - Hypokalemic 2.4, now S/p IV and PO replacement, last K 2.9, replacement protocol ordered   - EKG 8/19 showed QT prolongation 578, avoid QT prolonging meds. Sinus rhythm w/ PVCs, mod criteria for LVH, may be normal variant    - Anion gap initially 18, now improved to 16   - Mag 1.8   - BHB 0.40   - ALT/AST 46/48>41>45   -Diabetic ed consult placed   -  ml/hr NS    - Continue to closely monitor   - Patient reports overall feeling better, still nauseous but does not report vomiting, Mild headache. Hypokalemia   - 2.4 on admission, now 2.9   - Replacement protocol ordered    - EKG 8/19 showed QT prolongation 578, avoid QT prolonging meds. Sinus rhythm w/ PVCs, mod criteria for LVH, may be normal variant     Noncompliance   - Prenatal lab work attained in triage yesterday     cHTN (no meds)    - VSS, one elevated BP but no severe range   - Denies s/s PreE wnl, P/C 0.29. AST and ALT elevated but not 2x upper limit of normal ALT/AST 46/48>41>45.     Hx C/S x 2 declining TOLAC     Hx indicated PTD x 1 (G5 @ 36 weeks 0 days)   - Due to suspected chorioamnionitis    Grand multiparity    Hx starvation ketoacidosis (G7)    Hx GDMA1   - Failed 2 hour GTT in post partum period    Anxiety (no meds)    THC use   - Encourage cessation     BMI 38        Patient Active Problem List    Diagnosis Date Noted    History of pre-eclampsia 06/13/2018     Priority: High    27 weeks gestation of pregnancy 08/19/2020    cHTN (no meds) 08/19/2020    Pre-Gestational DM (new dx) 2020     Overview Note:     18: Failed 2hr GTT      Late prenatal care affecting pregnancy in second trimester 2020    High-risk pregnancy, second trimester 2020     Overview Note:     2020- MFM referral placed for anatomy scan       Abnormal uterine bleeding 2018    H/O Starvation ketoacidosis     History of gestational diabetes mellitus (GDM) 05/15/2018     Overview Note:     Passed early 1hr/3hr (at ~ 12wks), Failed late 1hr/3hr (~27 & 31wks)    18- Pt states fasting and 2 hr PPD are <95 and 80      H/O indicated  delivery 2018     Overview Note:     Suspected chorioamnionitis      Hx C/S x1 (breech) 2017     Overview Note:     H/O successful  x1  Desires TOLAC this pregnancy      H/O successful  2017     Overview Note:           BMI 39.0-39.9,adult 2017    Former smoker 2017     Overview Note:     Pt denies in current preg      History of THC abuse  2017     Overview Note:     Denies in this preg      Grand multiparity 2017       Will update Dr. Torrey Gonzalez.      Haydee Rush DO  Ob/Gyn Resident  2020, 8:55 AM

## 2020-08-21 LAB
ABSOLUTE EOS #: 0.31 K/UL (ref 0–0.44)
ABSOLUTE IMMATURE GRANULOCYTE: 0.08 K/UL (ref 0–0.3)
ABSOLUTE LYMPH #: 1.86 K/UL (ref 1.1–3.7)
ABSOLUTE MONO #: 0.89 K/UL (ref 0.1–1.2)
ALBUMIN SERPL-MCNC: 3.1 G/DL (ref 3.5–5.2)
ALBUMIN/GLOBULIN RATIO: 1.2 (ref 1–2.5)
ALLEN TEST: ABNORMAL
ALP BLD-CCNC: 61 U/L (ref 35–104)
ALT SERPL-CCNC: 42 U/L (ref 5–33)
ANION GAP SERPL CALCULATED.3IONS-SCNC: 14 MMOL/L (ref 9–17)
ANION GAP SERPL CALCULATED.3IONS-SCNC: 16 MMOL/L (ref 9–17)
AST SERPL-CCNC: 49 U/L
BASOPHILS # BLD: 0 % (ref 0–2)
BASOPHILS ABSOLUTE: 0.04 K/UL (ref 0–0.2)
BILIRUB SERPL-MCNC: 0.26 MG/DL (ref 0.3–1.2)
BUN BLDV-MCNC: 4 MG/DL (ref 6–20)
BUN BLDV-MCNC: 5 MG/DL (ref 6–20)
BUN/CREAT BLD: ABNORMAL (ref 9–20)
BUN/CREAT BLD: ABNORMAL (ref 9–20)
CALCIUM SERPL-MCNC: 8.5 MG/DL (ref 8.6–10.4)
CALCIUM SERPL-MCNC: 9 MG/DL (ref 8.6–10.4)
CARBOXYHEMOGLOBIN: 0.6 % (ref 0–5)
CHLORIDE BLD-SCNC: 101 MMOL/L (ref 98–107)
CHLORIDE BLD-SCNC: 102 MMOL/L (ref 98–107)
CO2: 20 MMOL/L (ref 20–31)
CO2: 22 MMOL/L (ref 20–31)
CREAT SERPL-MCNC: 0.45 MG/DL (ref 0.5–0.9)
CREAT SERPL-MCNC: 0.52 MG/DL (ref 0.5–0.9)
DIFFERENTIAL TYPE: ABNORMAL
EOSINOPHILS RELATIVE PERCENT: 3 % (ref 1–4)
FERN TESTING (POC): NORMAL
FIO2: ABNORMAL
GFR AFRICAN AMERICAN: >60 ML/MIN
GFR AFRICAN AMERICAN: >60 ML/MIN
GFR NON-AFRICAN AMERICAN: >60 ML/MIN
GFR NON-AFRICAN AMERICAN: >60 ML/MIN
GFR SERPL CREATININE-BSD FRML MDRD: ABNORMAL ML/MIN/{1.73_M2}
GLUCOSE BLD-MCNC: 167 MG/DL (ref 70–99)
GLUCOSE BLD-MCNC: 182 MG/DL (ref 65–105)
GLUCOSE BLD-MCNC: 208 MG/DL (ref 65–105)
GLUCOSE BLD-MCNC: 223 MG/DL (ref 65–105)
GLUCOSE BLD-MCNC: 224 MG/DL (ref 65–105)
GLUCOSE BLD-MCNC: 225 MG/DL (ref 70–99)
HCO3 VENOUS: 22.4 MMOL/L (ref 24–30)
HCT VFR BLD CALC: 33.4 % (ref 36.3–47.1)
HEMOGLOBIN: 10.7 G/DL (ref 11.9–15.1)
IMMATURE GRANULOCYTES: 1 %
KOH (POC): NORMAL
LYMPHOCYTES # BLD: 19 % (ref 24–43)
MAGNESIUM: 1.8 MG/DL (ref 1.6–2.6)
MAGNESIUM: 1.8 MG/DL (ref 1.6–2.6)
MCH RBC QN AUTO: 29.4 PG (ref 25.2–33.5)
MCHC RBC AUTO-ENTMCNC: 32 G/DL (ref 28.4–34.8)
MCV RBC AUTO: 91.8 FL (ref 82.6–102.9)
METHEMOGLOBIN: ABNORMAL % (ref 0–1.5)
MODE: ABNORMAL
MONOCYTES # BLD: 9 % (ref 3–12)
NEGATIVE BASE EXCESS, VEN: 1 MMOL/L (ref 0–2)
NITRAZINE PH (POC): NEGATIVE
NOTIFICATION TIME: ABNORMAL
NOTIFICATION: ABNORMAL
NRBC AUTOMATED: 0 PER 100 WBC
O2 DEVICE/FLOW/%: ABNORMAL
O2 SAT, VEN: 85.2 % (ref 60–85)
OXYHEMOGLOBIN: ABNORMAL % (ref 95–98)
PATIENT TEMP: 37
PCO2, VEN, TEMP ADJ: ABNORMAL MMHG (ref 39–55)
PCO2, VEN: 34.5 (ref 39–55)
PDW BLD-RTO: 14.8 % (ref 11.8–14.4)
PEEP/CPAP: ABNORMAL
PH VENOUS: 7.43 (ref 7.32–7.42)
PH, VEN, TEMP ADJ: ABNORMAL (ref 7.32–7.42)
PLATELET # BLD: 224 K/UL (ref 138–453)
PLATELET ESTIMATE: ABNORMAL
PMV BLD AUTO: 12.2 FL (ref 8.1–13.5)
PO2, VEN, TEMP ADJ: ABNORMAL MMHG (ref 30–50)
PO2, VEN: 50.8 (ref 30–50)
POSITIVE BASE EXCESS, VEN: ABNORMAL MMOL/L (ref 0–2)
POTASSIUM SERPL-SCNC: 3 MMOL/L (ref 3.7–5.3)
POTASSIUM SERPL-SCNC: 3.4 MMOL/L (ref 3.7–5.3)
PSV: ABNORMAL
PT. POSITION: ABNORMAL
RBC # BLD: 3.64 M/UL (ref 3.95–5.11)
RBC # BLD: ABNORMAL 10*6/UL
RESPIRATORY RATE: ABNORMAL
SAMPLE SITE: ABNORMAL
SEG NEUTROPHILS: 68 % (ref 36–65)
SEGMENTED NEUTROPHILS ABSOLUTE COUNT: 6.67 K/UL (ref 1.5–8.1)
SET RATE: ABNORMAL
SODIUM BLD-SCNC: 137 MMOL/L (ref 135–144)
SODIUM BLD-SCNC: 138 MMOL/L (ref 135–144)
TEXT FOR RESPIRATORY: ABNORMAL
TOTAL HB: ABNORMAL G/DL (ref 12–16)
TOTAL PROTEIN: 5.6 G/DL (ref 6.4–8.3)
TOTAL RATE: ABNORMAL
VT: ABNORMAL
WBC # BLD: 9.9 K/UL (ref 3.5–11.3)
WBC # BLD: ABNORMAL 10*3/UL
WET PREP (POC): NORMAL

## 2020-08-21 PROCEDURE — 36415 COLL VENOUS BLD VENIPUNCTURE: CPT

## 2020-08-21 PROCEDURE — 1220000000 HC SEMI PRIVATE OB R&B

## 2020-08-21 PROCEDURE — 82805 BLOOD GASES W/O2 SATURATION: CPT

## 2020-08-21 PROCEDURE — 6360000002 HC RX W HCPCS: Performed by: STUDENT IN AN ORGANIZED HEALTH CARE EDUCATION/TRAINING PROGRAM

## 2020-08-21 PROCEDURE — 6370000000 HC RX 637 (ALT 250 FOR IP): Performed by: STUDENT IN AN ORGANIZED HEALTH CARE EDUCATION/TRAINING PROGRAM

## 2020-08-21 PROCEDURE — 2580000003 HC RX 258: Performed by: STUDENT IN AN ORGANIZED HEALTH CARE EDUCATION/TRAINING PROGRAM

## 2020-08-21 PROCEDURE — 85025 COMPLETE CBC W/AUTO DIFF WBC: CPT

## 2020-08-21 PROCEDURE — G0108 DIAB MANAGE TRN  PER INDIV: HCPCS

## 2020-08-21 PROCEDURE — 82947 ASSAY GLUCOSE BLOOD QUANT: CPT

## 2020-08-21 PROCEDURE — 96372 THER/PROPH/DIAG INJ SC/IM: CPT

## 2020-08-21 PROCEDURE — 80048 BASIC METABOLIC PNL TOTAL CA: CPT

## 2020-08-21 PROCEDURE — 80053 COMPREHEN METABOLIC PANEL: CPT

## 2020-08-21 PROCEDURE — 99231 SBSQ HOSP IP/OBS SF/LOW 25: CPT | Performed by: OBSTETRICS & GYNECOLOGY

## 2020-08-21 PROCEDURE — 99223 1ST HOSP IP/OBS HIGH 75: CPT | Performed by: INTERNAL MEDICINE

## 2020-08-21 PROCEDURE — 83735 ASSAY OF MAGNESIUM: CPT

## 2020-08-21 RX ORDER — ACETAMINOPHEN 500 MG
1000 TABLET ORAL EVERY 6 HOURS PRN
Status: DISCONTINUED | OUTPATIENT
Start: 2020-08-21 | End: 2020-08-23 | Stop reason: HOSPADM

## 2020-08-21 RX ORDER — DEXTROSE MONOHYDRATE 25 G/50ML
12.5 INJECTION, SOLUTION INTRAVENOUS PRN
Status: DISCONTINUED | OUTPATIENT
Start: 2020-08-21 | End: 2020-08-23 | Stop reason: HOSPADM

## 2020-08-21 RX ORDER — NICOTINE POLACRILEX 4 MG
15 LOZENGE BUCCAL PRN
Status: DISCONTINUED | OUTPATIENT
Start: 2020-08-21 | End: 2020-08-23 | Stop reason: HOSPADM

## 2020-08-21 RX ORDER — POTASSIUM CHLORIDE 20 MEQ/1
40 TABLET, EXTENDED RELEASE ORAL
Status: COMPLETED | OUTPATIENT
Start: 2020-08-21 | End: 2020-08-21

## 2020-08-21 RX ORDER — GLUCAGON 1 MG/ML
1 KIT INJECTION PRN
Status: DISCONTINUED | OUTPATIENT
Start: 2020-08-21 | End: 2020-08-23 | Stop reason: HOSPADM

## 2020-08-21 RX ORDER — ACETAMINOPHEN 325 MG/1
650 TABLET ORAL EVERY 4 HOURS PRN
Status: DISCONTINUED | OUTPATIENT
Start: 2020-08-21 | End: 2020-08-21

## 2020-08-21 RX ORDER — DEXTROSE MONOHYDRATE 50 MG/ML
100 INJECTION, SOLUTION INTRAVENOUS PRN
Status: DISCONTINUED | OUTPATIENT
Start: 2020-08-21 | End: 2020-08-23 | Stop reason: HOSPADM

## 2020-08-21 RX ORDER — INSULIN GLARGINE 100 [IU]/ML
10 INJECTION, SOLUTION SUBCUTANEOUS NIGHTLY
Status: DISCONTINUED | OUTPATIENT
Start: 2020-08-22 | End: 2020-08-23 | Stop reason: HOSPADM

## 2020-08-21 RX ADMIN — POTASSIUM CHLORIDE 10 MEQ: 7.46 INJECTION, SOLUTION INTRAVENOUS at 05:30

## 2020-08-21 RX ADMIN — POTASSIUM CHLORIDE 10 MEQ: 7.46 INJECTION, SOLUTION INTRAVENOUS at 15:02

## 2020-08-21 RX ADMIN — POTASSIUM CHLORIDE 40 MEQ: 1500 TABLET, EXTENDED RELEASE ORAL at 10:01

## 2020-08-21 RX ADMIN — POTASSIUM CHLORIDE 10 MEQ: 7.46 INJECTION, SOLUTION INTRAVENOUS at 13:02

## 2020-08-21 RX ADMIN — POTASSIUM CHLORIDE 10 MEQ: 7.46 INJECTION, SOLUTION INTRAVENOUS at 19:34

## 2020-08-21 RX ADMIN — INSULIN LISPRO 2 UNITS: 100 INJECTION, SOLUTION INTRAVENOUS; SUBCUTANEOUS at 14:47

## 2020-08-21 RX ADMIN — POTASSIUM CHLORIDE 10 MEQ: 7.46 INJECTION, SOLUTION INTRAVENOUS at 03:39

## 2020-08-21 RX ADMIN — POTASSIUM CHLORIDE 10 MEQ: 7.46 INJECTION, SOLUTION INTRAVENOUS at 10:48

## 2020-08-21 RX ADMIN — Medication: at 19:59

## 2020-08-21 RX ADMIN — POTASSIUM CHLORIDE 10 MEQ: 7.46 INJECTION, SOLUTION INTRAVENOUS at 17:16

## 2020-08-21 RX ADMIN — Medication 50 MG: at 19:59

## 2020-08-21 RX ADMIN — INSULIN LISPRO 1 UNITS: 100 INJECTION, SOLUTION INTRAVENOUS; SUBCUTANEOUS at 20:23

## 2020-08-21 RX ADMIN — ACETAMINOPHEN 1000 MG: 500 TABLET ORAL at 10:47

## 2020-08-21 RX ADMIN — DOXYLAMINE SUCCINATE 25 MG: 25 TABLET ORAL at 20:25

## 2020-08-21 RX ADMIN — POTASSIUM CHLORIDE 10 MEQ: 7.46 INJECTION, SOLUTION INTRAVENOUS at 01:43

## 2020-08-21 RX ADMIN — INSULIN HUMAN 20 UNITS: 100 INJECTION, SUSPENSION SUBCUTANEOUS at 20:27

## 2020-08-21 RX ADMIN — SODIUM CHLORIDE: 9 INJECTION, SOLUTION INTRAVENOUS at 15:29

## 2020-08-21 RX ADMIN — ASPIRIN 81 MG: 81 TABLET, COATED ORAL at 10:00

## 2020-08-21 RX ADMIN — POTASSIUM CHLORIDE 40 MEQ: 1500 TABLET, EXTENDED RELEASE ORAL at 12:04

## 2020-08-21 RX ADMIN — POTASSIUM CHLORIDE 40 MEQ: 1500 TABLET, EXTENDED RELEASE ORAL at 15:29

## 2020-08-21 ASSESSMENT — PAIN SCALES - GENERAL: PAINLEVEL_OUTOF10: 6

## 2020-08-21 NOTE — FLOWSHEET NOTE
Writer encouraged pt to wake up et take morning meds. Pt refusing meds at this time. States she'll be nauseous if she takes it now.

## 2020-08-21 NOTE — PROGRESS NOTES
Obstetric/Gynecology Resident Interval Note    Potassium improved at 3.4. Mag stable at 1.8. Will order NPH 20U nightly for tonight and watch closely. Lab Results   Component Value Date     08/21/2020    K 3.4 08/21/2020     08/21/2020    CO2 22 08/21/2020    BUN 5 08/21/2020    CREATININE 0.52 08/21/2020    GLUCOSE 167 08/21/2020    CALCIUM 9.0 08/21/2020      Lab Results   Component Value Date    MG 1.8 08/21/2020     Dr. Slim Carcamo updated and agreeable to plan.     Mary Palacios DO  OBGYN Resident, PGY3  [facility]  8/21/2020, 7:03 PM

## 2020-08-21 NOTE — PROGRESS NOTES
[facility]   Progress Note                   Date:   8/21/2020  Patient name: Mayco Mercedes  Date of admission:  8/19/2020  2:09 PM  MRN:   [id number]  YOB: 1990  PCP: No primary care provider on file. Reason for Admission: 27 weeks gestation of pregnancy [Z3A.27]  27 weeks gestation of pregnancy [Z3A.27]  28 weeks gestation of pregnancy [Z3A.28]    Subjective:       Clinical Changes / Abnormalities: Pt seen and examined in the room, alone. Pt denies any CP or SOB. No CV issues overnight. Medications:   Scheduled Meds:   potassium chloride  40 mEq Oral Daily    vitamin B-6  50 mg Oral Nightly    doxyLAMINE succinate  25 mg Oral Nightly    aspirin  81 mg Oral Daily    prenatal vitamin  1 tablet Oral Daily     Continuous Infusions:   dextrose      sodium chloride 125 mL/hr at 08/20/20 1435     CBC:   Recent Labs     08/19/20  1550 08/20/20  1838 08/21/20  0728   WBC 12.0* 11.7* 9.9   HGB 11.9 11.7* 10.7*    247 224     BMP:    Recent Labs     08/20/20  0133 08/20/20  1838 08/21/20  0728    138 137   K 2.9* 2.7* 3.0*    100 101   CO2 21 22 20   BUN 5* 5* 4*   CREATININE 0.67 0.62 0.45*   GLUCOSE 243* 173* 225*     Hepatic:   Recent Labs     08/20/20  0133 08/20/20  1838 08/21/20  0728   AST 45* 55* 49*   ALT 41* 47* 42*   BILITOT 0.19* 0.28* 0.26*   ALKPHOS 64 72 61     Troponin: No results for input(s): TROPHS in the last 72 hours. BNP: No results for input(s): BNP in the last 72 hours. Lipids: No results for input(s): CHOL, HDL in the last 72 hours. Invalid input(s): LDLCALCU  INR: No results for input(s): INR in the last 72 hours. ECHO 8/20/20  Summary  Left ventricle is normal in size Global left ventricular systolic function  is normal Estimated ejection fraction is 55 % . Thickened mitral valve leaflets. Trace to mild mitral regurgitation. Mild tricuspid regurgitation.  Estimated right ventricular systolic pressure  is 35 mmHg.  Trivial pulmonic insufficiency. Objective:   Vitals: /65   Pulse 79   Temp 98.2 °F (36.8 °C) (Oral)   Resp 18   SpO2 99%   General appearance: alert and cooperative with exam  HEENT: Head: Normocephalic, no lesions, without obvious abnormality. Neck: no JVD, trachea midline, no adenopathy  Lungs: Clear to auscultation  Heart: Regular rate and rhythm, s1/s2 auscultated, no murmurs  Abdomen: soft, non-tender, bowel sounds active  Extremities: no edema  Neurologic: not done        Assessment / Acute Cardiac Problems:   1. Prolonged QT  2. LVH per ECG  3. HTN   4. 27 weeks pregnancy   5. Hypokalemia     Patient Active Problem List:     Hx C/S x2 (G4-breech, G7--NRFHT)     H/O successful      BMI 39.0-39.9,adult     Former smoker     History of THC abuse      Grand multiparity     H/O indicated  delivery     History of gestational diabetes mellitus (GDM)     H/O Starvation ketoacidosis     History of pre-eclampsia w/ SF--G7     Abnormal uterine bleeding     Late prenatal care affecting pregnancy in second trimester     High-risk pregnancy, second trimester     27 weeks gestation of pregnancy     cHTN (no meds)     Pre-Gestational DM (new dx)     Hypertension affecting pregnancy in third trimester     Pre-existing type 2 diabetes mellitus during pregnancy, antepartum     Obesity affecting pregnancy in third trimester     Feeling pelvic pressure in pregnancy, antepartum     Threatened  labor, antepartum     28 weeks gestation of pregnancy     Hx LSIL     History of anxiety      Plan of Treatment:   1. Keep K> 4 and Mag > 2   2. Prolonged QTc resolved. 3. ECHO reviewed and wnl.    4. Ok to d/c from cardiology standpoint. Please call with further questions or concerns.       Electronically signed by TRINA Levy CNP on 2020 at 9:41 AM  [address]  [phone]

## 2020-08-21 NOTE — PROGRESS NOTES
OB/GYN PROGRESS NOTE    Mayda Charles is a 26 y.o. female [id number] at 28w1d, Hospital Day: 3    Subjective:   Patient has been seen and examined. Patient is resting comfortably. Patient denies any vaginal discharge and any urinary complaints. The patient reports fetal movement is present, denies contractions, denies loss of fluid, denies vaginal bleeding. Patient denies headache, vision changes, nausea, vomiting, fever, chills, shortness of breath, chest pain, RUQ pain, abdominal pain, diarrhea, change in color/amount/odor of vaginal discharge, dysuria or, hematuria. Objective:   Vitals:  Vitals:    08/20/20 1000 08/20/20 1200 08/20/20 1600 08/20/20 1932   BP: 120/67 127/77 135/77 (!) 140/78   Pulse: 78 88 72 83   Resp: 18 18 18 18   Temp:  98.1 °F (36.7 °C) 98.1 °F (36.7 °C) 98.2 °F (36.8 °C)   TempSrc:  Oral Oral Oral   SpO2: 99%          FHT: 150, moderate variability, accelerations present, multiple prolong decelerations overnight lasting 4-5 min. The lowest edita to the 70 with spontaneous resolution.  Currently Cat 1    Contractions: none    Physical Exam:  General appearance:  no apparent distress, alert and cooperative  HEENT: head atraumatic, normocephalic, moist mucous membranes, trachea midline  Neurologic:  alert, oriented, normal speech, no focal findings or movement disorder noted  Lungs:  No increased work of breathing, good air exchange, clear to auscultation bilaterally, no crackles or wheezing  Heart:  regular rate and rhythm    Abdomen:  gravid, non-tender and no rebound, guarding, or rigidity  Extremities:  no calf tenderness, no edematous  Musculoskeletal: Gross strength equal and intact throughout  Psychiatric: Mood appropriate, normal affect     DATA:    Labs:   Recent Results (from the past 12 hour(s))   BETA-HYDROXYBUTERATE    Collection Time: 08/20/20  6:38 PM   Result Value Ref Range    Beta-Hydroxybutyrate 0.86 (H) 0.02 - 0.27 mmol/L   BLOOD GAS, VENOUS    Collection Time: 08/20/20 6:38 PM   Result Value Ref Range    pH, Lonnie 7.441 (H) 7.320 - 7.420    pCO2, Lonnie 36.0 (L) 39 - 55    pO2, Lonnie 31.6 30 - 50    HCO3, Venous 24.1 24 - 30 mmol/L    Positive Base Excess, Lonnie 0.7 0.0 - 2.0 mmol/L    Negative Base Excess, Lonnie NOT REPORTED 0.0 - 2.0 mmol/L    O2 Sat, Lonnie 58.0 (L) 60.0 - 85.0 %    Total Hb NOT REPORTED 12.0 - 16.0 g/dl    Oxyhemoglobin NOT REPORTED 95.0 - 98.0 %    Carboxyhemoglobin 0.6 0 - 5 %    Methemoglobin NOT REPORTED 0.0 - 1.5 %    Pt Temp 37.0     pH, Lonnie, Temp Adj NOT REPORTED 7.320 - 7.420    pCO2, Lonnie, Temp Adj NOT REPORTED 39 - 55 mmHg    pO2, Lonnie, Temp Adj NOT REPORTED 30 - 50 mmHg    O2 Device/Flow/% NOT REPORTED     Respiratory Rate NOT REPORTED     David Test NOT REPORTED     Sample Site NOT REPORTED     Pt.  Position NOT REPORTED     Mode NOT REPORTED     Set Rate NOT REPORTED     Total Rate NOT REPORTED     VT NOT REPORTED     FIO2 INFORMATION NOT PROVIDED     Peep/Cpap NOT REPORTED     PSV NOT REPORTED     Text for Respiratory NOT REPORTED     NOTIFICATION NOT REPORTED     NOTIFICATION TIME NOT REPORTED    CBC Auto Differential    Collection Time: 08/20/20  6:38 PM   Result Value Ref Range    WBC 11.7 (H) 3.5 - 11.3 k/uL    RBC 4.02 3.95 - 5.11 m/uL    Hemoglobin 11.7 (L) 11.9 - 15.1 g/dL    Hematocrit 36.4 36.3 - 47.1 %    MCV 90.5 82.6 - 102.9 fL    MCH 29.1 25.2 - 33.5 pg    MCHC 32.1 28.4 - 34.8 g/dL    RDW 14.8 (H) 11.8 - 14.4 %    Platelets 251 464 - 543 k/uL    MPV 12.4 8.1 - 13.5 fL    NRBC Automated 0.0 0.0 per 100 WBC    Differential Type NOT REPORTED     WBC Morphology NOT REPORTED     RBC Morphology NOT REPORTED     Platelet Estimate NOT REPORTED     Immature Granulocytes 0 0 %    Seg Neutrophils 73 (H) 36 - 66 %    Lymphocytes 16 (L) 24 - 44 %    Monocytes 9 (H) 1 - 7 %    Eosinophils % 2 1 - 4 %    Basophils 0 0 - 2 %    Absolute Immature Granulocyte 0.00 0.00 - 0.30 k/uL    Segs Absolute 8.55 (H) 1.8 - 7.7 k/uL    Absolute Lymph # 1.87 1.0 - 4.8 k/uL Absolute Mono # 1.05 (H) 0.1 - 0.8 k/uL    Absolute Eos # 0.23 0.0 - 0.4 k/uL    Basophils Absolute 0.00 0.0 - 0.2 k/uL    Morphology Normal    OSMOLALITY    Collection Time: 08/20/20  6:38 PM   Result Value Ref Range    Serum Osmolality 298 (H) 275 - 295 mOsm/kg   Hemoglobin A1C    Collection Time: 08/20/20  6:38 PM   Result Value Ref Range    Hemoglobin A1C 6.9 (H) 4.0 - 6.0 %    Estimated Avg Glucose 151 mg/dL   Comprehensive Metabolic Panel    Collection Time: 08/20/20  6:38 PM   Result Value Ref Range    Glucose 173 (H) 70 - 99 mg/dL    BUN 5 (L) 6 - 20 mg/dL    CREATININE 0.62 0.50 - 0.90 mg/dL    Bun/Cre Ratio NOT REPORTED 9 - 20    Calcium 9.4 8.6 - 10.4 mg/dL    Sodium 138 135 - 144 mmol/L    Potassium 2.7 (LL) 3.7 - 5.3 mmol/L    Chloride 100 98 - 107 mmol/L    CO2 22 20 - 31 mmol/L    Anion Gap 16 9 - 17 mmol/L    Alkaline Phosphatase 72 35 - 104 U/L    ALT 47 (H) 5 - 33 U/L    AST 55 (H) <32 U/L    Total Bilirubin 0.28 (L) 0.3 - 1.2 mg/dL    Total Protein 6.4 6.4 - 8.3 g/dL    Alb 3.5 3.5 - 5.2 g/dL    Albumin/Globulin Ratio 1.2 1.0 - 2.5    GFR Non-African American >60 >60 mL/min    GFR African American >60 >60 mL/min    GFR Comment          GFR Staging NOT REPORTED    TSH with Reflex    Collection Time: 08/20/20  6:38 PM   Result Value Ref Range    TSH 1.81 0.30 - 5.00 mIU/L   POC Glucose Fingerstick    Collection Time: 08/20/20  7:17 PM   Result Value Ref Range    POC Glucose 149 (H) 65 - 105 mg/dL   POTASSIUM, URINE, RANDOM    Collection Time: 08/20/20  9:05 PM   Result Value Ref Range    Potassium, Ur 17.3 mmol/L   CREATININE, RANDOM URINE    Collection Time: 08/20/20  9:05 PM   Result Value Ref Range    Creatinine, Ur 91.5 28.0 - 217.0 mg/dL   Osmolality, Urine    Collection Time: 08/20/20  9:05 PM   Result Value Ref Range    Osmolality, Ur 505 80 - 1300 mOsm/kg       Recent Results (from the past 8 hour(s))   POTASSIUM, URINE, RANDOM    Collection Time: 08/20/20  9:05 PM   Result Value Ref Range Potassium, Ur 17.3 mmol/L   CREATININE, RANDOM URINE    Collection Time: 20  9:05 PM   Result Value Ref Range    Creatinine, Ur 91.5 28.0 - 217.0 mg/dL   Osmolality, Urine    Collection Time: 20  9:05 PM   Result Value Ref Range    Osmolality, Ur 505 80 - 1300 mOsm/kg       Assessment/Plan:  Olvin Baltazar is a 26 y.o. female [id number] at 29w1d admitted for glucose management of Pre-gestational DM and severe hypokalemia    - Rh +/ RI / GBS unk   - No indcation for GBS prophylaxis    - VSS, Afebrile   - cEFM/TOCO   - CMP, CBC, VBG, Mag  this am   - Cardio consulted and recommended Echo and EKG   - Echo : EF 55%, thickened mitral valve leaflets, mild tricuspid regurg    - EKG : Normal sinus rhythm, QTc 479 (ok)   - IVF   - F&2hr PP   -  BHB: 0.40>0.86; Anion gap 18>16>16    - K 2.4>2.9>2.7    - Mag 1.8, TSH 1.81   - K/Cr: 0.18 (<1.5wnl)    - Ur Osm 505, Serum Osm 298    - Continue K replacement    - When K stable will plan to start NPH  and Novolog 15/10/10    - Diabetic Ed consulted    - MFMF scan  Cephalic, BPP , UADs and MCAs wnl.  Cervix 36-38 mm   - SCDs, ASA, PNV     cHTN (no meds)    - BP is currently normotensive   - Denies s/s of PreE    - Baseline PreE labs (LFTs elv) P/C 0.29    Hx C/S x2 (G4- breech, G7-NRFHT)      Anxiety    - Stable no meds    THC use/ Hx tobacco use     - Cessation encouraged    BMI 38     Patient Active Problem List    Diagnosis Date Noted    History of pre-eclampsia w/ SF--G7 2018     Priority: High    28 weeks gestation of pregnancy 2020    Hx LSIL 2020    History of anxiety 2020    Hypertension affecting pregnancy in third trimester     Pre-existing type 2 diabetes mellitus during pregnancy, antepartum     Obesity affecting pregnancy in third trimester     Feeling pelvic pressure in pregnancy, antepartum     Threatened  labor, antepartum     27 weeks gestation of pregnancy 2020    cHTN (no meds) direction of a teaching physician. Attending's Name:  Richard Kirby MD        Patient reports she is feeling ok this morning but tired because we keep waking her up. Her potassium is improved but still low at 3.0. Given the persistent need for potassium replacement and no further vomiting episodes will consult internal medicine for input on timing of starting insulin. Continue to replace potassium and mag as needed. Appreciate cardiology input. FHR with occasional prolonged decelerations will continue to monitor. Will remain admitted until glucoregulation initiated and electrolytes improved. Patient requests as part of delivery planning repeat  section an risk reducing salpingectomy at the time of her repeat  section. Discussed at length the risks and benefits of concurrent ovarian cancer risk reducing bilateral salpingectomy with the patient's upcoming scheduled abdominal or pelvic surgery as the patient is at higher than average risk for development of ovarian cancer. Advised the patient that the primary benefit of such surgery is up to 65% reduction in future risk of ovarian cancer. Finally, patient has been thoroughly counseled regarding the consequence of loss of fertility following this procedure. The patient understands that this loss of fertility cannot be reversed and has expressed via verbal and written consent that her wishes are to proceed with this surgery for the purposes of reducing risk for ovarian cancer.

## 2020-08-21 NOTE — CONSULTS
[facility]     Department of Internal Medicine - Staff Internal Medicine Teaching Service          ADMISSION NOTE/HISTORY AND PHYSICAL EXAMINATION   Date: 8/21/2020  Patient Name: Glendy Ryan  Date of admission: 8/19/2020  2:09 PM  YOB: 1990  PCP: No primary care provider on file. History Obtained From:  patient    Consult Reason:     Consult Reason: Hyperglycemia, hypokalemia    HISTORY OF PRESENTING ILLNESS     The patient is a pleasant 26 y.o. female [id number] at 27w6d who presented on 8/19/2020 with contractions along with pelvic pressure. On presentation, patient was found to be severely hypokalemic with K of 2.4 and also with pre-gestational diabetes. Hemoglobin A1c checked yesterday was 6.9. Patient has never taken insulin or oral antihyperglycemic agents. Cardiology were consulted yesterday after EKG showed prolonged QTC of 578. This is related to hypokalemia, hypomagnesemia. HCTZ was discontinued, and echo was ordered to rule out any wall motion abnormalities. Echocardiogram came back normal with EF 55%, mild TR and MR. Cardiology signed off this morning. Internal medicine consulted for management of hypokalemia in the setting of hyperglycemia. Prior to consultation, patient had ready received 40 mEq of p.o. potassium and was started on potassium replacement protocol. Patient is insulin naïve, blood glucose peaked at 253 yesterday, POC glucose today 208. Beta hydroxybutyrate was found to be elevated 0.40, 0.86. A1c 6.9 yesterday. TSH WNL. UDS negative.      Review of Systems:  General ROS: Completed and except as mentioned above were negative   HEENT ROS: Completed and except as mentioned above were negative   Allergy and Immunology ROS:  Completed and except as mentioned above were negative  Hematological and Lymphatic ROS:  Completed and except as mentioned above were negative  Respiratory ROS:  Completed and except as mentioned above were negative  Cardiovascular ROS:  Completed and except as mentioned above were negative  Gastrointestinal ROS: Completed and except as mentioned above were negative  Genito-Urinary ROS:  Completed and except as mentioned above were negative  Musculoskeletal ROS:  Completed and except as mentioned above were negative  Neurological ROS:  Completed and except as mentioned above were negative  Skin & Dermatological ROS:  Completed and except as mentioned above were negative  Psychological ROS:  Completed and except as mentioned above were negative    PAST MEDICAL HISTORY     Past Medical History:   Diagnosis Date    cHTN (no meds) 2020    GDMA1 5/15/2018    ARHLVGWM(632.5)     Pityriasis rosea     Right sciatic nerve pain 2018    Noted in previous pregnancy, was prescribed belt but it wasn't covered by insurance Progressively worsening symptoms noted -- Referred to PT 18    THC abuse      THC abuse      Traumatic injury  6/3/2018    Trichomonas 2013    treated       PAST SURGICAL HISTORY     Past Surgical History:   Procedure Laterality Date     SECTION      CHOLECYSTECTOMY      AK  DELIVERY ONLY N/A 2018     [facility]       ALLERGIES     Patient has no known allergies. MEDICATIONS PRIOR TO ADMISSION     Prior to Admission medications    Medication Sig Start Date End Date Taking?  Authorizing Provider   Docosahexaenoic Acid (PRENATAL DHA) 200 MG CAPS Take 1 capsule by mouth Daily 20 Yes Mercedes Osborn   calcium carbonate (ANTACID) 500 MG chewable tablet Take 1 tablet by mouth daily 20 Yes Mercedes Osborn   aspirin EC 81 MG EC tablet Take 1 tablet by mouth daily 20  Yes Mercedes Osborn   Prenatal Vit-Fe Fumarate-FA (PRENATAL VITAMIN) 27-1 MG TABS tablet Take 1 tablet by mouth daily 06  Yes Kailyn Rodriguez DO   ondansetron (ZOFRAN) 4 MG tablet Take 1 tablet by mouth every 8 hours as needed for Nausea  Patient not taking: Reported on 8/10/2020 7/22/20   Mercedes Osborn   ranitidine (ZANTAC) 150 MG tablet Take 1 tablet by mouth 2 times daily  Patient not taking: Reported on 2020   Gurwinder Hurst MD   acetaminophen (ACETAMINOPHEN EXTRA STRENGTH) 500 MG tablet TAKE (1) TABLET BY MOUTH EVERY 4 HOURS AS NEEDED FOR PAIN  Patient not taking: Reported on 19   Gurwinder Hurst MD   hydrochlorothiazide (HYDRODIURIL) 12.5 MG tablet Take 1 tablet by mouth daily  Patient not taking: Reported on 2020   Lorna Duffy MD   mineral oil-hydrophilic petrolatum (AQUAPHOR) ointment  18   Historical Provider, MD   Blood Pressure KIT Use as directed Dx HTN  Patient not taking: Reported on 2020   Lorna Duffy MD   acetaminophen (TYLENOL) 325 MG tablet Take 2 tablets by mouth every 6 hours as needed for Pain  Patient not taking: Reported on 2020   Steph Terrell PA-C       SOCIAL HISTORY     Tobacco: Current every day smoker  Alcohol: No  Illicits: Former marijuana user    FAMILY HISTORY     Family History   Problem Relation Age of Onset    High Blood Pressure Mother        PHYSICAL EXAM     Vitals: /63   Pulse 83   Temp 98.2 °F (36.8 °C) (Oral)   Resp 20   SpO2 99%   Tmax: Temp (24hrs), Av.2 °F (36.8 °C), Min:98.1 °F (36.7 °C), Max:98.2 °F (36.8 °C)    Last Body weight:   Wt Readings from Last 3 Encounters:   08/10/20 240 lb (108.9 kg)   20 242 lb (109.8 kg)   18 214 lb 6.4 oz (97.3 kg)     Body Mass Index : There is no height or weight on file to calculate BMI. PHYSICAL EXAMINATION:  Constitutional: This is a well developed, well nourished, 35-39.9 - Obesity Grade II 26y.o. year old female who is alert, oriented, cooperative and in no apparent distress. Head:normocephalic and atraumatic. EENT:  PERRLA. No conjunctival injections.    Septum was midline, mucosa was without erythema, REPORTED 30 - 50 mmHg    O2 Device/Flow/% NOT REPORTED     Respiratory Rate NOT REPORTED     David Test NOT REPORTED     Sample Site NOT REPORTED     Pt.  Position NOT REPORTED     Mode NOT REPORTED     Set Rate NOT REPORTED     Total Rate NOT REPORTED     VT NOT REPORTED     FIO2 INFORMATION NOT PROVIDED     Peep/Cpap NOT REPORTED     PSV NOT REPORTED     Text for Respiratory NOT REPORTED     NOTIFICATION NOT REPORTED     NOTIFICATION TIME NOT REPORTED    CBC Auto Differential    Collection Time: 08/20/20  6:38 PM   Result Value Ref Range    WBC 11.7 (H) 3.5 - 11.3 k/uL    RBC 4.02 3.95 - 5.11 m/uL    Hemoglobin 11.7 (L) 11.9 - 15.1 g/dL    Hematocrit 36.4 36.3 - 47.1 %    MCV 90.5 82.6 - 102.9 fL    MCH 29.1 25.2 - 33.5 pg    MCHC 32.1 28.4 - 34.8 g/dL    RDW 14.8 (H) 11.8 - 14.4 %    Platelets 679 208 - 704 k/uL    MPV 12.4 8.1 - 13.5 fL    NRBC Automated 0.0 0.0 per 100 WBC    Differential Type NOT REPORTED     WBC Morphology NOT REPORTED     RBC Morphology NOT REPORTED     Platelet Estimate NOT REPORTED     Immature Granulocytes 0 0 %    Seg Neutrophils 73 (H) 36 - 66 %    Lymphocytes 16 (L) 24 - 44 %    Monocytes 9 (H) 1 - 7 %    Eosinophils % 2 1 - 4 %    Basophils 0 0 - 2 %    Absolute Immature Granulocyte 0.00 0.00 - 0.30 k/uL    Segs Absolute 8.55 (H) 1.8 - 7.7 k/uL    Absolute Lymph # 1.87 1.0 - 4.8 k/uL    Absolute Mono # 1.05 (H) 0.1 - 0.8 k/uL    Absolute Eos # 0.23 0.0 - 0.4 k/uL    Basophils Absolute 0.00 0.0 - 0.2 k/uL    Morphology Normal    OSMOLALITY    Collection Time: 08/20/20  6:38 PM   Result Value Ref Range    Serum Osmolality 298 (H) 275 - 295 mOsm/kg   Hemoglobin A1C    Collection Time: 08/20/20  6:38 PM   Result Value Ref Range    Hemoglobin A1C 6.9 (H) 4.0 - 6.0 %    Estimated Avg Glucose 151 mg/dL   Comprehensive Metabolic Panel    Collection Time: 08/20/20  6:38 PM   Result Value Ref Range    Glucose 173 (H) 70 - 99 mg/dL    BUN 5 (L) 6 - 20 mg/dL    CREATININE 0.62 0.50 - 0.90 mg/dL Bun/Cre Ratio NOT REPORTED 9 - 20    Calcium 9.4 8.6 - 10.4 mg/dL    Sodium 138 135 - 144 mmol/L    Potassium 2.7 (LL) 3.7 - 5.3 mmol/L    Chloride 100 98 - 107 mmol/L    CO2 22 20 - 31 mmol/L    Anion Gap 16 9 - 17 mmol/L    Alkaline Phosphatase 72 35 - 104 U/L    ALT 47 (H) 5 - 33 U/L    AST 55 (H) <32 U/L    Total Bilirubin 0.28 (L) 0.3 - 1.2 mg/dL    Total Protein 6.4 6.4 - 8.3 g/dL    Alb 3.5 3.5 - 5.2 g/dL    Albumin/Globulin Ratio 1.2 1.0 - 2.5    GFR Non-African American >60 >60 mL/min    GFR African American >60 >60 mL/min    GFR Comment          GFR Staging NOT REPORTED    TSH with Reflex    Collection Time: 08/20/20  6:38 PM   Result Value Ref Range    TSH 1.81 0.30 - 5.00 mIU/L   POC Glucose Fingerstick    Collection Time: 08/20/20  7:17 PM   Result Value Ref Range    POC Glucose 149 (H) 65 - 105 mg/dL   POTASSIUM, URINE, RANDOM    Collection Time: 08/20/20  9:05 PM   Result Value Ref Range    Potassium, Ur 17.3 mmol/L   CREATININE, RANDOM URINE    Collection Time: 08/20/20  9:05 PM   Result Value Ref Range    Creatinine, Ur 91.5 28.0 - 217.0 mg/dL   Osmolality, Urine    Collection Time: 08/20/20  9:05 PM   Result Value Ref Range    Osmolality, Ur 505 80 - 1300 mOsm/kg   POC Glucose Fingerstick    Collection Time: 08/21/20  7:09 AM   Result Value Ref Range    POC Glucose 224 (H) 65 - 105 mg/dL   BLOOD GAS, VENOUS    Collection Time: 08/21/20  7:28 AM   Result Value Ref Range    pH, Lonnie 7.428 (H) 7.320 - 7.420    pCO2, Lonnie 34.5 (L) 39 - 55    pO2, Lonnie 50.8 (H) 30 - 50    HCO3, Venous 22.4 (L) 24 - 30 mmol/L    Positive Base Excess, Lonnie NOT REPORTED 0.0 - 2.0 mmol/L    Negative Base Excess, Lonnie 1.0 0.0 - 2.0 mmol/L    O2 Sat, Lonnie 85.2 (H) 60.0 - 85.0 %    Total Hb NOT REPORTED 12.0 - 16.0 g/dl    Oxyhemoglobin NOT REPORTED 95.0 - 98.0 %    Carboxyhemoglobin 0.6 0 - 5 %    Methemoglobin NOT REPORTED 0.0 - 1.5 %    Pt Temp 37.0     pH, Lonnie, Temp Adj NOT REPORTED 7.320 - 7.420    pCO2, Lonnie, Temp Adj NOT Alkaline Phosphatase 61 35 - 104 U/L    ALT 42 (H) 5 - 33 U/L    AST 49 (H) <32 U/L    Total Bilirubin 0.26 (L) 0.3 - 1.2 mg/dL    Total Protein 5.6 (L) 6.4 - 8.3 g/dL    Alb 3.1 (L) 3.5 - 5.2 g/dL    Albumin/Globulin Ratio 1.2 1.0 - 2.5    GFR Non-African American >60 >60 mL/min    GFR African American >60 >60 mL/min    GFR Comment          GFR Staging NOT REPORTED    MAGNESIUM    Collection Time: 08/21/20  7:28 AM   Result Value Ref Range    Magnesium 1.8 1.6 - 2.6 mg/dL   POC Glucose Fingerstick    Collection Time: 08/21/20 10:08 AM   Result Value Ref Range    POC Glucose 208 (H) 65 - 105 mg/dL       Imaging:   No results found. ASSESSMENT & PLAN     ASSESSMENT / PLAN:     Thank you for allowing us to see Mayda Charles in consultation for hypokalemia, hyperglycemia. 1. Pre-gestational diabetes - A1c 6.9 (8/20/2020). Start low-dose insulin sliding scale given hypokalemia, insulin naivety. Will reassess insulin intake in a.m. Hypoglycemia treatment protocol. POCT glucose 4 times daily. Carb controlled diet. 2. Acute hypokalemia. As low as 2.4 on admission. This a.m. 3.0. Patient received 40 mEq this a.m. in addition to IV replacement protocol. We will give 40 more mEq x2 doses 3 hours apart in addition to IV replacement. Follow-up potassium check 6 PM.  BMP daily. Andry Noble MD  Internal Medicine Resident, PGY-2  [facility];  [place], [place]  8/21/2020, 11:26 AM

## 2020-08-21 NOTE — CARE COORDINATION
Initial Discharge Planning:     Sloane Tenorio is a 26 y.o. [id number] at 28w0d who presented yesterday with contractions that started Tuesday along with pelvic pressure. They occur every 5 minutes about 1 minute long at a time. Increasing in frequency and strength. Pt also c/o 1 week hx of nausea and vomiting. She also has been newly diagnosed pre gestational DM. Plan:    Wet prep  Nitrazine test (negative)   GC/C, UA, and UDS with uCx. cEFM and TOCO  Zofran ODT x1 followed by tylenol 1000mg PO x 1              Will PO challenge/encourage PO hydration when able   Baseline Pre-E labs  CMP ordered     No HC or DME needs at present. Case Management will continue to follow throughout stay.
Interview postponed until COVID results are reported.
- K 2.4>2.9>2.7               - Mag 1.8, TSH 1.81              - K/Cr: 0.18 (<1.5wnl)               - Ur Osm 505, Serum Osm 298               - Continue K replacement               - When K stable will plan to start NPH 30/20 and Novolog 15/10/10               - Diabetic Ed consulted               - MFMF scan 9/91 Cephalic, BPP 8/8, UADs and MCAs wnl. Cervix 36-38 mm              - SCDs, ASA, PNV               - Refused COVID testing at this time     cHTN (no meds)               - BP is currently normotensive              - Denies s/s of PreE               - Baseline PreE labs (LFTs elv) P/C 0.29     Hx C/S x2 (G4- breech, G7-NRFHT)       Anxiety               - Stable no meds     THC use/ Hx tobacco use                - Cessation encouraged    BMI 45     Writer met w/ patient at bedside who confirmed address, phone number, and emergency contact on facesheet were correct. She confirmed [place]. She does not have any DME or Home Nursing at this time.  Writer discussed possible home nursing to help w/diabetes management and patient stated would think about it, but was agreeable to further discuss if needed, however, she will most likely need COVID testing for [facility] [place]. to agree to see patient    CM continue to follow

## 2020-08-21 NOTE — PROGRESS NOTES
Diabetes Education Progress Note    Janna Carbone was seen  for follow up education about Diabetes and Pregnancy, she is an inpatient room 703. Patient was sleeping / Loida Jackson asked if she had any questions around the topics of gestational diabetes or materials left at bedside. Patient denies any questions at this time. RN stated patient had sq insulin recently and was relived she injection was not painful. Patient did express needle fear at education session yesterday. If insulin need with home, would recommend consider home care visits and well as outpatient diabetes education support.             Ranulfo Sanchez RN CDE

## 2020-08-22 LAB
ABSOLUTE EOS #: 0.3 K/UL (ref 0–0.44)
ABSOLUTE IMMATURE GRANULOCYTE: 0.09 K/UL (ref 0–0.3)
ABSOLUTE LYMPH #: 1.95 K/UL (ref 1.1–3.7)
ABSOLUTE MONO #: 0.9 K/UL (ref 0.1–1.2)
ANION GAP SERPL CALCULATED.3IONS-SCNC: 15 MMOL/L (ref 9–17)
ANION GAP SERPL CALCULATED.3IONS-SCNC: 17 MMOL/L (ref 9–17)
BASOPHILS # BLD: 0 % (ref 0–2)
BASOPHILS ABSOLUTE: 0.03 K/UL (ref 0–0.2)
BETA-HYDROXYBUTYRATE: 0.48 MMOL/L (ref 0.02–0.27)
BUN BLDV-MCNC: 4 MG/DL (ref 6–20)
BUN BLDV-MCNC: 5 MG/DL (ref 6–20)
BUN/CREAT BLD: ABNORMAL (ref 9–20)
BUN/CREAT BLD: ABNORMAL (ref 9–20)
CALCIUM SERPL-MCNC: 8.8 MG/DL (ref 8.6–10.4)
CALCIUM SERPL-MCNC: 8.9 MG/DL (ref 8.6–10.4)
CHLORIDE BLD-SCNC: 100 MMOL/L (ref 98–107)
CHLORIDE BLD-SCNC: 105 MMOL/L (ref 98–107)
CO2: 19 MMOL/L (ref 20–31)
CO2: 20 MMOL/L (ref 20–31)
CREAT SERPL-MCNC: 0.42 MG/DL (ref 0.5–0.9)
CREAT SERPL-MCNC: 0.55 MG/DL (ref 0.5–0.9)
DIFFERENTIAL TYPE: ABNORMAL
EOSINOPHILS RELATIVE PERCENT: 3 % (ref 1–4)
GFR AFRICAN AMERICAN: >60 ML/MIN
GFR AFRICAN AMERICAN: >60 ML/MIN
GFR NON-AFRICAN AMERICAN: >60 ML/MIN
GFR NON-AFRICAN AMERICAN: >60 ML/MIN
GFR SERPL CREATININE-BSD FRML MDRD: ABNORMAL ML/MIN/{1.73_M2}
GLUCOSE BLD-MCNC: 118 MG/DL (ref 65–105)
GLUCOSE BLD-MCNC: 136 MG/DL (ref 70–99)
GLUCOSE BLD-MCNC: 142 MG/DL (ref 65–105)
GLUCOSE BLD-MCNC: 150 MG/DL (ref 70–99)
HCT VFR BLD CALC: 33 % (ref 36.3–47.1)
HEMOGLOBIN: 10.6 G/DL (ref 11.9–15.1)
IMMATURE GRANULOCYTES: 1 %
LYMPHOCYTES # BLD: 20 % (ref 24–43)
MAGNESIUM: 1.7 MG/DL (ref 1.6–2.6)
MAGNESIUM: 1.7 MG/DL (ref 1.6–2.6)
MCH RBC QN AUTO: 29.7 PG (ref 25.2–33.5)
MCHC RBC AUTO-ENTMCNC: 32.1 G/DL (ref 28.4–34.8)
MCV RBC AUTO: 92.4 FL (ref 82.6–102.9)
MONOCYTES # BLD: 9 % (ref 3–12)
NRBC AUTOMATED: 0 PER 100 WBC
PDW BLD-RTO: 14.8 % (ref 11.8–14.4)
PLATELET # BLD: 227 K/UL (ref 138–453)
PLATELET ESTIMATE: ABNORMAL
PMV BLD AUTO: 12.6 FL (ref 8.1–13.5)
POTASSIUM SERPL-SCNC: 2.9 MMOL/L (ref 3.7–5.3)
POTASSIUM SERPL-SCNC: 3.3 MMOL/L (ref 3.7–5.3)
RBC # BLD: 3.57 M/UL (ref 3.95–5.11)
RBC # BLD: ABNORMAL 10*6/UL
SEG NEUTROPHILS: 67 % (ref 36–65)
SEGMENTED NEUTROPHILS ABSOLUTE COUNT: 6.43 K/UL (ref 1.5–8.1)
SODIUM BLD-SCNC: 137 MMOL/L (ref 135–144)
SODIUM BLD-SCNC: 139 MMOL/L (ref 135–144)
WBC # BLD: 9.7 K/UL (ref 3.5–11.3)
WBC # BLD: ABNORMAL 10*3/UL

## 2020-08-22 PROCEDURE — 83735 ASSAY OF MAGNESIUM: CPT

## 2020-08-22 PROCEDURE — 85025 COMPLETE CBC W/AUTO DIFF WBC: CPT

## 2020-08-22 PROCEDURE — 96372 THER/PROPH/DIAG INJ SC/IM: CPT

## 2020-08-22 PROCEDURE — 80048 BASIC METABOLIC PNL TOTAL CA: CPT

## 2020-08-22 PROCEDURE — 6360000002 HC RX W HCPCS: Performed by: STUDENT IN AN ORGANIZED HEALTH CARE EDUCATION/TRAINING PROGRAM

## 2020-08-22 PROCEDURE — 6370000000 HC RX 637 (ALT 250 FOR IP): Performed by: STUDENT IN AN ORGANIZED HEALTH CARE EDUCATION/TRAINING PROGRAM

## 2020-08-22 PROCEDURE — 1220000000 HC SEMI PRIVATE OB R&B

## 2020-08-22 PROCEDURE — 82947 ASSAY GLUCOSE BLOOD QUANT: CPT

## 2020-08-22 PROCEDURE — 99233 SBSQ HOSP IP/OBS HIGH 50: CPT | Performed by: INTERNAL MEDICINE

## 2020-08-22 PROCEDURE — 82010 KETONE BODYS QUAN: CPT

## 2020-08-22 PROCEDURE — 2580000003 HC RX 258: Performed by: STUDENT IN AN ORGANIZED HEALTH CARE EDUCATION/TRAINING PROGRAM

## 2020-08-22 PROCEDURE — 36415 COLL VENOUS BLD VENIPUNCTURE: CPT

## 2020-08-22 RX ORDER — POTASSIUM CHLORIDE 20 MEQ/1
40 TABLET, EXTENDED RELEASE ORAL ONCE
Status: COMPLETED | OUTPATIENT
Start: 2020-08-22 | End: 2020-08-22

## 2020-08-22 RX ORDER — POTASSIUM CHLORIDE AND SODIUM CHLORIDE 450; 150 MG/100ML; MG/100ML
INJECTION, SOLUTION INTRAVENOUS CONTINUOUS
Status: DISCONTINUED | OUTPATIENT
Start: 2020-08-22 | End: 2020-08-22 | Stop reason: RX

## 2020-08-22 RX ORDER — MAGNESIUM SULFATE 1 G/100ML
1 INJECTION INTRAVENOUS ONCE
Status: DISCONTINUED | OUTPATIENT
Start: 2020-08-22 | End: 2020-08-23 | Stop reason: HOSPADM

## 2020-08-22 RX ORDER — POTASSIUM CHLORIDE 20 MEQ/1
40 TABLET, EXTENDED RELEASE ORAL
Status: DISPENSED | OUTPATIENT
Start: 2020-08-22 | End: 2020-08-22

## 2020-08-22 RX ADMIN — ACETAMINOPHEN 1000 MG: 500 TABLET ORAL at 08:48

## 2020-08-22 RX ADMIN — POTASSIUM CHLORIDE 10 MEQ: 7.46 INJECTION, SOLUTION INTRAVENOUS at 14:05

## 2020-08-22 RX ADMIN — Medication 50 MG: at 21:02

## 2020-08-22 RX ADMIN — ACETAMINOPHEN 1000 MG: 500 TABLET ORAL at 02:39

## 2020-08-22 RX ADMIN — POTASSIUM CHLORIDE 10 MEQ: 7.46 INJECTION, SOLUTION INTRAVENOUS at 10:42

## 2020-08-22 RX ADMIN — POTASSIUM CHLORIDE: 2 INJECTION, SOLUTION, CONCENTRATE INTRAVENOUS at 09:50

## 2020-08-22 RX ADMIN — POTASSIUM CHLORIDE 40 MEQ: 1500 TABLET, EXTENDED RELEASE ORAL at 10:33

## 2020-08-22 RX ADMIN — Medication 1 TABLET: at 21:01

## 2020-08-22 RX ADMIN — DOXYLAMINE SUCCINATE 25 MG: 25 TABLET ORAL at 21:01

## 2020-08-22 RX ADMIN — POTASSIUM CHLORIDE 10 MEQ: 7.46 INJECTION, SOLUTION INTRAVENOUS at 13:03

## 2020-08-22 RX ADMIN — ASPIRIN 81 MG: 81 TABLET, COATED ORAL at 08:45

## 2020-08-22 RX ADMIN — POTASSIUM CHLORIDE 40 MEQ: 1500 TABLET, EXTENDED RELEASE ORAL at 15:09

## 2020-08-22 RX ADMIN — POTASSIUM CHLORIDE 10 MEQ: 7.46 INJECTION, SOLUTION INTRAVENOUS at 11:45

## 2020-08-22 RX ADMIN — INSULIN GLARGINE 10 UNITS: 100 INJECTION, SOLUTION SUBCUTANEOUS at 21:05

## 2020-08-22 RX ADMIN — INSULIN LISPRO 1 UNITS: 100 INJECTION, SOLUTION INTRAVENOUS; SUBCUTANEOUS at 08:12

## 2020-08-22 RX ADMIN — INSULIN LISPRO 1 UNITS: 100 INJECTION, SOLUTION INTRAVENOUS; SUBCUTANEOUS at 20:20

## 2020-08-22 ASSESSMENT — PAIN SCALES - GENERAL
PAINLEVEL_OUTOF10: 6
PAINLEVEL_OUTOF10: 8

## 2020-08-22 NOTE — PROGRESS NOTES
chloride  40 mEq Oral Q3H (SCHEDULED)    magnesium sulfate  1 g Intravenous Once    potassium chloride  40 mEq Oral Once    insulin lispro  0-6 Units Subcutaneous TID WC    insulin lispro  0-3 Units Subcutaneous QAM    insulin glargine  10 Units Subcutaneous Nightly    vitamin B-6  50 mg Oral Nightly    doxyLAMINE succinate  25 mg Oral Nightly    aspirin  81 mg Oral Daily    prenatal vitamin  1 tablet Oral Daily     Continuous Infusions:    IV infusion builder 75 mL/hr at 08/22/20 0950    dextrose      dextrose       PRN Medicationsacetaminophen, 1,000 mg, Q6H PRN  glucose, 15 g, PRN  dextrose, 12.5 g, PRN  glucagon (rDNA), 1 mg, PRN  dextrose, 100 mL/hr, PRN  magnesium sulfate, 1 g, PRN  meclizine, 12.5 mg, TID PRN  calcium carbonate, 1,000 mg, TID PRN  glucose, 15 g, PRN  dextrose, 12.5 g, PRN  glucagon (rDNA), 1 mg, PRN  dextrose, 100 mL/hr, PRN  potassium chloride, 40 mEq, PRN    Or  potassium alternative oral replacement, 40 mEq, PRN    Or  potassium chloride, 10 mEq, PRN        Diagnostic Labs:  CBC:   Recent Labs     08/20/20  1838 08/21/20  0728 08/22/20  0617   WBC 11.7* 9.9 9.7   RBC 4.02 3.64* 3.57*   HGB 11.7* 10.7* 10.6*   HCT 36.4 33.4* 33.0*   MCV 90.5 91.8 92.4   RDW 14.8* 14.8* 14.8*    224 227     BMP:   Recent Labs     08/21/20  0728 08/21/20  1752 08/22/20  0617    138 139   K 3.0* 3.4* 2.9*    102 105   CO2 20 22 19*   BUN 4* 5* 5*   CREATININE 0.45* 0.52 0.42*     BNP: No results for input(s): BNP in the last 72 hours. PT/INR: No results for input(s): PROTIME, INR in the last 72 hours. APTT: No results for input(s): APTT in the last 72 hours. CARDIAC ENZYMES: No results for input(s): CKMB, CKMBINDEX, TROPONINI in the last 72 hours.     Invalid input(s): CKTOTAL;3  FASTING LIPID PANEL:No results found for: CHOL, HDL, TRIG  LIVER PROFILE:   Recent Labs     08/20/20  0133 08/20/20  1838 08/21/20  0728   AST 45* 55* 49*   ALT 41* 47* 42*   BILITOT 0.19* 0.28* 0.26* ALKPHOS 64 72 64      MICROBIOLOGY:   Lab Results   Component Value Date/Time    CULTURE NO SIGNIFICANT GROWTH 08/19/2020 03:20 PM       Imaging:    No results found. ASSESSMENT & PLAN     ASSESSMENT / PLAN:     Pre-gestational diabetes   - A1c 6.9 (8/20/2020). Start low-dose insulin sliding scale given hypokalemia, insulin.  -Hypoglycemia treatment protocol. POCT glucose 4 times daily. Carb controlled diet.  -Glucose 118, beta hydroxybutyrate 0.48    Acute hypokalemia.    -As low as 2.4 on admission.    -Potassium being replaced also go to 3.4, 2.9 this morning  -Continue to replace potassium  -BMP daily.           Ju Case MD  Internal Medicine Resident, PGY-1  [facility];  [address] [place] [address]  8/22/2020, 12:44 PM

## 2020-08-22 NOTE — PROGRESS NOTES
OB/GYN PROGRESS NOTE    Zhou Simon is a 26 y.o. female [id number] at 28w2d, Hospital Day: 4    Subjective:   Patient has been seen and examined. Patient is doing well, complaining of nothing today. She denies any N/V and reports only one bout of loose stool yesterday. Patient denies any vaginal discharge and any urinary complaints. The patient reports fetal movement is present, denies contractions, denies loss of fluid, denies vaginal bleeding. Patient denies headache, vision changes, nausea, vomiting, fever, chills, shortness of breath, chest pain, RUQ pain, abdominal pain, diarrhea, change in color/amount/odor of vaginal discharge, dysuria or, hematuria.      Objective:   Vitals:  Vitals:    08/21/20 1938 08/21/20 1939 08/22/20 0100 08/22/20 0241   BP:  135/81 115/64 (!) 111/58   Pulse:  78 67 76   Resp: 18  18 18   Temp: 98.2 °F (36.8 °C)      TempSrc: Oral      SpO2:             FHT: 150, moderate variability, accelerations present, @1924 3 minute prolonged deceleration down to 120bpm spontaneous return to baseline, @2052 4 minute prolonged deceleration down to 120 spontaneous return to baseline, @ 2339 4 minute prolonged deceleration down to 120 spontaneous return to baseline, @0528 a possible 4-6 min prolonged deceleration down to 120bpm with spontaneous return to baseline- broken up so unable to determine- overall Cat I FHT  Contractions: none    Physical Exam:  General appearance:  no apparent distress, alert and cooperative  HEENT: head atraumatic, normocephalic, moist mucous membranes, trachea midline  Neurologic:  alert, oriented, normal speech, no focal findings or movement disorder noted  Lungs:  No increased work of breathing, good air exchange, clear to auscultation bilaterally, no crackles or wheezing  Heart:  normal S1 and S2, regular rate and rhythm and no murmur    Abdomen:  soft, gravid, non-tender, no rebound, guarding, or rigidity, no RUQ or epigastric tenderness, no signs or symptoms of abruption and no signs or symptoms of chorioamnionitis  Extremities:  no calf tenderness, non edematous  Musculoskeletal: Gross strength equal and intact throughout, no gross abnormalities, range of motion normal in hips, knees, shoulders and spine  Psychiatric: Mood appropriate, normal affect   Rectal Exam: not indicated  Pelvic exam: not indicated    DATA:  Labs:   Lab Results   Component Value Date    WBC 9.9 08/21/2020    HGB 10.7 (L) 08/21/2020    HCT 33.4 (L) 08/21/2020    MCV 91.8 08/21/2020     08/21/2020     Lab Results   Component Value Date     08/21/2020    K 3.4 08/21/2020     08/21/2020    CO2 22 08/21/2020    BUN 5 08/21/2020    CREATININE 0.52 08/21/2020    GLUCOSE 167 08/21/2020    CALCIUM 9.0 08/21/2020        Diagnostics: MFM scan 2/59/18: cephalic, BPP 8/8, UADs and MCAs wnl, Cervi is 36-38mm    Assessment/Plan:  Aurelio Zhang is a 26 y.o. female [id number] at 28w2d IUP   - Rh positive/ Rubella immune/ GBS unknown   - PenG for GBS prophylaxis if delivery imminent   - Rhogam: not indicated   - Tdap vaccination: needed, will discuss with patient   - Continue PNV, SCDs, ASA daily   - VSS   - Overall Cat I FHT, TOCO none   - Declined COVID testing   - MFM scan (7/27): cephalic, BPP 8/8, UA and MCA dopplers WNL    N/V    - Pt reports stable   - Vitamin B6/Doxylamine nightly/Antivert PRN    - EKG (8/19): sinus rhythm w/ PVCs, mod criteria for LVH, may be normal variant, prolonged QT, QTc 578   - EKG (8/20): normal sinus rhythm, QTc 479   - TSH normal at 1.81    Contractions (resolved)   - Pt denying any contractions    Prolonged decels   - Pt continues to have intermittent prolonged decels but overall Cat I FHT    Hypokalemia (K: 2.4>2.9>2.7>3.0>3.4)   - CBC, BMP daily   - Mag 1.8>1.8>1.8, no need to replace   - Appreciate IM recommendations- aggressive K and Mag replacement, KCl 40mEQ PO qd    - K/Cr: normal at 0.18 showing no evidence of renal K wasting   - EKG (8/19): sinus rhythm w/ PVCs, mod criteria for LVH, may be normal variant, prolonged QT, QTc 578   - EKG (): normal sinus rhythm, QTc 479    - Will monitor closely and try to avoid QTc prolonging medications   - Echocardiogram (); EF 55%, thickened mitral leaflets, mild TR   - Insulin regimen has been held until yesterday () due to this refractory hypokalemia, pt was given NPH 20U at night ()   - Pt received 160mEQ PO and 80mEQ IV K replacement 20   - Will monitor K s/p NPH 20U  night    Transaminitis (ALT/AST 46/48>41/45>47/55>42/49)   - BPs overall normotensive   - Pt denies any s/s PreE    Poorly controlled pregestational DM (new dx)   - A>16>16>16>14   - Blood sugars still not well controlled   - BHB: 0.40>0.86, will order another one this morning   - Pt was given NPH 20 last night, will monitor blood sugars today   - Based on weight based calculations of insulin regimen recommended during pregnancy, patient to receive Novolog 15/10/10, NPH 30 AM and 20 PM- overall held due to refractory hypokalemia   - BMP with blood sugars still pending this AM   - IM consulted- recommending lantus 10 U nightly, LD ISS in place, recommending aggressive PO K replacement   - Yesterday 20, Pt received 2U lispro with lunch and 1U lispro with dinner   - Fasting and 2 hour PP blood sugars   - S/p diabetic ed consult done on 20    Anemia (10.6)   - Pt denies any s/s anemia   - CBC daily    cHTN (no meds)   - Pt denies any s/s PreE   - BP normotensive overall   - Baseline preE labs WNL x1 (except for LFTs), P/C 0.29 ()    Hx CS x2 (G4- breech, G7-NRFHT)    Hx  (G5)    Hx indication PTD x1 (G5 @ 36w0d- suspected chorio)   - Not a candidate for progesterone therapy    Hx starvation ketoacidosis (G7)   - Pt underwent significant ketoacidosis in last pregnancy in [facility]    Hx of PreE w/ SF (G7)   - Pt to continue [id number] daily    - Pt denies any s/s PreE   - Pt denies any s/s PreE   - BP normotensive overall   - Baseline preE labs WNL x1 (except for LFTs), P/C 0.29 ()    Hx of GDMA1 (G7)    Hx LSIL on pap smear 20    Anxiety (no meds)   - Stable on no medications    THC use/Tobacco use   - Encouraged cessation    BMI 38    Patient Active Problem List    Diagnosis Date Noted    History of pre-eclampsia w/ SF--G7 2018     Priority: High    Hypokalemia     28 weeks gestation of pregnancy 2020    Hx LSIL 2020    History of anxiety 2020    Hypertension affecting pregnancy in third trimester     Pre-existing type 2 diabetes mellitus during pregnancy, antepartum     Obesity affecting pregnancy in third trimester     Feeling pelvic pressure in pregnancy, antepartum     Threatened  labor, antepartum     27 weeks gestation of pregnancy 2020    cHTN (no meds) 2020    Pre-Gestational DM (new dx) 2020     Overview Note:     18:  Failed 2hr GTT  2020- MFM referral placed-SK      Late prenatal care affecting pregnancy in second trimester 2020    High-risk pregnancy, second trimester 2020     Overview Note:     2020- MFM referral placed for anatomy scan       Abnormal uterine bleeding 2018    H/O Starvation ketoacidosis     History of gestational diabetes mellitus (GDM) 05/15/2018     Overview Note:     Passed early 1hr/3hr (at ~ 12wks), Failed late 1hr/3hr (~27 & 31wks)    18- Pt states fasting and 2 hr PPD are <95 and 120      H/O indicated  delivery 2018     Overview Note:     Suspected chorioamnionitis      Hx C/S x2 (G4-breech, G7--NRFHT) 2017     Overview Note:     H/O successful  x1  Desires TOLAC this pregnancy      H/O successful  2017     Overview Note:           BMI 39.0-39.9,adult 2017    Former smoker 2017     Overview Note:     Pt denies in current preg      History of THC abuse  2017     Overview Note:     Denies in this preg     [facility]	[place] [facility] 12/18/2017       Will update  Vernice Holstein.      Mary Palacios DO  Ob/Gyn Resident  8/22/2020, 5:54 AM

## 2020-08-22 NOTE — PROGRESS NOTES
OB/GYN Resident Interval Note    Patient seen and examined. Patient is refusing to receive her IV potassium due to burning sensations from the IV. She has already received 80 mEq PO and 40 mEq IV. She is receiving KCL infusion. Her last K increased to 3.3 today. Last glucose was 136    Spoke to IM and it is ok to discontinue the IV potassium at this time.    Will repeat labs in the morning       Annabella Duarte DO   OB/GYN Resident  Pager [phone]  8/22/2020, 3:18 PM

## 2020-08-23 VITALS
TEMPERATURE: 98.4 F | DIASTOLIC BLOOD PRESSURE: 67 MMHG | SYSTOLIC BLOOD PRESSURE: 114 MMHG | HEART RATE: 84 BPM | RESPIRATION RATE: 16 BRPM | OXYGEN SATURATION: 99 %

## 2020-08-23 PROBLEM — R94.31 LONG QT INTERVAL: Status: ACTIVE | Noted: 2020-08-23

## 2020-08-23 PROBLEM — R79.89 ELEVATED LFTS: Status: ACTIVE | Noted: 2020-08-23

## 2020-08-23 LAB
ABSOLUTE EOS #: 0.3 K/UL (ref 0–0.44)
ABSOLUTE IMMATURE GRANULOCYTE: 0.09 K/UL (ref 0–0.3)
ABSOLUTE LYMPH #: 1.8 K/UL (ref 1.1–3.7)
ABSOLUTE MONO #: 0.87 K/UL (ref 0.1–1.2)
ANION GAP SERPL CALCULATED.3IONS-SCNC: 15 MMOL/L (ref 9–17)
BASOPHILS # BLD: 0 % (ref 0–2)
BASOPHILS ABSOLUTE: 0.04 K/UL (ref 0–0.2)
BUN BLDV-MCNC: 4 MG/DL (ref 6–20)
BUN/CREAT BLD: ABNORMAL (ref 9–20)
C TRACH DNA GENITAL QL NAA+PROBE: NEGATIVE
CALCIUM SERPL-MCNC: 8.8 MG/DL (ref 8.6–10.4)
CHLORIDE BLD-SCNC: 102 MMOL/L (ref 98–107)
CO2: 21 MMOL/L (ref 20–31)
CREAT SERPL-MCNC: 0.44 MG/DL (ref 0.5–0.9)
DIFFERENTIAL TYPE: ABNORMAL
EOSINOPHILS RELATIVE PERCENT: 3 % (ref 1–4)
FERRITIN: 37 UG/L (ref 13–150)
GFR AFRICAN AMERICAN: >60 ML/MIN
GFR NON-AFRICAN AMERICAN: >60 ML/MIN
GFR SERPL CREATININE-BSD FRML MDRD: ABNORMAL ML/MIN/{1.73_M2}
GFR SERPL CREATININE-BSD FRML MDRD: ABNORMAL ML/MIN/{1.73_M2}
GLUCOSE BLD-MCNC: 138 MG/DL (ref 70–99)
GLUCOSE BLD-MCNC: 171 MG/DL (ref 65–105)
HCT VFR BLD CALC: 32.9 % (ref 36.3–47.1)
HEMOGLOBIN: 10.5 G/DL (ref 11.9–15.1)
IMMATURE GRANULOCYTES: 1 %
IRON SATURATION: 26 % (ref 20–55)
IRON: 81 UG/DL (ref 37–145)
LYMPHOCYTES # BLD: 19 % (ref 24–43)
MCH RBC QN AUTO: 29.2 PG (ref 25.2–33.5)
MCHC RBC AUTO-ENTMCNC: 31.9 G/DL (ref 28.4–34.8)
MCV RBC AUTO: 91.6 FL (ref 82.6–102.9)
MONOCYTES # BLD: 9 % (ref 3–12)
N. GONORRHOEAE DNA: NEGATIVE
NRBC AUTOMATED: 0 PER 100 WBC
PDW BLD-RTO: 14.6 % (ref 11.8–14.4)
PLATELET # BLD: 215 K/UL (ref 138–453)
PLATELET ESTIMATE: ABNORMAL
PMV BLD AUTO: 12.5 FL (ref 8.1–13.5)
POTASSIUM SERPL-SCNC: 3.2 MMOL/L (ref 3.7–5.3)
RBC # BLD: 3.59 M/UL (ref 3.95–5.11)
RBC # BLD: ABNORMAL 10*6/UL
SEG NEUTROPHILS: 68 % (ref 36–65)
SEGMENTED NEUTROPHILS ABSOLUTE COUNT: 6.38 K/UL (ref 1.5–8.1)
SODIUM BLD-SCNC: 138 MMOL/L (ref 135–144)
SPECIMEN DESCRIPTION: NORMAL
TOTAL IRON BINDING CAPACITY: 311 UG/DL (ref 250–450)
UNSATURATED IRON BINDING CAPACITY: 230 UG/DL (ref 112–347)
WBC # BLD: 9.5 K/UL (ref 3.5–11.3)
WBC # BLD: ABNORMAL 10*3/UL

## 2020-08-23 PROCEDURE — 90471 IMMUNIZATION ADMIN: CPT | Performed by: STUDENT IN AN ORGANIZED HEALTH CARE EDUCATION/TRAINING PROGRAM

## 2020-08-23 PROCEDURE — 2580000003 HC RX 258: Performed by: STUDENT IN AN ORGANIZED HEALTH CARE EDUCATION/TRAINING PROGRAM

## 2020-08-23 PROCEDURE — 90715 TDAP VACCINE 7 YRS/> IM: CPT | Performed by: STUDENT IN AN ORGANIZED HEALTH CARE EDUCATION/TRAINING PROGRAM

## 2020-08-23 PROCEDURE — 83540 ASSAY OF IRON: CPT

## 2020-08-23 PROCEDURE — 82728 ASSAY OF FERRITIN: CPT

## 2020-08-23 PROCEDURE — 6370000000 HC RX 637 (ALT 250 FOR IP): Performed by: STUDENT IN AN ORGANIZED HEALTH CARE EDUCATION/TRAINING PROGRAM

## 2020-08-23 PROCEDURE — 36415 COLL VENOUS BLD VENIPUNCTURE: CPT

## 2020-08-23 PROCEDURE — 85025 COMPLETE CBC W/AUTO DIFF WBC: CPT

## 2020-08-23 PROCEDURE — 96372 THER/PROPH/DIAG INJ SC/IM: CPT

## 2020-08-23 PROCEDURE — 6360000002 HC RX W HCPCS: Performed by: STUDENT IN AN ORGANIZED HEALTH CARE EDUCATION/TRAINING PROGRAM

## 2020-08-23 PROCEDURE — 82947 ASSAY GLUCOSE BLOOD QUANT: CPT

## 2020-08-23 PROCEDURE — 80048 BASIC METABOLIC PNL TOTAL CA: CPT

## 2020-08-23 PROCEDURE — 83550 IRON BINDING TEST: CPT

## 2020-08-23 RX ORDER — INSULIN GLARGINE 100 [IU]/ML
10 INJECTION, SOLUTION SUBCUTANEOUS NIGHTLY
Qty: 840 UNITS | Refills: 0 | Status: SHIPPED | OUTPATIENT
Start: 2020-08-23 | End: 2020-08-25

## 2020-08-23 RX ORDER — POTASSIUM CHLORIDE 20 MEQ/1
40 TABLET, EXTENDED RELEASE ORAL
Status: DISCONTINUED | OUTPATIENT
Start: 2020-08-23 | End: 2020-08-23 | Stop reason: HOSPADM

## 2020-08-23 RX ADMIN — POTASSIUM CHLORIDE 40 MEQ: 1500 TABLET, EXTENDED RELEASE ORAL at 09:15

## 2020-08-23 RX ADMIN — ASPIRIN 81 MG: 81 TABLET, COATED ORAL at 08:20

## 2020-08-23 RX ADMIN — POTASSIUM CHLORIDE: 2 INJECTION, SOLUTION, CONCENTRATE INTRAVENOUS at 02:00

## 2020-08-23 RX ADMIN — TETANUS TOXOID, REDUCED DIPHTHERIA TOXOID AND ACELLULAR PERTUSSIS VACCINE, ADSORBED 0.5 ML: 5; 2.5; 8; 8; 2.5 SUSPENSION INTRAMUSCULAR at 09:44

## 2020-08-23 NOTE — PROGRESS NOTES
[facility]  Internal Medicine Teaching Residency Program  Inpatient Daily Progress Note  ______________________________________________________________________________    Patient: Erendira Deras  YOB: 1990   UU    Acct: [de-identified]     Room: [facility]  Admit date: 2020  Today's date: 20  Number of days in the hospital: 3    SUBJECTIVE   Admitting Diagnosis: Diabetes and pregnancy  CC: Pregnancy 20 weeks gestation  Pt examined at bedside. Chart & results reviewed. Per patient is lying comfortably in the bed. Blood glucose 138. We will continue glycemic control. Continue to monitor  Pt stated that she want to go home. Patient hemodynamically stable and afebrile. No acute complaints. Pt not seen with the attending as she left. BRIEF HISTORY     The patient is a pleasant 26 y.o. female [id number] at 27w6d who presented on 2020 with contractions along with pelvic pressure. On presentation, patient was found to be severely hypokalemic with K of 2.4 and also with pre-gestational diabetes. Hemoglobin A1c checked yesterday was 6.9. Patient has never taken insulin or oral antihyperglycemic agents. Cardiology were consulted yesterday after EKG showed prolonged QTC of 578. This is related to hypokalemia, hypomagnesemia. HCTZ was discontinued, and echo was ordered to rule out any wall motion abnormalities. Echocardiogram came back normal with EF 55%, mild TR and MR. Cardiology signed off this morning.     Internal medicine consulted for management of hypokalemia in the setting of hyperglycemia. Prior to consultation, patient had ready received 40 mEq of p.o. potassium and was started on potassium replacement protocol. Patient is insulin naïve, blood glucose peaked at 253 yesterday, POC glucose today 208. Beta hydroxybutyrate was found to be elevated 0.40, 0.86. A1c 6.9 yesterday. TSH WNL. UDS negative.     OBJECTIVE Vital Signs:  /84   Pulse 80   Temp 98.2 °F (36.8 °C) (Oral)   Resp 17   SpO2 99%     Temp (24hrs), Av.5 °F (36.9 °C), Min:98.2 °F (36.8 °C), Max:98.8 °F (37.1 °C)    In:    Out: -       Medications:  Scheduled Medications:    magnesium sulfate  1 g Intravenous Once    Tdap-Dtap  0.5 mL Intramuscular Once    insulin lispro  0-6 Units Subcutaneous TID WC    insulin lispro  0-3 Units Subcutaneous QAM    insulin glargine  10 Units Subcutaneous Nightly    vitamin B-6  50 mg Oral Nightly    doxyLAMINE succinate  25 mg Oral Nightly    aspirin  81 mg Oral Daily    prenatal vitamin  1 tablet Oral Daily     Continuous Infusions:    IV infusion builder 75 mL/hr at 20 0950    dextrose      dextrose       PRN Medicationsacetaminophen, 1,000 mg, Q6H PRN  glucose, 15 g, PRN  dextrose, 12.5 g, PRN  glucagon (rDNA), 1 mg, PRN  dextrose, 100 mL/hr, PRN  magnesium sulfate, 1 g, PRN  meclizine, 12.5 mg, TID PRN  calcium carbonate, 1,000 mg, TID PRN  glucose, 15 g, PRN  dextrose, 12.5 g, PRN  glucagon (rDNA), 1 mg, PRN  dextrose, 100 mL/hr, PRN        Diagnostic Labs:  CBC:   Recent Labs     20  1838 20  0728 20  0617   WBC 11.7* 9.9 9.7   RBC 4.02 3.64* 3.57*   HGB 11.7* 10.7* 10.6*   HCT 36.4 33.4* 33.0*   MCV 90.5 91.8 92.4   RDW 14.8* 14.8* 14.8*    224 227     BMP:   Recent Labs     20  1752 20  0617 20  1215    139 137   K 3.4* 2.9* 3.3*    105 100   CO2 22 19* 20   BUN 5* 5* 4*   CREATININE 0.52 0.42* 0.55     BNP: No results for input(s): BNP in the last 72 hours. PT/INR: No results for input(s): PROTIME, INR in the last 72 hours. APTT: No results for input(s): APTT in the last 72 hours. CARDIAC ENZYMES: No results for input(s): CKMB, CKMBINDEX, TROPONINI in the last 72 hours.     Invalid input(s): CKTOTAL;3  FASTING LIPID PANEL:No results found for: CHOL, HDL, TRIG  LIVER PROFILE:   Recent Labs     20  0133 20  3162 08/21/20  0728   AST 45* 55* 49*   ALT 41* 47* 42*   BILITOT 0.19* 0.28* 0.26*   ALKPHOS 64 72 61      MICROBIOLOGY:   Lab Results   Component Value Date/Time    CULTURE NO SIGNIFICANT GROWTH 08/19/2020 03:20 PM       Imaging:    No results found. ASSESSMENT & PLAN     ASSESSMENT / PLAN:     Pre-gestational diabetes   - A1c 6.9 (8/20/2020). Start low-dose insulin sliding scale given hypokalemia, insulin.  -Hypoglycemia treatment protocol. POCT glucose 4 times daily. Carb controlled diet.  -Glucose 118, beta hydroxybutyrate 0.48    Acute hypokalemia.    -As low as 2.4 on admission.    -Potassium being monitored and replaced if needed. -BMP daily.     Pt left and not seen with Attending. Noy Deng MD  Internal Medicine Resident, PGY-1  [facility];  [address], [place]  8/23/2020, 1:31 AM
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
## 2020-08-23 NOTE — PROGRESS NOTES
BUN 5 (L) 6 - 20 mg/dL    CREATININE 0.42 (L) 0.50 - 0.90 mg/dL    Bun/Cre Ratio NOT REPORTED 9 - 20    Calcium 8.8 8.6 - 10.4 mg/dL    Sodium 139 135 - 144 mmol/L    Potassium 2.9 (LL) 3.7 - 5.3 mmol/L    Chloride 105 98 - 107 mmol/L    CO2 19 (L) 20 - 31 mmol/L    Anion Gap 15 9 - 17 mmol/L    GFR Non-African American >60 >60 mL/min    GFR African American >60 >60 mL/min    GFR Comment          GFR Staging NOT REPORTED    CBC WITH AUTO DIFFERENTIAL    Collection Time: 08/22/20  6:17 AM   Result Value Ref Range    WBC 9.7 3.5 - 11.3 k/uL    RBC 3.57 (L) 3.95 - 5.11 m/uL    Hemoglobin 10.6 (L) 11.9 - 15.1 g/dL    Hematocrit 33.0 (L) 36.3 - 47.1 %    MCV 92.4 82.6 - 102.9 fL    MCH 29.7 25.2 - 33.5 pg    MCHC 32.1 28.4 - 34.8 g/dL    RDW 14.8 (H) 11.8 - 14.4 %    Platelets 467 303 - 780 k/uL    MPV 12.6 8.1 - 13.5 fL    NRBC Automated 0.0 0.0 per 100 WBC    Differential Type NOT REPORTED     Seg Neutrophils 67 (H) 36 - 65 %    Lymphocytes 20 (L) 24 - 43 %    Monocytes 9 3 - 12 %    Eosinophils % 3 1 - 4 %    Basophils 0 0 - 2 %    Immature Granulocytes 1 (H) 0 %    Segs Absolute 6.43 1.50 - 8.10 k/uL    Absolute Lymph # 1.95 1.10 - 3.70 k/uL    Absolute Mono # 0.90 0.10 - 1.20 k/uL    Absolute Eos # 0.30 0.00 - 0.44 k/uL    Basophils Absolute 0.03 0.00 - 0.20 k/uL    Absolute Immature Granulocyte 0.09 0.00 - 0.30 k/uL    WBC Morphology NOT REPORTED     RBC Morphology ANISOCYTOSIS PRESENT     Platelet Estimate NOT REPORTED    Magnesium    Collection Time: 08/22/20  6:17 AM   Result Value Ref Range    Magnesium 1.7 1.6 - 2.6 mg/dL   Beta-Hydroxybutyrate    Collection Time: 08/22/20  6:17 AM   Result Value Ref Range    Beta-Hydroxybutyrate 0.48 (H) 0.02 - 0.27 mmol/L   POC Glucose Fingerstick    Collection Time: 08/22/20 10:54 AM   Result Value Ref Range    POC Glucose 118 (H) 65 - 105 mg/dL   Basic Metabolic Panel w/ Reflex to MG    Collection Time: 08/22/20 12:15 PM   Result Value Ref Range    Glucose 136 (H) 70 - 99 mg/dL    BUN 4 (L) 6 - 20 mg/dL    CREATININE 0.55 0.50 - 0.90 mg/dL    Bun/Cre Ratio NOT REPORTED 9 - 20    Calcium 8.9 8.6 - 10.4 mg/dL    Sodium 137 135 - 144 mmol/L    Potassium 3.3 (L) 3.7 - 5.3 mmol/L    Chloride 100 98 - 107 mmol/L    CO2 20 20 - 31 mmol/L    Anion Gap 17 9 - 17 mmol/L    GFR Non-African American >60 >60 mL/min    GFR African American >60 >60 mL/min    GFR Comment          GFR Staging NOT REPORTED    Magnesium    Collection Time: 08/22/20 12:15 PM   Result Value Ref Range    Magnesium 1.7 1.6 - 2.6 mg/dL   POC Glucose Fingerstick    Collection Time: 08/22/20  7:41 PM   Result Value Ref Range    POC Glucose 142 (H) 65 - 105 mg/dL         Diagnostics:   ECHO (8/20): EF 55%, thickened mitral leaflets, mild tricuspid regurg  EKG (8/20): Normal sinus rhythm, QTc 479 (ok)  EKG (8/19): Sinus rhythm w/ PVCs, mod criteria for LVH, may be normal variant, Prolonged QT, QTc 578  MFM scan (5/17): Cephalic, BPP 8/8, UADs and MCAs wnl. Cervix 36-38 mm    Assessment/Plan:  Silvia Carcamo is a 26 y.o. female [id number] at 28w3d IUP   - Rh+/RI/GBSunk   - PenG for GBS if prophylaxis is indicated    - VSS   - cEFM and TOCO    - Declined COVID testing    - Tdap ordered    - Continue with PNV, SCDs and ASA   - Gc/C pending,will treat as needed    - MFM  8/81: cephalic, 8/8 BPP, dopplers wnl    - VitB6, doxylamine nightly, antivert PRN to control nausea and vomiting     Hypokalemia    - IM following, appreciate recommendations    - IVF: KCl 20 mEq in NS 0.45%   - CBC and BMP QD    - K 2.4>2.9>2.7>3.0>3.4>2.9>3.3    - Mag 1.8>1.8>1.8>1.7   - Insulin held due to refractory hypokalemia    - PO and IV K replacement ordered although patient refused IV K on 8/22   - K/Cr normal at 0.18 showing no evidence of renal K wasting    - Echo (8/20): EF 55%, thickened mitral leaflets, mild tricuspid regurg   - EKG (8/20): Normal sinus rhythm, QTc 479 (ok)   - EKG (8/19):  Sinus rhythm w/ PVCs, mod criteria for LVH, may be normal variant, Prolonged QT, QTc 578   - Cardio consult - signed off     Pre-gestational Diabetes    - BHB improving 0.40>0.86>0.48   - Anion gap 18>16>16>16>14   - Blood sugars are not controlled, but have improved today    - 90: fasting 150, breakfast 118, lunch 136, dinner  142. Will continue checking QID   - Insulin regimen was held during the day on  due to hypokalemia    - S/p diabetic ed consult    - IM following: Per IM, patient to get lantus 10 U nightly as it has a lesser effect of Hypokalemia in comparison to NPH    Transaminitis    - ALT/AST remaining stable 46/48 >41/45>47/55>42/49   - Denies any RUQ pain    Anemia    - Last Hgb was 10.3    - Denies any s/s of anemia    - CBC ordered this am     Chronic Hypertension (no meds)   - Denies any s/s of preE   - No severe range pressures    - PreE labs wnl except for LFTs, P/C 0.29 on    - ASA 81 mg QD   - Has a hx of PreE (G7)    Anxiety    - Stable without meds     THC and Tobacco Abuse     - Cessation encouraged           Patient Active Problem List    Diagnosis Date Noted    History of pre-eclampsia w/ SF--G7 2018     Priority: High    Hypokalemia     28 weeks gestation of pregnancy 2020    Hx LSIL 2020    History of anxiety 2020    Hypertension affecting pregnancy in third trimester     Pre-existing type 2 diabetes mellitus during pregnancy, antepartum     Obesity affecting pregnancy in third trimester     Feeling pelvic pressure in pregnancy, antepartum     Threatened  labor, antepartum     27 weeks gestation of pregnancy 2020    cHTN (no meds) 2020    Pre-Gestational DM (new dx) 2020     Overview Note:     18:  Failed 2hr GTT  2020- MFM referral placed-SK      Late prenatal care affecting pregnancy in second trimester 2020    High-risk pregnancy, second trimester 2020     Overview Note:     2020- MFM referral placed for anatomy scan      

## 2020-08-23 NOTE — FLOWSHEET NOTE
Pt stated that she needs to go. When asked if she needed to sign out AMA pt responded yes and that she was sorry. Dr. Jaqueline Rivera paged and notified. IV disc  Pt showered, spoke with Dr. Jaqueline Rivera and went home.

## 2020-08-24 ENCOUNTER — TELEPHONE (OUTPATIENT)
Dept: OBGYN | Age: 30
End: 2020-08-24

## 2020-08-24 LAB — GLUCOSE BLD-MCNC: 120 MG/DL (ref 65–105)

## 2020-08-24 NOTE — TELEPHONE ENCOUNTER
----- Message from Mati Zavaleta DO sent at 8/23/2020 10:38 AM EDT -----  Regarding: Needs f/u appt in 1-2 weeks  Pt needs a f/u visit in the clinic in 1-2 weeks for PN care and poorly controlled pregestational DM and difficult to control hypokalemia who left AMA today.

## 2020-08-25 PROBLEM — Z86.32 HISTORY OF GESTATIONAL DIABETES MELLITUS (GDM): Status: RESOLVED | Noted: 2018-05-15 | Resolved: 2020-08-25

## 2020-08-25 RX ORDER — POTASSIUM CHLORIDE 20 MEQ/1
40 TABLET, EXTENDED RELEASE ORAL 2 TIMES DAILY
Qty: 336 TABLET | Refills: 0 | Status: SHIPPED | OUTPATIENT
Start: 2020-08-25 | End: 2022-05-17

## 2020-08-25 RX ORDER — UBIQUINOL 100 MG
CAPSULE ORAL
Qty: 100 EACH | Refills: 3 | Status: ON HOLD | OUTPATIENT
Start: 2020-08-25 | End: 2020-10-07 | Stop reason: HOSPADM

## 2020-08-25 RX ORDER — DOCOSAHEXAENOIC ACID 200 MG
1 CAPSULE ORAL DAILY
Qty: 30 CAPSULE | Refills: 12 | Status: SHIPPED | OUTPATIENT
Start: 2020-08-25 | End: 2022-05-17

## 2020-08-25 RX ORDER — INSULIN GLARGINE 100 [IU]/ML
10 INJECTION, SOLUTION SUBCUTANEOUS NIGHTLY
Qty: 840 UNITS | Refills: 0 | Status: ON HOLD | OUTPATIENT
Start: 2020-08-25 | End: 2020-10-07 | Stop reason: HOSPADM

## 2020-08-25 RX ORDER — ONDANSETRON 4 MG/1
4 TABLET, FILM COATED ORAL EVERY 12 HOURS PRN
Qty: 15 TABLET | Refills: 1 | Status: SHIPPED | OUTPATIENT
Start: 2020-08-25 | End: 2020-11-16

## 2020-08-25 RX ORDER — ADHESIVE BANDAGE 3/4"
1 BANDAGE TOPICAL DAILY
Qty: 1 EACH | Refills: 0 | Status: SHIPPED | OUTPATIENT
Start: 2020-08-25 | End: 2020-11-16

## 2020-08-25 RX ORDER — ASPIRIN 81 MG/1
81 TABLET ORAL DAILY
Qty: 84 TABLET | Refills: 0 | Status: SHIPPED | OUTPATIENT
Start: 2020-08-25 | End: 2022-05-17

## 2020-08-26 ENCOUNTER — HOSPITAL ENCOUNTER (OUTPATIENT)
Age: 30
Setting detail: SPECIMEN
Discharge: HOME | End: 2020-08-26
Payer: COMMERCIAL

## 2020-08-26 LAB
ABO/RH: NORMAL
ABSOLUTE EOS #: 0.3 K/UL (ref 0–0.44)
ABSOLUTE IMMATURE GRANULOCYTE: 0.09 K/UL (ref 0–0.3)
ABSOLUTE LYMPH #: 1.91 K/UL (ref 1.1–3.7)
ABSOLUTE MONO #: 0.86 K/UL (ref 0.1–1.2)
ALBUMIN SERPL-MCNC: 3.5 G/DL (ref 3.5–5.2)
ALBUMIN/GLOBULIN RATIO: 1.2 (ref 1–2.5)
ALP BLD-CCNC: 73 U/L (ref 35–104)
ALT SERPL-CCNC: 44 U/L (ref 5–33)
AMPHETAMINE SCREEN URINE: NEGATIVE
ANION GAP SERPL CALCULATED.3IONS-SCNC: 17 MMOL/L (ref 9–17)
ANTIBODY SCREEN: NEGATIVE
AST SERPL-CCNC: 52 U/L
BARBITURATE SCREEN URINE: NEGATIVE
BASOPHILS # BLD: 0 % (ref 0–2)
BASOPHILS ABSOLUTE: 0.04 K/UL (ref 0–0.2)
BENZODIAZEPINE SCREEN, URINE: NEGATIVE
BILIRUB SERPL-MCNC: 0.38 MG/DL (ref 0.3–1.2)
BUN BLDV-MCNC: 4 MG/DL (ref 6–20)
BUN/CREAT BLD: ABNORMAL (ref 9–20)
BUPRENORPHINE URINE: NORMAL
CALCIUM SERPL-MCNC: 9.4 MG/DL (ref 8.6–10.4)
CANNABINOID SCREEN URINE: NEGATIVE
CHLORIDE BLD-SCNC: 99 MMOL/L (ref 98–107)
CO2: 20 MMOL/L (ref 20–31)
COCAINE METABOLITE, URINE: NEGATIVE
CREAT SERPL-MCNC: 0.49 MG/DL (ref 0.5–0.9)
CREATININE URINE: 77.2 MG/DL (ref 28–217)
DIFFERENTIAL TYPE: ABNORMAL
EOSINOPHILS RELATIVE PERCENT: 3 % (ref 1–4)
GFR AFRICAN AMERICAN: >60 ML/MIN
GFR NON-AFRICAN AMERICAN: >60 ML/MIN
GFR SERPL CREATININE-BSD FRML MDRD: ABNORMAL ML/MIN/{1.73_M2}
GFR SERPL CREATININE-BSD FRML MDRD: ABNORMAL ML/MIN/{1.73_M2}
GLUCOSE BLD-MCNC: 178 MG/DL (ref 70–99)
HCT VFR BLD CALC: 36.7 % (ref 36.3–47.1)
HEMOGLOBIN: 12.3 G/DL (ref 11.9–15.1)
HEPATITIS B SURFACE ANTIGEN: NONREACTIVE
HEPATITIS C ANTIBODY: NONREACTIVE
HIV AG/AB: NONREACTIVE
IMMATURE GRANULOCYTES: 1 %
LYMPHOCYTES # BLD: 16 % (ref 24–43)
MCH RBC QN AUTO: 30.4 PG (ref 25.2–33.5)
MCHC RBC AUTO-ENTMCNC: 33.5 G/DL (ref 28.4–34.8)
MCV RBC AUTO: 90.8 FL (ref 82.6–102.9)
MDMA URINE: NORMAL
METHADONE SCREEN, URINE: NEGATIVE
METHAMPHETAMINE, URINE: NORMAL
MONOCYTES # BLD: 7 % (ref 3–12)
NRBC AUTOMATED: 0 PER 100 WBC
OPIATES, URINE: NEGATIVE
OXYCODONE SCREEN URINE: NEGATIVE
PDW BLD-RTO: 14.7 % (ref 11.8–14.4)
PHENCYCLIDINE, URINE: NEGATIVE
PLATELET # BLD: 269 K/UL (ref 138–453)
PLATELET ESTIMATE: ABNORMAL
PMV BLD AUTO: 12.4 FL (ref 8.1–13.5)
POTASSIUM SERPL-SCNC: 3 MMOL/L (ref 3.7–5.3)
PROPOXYPHENE, URINE: NORMAL
RBC # BLD: 4.04 M/UL (ref 3.95–5.11)
RBC # BLD: ABNORMAL 10*6/UL
RUBV IGG SER QL: 114.8 IU/ML
SEG NEUTROPHILS: 73 % (ref 36–65)
SEGMENTED NEUTROPHILS ABSOLUTE COUNT: 8.7 K/UL (ref 1.5–8.1)
SODIUM BLD-SCNC: 136 MMOL/L (ref 135–144)
T. PALLIDUM, IGG: NONREACTIVE
TEST INFORMATION: NORMAL
TOTAL PROTEIN, URINE: 20 MG/DL
TOTAL PROTEIN: 6.5 G/DL (ref 6.4–8.3)
TRICYCLIC ANTIDEPRESSANTS, UR: NORMAL
URINE TOTAL PROTEIN CREATININE RATIO: 0.26 (ref 0–0.2)
WBC # BLD: 11.9 K/UL (ref 3.5–11.3)
WBC # BLD: ABNORMAL 10*3/UL

## 2020-08-27 LAB
CULTURE: NORMAL
Lab: NORMAL
SPECIMEN DESCRIPTION: NORMAL

## 2020-09-08 ENCOUNTER — TELEPHONE (OUTPATIENT)
Dept: PERINATAL CARE | Age: 30
End: 2020-09-08

## 2020-09-08 ENCOUNTER — ROUTINE PRENATAL (OUTPATIENT)
Dept: PERINATAL CARE | Age: 30
End: 2020-09-08
Payer: COMMERCIAL

## 2020-09-08 VITALS
TEMPERATURE: 97.2 F | SYSTOLIC BLOOD PRESSURE: 129 MMHG | HEIGHT: 66 IN | WEIGHT: 239 LBS | HEART RATE: 102 BPM | RESPIRATION RATE: 16 BRPM | BODY MASS INDEX: 38.41 KG/M2 | DIASTOLIC BLOOD PRESSURE: 84 MMHG

## 2020-09-08 PROCEDURE — 76816 OB US FOLLOW-UP PER FETUS: CPT | Performed by: OBSTETRICS & GYNECOLOGY

## 2020-09-08 PROCEDURE — 2022F DILAT RTA XM EVC RTNOPTHY: CPT | Performed by: OBSTETRICS & GYNECOLOGY

## 2020-09-08 PROCEDURE — 76820 UMBILICAL ARTERY ECHO: CPT | Performed by: OBSTETRICS & GYNECOLOGY

## 2020-09-08 PROCEDURE — G8428 CUR MEDS NOT DOCUMENT: HCPCS | Performed by: OBSTETRICS & GYNECOLOGY

## 2020-09-08 PROCEDURE — 76819 FETAL BIOPHYS PROFIL W/O NST: CPT | Performed by: OBSTETRICS & GYNECOLOGY

## 2020-09-08 PROCEDURE — 76825 ECHO EXAM OF FETAL HEART: CPT | Performed by: OBSTETRICS & GYNECOLOGY

## 2020-09-08 PROCEDURE — 99242 OFF/OP CONSLTJ NEW/EST SF 20: CPT | Performed by: OBSTETRICS & GYNECOLOGY

## 2020-09-08 PROCEDURE — G8417 CALC BMI ABV UP PARAM F/U: HCPCS | Performed by: OBSTETRICS & GYNECOLOGY

## 2020-09-08 PROCEDURE — 76827 ECHO EXAM OF FETAL HEART: CPT | Performed by: OBSTETRICS & GYNECOLOGY

## 2020-09-08 PROCEDURE — 93325 DOPPLER ECHO COLOR FLOW MAPG: CPT | Performed by: OBSTETRICS & GYNECOLOGY

## 2020-09-10 ENCOUNTER — ROUTINE PRENATAL (OUTPATIENT)
Dept: OBGYN | Age: 30
End: 2020-09-10
Payer: COMMERCIAL

## 2020-09-10 VITALS
WEIGHT: 240 LBS | BODY MASS INDEX: 38.74 KG/M2 | DIASTOLIC BLOOD PRESSURE: 82 MMHG | HEART RATE: 101 BPM | SYSTOLIC BLOOD PRESSURE: 130 MMHG

## 2020-09-10 PROCEDURE — 99213 OFFICE O/P EST LOW 20 MIN: CPT | Performed by: STUDENT IN AN ORGANIZED HEALTH CARE EDUCATION/TRAINING PROGRAM

## 2020-09-10 PROCEDURE — 1111F DSCHRG MED/CURRENT MED MERGE: CPT | Performed by: STUDENT IN AN ORGANIZED HEALTH CARE EDUCATION/TRAINING PROGRAM

## 2020-09-10 PROCEDURE — G8427 DOCREV CUR MEDS BY ELIG CLIN: HCPCS | Performed by: STUDENT IN AN ORGANIZED HEALTH CARE EDUCATION/TRAINING PROGRAM

## 2020-09-10 PROCEDURE — 4004F PT TOBACCO SCREEN RCVD TLK: CPT | Performed by: STUDENT IN AN ORGANIZED HEALTH CARE EDUCATION/TRAINING PROGRAM

## 2020-09-10 PROCEDURE — G8417 CALC BMI ABV UP PARAM F/U: HCPCS | Performed by: STUDENT IN AN ORGANIZED HEALTH CARE EDUCATION/TRAINING PROGRAM

## 2020-09-10 RX ORDER — FAMOTIDINE 20 MG/1
20 TABLET, FILM COATED ORAL 2 TIMES DAILY
Qty: 60 TABLET | Refills: 3 | Status: SHIPPED | OUTPATIENT
Start: 2020-09-10 | End: 2020-11-16 | Stop reason: ALTCHOICE

## 2020-09-10 RX ORDER — INSULIN GLARGINE 100 [IU]/ML
INJECTION, SOLUTION SUBCUTANEOUS
Status: ON HOLD | COMMUNITY
Start: 2020-08-25 | End: 2020-10-07 | Stop reason: HOSPADM

## 2020-09-10 NOTE — PROGRESS NOTES
reviewed and recommendations when to call or present to the hospital if she experiences signs or symptoms of  labor and pre-eclampsia were reviewed. - The patient was instructed on fetal kick counts. She was instructed that the baby should move at a minimum of ten times within one hour after a meal. The patient was instructed to lay down on her left side twenty minutes after eating and count movements for up to one hour with a target value of ten movements. She was instructed to notify the office if she did not make that target after two attempts or if after any attempt there was less than four movements. - Pepcid sent for heartburn  - Continue PNV and ASA  - Follow up in 2 weeks for GUNNER  - Desires RRS, approved       cHTN    - BP normotensive   - Denies s/s of preE   -  testing will be scheduled   - Continue to follow with MFM       Pregestational DM   - Continue to follow with MFM    - No s/s of DKA   - Counseled on importance of checking BG    Hx Hypokalemia   - Repeat BMP ordered today    - Reports taking daily potassium supplements     Patient Active Problem List    Diagnosis Date Noted    History of pre-eclampsia w/ SF--G7 2018     Priority: High    Elevated LFTs 2020 admission: ALT/AST 46/48 >41/45>47/55>42/49, Hepatitis panel WNL       Long QT interval 2020     Echo (): EF 55%, thickened mitral leaflets, mild tricuspid regurg  EKG (): Normal sinus rhythm, QTc 479 (ok)  EKG ():  Sinus rhythm w/ PVCs, mod criteria for LVH, may be normal variant, Prolonged QT, QTc 578      Hypokalemia      20: K 2.4>2.9>2.7>3.0>3.4>2.9>3.3>3.2, Mag 1.8>1.8>1.8>1.7 on this admission- pt has received 20 160mEQ PO and 80mEQ IV and 20 80mEQ PO and 40 mEQ IV, TSH 1.81, K/Cr: 0.18 (<1.5wnl, no renal K wasting), Ur Osm 505, Serum Osm 298 (elevated)       Hx LSIL 2020    History of anxiety 2020    Obesity affecting pregnancy in third trimester     Feeling pelvic pressure in pregnancy, antepartum     cHTN (no meds) 2020    Pre-Gestational DM (new dx) 2020: Failed 2hr GTT  2020- MFM referral placed-SK      Late prenatal care affecting pregnancy in second trimester 2020    Abnormal uterine bleeding 2018    H/O Starvation ketoacidosis     H/O indicated  delivery 2018     Suspected chorioamnionitis      Hx C/S x2 (G4-breech, G7--NRFHT) 2017     H/O successful  x1  Desires TOLAC this pregnancy  2020  APPROVED OVARIAN CA RRBS FORM SCANNED INTO MEDIA-SK      H/O successful  2017      BMI 39.0-39.9,adult 2017    Former smoker 2017     Pt denies in current preg      History of THC abuse  2017     Denies in this preg      [address] multiparity 2017     Return in about 2 weeks (around 2020) for GUNNER, 1 week for NST.     Kaela Curran DO  Ob/Gyn Resident  [facility] [place] [place] [facility], [address]  9/10/2020, 5:25 PM

## 2020-09-15 ENCOUNTER — TELEPHONE (OUTPATIENT)
Dept: PERINATAL CARE | Age: 30
End: 2020-09-15

## 2020-09-15 ENCOUNTER — TELEPHONE (OUTPATIENT)
Dept: OBGYN | Age: 30
End: 2020-09-15

## 2020-09-15 NOTE — TELEPHONE ENCOUNTER
----- Message from Abhilash Gardner RN sent at 9/11/2020  1:17 PM EDT -----  Regarding: NST's to be scheduled  Please call pt and scheduled weekly NST's    Start 9/18 and every Friday.      CRYSTAL 11/12/20      Indication- cHTN,  poorly controlled pre-gest diabetes    Thank you- Easton Sandoval

## 2020-09-15 NOTE — TELEPHONE ENCOUNTER
Pt called in blood sugars and reveiwed by Dr. Mouna Maldonado. Dr. Mouna Maldonado ordered pt to begin NPH insulin at hs. Pt notified and agreed asking to have prescrip called into [address] [place] [address]. Writer called into [address] [place] [address], [phone], Rachel, NPH insulin, 8u every hs, subcutanious, vial, with needles, syringes and alcohol pads, 1mos supply and no refills.

## 2020-10-06 ENCOUNTER — HOSPITAL ENCOUNTER (INPATIENT)
Age: 30
LOS: 5 days | Discharge: HOME | DRG: 539 | End: 2020-10-12
Attending: OBSTETRICS & GYNECOLOGY | Admitting: OBSTETRICS & GYNECOLOGY
Payer: COMMERCIAL

## 2020-10-06 PROBLEM — Z3A.34 34 WEEKS GESTATION OF PREGNANCY: Status: ACTIVE | Noted: 2020-10-06

## 2020-10-06 LAB
ABO/RH: NORMAL
ABSOLUTE EOS #: 0.13 K/UL (ref 0–0.44)
ABSOLUTE IMMATURE GRANULOCYTE: 0.09 K/UL (ref 0–0.3)
ABSOLUTE LYMPH #: 1.78 K/UL (ref 1.1–3.7)
ABSOLUTE MONO #: 0.99 K/UL (ref 0.1–1.2)
ALBUMIN SERPL-MCNC: 3.6 G/DL (ref 3.5–5.2)
ALBUMIN/GLOBULIN RATIO: 1.2 (ref 1–2.5)
ALP BLD-CCNC: 153 U/L (ref 35–104)
ALT SERPL-CCNC: 21 U/L (ref 5–33)
ANION GAP SERPL CALCULATED.3IONS-SCNC: 16 MMOL/L (ref 9–17)
ANTIBODY SCREEN: NEGATIVE
ARM BAND NUMBER: NORMAL
AST SERPL-CCNC: 18 U/L
BASOPHILS # BLD: 0 % (ref 0–2)
BASOPHILS ABSOLUTE: 0.04 K/UL (ref 0–0.2)
BETA-HYDROXYBUTYRATE: 1.52 MMOL/L (ref 0.02–0.27)
BILIRUB SERPL-MCNC: 0.43 MG/DL (ref 0.3–1.2)
BILIRUBIN URINE: NEGATIVE
BUN BLDV-MCNC: 6 MG/DL (ref 6–20)
BUN/CREAT BLD: ABNORMAL (ref 9–20)
CALCIUM SERPL-MCNC: 10.1 MG/DL (ref 8.6–10.4)
CHLORIDE BLD-SCNC: 95 MMOL/L (ref 98–107)
CO2: 19 MMOL/L (ref 20–31)
COLOR: YELLOW
COMMENT UA: ABNORMAL
CREAT SERPL-MCNC: 0.63 MG/DL (ref 0.5–0.9)
DIFFERENTIAL TYPE: ABNORMAL
DIRECT EXAM: ABNORMAL
EOSINOPHILS RELATIVE PERCENT: 1 % (ref 1–4)
EXPIRATION DATE: NORMAL
GFR AFRICAN AMERICAN: >60 ML/MIN
GFR NON-AFRICAN AMERICAN: >60 ML/MIN
GFR SERPL CREATININE-BSD FRML MDRD: ABNORMAL ML/MIN/{1.73_M2}
GFR SERPL CREATININE-BSD FRML MDRD: ABNORMAL ML/MIN/{1.73_M2}
GLUCOSE BLD-MCNC: 349 MG/DL (ref 65–105)
GLUCOSE BLD-MCNC: 358 MG/DL (ref 65–105)
GLUCOSE BLD-MCNC: 485 MG/DL (ref 70–99)
GLUCOSE URINE: ABNORMAL
HCT VFR BLD CALC: 39.4 % (ref 36.3–47.1)
HEMOGLOBIN: 13.1 G/DL (ref 11.9–15.1)
IMMATURE GRANULOCYTES: 1 %
KETONES, URINE: ABNORMAL
LEUKOCYTE ESTERASE, URINE: NEGATIVE
LYMPHOCYTES # BLD: 15 % (ref 24–43)
Lab: ABNORMAL
MCH RBC QN AUTO: 29.4 PG (ref 25.2–33.5)
MCHC RBC AUTO-ENTMCNC: 33.2 G/DL (ref 28.4–34.8)
MCV RBC AUTO: 88.5 FL (ref 82.6–102.9)
MONOCYTES # BLD: 9 % (ref 3–12)
NITRITE, URINE: NEGATIVE
NRBC AUTOMATED: 0 PER 100 WBC
PDW BLD-RTO: 14.7 % (ref 11.8–14.4)
PH UA: 7 (ref 5–8)
PLATELET # BLD: 201 K/UL (ref 138–453)
PLATELET ESTIMATE: ABNORMAL
PMV BLD AUTO: 12.1 FL (ref 8.1–13.5)
POTASSIUM SERPL-SCNC: 3.2 MMOL/L (ref 3.7–5.3)
PROTEIN UA: NEGATIVE
RBC # BLD: 4.45 M/UL (ref 3.95–5.11)
RBC # BLD: ABNORMAL 10*6/UL
SEG NEUTROPHILS: 74 % (ref 36–65)
SEGMENTED NEUTROPHILS ABSOLUTE COUNT: 8.58 K/UL (ref 1.5–8.1)
SODIUM BLD-SCNC: 130 MMOL/L (ref 135–144)
SPECIFIC GRAVITY UA: 1.03 (ref 1–1.03)
SPECIMEN DESCRIPTION: ABNORMAL
TOTAL PROTEIN: 6.7 G/DL (ref 6.4–8.3)
TURBIDITY: CLEAR
URINE HGB: NEGATIVE
UROBILINOGEN, URINE: NORMAL
WBC # BLD: 11.6 K/UL (ref 3.5–11.3)
WBC # BLD: ABNORMAL 10*3/UL

## 2020-10-06 PROCEDURE — 6360000002 HC RX W HCPCS: Performed by: STUDENT IN AN ORGANIZED HEALTH CARE EDUCATION/TRAINING PROGRAM

## 2020-10-06 PROCEDURE — 87491 CHLMYD TRACH DNA AMP PROBE: CPT

## 2020-10-06 PROCEDURE — 2580000003 HC RX 258: Performed by: STUDENT IN AN ORGANIZED HEALTH CARE EDUCATION/TRAINING PROGRAM

## 2020-10-06 PROCEDURE — 85025 COMPLETE CBC W/AUTO DIFF WBC: CPT

## 2020-10-06 PROCEDURE — 87510 GARDNER VAG DNA DIR PROBE: CPT

## 2020-10-06 PROCEDURE — 82947 ASSAY GLUCOSE BLOOD QUANT: CPT

## 2020-10-06 PROCEDURE — 6370000000 HC RX 637 (ALT 250 FOR IP): Performed by: STUDENT IN AN ORGANIZED HEALTH CARE EDUCATION/TRAINING PROGRAM

## 2020-10-06 PROCEDURE — 87480 CANDIDA DNA DIR PROBE: CPT

## 2020-10-06 PROCEDURE — 86850 RBC ANTIBODY SCREEN: CPT

## 2020-10-06 PROCEDURE — 82010 KETONE BODYS QUAN: CPT

## 2020-10-06 PROCEDURE — 87086 URINE CULTURE/COLONY COUNT: CPT

## 2020-10-06 PROCEDURE — 96374 THER/PROPH/DIAG INJ IV PUSH: CPT

## 2020-10-06 PROCEDURE — 81003 URINALYSIS AUTO W/O SCOPE: CPT

## 2020-10-06 PROCEDURE — 87660 TRICHOMONAS VAGIN DIR PROBE: CPT

## 2020-10-06 PROCEDURE — 2500000003 HC RX 250 WO HCPCS: Performed by: STUDENT IN AN ORGANIZED HEALTH CARE EDUCATION/TRAINING PROGRAM

## 2020-10-06 PROCEDURE — 86901 BLOOD TYPING SEROLOGIC RH(D): CPT

## 2020-10-06 PROCEDURE — 80053 COMPREHEN METABOLIC PANEL: CPT

## 2020-10-06 PROCEDURE — 87591 N.GONORRHOEAE DNA AMP PROB: CPT

## 2020-10-06 PROCEDURE — 86900 BLOOD TYPING SEROLOGIC ABO: CPT

## 2020-10-06 RX ORDER — BETAMETHASONE SODIUM PHOSPHATE AND BETAMETHASONE ACETATE 3; 3 MG/ML; MG/ML
12 INJECTION, SUSPENSION INTRA-ARTICULAR; INTRALESIONAL; INTRAMUSCULAR; SOFT TISSUE EVERY 24 HOURS
Status: DISCONTINUED | OUTPATIENT
Start: 2020-10-06 | End: 2020-10-06

## 2020-10-06 RX ORDER — PROMETHAZINE HYDROCHLORIDE 12.5 MG/1
12.5 TABLET ORAL EVERY 6 HOURS PRN
Status: DISCONTINUED | OUTPATIENT
Start: 2020-10-06 | End: 2020-10-12 | Stop reason: HOSPADM

## 2020-10-06 RX ORDER — POTASSIUM CHLORIDE 20 MEQ/1
40 TABLET, EXTENDED RELEASE ORAL PRN
Status: DISCONTINUED | OUTPATIENT
Start: 2020-10-06 | End: 2020-10-07

## 2020-10-06 RX ORDER — ACETAMINOPHEN 500 MG
1000 TABLET ORAL EVERY 6 HOURS PRN
Status: DISCONTINUED | OUTPATIENT
Start: 2020-10-06 | End: 2020-10-08

## 2020-10-06 RX ORDER — SODIUM CHLORIDE 9 MG/ML
INJECTION, SOLUTION INTRAVENOUS CONTINUOUS
Status: DISCONTINUED | OUTPATIENT
Start: 2020-10-06 | End: 2020-10-07

## 2020-10-06 RX ORDER — CALCIUM CARBONATE 200(500)MG
500 TABLET,CHEWABLE ORAL 3 TIMES DAILY PRN
Status: DISCONTINUED | OUTPATIENT
Start: 2020-10-06 | End: 2020-10-12 | Stop reason: HOSPADM

## 2020-10-06 RX ORDER — 0.9 % SODIUM CHLORIDE 0.9 %
500 INTRAVENOUS SOLUTION INTRAVENOUS ONCE
Status: COMPLETED | OUTPATIENT
Start: 2020-10-06 | End: 2020-10-06

## 2020-10-06 RX ORDER — POTASSIUM CHLORIDE 7.45 MG/ML
10 INJECTION INTRAVENOUS PRN
Status: DISCONTINUED | OUTPATIENT
Start: 2020-10-06 | End: 2020-10-07

## 2020-10-06 RX ORDER — ONDANSETRON 2 MG/ML
4 INJECTION INTRAMUSCULAR; INTRAVENOUS EVERY 6 HOURS PRN
Status: DISCONTINUED | OUTPATIENT
Start: 2020-10-06 | End: 2020-10-12 | Stop reason: HOSPADM

## 2020-10-06 RX ADMIN — POTASSIUM CHLORIDE 40 MEQ: 1500 TABLET, EXTENDED RELEASE ORAL at 22:57

## 2020-10-06 RX ADMIN — ONDANSETRON 4 MG: 2 INJECTION INTRAMUSCULAR; INTRAVENOUS at 21:14

## 2020-10-06 RX ADMIN — ANTACID TABLETS 500 MG: 500 TABLET, CHEWABLE ORAL at 19:52

## 2020-10-06 RX ADMIN — FAMOTIDINE 20 MG: 10 INJECTION, SOLUTION INTRAVENOUS at 22:57

## 2020-10-06 RX ADMIN — ACETAMINOPHEN 1000 MG: 500 TABLET ORAL at 19:54

## 2020-10-06 RX ADMIN — SODIUM CHLORIDE 500 ML: 9 INJECTION, SOLUTION INTRAVENOUS at 21:02

## 2020-10-06 NOTE — FLOWSHEET NOTE
Pt presents to L&D with c/o ctx and LOF. Pt states she has been leaking fluid since yesterday but did not experience a gush of fluid. Pt states she is experiencing vaginal irritation and it burns when she pees. Pt states decreased FM as well, she says \"I haven't felt him in a day or two. \" Pt denies vaginal bleeding. Pt taken to triage 1, given gown, urine obtained, EFM applied,  notified.

## 2020-10-06 NOTE — H&P
OBSTETRICAL HISTORY [facility]    Date: 10/6/2020       Time: 7:29 PM   Patient Name: Tatum Olson     Patient : 1990  Room/Bed: [place] [address]    Admission Date/Time: 10/6/2020  7:20 PM      CC: Contractions, LOF, and Decreased fetal movement     HPI: Tautm Olson is a 26 y.o. [id number] at 34w5d who presents complaining of contractions and LOF. She reports a steady trickle of leakage since yesterday. She also admits to dysuria  pain with wiping after urination. Patient denies any fever, chills, N/V, headaches, vision changes, chest pain, shortness of breath, RUQ pain, and increased swelling/tenderness in bilateral lower extremities. Patient denies any vaginal discharge. The patient reports fetal movement is present but decreased, complains of contractions, complains of loss of fluid, denies vaginal bleeding. Pregnancy is complicated by Pregestational DM (newly dx in this pregnancy - 2hr GTT 18 abnormal and reports compliance with NPH 8U), Hx of C/S x2 (G4 breech, G7 NRFHT), Hx of successful  (G5), Hx iPTD x1 (G5 @ 36w0d 2/2 suspected chorioamnionitis), grand multiparity, Hx starvation ketoacidosis (G7), Hx PreE w/ SF's (G7), AUB, Desires RRS, Former smoker, THC use, BMI 38    DATING:  LMP: No LMP recorded. Patient is pregnant.   Estimated Date of Delivery: 20   Based on: ultrasound, at 16 0/7 weeks GA    PREGNANCY RISK FACTORS:  Patient Active Problem List   Diagnosis    Hx C/S x2 (G4-breech, G7--NRFHT)   Inna Her H/O successful     BMI 39.0-39.9,adult    Former smoker    History of THC abuse     [address] multip[address]    H/O indicated  delivery    H/O Starvation ketoacidosis    History of pre-eclampsia w/ SF--G7    Abnormal uterine bleeding    Late prenatal care affecting pregnancy in second trimester    cHTN (no meds)    Pre-Gestational DM (new dx)    Obesity affecting pregnancy in third trimester    Feeling pelvic pressure in pregnancy, antepartum    Hx LSIL    History of anxiety    Hypokalemia    Elevated LFTs    Long QT interval    34 weeks gestation of pregnancy        Steroids Given In This Pregnancy:  no     REVIEW OF SYSTEMS:   Constitutional: negative fever, negative chills  HEENT: negative visual disturbances, negative headaches  Respiratory: negative dyspnea, negative cough  Cardiovascular: negative chest pain,  negative palpitations  Gastrointestinal: positive abdominal pain due to cramping, negative RUQ pain, negative N/V, negative diarrhea, negative constipation  Genitourinary: positive pain with wiping after urination, negative vaginal discharge, negative vaginal bleeding, positive LOF  Dermatological: negative rash  Hematologic: negative bruising  Immunologic/Lymphatic: negative recent illness, negative recent sick contact  Musculoskeletal: negative back pain, negative myalgias, negative arthralgias  Neurological:  negative dizziness, negative weakness  Behavior/Psych: negative depression, negative anxiety    OBSTETRICAL HISTORY:   OB History    Para Term  AB Living   8 6 5 1 1 6   SAB TAB Ectopic Molar Multiple Live Births   1 0 0 0 0 6      # Outcome Date GA Lbr [place]/2nd Weight Sex Delivery Anes PTL Lv   8 Current            7 Term 18 37w2d  8 lb 10.1 oz (3.915 kg) F CS-LTranv EPI N PRANAY      Complications: Fetal Intolerance      Name: Schuyler Pal: 8  Apgar5: 9   6 SAB            5  14 36w0d  9 lb 1 oz (4.111 kg) F  EPI N PRANAY      Complications: Maternal fever affecting labor, Acute URI, Fetal tachycardia affecting care of mother, delivered   4 Term 09 37w0d  5 lb 8 oz (2.495 kg) F CS-LTranv Spinal N PRANAY      Birth Comments: Breech, SROM, FOB#2   3 Term 08 40w0d  7 lb 15 oz (3.6 kg) F Vag-Spont EPI N PRANAY      Birth Comments: FOB#3   2 Term 07 40w0d  10 lb (4.536 kg) M Vag-Spont EPI N PRANAY      Birth Comments: FOB#2   1 Term 03 40w0d 6 lb 15 oz (3.147 kg) F Vag-Spont EPI N PRANAY      Birth Comments: FOB#1      Obstetric Comments   New partner in current preg    G5- indicated  delivery d/t suspected chorioamnionitis       PAST MEDICAL HISTORY:   has a past medical history of cHTN (no meds), GDMA1, Headache(784.0), Pityriasis rosea, Right sciatic nerve pain, THC abuse , THC abuse , Traumatic injury , and Trichomonas. PAST SURGICAL HISTORY:   has a past surgical history that includes Cholecystectomy ();  section (); and pr  delivery only (N/A, 2018). ALLERGIES:  has No Known Allergies. MEDICATIONS:  Prior to Admission medications    Medication Sig Start Date End Date Taking?  Authorizing Provider   LANTUS SOLOSTAR 100 UNIT/ML injection pen  20   Historical Provider, MD   famotidine (PEPCID) 20 MG tablet Take 1 tablet by mouth 2 times daily 9/10/20   Myla Butt, DO   insulin glargine (LANTUS) 100 UNIT/ML injection vial Inject 10 Units into the skin nightly 20  Sayra Chamberlain, DO   aspirin EC 81 MG EC tablet Take 1 tablet by mouth daily 20  Kristin Nicholson, DO   ondansetron (ZOFRAN) 4 MG tablet Take 1 tablet by mouth every 12 hours as needed for Nausea 20   Sarya Chamberlain, DO   potassium chloride (KLOR-CON M) 20 MEQ extended release tablet Take 2 tablets by mouth 2 times daily 20  Kristin Nicholson, DO   Docosahexaenoic Acid (PRENATAL DHA) 200 MG CAPS Take 1 capsule by mouth Daily 20  Sayra Chamberlain, DO   Blood Pressure Monitoring (BLOOD PRESSURE CUFF) MISC 1 Units by Does not apply route daily  Patient not taking: Reported on 20  Sayra Chamberlain, DO   Alcohol Swabs (ALCOHOL PREP) 70 % PADS Please apply to finger before checking blood sugar 20   Kristin Nicholson, DO   ranitidine (ZANTAC) 150 MG tablet Take 1 tablet by mouth 2 times daily  Patient not taking: Reported on 2020   Darryn Wing MD   acetaminophen (ACETAMINOPHEN EXTRA STRENGTH) 500 MG tablet TAKE (1) TABLET BY MOUTH EVERY 4 HOURS AS NEEDED FOR PAIN  Patient not taking: Reported on 7/22/2020 12/17/19   Zechariah Birch MD   Prenatal Vit-Fe Fumarate-FA (PRENATAL VITAMIN) 27-1 MG TABS tablet Take 1 tablet by mouth daily 36/6/96   Bary Rash, DO   mineral oil-hydrophilic petrolatum (AQUAPHOR) ointment  12/21/18   Historical Provider, MD   Blood Pressure KIT Use as directed Dx HTN  Patient not taking: Reported on 7/22/2020 12/5/18   Adalid Dent MD   acetaminophen (TYLENOL) 325 MG tablet Take 2 tablets by mouth every 6 hours as needed for Pain 9/20/17   Caroline Montoya PA-C       FAMILY HISTORY:  family history includes High Blood Pressure in her mother. SOCIAL HISTORY:   reports that she has been smoking cigars. She has a 0.15 pack-year smoking history. She has never used smokeless tobacco. She reports that she does not drink alcohol or use drugs. VITALS:  Vitals:    10/06/20 1931 10/06/20 2306 10/06/20 2347   BP: (!) 147/91 (!) 145/75    Pulse: 102 77    Resp: 16 16    Temp: 98.4 °F (36.9 °C) 98.4 °F (36.9 °C)    TempSrc: Oral Oral    Weight:   240 lb (108.9 kg)   Height:   5' 6\" (1.676 m)         PHYSICAL EXAM:  Fetal Heart Monitor:  Baseline Heart Rate 160bpm, moderate variability, absent accelerations, intermittent variable decelerations and likely prolonged deceleration x1, but difficult to interpret due to broken tracing.   [place]: none contractions    General appearance:  no apparent distress, alert and cooperative  HEENT: head atraumatic, normocephalic, moist mucous membranes, trachea midline  Neurologic:  alert, oriented, normal speech, no focal findings or movement disorder noted  Lungs:  No increased work of breathing, good air exchange, clear to auscultation bilaterally, no crackles or wheezing  Heart:  regular rate and rhythm and no murmur    Abdomen:  soft, gravid, non-tender, no rebound, guarding, or rigidity and no RUQ or epigastric tenderness  Extremities:  no calf tenderness, non edematous, DTR's: +2/4 bilateral lower extremities   Musculoskeletal: Gross strength equal and intact throughout, no gross abnormalities, range of motion normal in hips, knees, shoulders and spine, CVA tenderness: none  Psychiatric: Mood appropriate, normal affect   Rectal Exam: not indicated  Sterile Speculum Exam:   Urethral meatus: normal appearing   Vulva: Normal hair distribution, normal appearing vulva, no masses, tenderness or lesions, normal clitoris   Vagina: Normal appearing vaginal mucosa without lesions, white discharge noted in the posterior vault, no lacerations   Cervix: Normal appearing cervix without lesions, external os visibly closed, no lacerations or abnormal lesions visualized  Sterile Vaginal Exam:  Cervix: No cervical motion tenderness   Uterus: Is gravid, Normal size, shape, consistency and non-tender   Adnexa: Non-tender, no palpable masses     DATA:  Membranes Ruptured: No  Nitrazine: Negative  Valsalva/Pooling: absent  Vaginal Bleeding: absent        BIOPHYSICAL PROFILE:  Position: Cephalic  Placental Location: anterior  Fetal Heart Tones: Present  Fetal Breathing: present  Fetal Tone: Present  Fetal Movement: Present  Amniotic Fluid Index/Volume: 13.1 cm    PRENATAL LAB RESULTS:   Blood Type/Rh: B pos  Antibody Screen: negative  Hemoglobin, Hematocrit, Platelets: 51.0 / 09.3 / 269  Rubella: immune  T.  Pallidum, IgG: non-reactive   Hepatitis B Surface Antigen: non-reactive   HIV: negative   Sickle Cell Screen: negative  Gonorrhea: negative  Chlamydia: negative  Urine culture: negative, date: 8/19/20    1 hour Glucose Tolerance Test: Not done - Pregestational DM    Group B Strep: not done  Cystic Fibrosis Screen: negative  First Trimester Screen: not done  MSAFP/Multiple Markers: not done  Non-Invasive Prenatal Testing: not done  Anatomy US: anterior, normal male anatomy, 3VC, normal cord insertion    ASSESSMENT & PLAN:  Christa Kong is a 26 y.o. female [id number] at 34w5d IUP   - GBS unknown / Rh positive / R immune   - Pen G for GBS prophylaxis   - Elevated BP but non severe range, other wise VSS, afebrile   - cEFM/TOCO   - BPP: 8/10 (off for non reactive NST)   - T&S and CBC ordered   - Continue to monitor closely    Contractions/LOF/Pelvic pressure   - TOCO quiet   - SSE: not consistent with  labor or ROM   - Vaginitis significant for candidiasis and BV, GC/C pending   - Negative pooling/valsalva, negative Nitrazine, negative ferning. Patient does not have rupture of membranes. - UA not consistent with infection, UCx pending   - Low suspicion for  labor given no contractions on TOCO and cervix closed on SSE. No concern for ROM at this time. - Will try Tylenol and IV and PO hydration   - Continue to monitor closely    Decreased Fetal Movement   - BPP 8/10: off for nonreactive NST   - Patient reassured by fetal movement present on BSUS   - Will continue cEFM overnight   - Will order Celestone if glucose is normal    Pregestational DM    - newly dx in this pregnancy - 2hr GTT 18 abnormal   - Patient has had blood sugar well controlled on NPH 8U   - Fetal echo wnl   - 3+ glucose on UA   - CBC, CMP ordered and pending to eval for blood glucose control    Dysuria   - Patient reports describes pain after urination specifically with wiping   - No lesions visualized on SSE   - Vaginitis positive for candidiasis and BV   - Suspect vulvar candidiasis as cause of pain. Will start Diflucan and Flagyl.    - UA only significant for small Ketones. UCx pending. Will await culture results and treat as needed. Vaginal Candidiasis and Bacterial Vaginosis   - Diflucan and Flagyl ordered for patient    Hx of C/S x2 (G4 breech, G7 NRFHT), declining TOLAC   - declining TOLAC   - desires RRS   - Patient plans for repeat C/S with RRS in this pregnancy. Consent and ethics forms are completed and signed in media section.     cHTN (no meds)   - Elevated BP, no severe ranges today, VSS   - Cenies s/s PreE labs wnl x1, P/C 0.26 () - elevated LFTs, hepatitis panel wnl    Hx of successful  (G5)    Hx iPTD x1 (G5 @ 36w0d 2/2 suspected chorioamnionitis)    Hx PreE w/ SF's (G7)   - Diagnosed at term   - patient denies s/s PreE   - no severe ranges during admission today   - PreE labs pending   - Baseline preE labs were significant for ALT and AST, P/C 0.26 on 20   - Continue ASA 81mg daily    Elevated LFTs (20)   - Denies s/s of RUQ   - Hepatitis panel negative   - Repeat CMP    LSIL on pap smear   - needs colposcopy in pregnancy or 6 weeks PP    THC use   - Patient denies use in pregnancy   - Continued cessation encouraged    Hx of Anxiety   - Controlled on no meds   - Denies SI/HI    BMI 38    Patient Active Problem List    Diagnosis Date Noted    History of pre-eclampsia w/ SF--G7 2018     Priority: High    34 weeks gestation of pregnancy 10/06/2020    Elevated LFTs 2020 admission: ALT/AST 46/48 >41/45>47/55>42/49, Hepatitis panel WNL       Long QT interval 2020     Echo (): EF 55%, thickened mitral leaflets, mild tricuspid regurg  EKG (): Normal sinus rhythm, QTc 479 (ok)  EKG (): Sinus rhythm w/ PVCs, mod criteria for LVH, may be normal variant, Prolonged QT, QTc 578      Hypokalemia      20: K 2.4>2.9>2.7>3.0>3.4>2.9>3.3>3.2, Mag 1.8>1.8>1.8>1.7 on this admission- pt has received 20 160mEQ PO and 80mEQ IV and 20 80mEQ PO and 40 mEQ IV, TSH 1.81, K/Cr: 0.18 (<1.5wnl, no renal K wasting), Ur Osm 505, Serum Osm 298 (elevated)       Hx LSIL 2020    History of anxiety 2020    Obesity affecting pregnancy in third trimester     Feeling pelvic pressure in pregnancy, antepartum     cHTN (no meds) 2020    Pre-Gestational DM (new dx) 2020:  Failed 2hr GTT  2020- MFM referral placed-SK      Late prenatal care affecting pregnancy in second trimester 2020    Abnormal uterine bleeding 2018    H/O Starvation ketoacidosis     H/O indicated  delivery 2018     Suspected chorioamnionitis      Hx C/S x2 (G4-breech, G7--NRFHT) 2017     H/O successful  x1  Desires TOLAC this pregnancy  2020  APPROVED OVARIAN CA RRBS FORM SCANNED INTO MEDIA-SK      H/O successful  2017      BMI 39.0-39.9,adult 2017    Former smoker 2017     Pt denies in current preg      History of THC abuse  2017     Denies in this preg     [address] multiparity 2017       Plan discussed with Dr. Mak Akins, who is agreeable. Steroids given this admission: No    Risks, benefits, alternatives and possible complications have been discussed in detail with the patient. Admission, and post admission procedures and expectations were discussed in detail. All questions were answered.     Attending's Name: Dr. She Cool DO  Ob/Gyn Resident  10/6/2020, 7:29 PM

## 2020-10-07 ENCOUNTER — TELEPHONE (OUTPATIENT)
Dept: OBGYN | Age: 30
End: 2020-10-07

## 2020-10-07 PROBLEM — O09.893 HIGH RISK TEEN PREGNANCY IN THIRD TRIMESTER: Status: ACTIVE | Noted: 2020-10-07

## 2020-10-07 LAB
-: NORMAL
ABSOLUTE EOS #: 0.23 K/UL (ref 0–0.44)
ABSOLUTE IMMATURE GRANULOCYTE: 0.08 K/UL (ref 0–0.3)
ABSOLUTE LYMPH #: 2.17 K/UL (ref 1.1–3.7)
ABSOLUTE MONO #: 1.13 K/UL (ref 0.1–1.2)
ALBUMIN SERPL-MCNC: 2.8 G/DL (ref 3.5–5.2)
ALBUMIN/GLOBULIN RATIO: 1 (ref 1–2.5)
ALLEN TEST: ABNORMAL
ALLEN TEST: ABNORMAL
ALP BLD-CCNC: 113 U/L (ref 35–104)
ALT SERPL-CCNC: 18 U/L (ref 5–33)
ANION GAP SERPL CALCULATED.3IONS-SCNC: 12 MMOL/L (ref 9–17)
AST SERPL-CCNC: 17 U/L
BASOPHILS # BLD: 1 % (ref 0–2)
BASOPHILS ABSOLUTE: 0.05 K/UL (ref 0–0.2)
BETA-HYDROXYBUTYRATE: 0.4 MMOL/L (ref 0.02–0.27)
BETA-HYDROXYBUTYRATE: 1.73 MMOL/L (ref 0.02–0.27)
BILIRUB SERPL-MCNC: 0.35 MG/DL (ref 0.3–1.2)
BUN BLDV-MCNC: 4 MG/DL (ref 6–20)
BUN/CREAT BLD: ABNORMAL (ref 9–20)
CALCIUM SERPL-MCNC: 8.8 MG/DL (ref 8.6–10.4)
CARBOXYHEMOGLOBIN: 2 % (ref 0–5)
CARBOXYHEMOGLOBIN: 2.9 % (ref 0–5)
CHLORIDE BLD-SCNC: 108 MMOL/L (ref 98–107)
CO2: 18 MMOL/L (ref 20–31)
CREAT SERPL-MCNC: 0.51 MG/DL (ref 0.5–0.9)
CREATININE URINE: 84.2 MG/DL (ref 28–217)
DIFFERENTIAL TYPE: ABNORMAL
EOSINOPHILS RELATIVE PERCENT: 2 % (ref 1–4)
ESTIMATED AVERAGE GLUCOSE: 206 MG/DL
FIO2: ABNORMAL
FIO2: ABNORMAL
GFR AFRICAN AMERICAN: >60 ML/MIN
GFR NON-AFRICAN AMERICAN: >60 ML/MIN
GFR SERPL CREATININE-BSD FRML MDRD: ABNORMAL ML/MIN/{1.73_M2}
GFR SERPL CREATININE-BSD FRML MDRD: ABNORMAL ML/MIN/{1.73_M2}
GLUCOSE BLD-MCNC: 130 MG/DL (ref 65–105)
GLUCOSE BLD-MCNC: 133 MG/DL (ref 65–105)
GLUCOSE BLD-MCNC: 133 MG/DL (ref 70–99)
GLUCOSE BLD-MCNC: 137 MG/DL (ref 65–105)
GLUCOSE BLD-MCNC: 141 MG/DL (ref 65–105)
GLUCOSE BLD-MCNC: 148 MG/DL (ref 65–105)
GLUCOSE BLD-MCNC: 152 MG/DL (ref 65–105)
GLUCOSE BLD-MCNC: 170 MG/DL (ref 65–105)
GLUCOSE BLD-MCNC: 178 MG/DL (ref 65–105)
GLUCOSE BLD-MCNC: 218 MG/DL (ref 65–105)
GLUCOSE BLD-MCNC: 234 MG/DL (ref 65–105)
HBA1C MFR BLD: 8.8 % (ref 4–6)
HCO3 VENOUS: 21 MMOL/L (ref 24–30)
HCO3 VENOUS: 21.5 MMOL/L (ref 24–30)
HCT VFR BLD CALC: 39.7 % (ref 36.3–47.1)
HEMOGLOBIN: 12.6 G/DL (ref 11.9–15.1)
IMMATURE GRANULOCYTES: 1 %
LYMPHOCYTES # BLD: 20 % (ref 24–43)
MCH RBC QN AUTO: 29.6 PG (ref 25.2–33.5)
MCHC RBC AUTO-ENTMCNC: 31.7 G/DL (ref 28.4–34.8)
MCV RBC AUTO: 93.4 FL (ref 82.6–102.9)
METHEMOGLOBIN: ABNORMAL % (ref 0–1.5)
METHEMOGLOBIN: ABNORMAL % (ref 0–1.5)
MODE: ABNORMAL
MODE: ABNORMAL
MONOCYTES # BLD: 11 % (ref 3–12)
NEGATIVE BASE EXCESS, VEN: 1.6 MMOL/L (ref 0–2)
NEGATIVE BASE EXCESS, VEN: 2.8 MMOL/L (ref 0–2)
NOTIFICATION TIME: ABNORMAL
NOTIFICATION TIME: ABNORMAL
NOTIFICATION: ABNORMAL
NOTIFICATION: ABNORMAL
NRBC AUTOMATED: 0 PER 100 WBC
O2 DEVICE/FLOW/%: ABNORMAL
O2 DEVICE/FLOW/%: ABNORMAL
O2 SAT, VEN: 76 % (ref 60–85)
O2 SAT, VEN: 95.7 % (ref 60–85)
OXYHEMOGLOBIN: ABNORMAL % (ref 95–98)
OXYHEMOGLOBIN: ABNORMAL % (ref 95–98)
PATIENT TEMP: 37
PATIENT TEMP: 37
PCO2, VEN, TEMP ADJ: ABNORMAL MMHG (ref 39–55)
PCO2, VEN, TEMP ADJ: ABNORMAL MMHG (ref 39–55)
PCO2, VEN: 33 (ref 39–55)
PCO2, VEN: 35.3 (ref 39–55)
PDW BLD-RTO: 14.8 % (ref 11.8–14.4)
PEEP/CPAP: ABNORMAL
PEEP/CPAP: ABNORMAL
PH VENOUS: 7.39 (ref 7.32–7.42)
PH VENOUS: 7.43 (ref 7.32–7.42)
PH, VEN, TEMP ADJ: ABNORMAL (ref 7.32–7.42)
PH, VEN, TEMP ADJ: ABNORMAL (ref 7.32–7.42)
PLATELET # BLD: 186 K/UL (ref 138–453)
PLATELET ESTIMATE: ABNORMAL
PMV BLD AUTO: 12.6 FL (ref 8.1–13.5)
PO2, VEN, TEMP ADJ: ABNORMAL MMHG (ref 30–50)
PO2, VEN, TEMP ADJ: ABNORMAL MMHG (ref 30–50)
PO2, VEN: 41.9 (ref 30–50)
PO2, VEN: 78 (ref 30–50)
POSITIVE BASE EXCESS, VEN: ABNORMAL MMOL/L (ref 0–2)
POSITIVE BASE EXCESS, VEN: ABNORMAL MMOL/L (ref 0–2)
POTASSIUM SERPL-SCNC: 3.2 MMOL/L (ref 3.7–5.3)
PSV: ABNORMAL
PSV: ABNORMAL
PT. POSITION: ABNORMAL
PT. POSITION: ABNORMAL
RBC # BLD: 4.25 M/UL (ref 3.95–5.11)
RBC # BLD: ABNORMAL 10*6/UL
REASON FOR REJECTION: NORMAL
RESPIRATORY RATE: ABNORMAL
RESPIRATORY RATE: ABNORMAL
SAMPLE SITE: ABNORMAL
SAMPLE SITE: ABNORMAL
SEG NEUTROPHILS: 65 % (ref 36–65)
SEGMENTED NEUTROPHILS ABSOLUTE COUNT: 7.04 K/UL (ref 1.5–8.1)
SET RATE: ABNORMAL
SET RATE: ABNORMAL
SODIUM BLD-SCNC: 138 MMOL/L (ref 135–144)
TEXT FOR RESPIRATORY: ABNORMAL
TEXT FOR RESPIRATORY: ABNORMAL
TOTAL HB: ABNORMAL G/DL (ref 12–16)
TOTAL HB: ABNORMAL G/DL (ref 12–16)
TOTAL PROTEIN, URINE: 19 MG/DL
TOTAL PROTEIN: 5.6 G/DL (ref 6.4–8.3)
TOTAL RATE: ABNORMAL
TOTAL RATE: ABNORMAL
URINE TOTAL PROTEIN CREATININE RATIO: 0.23 (ref 0–0.2)
VT: ABNORMAL
VT: ABNORMAL
WBC # BLD: 10.7 K/UL (ref 3.5–11.3)
WBC # BLD: ABNORMAL 10*3/UL
ZZ NTE CLEAN UP: ORDERED TEST: NORMAL
ZZ NTE WITH NAME CLEAN UP: SPECIMEN SOURCE: NORMAL

## 2020-10-07 PROCEDURE — 6370000000 HC RX 637 (ALT 250 FOR IP): Performed by: STUDENT IN AN ORGANIZED HEALTH CARE EDUCATION/TRAINING PROGRAM

## 2020-10-07 PROCEDURE — 76820 UMBILICAL ARTERY ECHO: CPT | Performed by: OBSTETRICS & GYNECOLOGY

## 2020-10-07 PROCEDURE — 36415 COLL VENOUS BLD VENIPUNCTURE: CPT

## 2020-10-07 PROCEDURE — 83036 HEMOGLOBIN GLYCOSYLATED A1C: CPT

## 2020-10-07 PROCEDURE — 84156 ASSAY OF PROTEIN URINE: CPT

## 2020-10-07 PROCEDURE — 82010 KETONE BODYS QUAN: CPT

## 2020-10-07 PROCEDURE — 6370000000 HC RX 637 (ALT 250 FOR IP): Performed by: OBSTETRICS & GYNECOLOGY

## 2020-10-07 PROCEDURE — 80053 COMPREHEN METABOLIC PANEL: CPT

## 2020-10-07 PROCEDURE — G0108 DIAB MANAGE TRN  PER INDIV: HCPCS

## 2020-10-07 PROCEDURE — 76805 OB US >/= 14 WKS SNGL FETUS: CPT | Performed by: OBSTETRICS & GYNECOLOGY

## 2020-10-07 PROCEDURE — 80307 DRUG TEST PRSMV CHEM ANLYZR: CPT

## 2020-10-07 PROCEDURE — 82947 ASSAY GLUCOSE BLOOD QUANT: CPT

## 2020-10-07 PROCEDURE — 82570 ASSAY OF URINE CREATININE: CPT

## 2020-10-07 PROCEDURE — 1220000000 HC SEMI PRIVATE OB R&B

## 2020-10-07 PROCEDURE — 2580000003 HC RX 258: Performed by: STUDENT IN AN ORGANIZED HEALTH CARE EDUCATION/TRAINING PROGRAM

## 2020-10-07 PROCEDURE — 76819 FETAL BIOPHYS PROFIL W/O NST: CPT | Performed by: OBSTETRICS & GYNECOLOGY

## 2020-10-07 PROCEDURE — 96372 THER/PROPH/DIAG INJ SC/IM: CPT

## 2020-10-07 PROCEDURE — 85025 COMPLETE CBC W/AUTO DIFF WBC: CPT

## 2020-10-07 PROCEDURE — 76821 MIDDLE CEREBRAL ARTERY ECHO: CPT | Performed by: OBSTETRICS & GYNECOLOGY

## 2020-10-07 PROCEDURE — 82805 BLOOD GASES W/O2 SATURATION: CPT

## 2020-10-07 RX ORDER — FAMOTIDINE 20 MG/1
20 TABLET, FILM COATED ORAL 2 TIMES DAILY
Status: DISCONTINUED | OUTPATIENT
Start: 2020-10-07 | End: 2020-10-12 | Stop reason: HOSPADM

## 2020-10-07 RX ORDER — NICOTINE POLACRILEX 4 MG
15 LOZENGE BUCCAL PRN
Status: DISCONTINUED | OUTPATIENT
Start: 2020-10-07 | End: 2020-10-12 | Stop reason: HOSPADM

## 2020-10-07 RX ORDER — GLUCOSAMINE HCL/CHONDROITIN SU 500-400 MG
CAPSULE ORAL
Qty: 100 STRIP | Refills: 0 | Status: SHIPPED | OUTPATIENT
Start: 2020-10-07 | End: 2020-10-07 | Stop reason: SDUPTHER

## 2020-10-07 RX ORDER — METRONIDAZOLE 500 MG/1
500 TABLET ORAL EVERY 12 HOURS SCHEDULED
Qty: 13 TABLET | Refills: 0 | Status: SHIPPED | OUTPATIENT
Start: 2020-10-07 | End: 2020-10-14

## 2020-10-07 RX ORDER — SODIUM CHLORIDE 9 MG/ML
INJECTION, SOLUTION INTRAVENOUS CONTINUOUS
Status: DISCONTINUED | OUTPATIENT
Start: 2020-10-07 | End: 2020-10-07

## 2020-10-07 RX ORDER — METRONIDAZOLE 500 MG/1
500 TABLET ORAL EVERY 12 HOURS SCHEDULED
Status: DISCONTINUED | OUTPATIENT
Start: 2020-10-07 | End: 2020-10-07

## 2020-10-07 RX ORDER — METRONIDAZOLE 500 MG/1
500 TABLET ORAL EVERY 12 HOURS SCHEDULED
Status: DISCONTINUED | OUTPATIENT
Start: 2020-10-07 | End: 2020-10-08

## 2020-10-07 RX ORDER — DEXTROSE MONOHYDRATE 25 G/50ML
12.5 INJECTION, SOLUTION INTRAVENOUS PRN
Status: DISCONTINUED | OUTPATIENT
Start: 2020-10-06 | End: 2020-10-12 | Stop reason: HOSPADM

## 2020-10-07 RX ORDER — ASPIRIN 81 MG/1
81 TABLET, CHEWABLE ORAL DAILY
Status: DISCONTINUED | OUTPATIENT
Start: 2020-10-07 | End: 2020-10-09

## 2020-10-07 RX ORDER — BLOOD-GLUCOSE METER
1 KIT MISCELLANEOUS DAILY
Qty: 1 KIT | Refills: 0 | Status: SHIPPED | OUTPATIENT
Start: 2020-10-07 | End: 2022-05-17

## 2020-10-07 RX ORDER — GLUCOSAMINE HCL/CHONDROITIN SU 500-400 MG
CAPSULE ORAL
Qty: 100 STRIP | Refills: 0 | Status: SHIPPED | OUTPATIENT
Start: 2020-10-07 | End: 2022-05-17

## 2020-10-07 RX ORDER — LANCETS 30 GAUGE
1 EACH MISCELLANEOUS
Qty: 100 EACH | Refills: 1 | Status: SHIPPED | OUTPATIENT
Start: 2020-10-07 | End: 2022-05-17

## 2020-10-07 RX ORDER — GLUCAGON 1 MG/ML
1 KIT INJECTION PRN
Status: DISCONTINUED | OUTPATIENT
Start: 2020-10-07 | End: 2020-10-12 | Stop reason: HOSPADM

## 2020-10-07 RX ORDER — PEN NEEDLE, DIABETIC 31 GX5/16"
NEEDLE, DISPOSABLE MISCELLANEOUS
Qty: 100 EACH | Refills: 0 | Status: SHIPPED | OUTPATIENT
Start: 2020-10-07 | End: 2022-05-17

## 2020-10-07 RX ORDER — DEXTROSE AND SODIUM CHLORIDE 5; .45 G/100ML; G/100ML
INJECTION, SOLUTION INTRAVENOUS CONTINUOUS PRN
Status: DISCONTINUED | OUTPATIENT
Start: 2020-10-06 | End: 2020-10-07

## 2020-10-07 RX ORDER — 0.9 % SODIUM CHLORIDE 0.9 %
15 INTRAVENOUS SOLUTION INTRAVENOUS ONCE
Status: DISCONTINUED | OUTPATIENT
Start: 2020-10-07 | End: 2020-10-07

## 2020-10-07 RX ORDER — VITAMIN A, ASCORBIC ACID, CHOLECALCIFEROL, .ALPHA.-TOCOPHEROL ACETATE, DL-, THIAMINE MONONITRATE, RIBOFLAVIN, NIACINAMIDE, PYRIDOXINE HYDROCHLORIDE, FOLIC ACID, CYANOCOBALAMIN, CALCIUM CARBONATE, IRON, ZINC OXIDE, AND CUPRIC OXIDE 4000; 120; 400; 22; 1.84; 3; 20; 10; 1; 12; 200; 29; 25; 2 [IU]/1; MG/1; [IU]/1; [IU]/1; MG/1; MG/1; MG/1; MG/1; MG/1; UG/1; MG/1; MG/1; MG/1; MG/1
1 TABLET ORAL DAILY
Status: DISCONTINUED | OUTPATIENT
Start: 2020-10-07 | End: 2020-10-08

## 2020-10-07 RX ORDER — MAGNESIUM SULFATE 1 G/100ML
1 INJECTION INTRAVENOUS PRN
Status: DISCONTINUED | OUTPATIENT
Start: 2020-10-06 | End: 2020-10-12 | Stop reason: HOSPADM

## 2020-10-07 RX ORDER — POTASSIUM CHLORIDE 7.45 MG/ML
10 INJECTION INTRAVENOUS PRN
Status: DISCONTINUED | OUTPATIENT
Start: 2020-10-07 | End: 2020-10-08

## 2020-10-07 RX ADMIN — ASPIRIN 81 MG: 81 TABLET, CHEWABLE ORAL at 08:54

## 2020-10-07 RX ADMIN — METRONIDAZOLE 500 MG: 500 TABLET, FILM COATED ORAL at 20:56

## 2020-10-07 RX ADMIN — METRONIDAZOLE 500 MG: 500 TABLET, FILM COATED ORAL at 01:27

## 2020-10-07 RX ADMIN — INSULIN LISPRO 8 UNITS: 100 INJECTION, SOLUTION INTRAVENOUS; SUBCUTANEOUS at 17:27

## 2020-10-07 RX ADMIN — SODIUM CHLORIDE 10.89 UNITS/HR: 9 INJECTION, SOLUTION INTRAVENOUS at 00:17

## 2020-10-07 RX ADMIN — INSULIN LISPRO 8 UNITS: 100 INJECTION, SOLUTION INTRAVENOUS; SUBCUTANEOUS at 10:34

## 2020-10-07 RX ADMIN — SODIUM CHLORIDE: 9 INJECTION, SOLUTION INTRAVENOUS at 02:40

## 2020-10-07 RX ADMIN — INSULIN HUMAN 8 UNITS: 100 INJECTION, SUSPENSION SUBCUTANEOUS at 20:56

## 2020-10-07 RX ADMIN — Medication 1 TABLET: at 08:55

## 2020-10-07 RX ADMIN — INSULIN HUMAN 8 UNITS: 100 INJECTION, SUSPENSION SUBCUTANEOUS at 10:33

## 2020-10-07 RX ADMIN — FAMOTIDINE 20 MG: 20 TABLET, FILM COATED ORAL at 20:56

## 2020-10-07 RX ADMIN — INSULIN HUMAN 11 UNITS: 100 INJECTION, SOLUTION PARENTERAL at 00:52

## 2020-10-07 RX ADMIN — INSULIN LISPRO 8 UNITS: 100 INJECTION, SOLUTION INTRAVENOUS; SUBCUTANEOUS at 14:19

## 2020-10-07 RX ADMIN — FAMOTIDINE 20 MG: 20 TABLET, FILM COATED ORAL at 08:54

## 2020-10-07 RX ADMIN — FLUCONAZOLE 150 MG: 100 TABLET ORAL at 01:27

## 2020-10-07 NOTE — SIGNIFICANT EVENT
OB Attending    Nara Quiñones is a 27 yo [id number] at 34w6d who presents contractions and decreased fetal movement. This patient is well known to our service since her last pregnancy in 2018 where she had a prolonged hospitalization for starvation euglycemic ketoacidosis and hypokalemia refractory to treatment of unclear etiology. Patient was euglycemic during that pregnancy and diagnosed with GDMA1 d/t an elevated 1 hr GTT and an elevated 3 hourGTT, but never required insulin in her pregnancy and reported normal glucose readings. She had an [id number] baby at 42w4d which is large for gestational age. Nara Quiñones did have a 2 hr GTT done 6 weeks PP consistent with Class B DM. This pregnancy has been complicated by poorly controlled Class B DM, CHTN not on meds, history of c/s x2, and noncompliance. She had her first prenatal visit at 23 weeks, saw Dr. Cristal Landa at 26 weeks gestation, and then had a multi-day hospital stay 8/19-8/23 for DKA. Prior to her hospital stay, patient was not on insulin. During that hospitalization, the recommendation from the [facility] Hospitalist team was to initiate an insulin regime of NPH 30/20 and Reg 15/10/10 based on patient's weight. Patient was never able to be started on the regiment due to her profound hypokalemia. HBA1c at the time 6.9. Etiologies of her refractory hypokalemia where evaluated (please see my note from 8/20) and ultimately internal medicine initiated the patient on Lantus 10 units nightly. Patient left AMA on 8/23 and it is unclear if this was ever initiated at home. Patient did take daily PO potassium at home. Patient then submitted first glucose log to Dr. Cristal Landa on 9/8 with no fasting values and 2/10 elevated glucose levels on 9/8. No insulin was advised at the time until additional data could be achieved. On 9/15, 3 out of 5 elevated fastings noted, no elevated 2 hour PP noted and 8 units NPH at night started.  She did not return to clinic or to Dr. Hailey Gallego office after this time.    Patient again presents with DKA during this hospital admission. I am concerned that this patient non compliance and multiple social barriers to care are contributing to very poorly controlled diabetes. We are currently initiating NPH 8/8 and Reg 8/8/8 and will observe this regiment over the next 24 hours. I recommend delivery at 36 weeks given this patient's risk of stillbirth, preeclampsia, additional episodes of DKA, and multiple poor maternal and fetal outcomes. Will schedule case for October 15th unless indication to proceed earlier. I spent greater than 30 minutes discussing this with Varinder Velasquez

## 2020-10-07 NOTE — PROGRESS NOTES
OB/GYN PROGRESS NOTE    Gloria Canchola is a 26 y.o. female [id number] at [address] [facility] Day: 2    Subjective:   Patient has been seen and examined. She complains of occasional lower abdominal pain that sometimes is epigastric as well/ Patient denies any vaginal discharge and any urinary complaints. The patient reports fetal movement is present, denies contractions, denies loss of fluid, denies vaginal bleeding. Patient denies headache, vision changes, nausea, vomiting, fever, chills, shortness of breath, chest pain, RUQ pain, abdominal pain, diarrhea, change in color/amount/odor of vaginal discharge, dysuria or, hematuria.      Objective:   Vitals:  Vitals:    10/06/20 2306 10/06/20 2347 10/07/20 0542 10/07/20 0600   BP: (!) 145/75  (!) 162/105 (!) 144/95   Pulse: 77  68 82   Resp: 16  20 16   Temp: 98.4 °F (36.9 °C)  98 °F (36.7 °C)    TempSrc: Oral  Oral    Weight:  240 lb (108.9 kg)     Height:  5' 6\" (1.676 m)           FHT: 150, moderate variability with periods of minimal variablity, accelerations present, multiple prolonged and spontaneous decels throughout the night   Contractions: none    Physical Exam:  General appearance:  no apparent distress, alert and cooperative  HEENT: head atraumatic, normocephalic, moist mucous membranes, trachea midline  Neurologic:  alert, oriented, normal speech, no focal findings or movement disorder noted  Lungs:  No increased work of breathing, good air exchange, clear to auscultation bilaterally, no crackles or wheezing  Heart:  regular rate and rhythm and no murmur    Abdomen:  soft, gravid, non-tender, no rebound, guarding, or rigidity, no RUQ or epigastric tenderness, no signs or symptoms of abruption and no signs or symptoms of chorioamnionitis  Extremities:  no calf tenderness, non edematous  Musculoskeletal: Gross strength equal and intact throughout, no gross abnormalities, range of motion normal in hips, knees, shoulders and spine, CVA tenderness: none  Psychiatric: Mood appropriate, normal affect   Rectal Exam: not indicated    DATA:  Labs:   Recent Results (from the past 12 hour(s))   Urinalysis Reflex to Culture    Collection Time: 10/06/20  7:42 PM    Specimen: Urine, clean catch   Result Value Ref Range    Color, UA YELLOW YELLOW    Turbidity UA CLEAR CLEAR    Glucose, Ur 3+ (A) NEGATIVE    Bilirubin Urine NEGATIVE NEGATIVE    Ketones, Urine SMALL (A) NEGATIVE    Specific Gravity, UA 1.026 1.005 - 1.030    Urine Hgb NEGATIVE NEGATIVE    pH, UA 7.0 5.0 - 8.0    Protein, UA NEGATIVE NEGATIVE    Urobilinogen, Urine Normal Normal    Nitrite, Urine NEGATIVE NEGATIVE    Leukocyte Esterase, Urine NEGATIVE NEGATIVE    Urinalysis Comments       Microscopic exam not performed based on chemical results unless requested in original order. VAGINITIS DNA PROBE    Collection Time: 10/06/20  8:21 PM    Specimen: Vaginal   Result Value Ref Range    Specimen Description . VAGINA     Special Requests NOT REPORTED     Direct Exam POSITIVE for Gardnerella vaginalis. (A)     Direct Exam POSITIVE for Candida sp. (A)     Direct Exam NEGATIVE for Trichomonas vaginalis     Direct Exam       Method of testing is a DNA probe intended for detection and identification of Candida species, Gardnerella vaginalis, and Trichomonas vaginalis nucleic acid in vaginal fluid specimens from patients with symptoms of vaginitis/vaginosis.    CBC Auto Differential    Collection Time: 10/06/20  9:04 PM   Result Value Ref Range    WBC 11.6 (H) 3.5 - 11.3 k/uL    RBC 4.45 3.95 - 5.11 m/uL    Hemoglobin 13.1 11.9 - 15.1 g/dL    Hematocrit 39.4 36.3 - 47.1 %    MCV 88.5 82.6 - 102.9 fL    MCH 29.4 25.2 - 33.5 pg    MCHC 33.2 28.4 - 34.8 g/dL    RDW 14.7 (H) 11.8 - 14.4 %    Platelets 037 270 - 260 k/uL    MPV 12.1 8.1 - 13.5 fL    NRBC Automated 0.0 0.0 per 100 WBC    Differential Type NOT REPORTED     Seg Neutrophils 74 (H) 36 - 65 %    Lymphocytes 15 (L) 24 - 43 %    Monocytes 9 3 - 12 %    Eosinophils % 1 1 - 4 % Basophils 0 0 - 2 %    Immature Granulocytes 1 (H) 0 %    Segs Absolute 8.58 (H) 1.50 - 8.10 k/uL    Absolute Lymph # 1.78 1.10 - 3.70 k/uL    Absolute Mono # 0.99 0.10 - 1.20 k/uL    Absolute Eos # 0.13 0.00 - 0.44 k/uL    Basophils Absolute 0.04 0.00 - 0.20 k/uL    Absolute Immature Granulocyte 0.09 0.00 - 0.30 k/uL    WBC Morphology NOT REPORTED     RBC Morphology ANISOCYTOSIS PRESENT     Platelet Estimate NOT REPORTED    Comprehensive Metabolic Panel    Collection Time: 10/06/20  9:04 PM   Result Value Ref Range    Glucose 485 (HH) 70 - 99 mg/dL    BUN 6 6 - 20 mg/dL    CREATININE 0.63 0.50 - 0.90 mg/dL    Bun/Cre Ratio NOT REPORTED 9 - 20    Calcium 10.1 8.6 - 10.4 mg/dL    Sodium 130 (L) 135 - 144 mmol/L    Potassium 3.2 (L) 3.7 - 5.3 mmol/L    Chloride 95 (L) 98 - 107 mmol/L    CO2 19 (L) 20 - 31 mmol/L    Anion Gap 16 9 - 17 mmol/L    Alkaline Phosphatase 153 (H) 35 - 104 U/L    ALT 21 5 - 33 U/L    AST 18 <32 U/L    Total Bilirubin 0.43 0.3 - 1.2 mg/dL    Total Protein 6.7 6.4 - 8.3 g/dL    Alb 3.6 3.5 - 5.2 g/dL    Albumin/Globulin Ratio 1.2 1.0 - 2.5    GFR Non-African American >60 >60 mL/min    GFR African American >60 >60 mL/min    GFR Comment          GFR Staging NOT REPORTED    TYPE AND SCREEN    Collection Time: 10/06/20  9:04 PM   Result Value Ref Range    Expiration Date 10/09/2020,2359     Arm Band Number BE [id number]     ABO/Rh B POSITIVE     Antibody Screen NEGATIVE    Beta-Hydroxybutyrate    Collection Time: 10/06/20  9:04 PM   Result Value Ref Range    Beta-Hydroxybutyrate 1.52 (H) 0.02 - 0.27 mmol/L   POC Glucose Fingerstick    Collection Time: 10/06/20  9:53 PM   Result Value Ref Range    POC Glucose 358 (H) 65 - 105 mg/dL   POC Glucose Fingerstick    Collection Time: 10/06/20 11:38 PM   Result Value Ref Range    POC Glucose 349 (H) 65 - 105 mg/dL   BETA-HYDROXYBUTERATE    Collection Time: 10/07/20 12:35 AM   Result Value Ref Range    Beta-Hydroxybutyrate 1.73 (H) 0.02 - 0.27 mmol/L   BLOOD GAS, VENOUS    Collection Time: 10/07/20 12:35 AM   Result Value Ref Range    pH, Lonnie 7.392 7.320 - 7.420    pCO2, Lonnie 35.3 (L) 39 - 55    pO2, Lonnie 41.9 30 - 50    HCO3, Venous 21.0 (L) 24 - 30 mmol/L    Positive Base Excess, Lonnie NOT REPORTED 0.0 - 2.0 mmol/L    Negative Base Excess, Lonnie 2.8 (H) 0.0 - 2.0 mmol/L    O2 Sat, Lonnie 76.0 60.0 - 85.0 %    Total Hb NOT REPORTED 12.0 - 16.0 g/dl    Oxyhemoglobin NOT REPORTED 95.0 - 98.0 %    Carboxyhemoglobin 2.9 0 - 5 %    Methemoglobin NOT REPORTED 0.0 - 1.5 %    Pt Temp 37.0     pH, Lonnie, Temp Adj NOT REPORTED 7.320 - 7.420    pCO2, Lonnie, Temp Adj NOT REPORTED 39 - 55 mmHg    pO2, Lonnie, Temp Adj NOT REPORTED 30 - 50 mmHg    O2 Device/Flow/% NOT REPORTED     Respiratory Rate NOT REPORTED     David Test NOT REPORTED     Sample Site NOT REPORTED     Pt.  Position NOT REPORTED     Mode NOT REPORTED     Set Rate NOT REPORTED     Total Rate NOT REPORTED     VT NOT REPORTED     FIO2 INFORMATION NOT PROVIDED     Peep/Cpap NOT REPORTED     PSV NOT REPORTED     Text for Respiratory NOT REPORTED     NOTIFICATION NOT REPORTED     NOTIFICATION TIME NOT REPORTED    POC Glucose Fingerstick    Collection Time: 10/07/20  1:30 AM   Result Value Ref Range    POC Glucose 234 (H) 65 - 105 mg/dL   POC Glucose Fingerstick    Collection Time: 10/07/20  2:34 AM   Result Value Ref Range    POC Glucose 218 (H) 65 - 105 mg/dL   POC Glucose Fingerstick    Collection Time: 10/07/20  3:32 AM   Result Value Ref Range    POC Glucose 137 (H) 65 - 105 mg/dL   POC Glucose Fingerstick    Collection Time: 10/07/20  4:31 AM   Result Value Ref Range    POC Glucose 152 (H) 65 - 105 mg/dL   POC Glucose Fingerstick    Collection Time: 10/07/20  5:32 AM   Result Value Ref Range    POC Glucose 130 (H) 65 - 105 mg/dL   BETA-HYDROXYBUTERATE    Collection Time: 10/07/20  5:56 AM   Result Value Ref Range    Beta-Hydroxybutyrate 0.40 (H) 0.02 - 0.27 mmol/L   BLOOD GAS, VENOUS    Collection Time: 10/07/20  5:56 AM   Result Value Ref Range    pH, Lonnie 7.429 (H) 7.320 - 7.420    pCO2, Lonnie 33.0 (L) 39 - 55    pO2, Lonnie 78.0 (H) 30 - 50    HCO3, Venous 21.5 (L) 24 - 30 mmol/L    Positive Base Excess, Lonnie NOT REPORTED 0.0 - 2.0 mmol/L    Negative Base Excess, Lonnie 1.6 0.0 - 2.0 mmol/L    O2 Sat, Lonnie 95.7 (H) 60.0 - 85.0 %    Total Hb NOT REPORTED 12.0 - 16.0 g/dl    Oxyhemoglobin NOT REPORTED 95.0 - 98.0 %    Carboxyhemoglobin 2.0 0 - 5 %    Methemoglobin NOT REPORTED 0.0 - 1.5 %    Pt Temp 37.0     pH, Lonnie, Temp Adj NOT REPORTED 7.320 - 7.420    pCO2, Lonnie, Temp Adj NOT REPORTED 39 - 55 mmHg    pO2, Lonnie, Temp Adj NOT REPORTED 30 - 50 mmHg    O2 Device/Flow/% NOT REPORTED     Respiratory Rate NOT REPORTED     David Test NOT REPORTED     Sample Site NOT REPORTED     Pt.  Position NOT REPORTED     Mode NOT REPORTED     Set Rate NOT REPORTED     Total Rate NOT REPORTED     VT NOT REPORTED     FIO2 INFORMATION NOT PROVIDED     Peep/Cpap NOT REPORTED     PSV NOT REPORTED     Text for Respiratory NOT REPORTED     NOTIFICATION NOT REPORTED     NOTIFICATION TIME NOT REPORTED    CBC WITH AUTO DIFFERENTIAL    Collection Time: 10/07/20  5:56 AM   Result Value Ref Range    WBC 10.7 3.5 - 11.3 k/uL    RBC 4.25 3.95 - 5.11 m/uL    Hemoglobin 12.6 11.9 - 15.1 g/dL    Hematocrit 39.7 36.3 - 47.1 %    MCV 93.4 82.6 - 102.9 fL    MCH 29.6 25.2 - 33.5 pg    MCHC 31.7 28.4 - 34.8 g/dL    RDW 14.8 (H) 11.8 - 14.4 %    Platelets 873 253 - 124 k/uL    MPV 12.6 8.1 - 13.5 fL    NRBC Automated 0.0 0.0 per 100 WBC    Differential Type NOT REPORTED     Seg Neutrophils 65 36 - 65 %    Lymphocytes 20 (L) 24 - 43 %    Monocytes 11 3 - 12 %    Eosinophils % 2 1 - 4 %    Basophils 1 0 - 2 %    Immature Granulocytes 1 (H) 0 %    Segs Absolute 7.04 1.50 - 8.10 k/uL    Absolute Lymph # 2.17 1.10 - 3.70 k/uL    Absolute Mono # 1.13 0.10 - 1.20 k/uL    Absolute Eos # 0.23 0.00 - 0.44 k/uL    Basophils Absolute 0.05 0.00 - 0.20 k/uL    Absolute Immature Granulocyte 0.08 0.00 - 0.30 k/uL    WBC Morphology NOT REPORTED     RBC Morphology ANISOCYTOSIS PRESENT     Platelet Estimate NOT REPORTED    Comprehensive Metabolic Panel    Collection Time: 10/07/20  5:56 AM   Result Value Ref Range    Glucose 133 (H) 70 - 99 mg/dL    BUN 4 (L) 6 - 20 mg/dL    CREATININE 0.51 0.50 - 0.90 mg/dL    Bun/Cre Ratio NOT REPORTED 9 - 20    Calcium 8.8 8.6 - 10.4 mg/dL    Sodium 138 135 - 144 mmol/L    Potassium 3.2 (L) 3.7 - 5.3 mmol/L    Chloride 108 (H) 98 - 107 mmol/L    CO2 18 (L) 20 - 31 mmol/L    Anion Gap 12 9 - 17 mmol/L    Alkaline Phosphatase 113 (H) 35 - 104 U/L    ALT 18 5 - 33 U/L    AST 17 <32 U/L    Total Bilirubin 0.35 0.3 - 1.2 mg/dL    Total Protein 5.6 (L) 6.4 - 8.3 g/dL    Alb 2.8 (L) 3.5 - 5.2 g/dL    Albumin/Globulin Ratio 1.0 1.0 - 2.5    GFR Non-African American >60 >60 mL/min    GFR African American >60 >60 mL/min    GFR Comment          GFR Staging NOT REPORTED    POC Glucose Fingerstick    Collection Time: 10/07/20  6:32 AM   Result Value Ref Range    POC Glucose 133 (H) 65 - 105 mg/dL         Assessment/Plan:  Chelsea Og is a 26 y.o. female [id number] at 34w6d IUP admitted for DKA with NRFHT   - Rh +/ RI/ GBS unknown   - No antibiotics for GBS prophylaxis at this time   - VSS elevated. Patient is a known cHTN, Afebrile   - CS consent signed on chart   - SCDs, ASA, PNV   - MFM scans this AM   - Bedside US performed overnight with 8/10 BPP off for non reactive NST    Pregestational Diabetes with DKA   - diagnosed at 6 week 2 hour GTT following last pregnancy   - Has been on NPH8 nightly. Reports compliance however states she has missed a few doses, none recently. - Fetal echo wnl   - Insulin drip to be discontinued at this time secondary to relative blood sugar control and need for MFM scan   - Denies nausea or vomiting   - Labs pending      Hx C/S x 2 (GG4 breech, G7 NRFHT), declining TOLAC   - Desires RRS. Consent and ethics form in chart.     cHTN (no meds)   - Elevated BP one 2014      BMI 39.0-39.9,adult 12/18/2017    Former smoker 12/18/2017     Overview Note:     Pt denies in current preg      History of THC abuse  12/18/2017     Overview Note:     Denies in this preg     [address] multiparity 12/18/2017       Will update Dr. Karime Lopez.      Patrice Looney DO  Ob/Gyn Resident  10/7/2020, 6:49 AM

## 2020-10-07 NOTE — CARE COORDINATION
ANTEPARTUM INITIAL DISCHARGE PLANNING/CARE COORDINATION    34 weeks gestation of pregnancy [Z3A.34]  High risk teen pregnancy in third trimester [O09.893]    HPI: Writer met w/ patient at bedside to discuss DCP. Bethanie Arciniega is a 26year old [id number] at 34w5d who was admitted for contractions, possible LOF, and decreased fetal movement. She was not found to be ruptured, but instead in DKI w/ BS of 485 and positive urine ketones. MFM Sono Report (Verbal) 84/0/3865 am: cephalic, anterior placenta, BPP 8/8, EFW 6#6, some measurements large for gestational age    Treatment Plan:      - Rh +/ RI/ GBS unknown              - No antibiotics for GBS prophylaxis at this time              - VSS elevated. Patient is a known cHTN, Afebrile              - CS consent signed on chart              - SCDs, ASA, PNV              - MFM scans 10/7/2020 AM               - Bedside US performed overnight with 8/10 BPP off for non reactive NST  Pregestational Diabetes with DKA              - diagnosed at 6 week 2 hour GTT following last pregnancy              - Has been on NPH8 nightly. Reports compliance however states she has missed a few doses, none recently. - Fetal echo wnl              - Insulin drip to be discontinued at this time secondary to relative blood sugar control and need for MFM scan              - Denies nausea or vomiting              - Labs pending  Hx C/S x 2 (GG4 breech, G7 NRFHT), declining TOLAC              - Desires RRS. Consent and ethics form in chart. cHTN (no meds)              - Elevated BP one severe range BP however repeat was nonsevere              - Denies s/s PreE at this time              - PreE labs wnl x 1 P:C 0.26 (8/26). Elevated LFTs at that time with negative hepatitis panel. LFTs wnl on admission    · Follow up in one week with MFM.  Appointment made for 10/17 at 10:45am    Writer verified name/address/phone number/[facility] insurance correct on facesheet    HC: No     DME: Patient has home BP Cuff and Diabetic supplies from [address] [place] [address] continue to follow for any DC needs.

## 2020-10-07 NOTE — PROGRESS NOTES
Gestational Diabetes Education Progress Note    Teaching provided at this session included:   _X__ Self-monitoring of blood sugar (FBS and 2 hrs postprandial)    Patient stated she has meter strips and BG log sheet at home   _X__ Blood sugar targets FBS 65-90 and <120 2 hrs postprandial)    Reinforced to call for elevated BG - any BG fasting over 150 or any time BG over 200 - need to call Dr Higinio Dia       _X__  Role of Insulins and use of injection methods  _x__ Pen   __x_ Asha Mckeon     Discussed with patient her current insulin at home- she stated she had insulin from [facility], expressed it was insulin pen, but she did not have pen tips. She was unsure of name of insulin  Writer did call to [facility] and spoke with Murphy Morton who stated lantus was dispensed on 8/25, but then note was to suspend this order per Dr Higinio Dia. Also that Humulin NPH was ordered then on 9/15, but was requiring a prior auth. And was not dispensed. Patient did then relate that she was only use the \"pen\" a few times and was unsure of what her plan was. She admited she did not like to take insulin shots. Today writer practiced use of both vial and syringe and pen methods with patient. Disused site rotation and sharps disposal.    Explained why NHP insulin is preferred an ordered at this time. Reinforced need to roll pen or vial of NHP to mix and that this is a \"cloudy\" longer acting insulin. And current plan is 8 units with Breakfast and Bedtime. Explained role of meal time insulin - rapid Novolog/ Humalong and fast acting Regular. And that these insulin should be taken with meals - current dose is 8 units with breakfast, Lunch, and Dinner. Reviewed  Meal planning:   _X_  Avoid fruit juice and sugary beverages. _X_  Aim to eat small frequent meals and snacks. (ie., 3 + 3)                _X__  Eat balanced meals according to divided dinner plate sample.      Patient stated understanding of role of insulin in care of gestational diabetes, she would perfer use of insulin pens for NHP and Novolog. Please note NPH will require prior auth under [facility]. Recommend patient have meds to beds program to ensure all insulin pens  and pen tips are in hand at time of discharge home. Patient will need insulin pen tips for 5 injections per day on current plan. Noted patient plan for discharge home with insulin and plan in place for delivery on 10 /15/2020.     Anni Cano RN  CDE

## 2020-10-07 NOTE — DISCHARGE SUMMARY
Obstetric Discharge Summary  [address] [facility]    Patient Name: Gris Christensen  Patient : 1990  Primary Care Physician: No primary care provider on file.   Admit Date: 10/6/2020    Principal Diagnosis: IUP at 34w6d, admitted for DKA     Her pregnancy has been complicated by:   Patient Active Problem List   Diagnosis    Hx C/S x2 (G4-breech, G7--NRFHT)   [employer] H/O successful     BMI 39.0-39.9,adult    Former smoker    History of THC abuse     [address] multip[address]    H/O indicated  delivery    H/O Starvation ketoacidosis    History of pre-eclampsia w/ SF--G7    Abnormal uterine bleeding    Late prenatal care affecting pregnancy in second trimester    cHTN (no meds)    Pre-Gestational DM (new dx)    Obesity affecting pregnancy in third trimester    Feeling pelvic pressure in pregnancy, antepartum    Hx LSIL    History of anxiety    Hypokalemia    Elevated LFTs    Long QT interval    34 weeks gestation of pregnancy    High risk teen pregnancy in third trimester    Pre-existing type 2 diabetes mellitus during pregnancy, antepartum    Hypertension affecting pregnancy in third trimester    Decreased fetal movement, antepartum    Fetal heart rate decelerations affecting management of mother    Encounter for routine screening for malformation using ultrasonics     screening for fetal growth retardation using ultrasonics    35 weeks gestation of pregnancy    RLTCS w/ RRS 10/8/ M Wt 6#5 Apg        Infection Present?: No  Hospital Acquired: No    Surgical Operations & Procedures:  [] Pitocin Induction of Labor  [] Pitocin Augmentation of Labor  [] Prostaglandin Induction of Labor  [] Mechanical Induction of Labor  [] Artificial Rupture of Membranes  [] Intrauterine Pressure Catheter  [] Fetal Scalp Electrode  [] Amnioinfusion  Analgesia: spinal  Delivery Type: RLTCS w/ RRS  Laceration(s): Absent    Consultations: MFM, NICU and Anesthesia, IM    Pertinent Findings & Procedures:   Pawel Vera is a 26 y.o. female [id number] at 34w6d admitted for Diabetic Ketoacidosis and was started on a insulin drip. Patient was noted to have a blood sugar of 485 on admission. BHB 1.52 on admission>1.73>0.4. and Anion gap was 16>12. Blood sugars improved to 130s and insulin gtt was discontinued. HD#2 (10/7/20: MFM scan showed cephalic, anterior placenta, BPP 8/8, EFW 6#6, some measurements large for gestational age. Patient's anion gap was 12, and insulin gtt discontinued. Patient started on Novolog 8U before breakfast, 8U before lunch, 8U before dinner; NPH 8U BID. HgbA1c 8.8. Patient received diabetic education. HD#3 (10/8/20): Patient continued to have elevated blood glucose and insulin regimen was adjusted to Novolog 8 units with breakfast and lunch, Novolog 10 units with dinner. PM dose of NPH was increased to 12 units. MFM ultrasound was repeated and showed BPP 8/8, normal UADs and MCAs. BPs continued to be in the 150s/90s so Procardia 30 XL daily. Patient had repetitive prolonged variable decelerations. Repeat bedside BPP was 4/8. Decision was made to proceed with delivery. She delivered by repeat low transverse  a Live Born infant on 10/8/20. Information for the patient's :  Ruth Freeman [[id number]]   male   Birth Weight: 6 lb 5.6 oz (2.88 kg)       Apgars: 8 at 1 minute and 9 at 5 minutes. Postpartum course:   POD#1: BPs remained elevated and do procardia 30 XL was started. Patient had preE labs that were wnl x1. Amy Rode was replaced. Internal Medicine team consulted for management of pregestational DM, and recommended. POD#2: The patient was started on Metformin 500 mg BID per IM team.     POD#4: Patient stable for discharge home.     Course of patient: complicated by DKA and indicated  delivery secondary to non-reassuring fetal heart tones and 4/8 BPP    Discharge to: Home    Readmission planned: no strength  · additional instructions        CONTINUE taking these medications    * acetaminophen 325 MG tablet  Commonly known as:  Tylenol  Take 2 tablets by mouth every 6 hours as needed for Pain     * acetaminophen 500 MG tablet  Commonly known as:  Acetaminophen Extra Strength  TAKE (1) TABLET BY MOUTH EVERY 4 HOURS AS NEEDED FOR PAIN     aspirin EC 81 MG EC tablet  Take 1 tablet by mouth daily     * Blood Pressure Kit  Use as directed Dx HTN     * Blood Pressure Cuff Misc  1 Units by Does not apply route daily     famotidine 20 MG tablet  Commonly known as:  PEPCID  Take 1 tablet by mouth 2 times daily     mineral oil-hydrophilic petrolatum ointment     ondansetron 4 MG tablet  Commonly known as:  Zofran  Take 1 tablet by mouth every 12 hours as needed for Nausea     potassium chloride 20 MEQ extended release tablet  Commonly known as:  KLOR-CON M  Take 2 tablets by mouth 2 times daily     PreNatal  MG Caps  Generic drug:  Docosahexaenoic Acid  Take 1 capsule by mouth Daily     prenatal vitamin 27-1 MG Tabs tablet  Take 1 tablet by mouth daily     raNITIdine 150 MG tablet  Commonly known as:  ZANTAC  Take 1 tablet by mouth 2 times daily         * This list has 4 medication(s) that are the same as other medications prescribed for you. Read the directions carefully, and ask your doctor or other care provider to review them with you.             STOP taking these medications    insulin glargine 100 UNIT/ML injection vial  Commonly known as:  LANTUS     Lantus SoloStar 100 UNIT/ML injection pen  Generic drug:  insulin glargine           Where to Get Your Medications      These medications were sent to [facility] [address]  [address] [facility], [address]  [address]    Phone:  [phone]   · Alcohol Prep Pads  · glucose monitoring kit monitoring kit  · insulin  UNIT/ML injection vial  · insulin  UNIT/ML injection vial  · Lancets Misc  · metFORMIN 500 MG tablet  · metroNIDAZOLE 500 MG tablet  · Needles & Syringes Misc     You can get these medications from any pharmacy    Bring a paper prescription for each of these medications  · blood glucose test strips  · docusate sodium 100 MG capsule  · ibuprofen 600 MG tablet  · NIFEdipine 30 MG extended release tablet  · oxyCODONE-acetaminophen 5-325 MG per tablet         Activity: pelvic rest x 6 weeks, no driving on narcotics, no lifting greater than 15 lbs  Diet: diabetic diet  Follow up: 10/15/20 for Prevena wound vac removal     Condition on discharge: stable    Discharge date: 10/12/2020      Negro Wesley DO  Ob/Gyn Resident    Comments:  Home care and follow-up care were reviewed. Pelvic rest, and birth control were reviewed. Signs and symptoms of mastitis and post partum depression were reviewed. The patient is to notify her physician if any of these occur. The patient was counseled on secondary smoke risks and the increased risk of sudden infant death syndrome and respiratory problems to her baby with exposure. She was counseled on various alternate recommendations to decrease the exposure to secondary smoke to her children.

## 2020-10-07 NOTE — PROGRESS NOTES
Maternal Fetal Medicine   Ultrasound Interval Note    Hilary Avalos is a 26 y.o. female [id number] at 34w6d     Case discussed with Dr. Hailey Brown. MFM Sono Report (Verbal): cephalic, anterior placenta, BPP 8/8, EFW 6#6, some measurements large for gestational age      Plan: Please refer to report for further details. Follow up in one week with MFM.  Appointment made for 10/17 at 10:45am    Dr. Vazquez Avina updated, OB Residents updated    Nikhil Barreto DO  Ob/Gyn Resident  10/7/2020, 8:20 AM

## 2020-10-07 NOTE — PROGRESS NOTES
CLINICAL PHARMACY NOTE: MEDS TO [address] Select Patient?: No  Total # of Prescriptions Filled: 6   The following medications were delivered to the patient:  · Glucometer  · Lancets  · Humulin N  · Humalog  · Syringes  · Metronidazole   Total # of Interventions Completed: 0  Time Spent (min): 5    Additional Documentation: meds delivered to nurse 10/7 at 5:40pm. Keep in fridge till patient is discharged. Patient in room 702. No co-pay.

## 2020-10-07 NOTE — PROGRESS NOTES
REPORTED     Set Rate NOT REPORTED     Total Rate NOT REPORTED     VT NOT REPORTED     FIO2 INFORMATION NOT PROVIDED     Peep/Cpap NOT REPORTED     PSV NOT REPORTED     Text for Respiratory NOT REPORTED     NOTIFICATION NOT REPORTED     NOTIFICATION TIME NOT REPORTED    POC Glucose Fingerstick    Collection Time: 10/07/20  1:30 AM   Result Value Ref Range    POC Glucose 234 (H) 65 - 105 mg/dL   POC Glucose Fingerstick    Collection Time: 10/07/20  2:34 AM   Result Value Ref Range    POC Glucose 218 (H) 65 - 105 mg/dL   POC Glucose Fingerstick    Collection Time: 10/07/20  3:32 AM   Result Value Ref Range    POC Glucose 137 (H) 65 - 105 mg/dL   POC Glucose Fingerstick    Collection Time: 10/07/20  4:31 AM   Result Value Ref Range    POC Glucose 152 (H) 65 - 105 mg/dL         Plan discussed with Dr. Sylwia Persaud who is in agreement.        Ernie Grissom DO  OB/GYN Resident  PGY3  [address] [facility], [address]  10/7/2020, 5:09 AM

## 2020-10-07 NOTE — FLOWSHEET NOTE
Pt off monitor to go to [facility] via wheelchair accompanied by Sylvie Bloch. Iv fluids continue to infuse per order.

## 2020-10-07 NOTE — TELEPHONE ENCOUNTER
Pt called and plan of care for prenatal appt, NST and BPP scheduled for Monday 10/12/2020. SBAR report to Dr. Sara Acosta and Dr. Alcides Diamond.  Pt agreeable to the plan of care

## 2020-10-08 ENCOUNTER — ANESTHESIA (OUTPATIENT)
Dept: LABOR AND DELIVERY | Age: 30
DRG: 539 | End: 2020-10-08
Payer: COMMERCIAL

## 2020-10-08 ENCOUNTER — ANESTHESIA EVENT (OUTPATIENT)
Dept: LABOR AND DELIVERY | Age: 30
DRG: 539 | End: 2020-10-08
Payer: COMMERCIAL

## 2020-10-08 VITALS — DIASTOLIC BLOOD PRESSURE: 85 MMHG | OXYGEN SATURATION: 100 % | SYSTOLIC BLOOD PRESSURE: 138 MMHG

## 2020-10-08 PROBLEM — Z98.891 STATUS POST REPEAT LOW TRANSVERSE CESAREAN SECTION: Status: ACTIVE | Noted: 2020-10-08

## 2020-10-08 LAB
AMPHETAMINE SCREEN URINE: NEGATIVE
BARBITURATE SCREEN URINE: NEGATIVE
BENZODIAZEPINE SCREEN, URINE: NEGATIVE
BUPRENORPHINE URINE: NORMAL
C TRACH DNA GENITAL QL NAA+PROBE: NEGATIVE
CANNABINOID SCREEN URINE: NEGATIVE
COCAINE METABOLITE, URINE: NEGATIVE
CULTURE: NORMAL
GLUCOSE BLD-MCNC: 156 MG/DL (ref 65–105)
GLUCOSE BLD-MCNC: 167 MG/DL (ref 65–105)
GLUCOSE BLD-MCNC: 182 MG/DL (ref 65–105)
Lab: NORMAL
MDMA URINE: NORMAL
METHADONE SCREEN, URINE: NEGATIVE
METHAMPHETAMINE, URINE: NORMAL
N. GONORRHOEAE DNA: NEGATIVE
OPIATES, URINE: NEGATIVE
OXYCODONE SCREEN URINE: NEGATIVE
PHENCYCLIDINE, URINE: NEGATIVE
PROPOXYPHENE, URINE: NORMAL
SARS-COV-2, RAPID: NOT DETECTED
SARS-COV-2: NORMAL
SARS-COV-2: NORMAL
SOURCE: NORMAL
SPECIMEN DESCRIPTION: NORMAL
SPECIMEN DESCRIPTION: NORMAL
T. PALLIDUM, IGG: NONREACTIVE
TEST INFORMATION: NORMAL
TRICYCLIC ANTIDEPRESSANTS, UR: NORMAL

## 2020-10-08 PROCEDURE — 1220000000 HC SEMI PRIVATE OB R&B

## 2020-10-08 PROCEDURE — U0002 COVID-19 LAB TEST NON-CDC: HCPCS

## 2020-10-08 PROCEDURE — 6360000002 HC RX W HCPCS: Performed by: STUDENT IN AN ORGANIZED HEALTH CARE EDUCATION/TRAINING PROGRAM

## 2020-10-08 PROCEDURE — 96374 THER/PROPH/DIAG INJ IV PUSH: CPT

## 2020-10-08 PROCEDURE — 7100000000 HC PACU RECOVERY - FIRST 15 MIN: Performed by: OBSTETRICS & GYNECOLOGY

## 2020-10-08 PROCEDURE — 6370000000 HC RX 637 (ALT 250 FOR IP): Performed by: STUDENT IN AN ORGANIZED HEALTH CARE EDUCATION/TRAINING PROGRAM

## 2020-10-08 PROCEDURE — 6360000002 HC RX W HCPCS

## 2020-10-08 PROCEDURE — 3609079900 HC CESAREAN SECTION: Performed by: OBSTETRICS & GYNECOLOGY

## 2020-10-08 PROCEDURE — 86780 TREPONEMA PALLIDUM: CPT

## 2020-10-08 PROCEDURE — 88302 TISSUE EXAM BY PATHOLOGIST: CPT

## 2020-10-08 PROCEDURE — 88307 TISSUE EXAM BY PATHOLOGIST: CPT

## 2020-10-08 PROCEDURE — 7100000001 HC PACU RECOVERY - ADDTL 15 MIN: Performed by: OBSTETRICS & GYNECOLOGY

## 2020-10-08 PROCEDURE — 6360000002 HC RX W HCPCS: Performed by: NURSE ANESTHETIST, CERTIFIED REGISTERED

## 2020-10-08 PROCEDURE — 0UT70ZZ RESECTION OF BILATERAL FALLOPIAN TUBES, OPEN APPROACH: ICD-10-PCS | Performed by: OBSTETRICS & GYNECOLOGY

## 2020-10-08 PROCEDURE — 59514 CESAREAN DELIVERY ONLY: CPT | Performed by: OBSTETRICS & GYNECOLOGY

## 2020-10-08 PROCEDURE — 3700000001 HC ADD 15 MINUTES (ANESTHESIA): Performed by: OBSTETRICS & GYNECOLOGY

## 2020-10-08 PROCEDURE — 2580000003 HC RX 258

## 2020-10-08 PROCEDURE — 3700000000 HC ANESTHESIA ATTENDED CARE: Performed by: OBSTETRICS & GYNECOLOGY

## 2020-10-08 PROCEDURE — 2580000003 HC RX 258: Performed by: STUDENT IN AN ORGANIZED HEALTH CARE EDUCATION/TRAINING PROGRAM

## 2020-10-08 PROCEDURE — 96375 TX/PRO/DX INJ NEW DRUG ADDON: CPT

## 2020-10-08 PROCEDURE — 2709999900 HC NON-CHARGEABLE SUPPLY: Performed by: OBSTETRICS & GYNECOLOGY

## 2020-10-08 PROCEDURE — 82947 ASSAY GLUCOSE BLOOD QUANT: CPT

## 2020-10-08 PROCEDURE — 2500000003 HC RX 250 WO HCPCS

## 2020-10-08 RX ORDER — SIMETHICONE 80 MG
80 TABLET,CHEWABLE ORAL EVERY 6 HOURS PRN
Status: DISCONTINUED | OUTPATIENT
Start: 2020-10-08 | End: 2020-10-12 | Stop reason: HOSPADM

## 2020-10-08 RX ORDER — DIPHENHYDRAMINE HYDROCHLORIDE 50 MG/ML
25 INJECTION INTRAMUSCULAR; INTRAVENOUS EVERY 6 HOURS PRN
Status: DISCONTINUED | OUTPATIENT
Start: 2020-10-08 | End: 2020-10-12 | Stop reason: HOSPADM

## 2020-10-08 RX ORDER — IBUPROFEN 800 MG/1
800 TABLET ORAL EVERY 8 HOURS
Status: DISCONTINUED | OUTPATIENT
Start: 2020-10-09 | End: 2020-10-12 | Stop reason: HOSPADM

## 2020-10-08 RX ORDER — TRISODIUM CITRATE DIHYDRATE AND CITRIC ACID MONOHYDRATE 500; 334 MG/5ML; MG/5ML
30 SOLUTION ORAL ONCE
Status: COMPLETED | OUTPATIENT
Start: 2020-10-08 | End: 2020-10-08

## 2020-10-08 RX ORDER — SODIUM CHLORIDE 0.9 % (FLUSH) 0.9 %
10 SYRINGE (ML) INJECTION PRN
Status: DISCONTINUED | OUTPATIENT
Start: 2020-10-08 | End: 2020-10-12 | Stop reason: HOSPADM

## 2020-10-08 RX ORDER — POTASSIUM CHLORIDE 7.45 MG/ML
10 INJECTION INTRAVENOUS PRN
Status: DISCONTINUED | OUTPATIENT
Start: 2020-10-08 | End: 2020-10-12 | Stop reason: HOSPADM

## 2020-10-08 RX ORDER — SODIUM CHLORIDE 9 MG/ML
125 INJECTION, SOLUTION INTRAVENOUS CONTINUOUS
Status: DISCONTINUED | OUTPATIENT
Start: 2020-10-08 | End: 2020-10-08

## 2020-10-08 RX ORDER — MORPHINE SULFATE 1 MG/ML
INJECTION, SOLUTION EPIDURAL; INTRATHECAL; INTRAVENOUS PRN
Status: DISCONTINUED | OUTPATIENT
Start: 2020-10-08 | End: 2020-10-08 | Stop reason: SDUPTHER

## 2020-10-08 RX ORDER — 0.9 % SODIUM CHLORIDE 0.9 %
1000 INTRAVENOUS SOLUTION INTRAVENOUS ONCE
Status: COMPLETED | OUTPATIENT
Start: 2020-10-08 | End: 2020-10-08

## 2020-10-08 RX ORDER — POLYETHYLENE GLYCOL 3350 17 G/17G
17 POWDER, FOR SOLUTION ORAL DAILY
Status: DISCONTINUED | OUTPATIENT
Start: 2020-10-08 | End: 2020-10-12 | Stop reason: HOSPADM

## 2020-10-08 RX ORDER — SODIUM CHLORIDE, SODIUM LACTATE, POTASSIUM CHLORIDE, CALCIUM CHLORIDE 600; 310; 30; 20 MG/100ML; MG/100ML; MG/100ML; MG/100ML
INJECTION, SOLUTION INTRAVENOUS CONTINUOUS PRN
Status: DISCONTINUED | OUTPATIENT
Start: 2020-10-08 | End: 2020-10-08 | Stop reason: SDUPTHER

## 2020-10-08 RX ORDER — SODIUM CHLORIDE, SODIUM LACTATE, POTASSIUM CHLORIDE, CALCIUM CHLORIDE 600; 310; 30; 20 MG/100ML; MG/100ML; MG/100ML; MG/100ML
INJECTION, SOLUTION INTRAVENOUS CONTINUOUS
Status: CANCELLED | OUTPATIENT
Start: 2020-10-08

## 2020-10-08 RX ORDER — DOCUSATE SODIUM 100 MG/1
100 CAPSULE, LIQUID FILLED ORAL 2 TIMES DAILY
Status: DISCONTINUED | OUTPATIENT
Start: 2020-10-08 | End: 2020-10-12 | Stop reason: HOSPADM

## 2020-10-08 RX ORDER — NALOXONE HYDROCHLORIDE 0.4 MG/ML
0.4 INJECTION, SOLUTION INTRAMUSCULAR; INTRAVENOUS; SUBCUTANEOUS PRN
Status: DISCONTINUED | OUTPATIENT
Start: 2020-10-08 | End: 2020-10-12 | Stop reason: HOSPADM

## 2020-10-08 RX ORDER — SODIUM CHLORIDE 0.9 % (FLUSH) 0.9 %
10 SYRINGE (ML) INJECTION EVERY 12 HOURS SCHEDULED
Status: DISCONTINUED | OUTPATIENT
Start: 2020-10-08 | End: 2020-10-12 | Stop reason: HOSPADM

## 2020-10-08 RX ORDER — ACETAMINOPHEN 500 MG
1000 TABLET ORAL EVERY 4 HOURS PRN
Status: DISCONTINUED | OUTPATIENT
Start: 2020-10-08 | End: 2020-10-12 | Stop reason: HOSPADM

## 2020-10-08 RX ORDER — METRONIDAZOLE 500 MG/1
500 TABLET ORAL EVERY 8 HOURS SCHEDULED
Status: DISPENSED | OUTPATIENT
Start: 2020-10-08 | End: 2020-10-10

## 2020-10-08 RX ORDER — SODIUM CHLORIDE 0.9 % (FLUSH) 0.9 %
10 SYRINGE (ML) INJECTION EVERY 12 HOURS SCHEDULED
Status: DISCONTINUED | OUTPATIENT
Start: 2020-10-08 | End: 2020-10-08

## 2020-10-08 RX ORDER — CEPHALEXIN 500 MG/1
500 CAPSULE ORAL EVERY 8 HOURS SCHEDULED
Status: DISPENSED | OUTPATIENT
Start: 2020-10-08 | End: 2020-10-10

## 2020-10-08 RX ORDER — POTASSIUM CHLORIDE 20 MEQ/1
40 TABLET, EXTENDED RELEASE ORAL PRN
Status: DISCONTINUED | OUTPATIENT
Start: 2020-10-08 | End: 2020-10-12 | Stop reason: HOSPADM

## 2020-10-08 RX ORDER — ONDANSETRON 2 MG/ML
4 INJECTION INTRAMUSCULAR; INTRAVENOUS EVERY 6 HOURS PRN
Status: DISCONTINUED | OUTPATIENT
Start: 2020-10-08 | End: 2020-10-12 | Stop reason: HOSPADM

## 2020-10-08 RX ORDER — OXYCODONE HYDROCHLORIDE AND ACETAMINOPHEN 5; 325 MG/1; MG/1
1 TABLET ORAL EVERY 4 HOURS PRN
Status: DISCONTINUED | OUTPATIENT
Start: 2020-10-09 | End: 2020-10-09

## 2020-10-08 RX ORDER — BISACODYL 10 MG
10 SUPPOSITORY, RECTAL RECTAL DAILY PRN
Status: DISCONTINUED | OUTPATIENT
Start: 2020-10-08 | End: 2020-10-12 | Stop reason: HOSPADM

## 2020-10-08 RX ORDER — LANOLIN 72 %
OINTMENT (GRAM) TOPICAL
Status: DISCONTINUED | OUTPATIENT
Start: 2020-10-08 | End: 2020-10-12 | Stop reason: HOSPADM

## 2020-10-08 RX ORDER — NIFEDIPINE 30 MG/1
30 TABLET, EXTENDED RELEASE ORAL DAILY
Status: DISCONTINUED | OUTPATIENT
Start: 2020-10-08 | End: 2020-10-12 | Stop reason: HOSPADM

## 2020-10-08 RX ORDER — OXYCODONE HYDROCHLORIDE AND ACETAMINOPHEN 5; 325 MG/1; MG/1
2 TABLET ORAL EVERY 4 HOURS PRN
Status: DISCONTINUED | OUTPATIENT
Start: 2020-10-09 | End: 2020-10-09

## 2020-10-08 RX ORDER — BUPIVACAINE HYDROCHLORIDE 7.5 MG/ML
INJECTION, SOLUTION INTRASPINAL PRN
Status: DISCONTINUED | OUTPATIENT
Start: 2020-10-08 | End: 2020-10-08 | Stop reason: SDUPTHER

## 2020-10-08 RX ORDER — KETOROLAC TROMETHAMINE 30 MG/ML
30 INJECTION, SOLUTION INTRAMUSCULAR; INTRAVENOUS EVERY 6 HOURS
Status: DISPENSED | OUTPATIENT
Start: 2020-10-08 | End: 2020-10-09

## 2020-10-08 RX ORDER — VITAMIN A, ASCORBIC ACID, CHOLECALCIFEROL, .ALPHA.-TOCOPHEROL ACETATE, DL-, THIAMINE MONONITRATE, RIBOFLAVIN, NIACINAMIDE, PYRIDOXINE HYDROCHLORIDE, FOLIC ACID, CYANOCOBALAMIN, CALCIUM CARBONATE, IRON, ZINC OXIDE, AND CUPRIC OXIDE 4000; 120; 400; 22; 1.84; 3; 20; 10; 1; 12; 200; 29; 25; 2 [IU]/1; MG/1; [IU]/1; [IU]/1; MG/1; MG/1; MG/1; MG/1; MG/1; UG/1; MG/1; MG/1; MG/1; MG/1
1 TABLET ORAL DAILY
Status: DISCONTINUED | OUTPATIENT
Start: 2020-10-08 | End: 2020-10-12 | Stop reason: HOSPADM

## 2020-10-08 RX ADMIN — CEPHALEXIN 500 MG: 500 CAPSULE ORAL at 22:24

## 2020-10-08 RX ADMIN — SODIUM CITRATE AND CITRIC ACID MONOHYDRATE 30 ML: 500; 334 SOLUTION ORAL at 10:03

## 2020-10-08 RX ADMIN — CEFAZOLIN 2 G: 10 INJECTION, POWDER, FOR SOLUTION INTRAVENOUS at 10:03

## 2020-10-08 RX ADMIN — METRONIDAZOLE 500 MG: 500 TABLET, FILM COATED ORAL at 22:24

## 2020-10-08 RX ADMIN — DOCUSATE SODIUM 100 MG: 100 CAPSULE, LIQUID FILLED ORAL at 22:24

## 2020-10-08 RX ADMIN — INSULIN LISPRO 3 UNITS: 100 INJECTION, SOLUTION INTRAVENOUS; SUBCUTANEOUS at 18:30

## 2020-10-08 RX ADMIN — PHENYLEPHRINE HYDROCHLORIDE 100 MCG: 10 INJECTION INTRAVENOUS at 10:45

## 2020-10-08 RX ADMIN — DIPHENHYDRAMINE HYDROCHLORIDE 25 MG: 50 INJECTION, SOLUTION INTRAMUSCULAR; INTRAVENOUS at 13:03

## 2020-10-08 RX ADMIN — PHENYLEPHRINE HYDROCHLORIDE 200 MCG: 10 INJECTION INTRAVENOUS at 10:28

## 2020-10-08 RX ADMIN — Medication 334 ML/HR: at 10:58

## 2020-10-08 RX ADMIN — MORPHINE SULFATE 0.02 MG: 1 INJECTION, SOLUTION EPIDURAL; INTRATHECAL; INTRAVENOUS at 10:24

## 2020-10-08 RX ADMIN — FAMOTIDINE 20 MG: 20 TABLET, FILM COATED ORAL at 22:24

## 2020-10-08 RX ADMIN — ACETAMINOPHEN 1000 MG: 500 TABLET ORAL at 08:27

## 2020-10-08 RX ADMIN — ONDANSETRON 4 MG: 2 INJECTION INTRAMUSCULAR; INTRAVENOUS at 11:02

## 2020-10-08 RX ADMIN — BUPIVACAINE HYDROCHLORIDE IN DEXTROSE 1.8 ML: 7.5 INJECTION, SOLUTION SUBARACHNOID at 10:24

## 2020-10-08 RX ADMIN — NIFEDIPINE 30 MG: 30 TABLET, FILM COATED, EXTENDED RELEASE ORAL at 20:12

## 2020-10-08 RX ADMIN — SODIUM CHLORIDE 1000 ML: 9 INJECTION, SOLUTION INTRAVENOUS at 09:55

## 2020-10-08 RX ADMIN — SODIUM CHLORIDE, POTASSIUM CHLORIDE, SODIUM LACTATE AND CALCIUM CHLORIDE: 600; 310; 30; 20 INJECTION, SOLUTION INTRAVENOUS at 10:14

## 2020-10-08 RX ADMIN — KETOROLAC TROMETHAMINE 30 MG: 30 INJECTION, SOLUTION INTRAMUSCULAR; INTRAVENOUS at 20:13

## 2020-10-08 RX ADMIN — INSULIN LISPRO 10 UNITS: 100 INJECTION, SOLUTION INTRAVENOUS; SUBCUTANEOUS at 18:38

## 2020-10-08 RX ADMIN — INSULIN LISPRO 6 UNITS: 100 INJECTION, SOLUTION INTRAVENOUS; SUBCUTANEOUS at 09:45

## 2020-10-08 RX ADMIN — SODIUM CHLORIDE, POTASSIUM CHLORIDE, SODIUM LACTATE AND CALCIUM CHLORIDE: 600; 310; 30; 20 INJECTION, SOLUTION INTRAVENOUS at 11:40

## 2020-10-08 RX ADMIN — POTASSIUM CHLORIDE 40 MEQ: 1500 TABLET, EXTENDED RELEASE ORAL at 22:24

## 2020-10-08 ASSESSMENT — PULMONARY FUNCTION TESTS
PIF_VALUE: 0
PIF_VALUE: 1
PIF_VALUE: 0
PIF_VALUE: 1
PIF_VALUE: 0
PIF_VALUE: 1
PIF_VALUE: 0

## 2020-10-08 ASSESSMENT — PAIN SCALES - GENERAL
PAINLEVEL_OUTOF10: 10
PAINLEVEL_OUTOF10: 6

## 2020-10-08 ASSESSMENT — PAIN DESCRIPTION - PROGRESSION: CLINICAL_PROGRESSION: NOT CHANGED

## 2020-10-08 NOTE — ANESTHESIA PRE PROCEDURE
Department of Anesthesiology  Preprocedure Note       Name:  Pawel Vera   Age:  26 y.o.  :  1990                                          MRN:  [id number]         Date:  10/8/2020      Surgeon: Nora Arellano):  Sharon Corrigan DO         Name:  Pawel Vera                                         Age:  26 y.o. MRN:  [id number]           Procedure (Scheduled):  Epidural  Surgeon:  Dr. Madeline Charles    Medications  No current facility-administered medications for this visit. Current Outpatient Medications   Medication Sig Dispense Refill    insulin NPH (HUMULIN N;NOVOLIN N) 100 UNIT/ML injection vial Inject 8 Units into the skin every morning for 8 days 1 vial 3    insulin NPH (HUMULIN N;NOVOLIN N) 100 UNIT/ML injection vial Inject 8 Units into the skin nightly for 8 days 1 vial 3    metroNIDAZOLE (FLAGYL) 500 MG tablet Take 1 tablet by mouth every 12 hours for 7 days 13 tablet 0    Lancets MISC 1 each by Does not apply route 5 times daily for 8 days 100 each 1    Alcohol Swabs (ALCOHOL PREP) PADS Please use on finger before check blood sugar 100 each 0    Needles & Syringes MISC 1 each by Does not apply route 5 times daily 100 each 0    glucose monitoring kit (FREESTYLE) monitoring kit 1 kit by Does not apply route daily 1 kit 0    blood glucose monitor strips Test 4 times a day & as needed for symptoms of irregular blood glucose. Dispense sufficient amount for indicated testing frequency plus additional to accommodate PRN testing needs.  100 strip 0     Facility-Administered Medications Ordered in Other Visits   Medication Dose Route Frequency Provider Last Rate Last Dose    sodium chloride flush 0.9 % injection 10 mL  10 mL Intravenous 2 times per day Benja Mace DO        sodium chloride flush 0.9 % injection 10 mL  10 mL Intravenous PRN Benja Mace DO        citric acid-sodium citrate (BICITRA) solution 30 mL  30 mL Oral Once Benja Mace DO        ceFAZolin (ANCEF) 2 g in dextrose 5 % 50 mL IVPB  2 g Intravenous Once Progga Sahu, DO        0.9 % sodium chloride bolus  1,000 mL Intravenous Once Lizzy King, DO 1,000 mL/hr at 10/08/20 0955 1,000 mL at 10/08/20 0955    0.9 % sodium chloride infusion  125 mL/hr Intravenous Continuous Silvana Blankenship, DO        dextrose 50 % IV solution  12.5 g Intravenous PRN Santosh Minion, DO        magnesium sulfate 1 g in dextrose 5% 100 mL IVPB  1 g Intravenous PRN Santosh Minion, DO        sodium phosphate 10 mmol in dextrose 5 % 250 mL IVPB  10 mmol Intravenous PRN Santosh Minion, DO        Or    sodium phosphate 15 mmol in dextrose 5 % 250 mL IVPB  15 mmol Intravenous PRN Santosh Minion, DO        Or    sodium phosphate 20 mmol in dextrose 5 % 500 mL IVPB  20 mmol Intravenous PRN Santosh Minion, DO        potassium chloride 10 mEq/100 mL IVPB (Peripheral Line)  10 mEq Intravenous PRN Santosh Minion, DO        metroNIDAZOLE (FLAGYL) tablet 500 mg  500 mg Oral 2 times per day Mickey Lerma DO   500 mg at 10/07/20 2056    aspirin chewable tablet 81 mg  81 mg Oral Daily Taylor Hodges, DO   81 mg at 10/07/20 0854    prenatal vitamin plus iron 29-1 MG tablet 1 tablet  1 tablet Oral Daily Santosh Minion, DO   1 tablet at 10/07/20 0855    famotidine (PEPCID) tablet 20 mg  20 mg Oral BID Lizzy King, DO   20 mg at 10/07/20 2056    insulin NPH (HUMULIN N;NOVOLIN N) injection vial 8 Units  8 Units Subcutaneous [facility], DO   8 Units at 10/07/20 1033    insulin lispro (HUMALOG) injection vial 8 Units  8 Units Subcutaneous Daily with breakfast Santosh Minion, DO        insulin lispro (HUMALOG) injection vial 8 Units  8 Units Subcutaneous Lunch Taylor Hodges, DO        insulin lispro (HUMALOG) injection vial 10 Units  10 Units Subcutaneous Dinner Taylor Hodges, DO        glucose (GLUTOSE) 40 % oral gel 15 g  15 g Oral PRN Santosh Minion, DO        glucagon injection 1 mg  1 mg Intramuscular PRN Santosh Minion, DO        insulin NPH (HUMULIN N;NOVOLIN N) injection vial 12 Units  12 Units Subcutaneous Nightly Ashley Favorite, DO   Stopped at 10/08/20 0008    insulin lispro (HUMALOG) injection vial 0-12 Units  0-12 Units Subcutaneous TID Ashley Favorite, DO   6 Units at 10/08/20 0945    acetaminophen (TYLENOL) tablet 1,000 mg  1,000 mg Oral Q6H PRN Randye Dark, DO   1,000 mg at 10/08/20 0827    promethazine (PHENERGAN) tablet 12.5 mg  12.5 mg Oral Q6H PRN Randye Dark, DO        Or    ondansetron [facility]) injection 4 mg  4 mg Intravenous Q6H PRN Randye Dark, DO   4 mg at 10/06/20 2114    calcium carbonate (TUMS) chewable tablet 500 mg  500 mg Oral TID PRN Randye Dark, DO   500 mg at 10/06/20 1952       No Known Allergies  Patient Active Problem List   Diagnosis    Hx C/S x2 (G4-breech, G7--NRFHT)    H/O successful     BMI 39.0-39.9,adult    Former smoker    History of THC abuse     [address]    H/O indicated  delivery    H/O Starvation ketoacidosis    History of pre-eclampsia w/ SF--G7    Abnormal uterine bleeding    Late prenatal care affecting pregnancy in second trimester    cHTN (no meds)    Pre-Gestational DM (new dx)    Obesity affecting pregnancy in third trimester    Feeling pelvic pressure in pregnancy, antepartum    Hx LSIL    History of anxiety    Hypokalemia    Elevated LFTs    Long QT interval    34 weeks gestation of pregnancy    High risk teen pregnancy in third trimester    Pre-existing type 2 diabetes mellitus during pregnancy, antepartum    Hypertension affecting pregnancy in third trimester    Decreased fetal movement, antepartum    Fetal heart rate decelerations affecting management of mother    Encounter for routine screening for malformation using ultrasonics     screening for fetal growth retardation using ultrasonics     Past Medical History:   Diagnosis Date    cHTN (no meds) 2020    GDMA1 5/15/2018    TIEN(853.0)     Pityriasis rosea     Right sciatic nerve pain 2018    Noted in previous pregnancy, was prescribed belt but it wasn't covered by insurance Progressively worsening symptoms noted -- Referred to PT 18    THC abuse      THC abuse      Traumatic injury  6/3/2018    Trichomonas 2013    treated     Past Surgical History:   Procedure Laterality Date     SECTION      CHOLECYSTECTOMY      [place]  DELIVERY ONLY N/A 2018     SECTION performed by Sharon Cor[facility]igan DO at [facility]     Social History     Tobacco Use    Smoking status: Current Every Day Smoker     Packs/day: 0.03     Years: 5.00     Pack years: 0.15     Types: Cigars    Smokeless tobacco: Never Used    Tobacco comment: \"black and mild - 3/day\"  8/10/2020   Substance Use Topics    Alcohol use: No     Comment: occasionally when not preg     Drug use: No     Types: Marijuana     Comment: used end of 2017         Vital Signs (Current)   There were no vitals filed for this visit. Vital Signs Statistics (for past 48 hrs)     Temp  Av.3 °F (36.8 °C)  Min: 97.9 °F (36.6 °C)   Min taken time: 10/07/20 2056  Max: 98.6 °F (37 °C)   Max taken time: 10/07/20 1602  Pulse  Av.7  Min: 61   Min taken time: 10/08/20 0025  Max: 80   Max taken time: 10/06/20 1931  Resp  Av.8  Min: 12   Min taken time: 10/07/20 1602  Max: 20   Max taken time: 10/07/20 0542  BP  Min: 136/91   Min taken time: 10/07/20 1392  Max: 162/105   Max taken time: 10/07/20 0542  BP Readings from Last 3 Encounters:   10/08/20 (!) 153/72   09/10/20 130/82   20 129/84       BMI  There is no height or weight on file to calculate BMI.     CBC   Lab Results   Component Value Date    WBC 10.7 10/07/2020    RBC 4.25 10/07/2020    HGB 12.6 10/07/2020    HCT 39.7 10/07/2020    MCV 93.4 10/07/2020    RDW 14.8 10/07/2020     10/07/2020       CMP    Lab Results   Component Value Date     10/07/2020    K 3.2 10/07/2020  10/07/2020    CO2 18 10/07/2020    BUN 4 10/07/2020    CREATININE 0.51 10/07/2020    GFRAA >60 10/07/2020    LABGLOM >60 10/07/2020    GLUCOSE 133 10/07/2020    PROT 5.6 10/07/2020    CALCIUM 8.8 10/07/2020    BILITOT 0.35 10/07/2020    ALKPHOS 113 10/07/2020    AST 17 10/07/2020    ALT 18 10/07/2020       BMP    Lab Results   Component Value Date     10/07/2020    K 3.2 10/07/2020     10/07/2020    CO2 18 10/07/2020    BUN 4 10/07/2020    CREATININE 0.51 10/07/2020    CALCIUM 8.8 10/07/2020    GFRAA >60 10/07/2020    LABGLOM >60 10/07/2020    GLUCOSE 133 10/07/2020       POC Testing  Recent Labs     10/08/20  0935   POCGLU 182*       Coags    Lab Results   Component Value Date    PROTIME 9.7 06/03/2018    INR 0.9 06/03/2018    APTT 23.7 06/03/2018       HCG (If Applicable)   Lab Results   Component Value Date    PREGTESTUR POSITIVE (A) 10/26/2017    HCG NEGATIVE 02/03/2012    HCGQUANT <1 11/27/2018        ABGs No results found for: PHART, PO2ART, MHK9LYW, PGT1UNG, BEART, V8VSJRMV     Type & Screen (If Applicable)  No results found for: [facility]    Radiology (If Applicable)    Cardiac Testing (If Applicable)     EKG (If Applicable)           Procedure: * No procedures listed *    Medications prior to admission:   Prior to Admission medications    Medication Sig Start Date End Date Taking?  Authorizing Provider   insulin NPH (HUMULIN N;NOVOLIN N) 100 UNIT/ML injection vial Inject 8 Units into the skin every morning for 8 days 10/8/20 10/16/20  Kristin Bharat, DO   insulin NPH (HUMULIN N;NOVOLIN N) 100 UNIT/ML injection vial Inject 8 Units into the skin nightly for 8 days 10/7/20 10/15/20  Kristin Bharat, DO   metroNIDAZOLE (FLAGYL) 500 MG tablet Take 1 tablet by mouth every 12 hours for 7 days 10/7/20 10/14/20  Vergia Peeling, DO   Lancets MISC 1 each by Does not apply route 5 times daily for 8 days 10/7/20 10/15/20  Vergia Peeling, DO   Alcohol Swabs (ALCOHOL PREP) PADS Please use on finger before check blood sugar 10/7/20   Aniya Spence, DO   Needles & Syringes MISC 1 each by Does not apply route 5 times daily 10/7/20   Kristincandido Nicholson, DO   glucose monitoring kit (FREESTYLE) monitoring kit 1 kit by Does not apply route daily 10/7/20   Kristincandido Nicholson, DO   blood glucose monitor strips Test 4 times a day & as needed for symptoms of irregular blood glucose. Dispense sufficient amount for indicated testing frequency plus additional to accommodate PRN testing needs.  10/7/20   Kristin Littleon, DO   famotidine (PEPCID) 20 MG tablet Take 1 tablet by mouth 2 times daily 9/10/20   Prateek Patton, DO   aspirin EC 81 MG EC tablet Take 1 tablet by mouth daily 8/25/20 11/17/20  Meenakshiyazmin Spence, DO   ondansetron (ZOFRAN) 4 MG tablet Take 1 tablet by mouth every 12 hours as needed for Nausea 8/25/20   Meenakshiyazmin Spence, DO   potassium chloride (KLOR-CON M) 20 MEQ extended release tablet Take 2 tablets by mouth 2 times daily 8/25/20 11/17/20  Kristincandido Nicholson, DO   Docosahexaenoic Acid (PRENATAL DHA) 200 MG CAPS Take 1 capsule by mouth Daily 8/25/20 8/25/21  Meenakshiaureliajaison Spence, DO   Blood Pressure Monitoring (BLOOD PRESSURE CUFF) MISC 1 Units by Does not apply route daily  Patient not taking: Reported on 9/8/2020 8/25/20 11/17/20  Aniya Spence, DO   ranitidine (ZANTAC) 150 MG tablet Take 1 tablet by mouth 2 times daily  Patient not taking: Reported on 7/22/2020 1/21/20   Cristina Mathews MD   acetaminophen (ACETAMINOPHEN EXTRA STRENGTH) 500 MG tablet TAKE (1) TABLET BY MOUTH EVERY 4 HOURS AS NEEDED FOR PAIN  Patient not taking: Reported on 7/22/2020 12/17/19   Cristina Mathews MD   Prenatal Vit-Fe Fumarate-FA (PRENATAL VITAMIN) 27-1 MG TABS tablet Take 1 tablet by mouth daily 16/9/09   Caryn Fragoso, DO   mineral oil-hydrophilic petrolatum (AQUAPHOR) ointment  12/21/18   Historical MD Jesi   Blood Pressure KIT Use as directed Dx HTN  Patient not taking: Reported on 7/22/2020 12/5/18   Tony Ahn MD   acetaminophen (TYLENOL) 325 MG tablet Take 2 tablets by mouth every 6 hours as needed for Pain 9/20/17   Melissa Cool PA-C       Current medications:    No current facility-administered medications for this visit. Current Outpatient Medications   Medication Sig Dispense Refill    insulin NPH (HUMULIN N;NOVOLIN N) 100 UNIT/ML injection vial Inject 8 Units into the skin every morning for 8 days 1 vial 3    insulin NPH (HUMULIN N;NOVOLIN N) 100 UNIT/ML injection vial Inject 8 Units into the skin nightly for 8 days 1 vial 3    metroNIDAZOLE (FLAGYL) 500 MG tablet Take 1 tablet by mouth every 12 hours for 7 days 13 tablet 0    Lancets MISC 1 each by Does not apply route 5 times daily for 8 days 100 each 1    Alcohol Swabs (ALCOHOL PREP) PADS Please use on finger before check blood sugar 100 each 0    Needles & Syringes MISC 1 each by Does not apply route 5 times daily 100 each 0    glucose monitoring kit (FREESTYLE) monitoring kit 1 kit by Does not apply route daily 1 kit 0    blood glucose monitor strips Test 4 times a day & as needed for symptoms of irregular blood glucose. Dispense sufficient amount for indicated testing frequency plus additional to accommodate PRN testing needs.  100 strip 0     Facility-Administered Medications Ordered in Other Visits   Medication Dose Route Frequency Provider Last Rate Last Dose    sodium chloride flush 0.9 % injection 10 mL  10 mL Intravenous 2 times per day Galarza Melter, DO        sodium chloride flush 0.9 % injection 10 mL  10 mL Intravenous PRN Galarza Melter, DO        citric acid-sodium citrate (BICITRA) solution 30 mL  30 mL Oral Once Galarza Melter, DO        ceFAZolin (ANCEF) 2 g in dextrose 5 % 50 mL IVPB  2 g Intravenous Once Proggcris Sahu, DO        0.9 % sodium chloride bolus  1,000 mL Intravenous Once Jesus Coad, DO 1,000 mL/hr at 10/08/20 0955 1,000 mL at 10/08/20 0955    0.9 % sodium chloride infusion  125 mL/hr Intravenous Continuous Silvana Blankenship, DO        dextrose 50 % IV solution  12.5 g Intravenous PRN Mak Huge, DO        magnesium sulfate 1 g in dextrose 5% 100 mL IVPB  1 g Intravenous PRN Mak Huge, DO        sodium phosphate 10 mmol in dextrose 5 % 250 mL IVPB  10 mmol Intravenous PRN Mak Huge, DO        Or    sodium phosphate 15 mmol in dextrose 5 % 250 mL IVPB  15 mmol Intravenous PRN Mak Huge, DO        Or    sodium phosphate 20 mmol in dextrose 5 % 500 mL IVPB  20 mmol Intravenous PRN Mak Huge, DO        potassium chloride 10 mEq/100 mL IVPB (Peripheral Line)  10 mEq Intravenous PRN Mak Huge, DO        metroNIDAZOLE (FLAGYL) tablet 500 mg  500 mg Oral 2 times per day Maegan Lai, DO   500 mg at 10/07/20 2056    aspirin chewable tablet 81 mg  81 mg Oral Daily Taylor Hodges, DO   81 mg at 10/07/20 0854    prenatal vitamin plus iron 29-1 MG tablet 1 tablet  1 tablet Oral Daily Mak Huge, DO   1 tablet at 10/07/20 0855    famotidine (PEPCID) tablet 20 mg  20 mg Oral BID Mary Bleacher, DO   20 mg at 10/07/20 2056    insulin NPH (HUMULIN N;NOVOLIN N) injection vial 8 Units  8 Units Subcutaneous QAM Venida Cannon, DO   8 Units at 10/07/20 1033    insulin lispro (HUMALOG) injection vial 8 Units  8 Units Subcutaneous Daily with breakfast Mak Yared, DO        insulin lispro (HUMALOG) injection vial 8 Units  8 Units Subcutaneous Lunch Taylorkeeley Hodges, DO        insulin lispro (HUMALOG) injection vial 10 Units  10 Units Subcutaneous Dinner Taylorkeeley Hodges, DO        glucose (GLUTOSE) 40 % oral gel 15 g  15 g Oral PRN Mak Huge, DO        glucagon injection 1 mg  1 mg Intramuscular PRN Mak Huge, DO        insulin NPH (HUMULIN N;NOVOLIN N) injection vial 12 Units  12 Units Subcutaneous Nightly Mak Huge, DO   Stopped at 10/08/20 0008    insulin lispro (HUMALOG) injection vial 0-12 Units  0-12 Units Subcutaneous TID Mak Huge, DO   6 Units at 10/08/20 0945    acetaminophen (TYLENOL) tablet 1,000 mg 1,000 mg Oral Q6H PRN Maegan [place], DO   1,000 mg at 10/08/20 0827    promethazine (PHENERGAN) tablet 12.5 mg  12.5 mg Oral Q6H PRN Maegan [place], DO        Or    ondansetron [facility]) injection 4 mg  4 mg Intravenous Q6H PRN Maegan [place], DO   4 mg at 10/06/20 2114    calcium carbonate (TUMS) chewable tablet 500 mg  500 mg Oral TID PRN Maegan [place], DO   500 mg at 10/06/20 1952       Allergies:  No Known Allergies    Problem List:    Patient Active Problem List   Diagnosis Code    Hx C/S x2 (G4-breech, G7--NRFHT) O31.200    H/O successful  Z98.891    BMI 39.0-39.9,adult Z68.39    Former smoker Z87.891    History of THC abuse  F12.10   [address] multiparity Z59.3    H/O indicated  delivery O09.899    H/O Starvation ketoacidosis E87.2    History of pre-eclampsia w/ SF--G7 Z87.59    Abnormal uterine bleeding N93.9    Late prenatal care affecting pregnancy in second trimester O09.32    cHTN (no meds) I10    Pre-Gestational DM (new dx) O24.112    Obesity affecting pregnancy in third trimester O99.213    Feeling pelvic pressure in pregnancy, antepartum O26.899, R10.2    Hx LSIL Z87.42    History of anxiety Z86.59    Hypokalemia E87.6    Elevated LFTs R79.89    Long QT interval R94.31    34 weeks gestation of pregnancy Z3A.34    High risk teen pregnancy in third trimester O09.893    Pre-existing type 2 diabetes mellitus during pregnancy, antepartum O24.119    Hypertension affecting pregnancy in third trimester O16.3    Decreased fetal movement, antepartum O36.8190    Fetal heart rate decelerations affecting management of mother L08.1547   Kobe Obando Encounter for routine screening for malformation using ultrasonics Z13.89     screening for fetal growth retardation using ultrasonics Z36.4       Past Medical History:        Diagnosis Date    cHTN (no meds) 2020    GDMA1 5/15/2018    ZCNOLIVIA(930.0)     Pityriasis rosea     Right sciatic nerve pain 2018 Noted in previous pregnancy, was prescribed belt but it wasn't covered by insurance Progressively worsening symptoms noted -- Referred to PT 18    THC abuse      THC abuse      Traumatic injury  6/3/2018    Trichomonas 2013    treated       Past Surgical History:        Procedure Laterality Date     SECTION      CHOLECYSTECTOMY  2010    NC  DELIVERY ONLY N/A 2018     SECTION performed by Gurvinder Denis DO at [facility] L[facility]D [facility]       Social History:    Social History     Tobacco Use    Smoking status: Current Every Day Smoker     Packs/day: 0.03     Years: 5.00     Pack years: 0.15     Types: Cigars    Smokeless tobacco: Never Used    Tobacco comment: \"black and mild - 3/day\"  8/10/2020   Substance Use Topics    Alcohol use: No     Comment: occasionally when not preg                                 Ready to quit: Not Answered  Counseling given: Not Answered  Comment: \"black and mild - 3/day\"  8/10/2020      Vital Signs (Current): There were no vitals filed for this visit.                                            BP Readings from Last 3 Encounters:   10/08/20 (!) 153/72   09/10/20 130/82   20 129/84       NPO Status:                                                                                 BMI:   Wt Readings from Last 3 Encounters:   10/06/20 240 lb (108.9 kg)   09/10/20 240 lb (108.9 kg)   20 239 lb (108.4 kg)     There is no height or weight on file to calculate BMI.    CBC:   Lab Results   Component Value Date    WBC 10.7 10/07/2020    RBC 4.25 10/07/2020    HGB 12.6 10/07/2020    HCT 39.7 10/07/2020    MCV 93.4 10/07/2020    RDW 14.8 10/07/2020     10/07/2020       CMP:   Lab Results   Component Value Date     10/07/2020    K 3.2 10/07/2020     10/07/2020    CO2 18 10/07/2020    BUN 4 10/07/2020    CREATININE 0.51 10/07/2020    GFRAA >60 10/07/2020    LABGLOM >60 10/07/2020    GLUCOSE 133 10/07/2020    PROT 5.6 10/07/2020    CALCIUM 8.8 10/07/2020    BILITOT 0.35 10/07/2020    ALKPHOS 113 10/07/2020    AST 17 10/07/2020    ALT 18 10/07/2020       POC Tests:   Recent Labs     10/08/20  0935   POCGLU 182*       Coags:   Lab Results   Component Value Date    PROTIME 9.7 06/03/2018    INR 0.9 06/03/2018    APTT 23.7 06/03/2018       HCG (If Applicable):   Lab Results   Component Value Date    PREGTESTUR POSITIVE (A) 10/26/2017    HCG NEGATIVE 02/03/2012    HCGQUANT <1 11/27/2018        ABGs: No results found for: PHART, PO2ART, HXP2LHK, HEU8EAP, BEART, S9QUFWUH     Type & Screen (If Applicable):  No results found for: [facility], [address]    Anesthesia Evaluation  Patient summary reviewed no history of anesthetic complications:   Airway: Mallampati: II  TM distance: >3 FB   Neck ROM: full  Mouth opening: > = 3 FB Dental: normal exam         Pulmonary:Negative Pulmonary ROS and normal exam                               Cardiovascular:    (+) orthopnea,         Rhythm: regular                   ROS comment: PET  Severe hypokalemia 2.8      Neuro/Psych:   (+) neuromuscular disease:, headaches:,             GI/Hepatic/Renal: Neg GI/Hepatic/Renal ROS            Endo/Other:    (+) Diabetes, . Abdominal:   (+) obese,         Vascular:                                          Anesthesia Plan      spinal     ASA 3           MIPS: Postoperative opioids intended and Prophylactic antiemetics administered. Anesthetic plan and risks discussed with patient. Plan discussed with attending and CRNA.     Attending anesthesiologist reviewed and agrees with [address] Lara Peterson MD   10/8/2020

## 2020-10-08 NOTE — OP NOTE
Operative Note  Department of Obstetrics and Gynecology  [facility]     Patient: Ammy Carson   : 1990  MRN: [id number]       Acct: [de-identified]   PCP: No primary care provider on file. Date of Procedure: 10/8/20    Pre-operative Diagnosis: 26 y.o. female [id number] at 35w0d IUP   4/8 BPP (off breathing and JOSH 2 cm)  NRFHT (variable decelerations, prolonged decelerations)  DKA   Hx C/S x2, declines TOLAC  Hx of successful   Pregestational DM   cHTN (no medications)  History of pre-eclampsia w/ SF  Hx of indicated PTD    Hx of Elevated LFTs   Hx of anxiety   Late prenatal care  BMI 38.74     Post-operative Diagnosis: Same as above, living  male infant    Procedure: repeat low transverse  section with risk reducing salpingectomy     Indications: Patient is a 26 y.o. female [id number] at 35w0d with a history of pre-gestational DM Class B who presented on 10/6/20 with contractions and decreased fetal movement and was found to be in DKA with blood sugar 485, BHB 1.52, and Anion gap was 16 on admission, insulin drip was started. Blood sugars improved to 130s and insulin gtt was discontinued on 10/7/20. Patient had an MFM ultrasound on 10/7/20 that showed an 8/8 BPP. Patient was kept inpatient for blood sugar optimization and was found to have a non-reassuring fetal heart tones (variable decelerations and prolonged decelerations). Repeat BPP performed on 10/8/20 that was 4/8 (off for breathing and fluid, JOSH 2 cm) and decision was made to proceed with delivery. Patient has a history of  section x 2 and declined TOLAC. Discussed at length the risks and benefits of concurrent bilateral salpingectomy with patient's upcoming schedule abdominal or pelvic surgery per recommendation of the Society of Gynecologic Oncology, American College of Obstetricians and Gynecologists as this patient is at above average risk for development of ovarian cancer.  Advised patient that the primary benefit of such surgery is a 65% reduction in future risk of ovarian cancer. Patient advised that large scale studies have not demonstrated an increase in estimated blood loss, complications, or operating time but that bleeding, infection, and injury to nearby organs are potential complications with this additional surgery. Finally, patient has been thoroughly counseled regarding the consequence of loss of fertility following this procedure. Patient understands that this loss of fertility can not be reversed and has expressed via verbal and written consent that her wishes are to proceed with the this surgery for the purposes of ovarian cancer reduction. Surgeon: Dr. Jacquie Mcnamara DO  Assistants: Santiago Blackman; Gilberto Garcia, PGY1; Casie Gallardo, OMS-3    Anesthesia: spinal with duramorph    Procedure Details   The patient was seen pre-operatively. The risks, benefits, complications, treatment options, and expected outcomes were discussed with the patient. The patient concurred with the proposed plan, giving informed consent. The patient was taken to the Operating Room, identified as Melisa Payer and the procedure verified as  Delivery. A Time Out was held and the above information confirmed. After spinal anesthesia, the patient was draped and prepped in the usual sterile manner. A Pfannenstiel incision was made and carried down through the subcutaneous tissue to the fascia using scalpel. Fascial incision was made and extended transversely using scalpel and Bovie electrocautery for sharp dissection. The fascia was  from the underlying rectus tissue superiorly and inferiorly using blunt dissection. The peritoneum was identified and entered bluntly. Peritoneal incision was extended longitudinally with blunt stretch, bladder retractor was placed.  The utero-vesical peritoneal reflection was incised transversely using Metzenbaum scissors and the bladder flap was bluntly freed reapproximated with running sutures of 0 Vicryl x 2. The subcuticular space was irrigated copiously. The skin was reapproximated with a 4-0 Vicryl using a subcuticular stitch. The skin was then cleansed and dressed with a Prevena dressing in sterile fashion. Instrument, sponge, and needle counts were correct prior the abdominal closure and at the conclusion of the case. The urine remained clear throughout the case. Ancef was given for antibiotic prophylaxis. SCDs for DVT prophylaxis remain in place for the post operative period. Dr. Annabelle Garvey was present for the entire operation. Findings:  Viable 6 lb 5 oz male infant in cephalic presentation with Apgars of 8 at 1 minute and 9 at five minutes, normal appearing uterus tubes and ovaries   Quantitative Blood Loss: 1155 mL  Total IV Fluids: 2100 mL  Urine output: 175 mL clear urine   Drains:  none  Specimens:  placenta sent to pathology, cord blood and cord gases, bilateral fallopian tubes  Instrument and Sponge Count: Correct  Complications: none  Condition: Mother and infant stable to post anesthesia recovery room       Wyatt Catalan DO  OB/GYN Resident  10/8/2020, 6:36 PM     Resident Physician Statement  I have discussed the case, including pertinent history and exam findings with the above OB/GYN Resident Physician. I have personally seen the patient. I agree with the assessment, plan and orders as documented. I have made changes to the above note as needed. I have discussed the case with above named attending. Niko Winter DO  Ob/Gyn Resident [address] [facility]  10/8/2020 7:22 PM          Attending Physician Statement  I have discussed the care of Chelsea Real, including pertinent history and exam findings,  with the resident. I have seen and examined the patient and the key elements of all parts of the encounter have been performed by me.   I agree with the assessment, plan and orders as documented by the resident. (GC Modifier)    I was present for and scrubbed into the entire procedure.     Connor White DO

## 2020-10-08 NOTE — PROGRESS NOTES
OB/GYN Resident Interval Note    FHT:   Fetal heart rate baseline: 150, moderate variability, accelerations present, multiple prolonged and spontaneous decelerations throughout the night     Biophysical Profile:   Amniotic Fluid Index: 2.45 cm  Tone: Present  Movement: Present  Breathing: Absent    Biophysical Score: 4 / 8    Fetal Position: Cephalic      Patient counseled for c/s secondary to 4/8 BPP off for breathing and fluid. Risks, benefits, and alternatives discussed including but not limited to hemorrhage, infection, injury to nearby organs, need for additional operative procedures, and exceedingly rare risk of stroke and death. Discussed at length the risks and benefits of concurrent bilateral salpingectomy with patient's upcoming schedule abdominal or pelvic surgery per recommendation of the Society of Gynecologic Oncology, American College of Obstetricians and Gynecologists as this patient is at above average risk for development of ovarian cancer. Advised patient that the primary benefit of such surgery is a 65% reduction in future risk of ovarian cancer. Patient advised that large scale studies have not demonstrated an increase in estimated blood loss, complications, or operating time but that bleeding, infection, and injury to nearby organs are potential complications with this additional surgery. Finally, patient has been thoroughly counseled regarding the consequence of loss of fertility following this procedure. Patient understands that this loss of fertility can not be reversed and has expressed via verbal and written consent that her wishes are to proceed with the this surgery for the purposes of ovarian cancer reduction. Ancef and bicitra ordered. Anesthesia and NICU consulted. COVID screening ordered. Dr. Dede Castrejon at bedside and agreeable to plan.      Adalid Beltran DO  Ob/Gyn Resident [address] [facility]  10/8/2020 9:18 AM          Attending Physician Statement  I have discussed the care of Gris Christensen, including pertinent history and exam findings,  with the resident. I have seen and examined the patient and the key elements of all parts of the encounter have been performed by me. I agree with the assessment, plan and orders as documented by the resident.   [facility] Modifier)    Letty Andrews DO

## 2020-10-08 NOTE — PLAN OF CARE
Problem: Pain:  Goal: Pain level will decrease  Description: Pain level will decrease  Outcome: Ongoing  Goal: Control of acute pain  Description: Control of acute pain  Outcome: Ongoing  Goal: Control of chronic pain  Description: Control of chronic pain  Outcome: Ongoing     Problem: Discharge Planning:  Goal: Discharged to appropriate level of care  Description: Discharged to appropriate level of care  Outcome: Ongoing     Problem: Fluid Volume - Imbalance:  Goal: Absence of postpartum hemorrhage signs and symptoms  Description: Absence of postpartum hemorrhage signs and symptoms  Outcome: Ongoing  Goal: Absence of imbalanced fluid volume signs and symptoms  Description: Absence of imbalanced fluid volume signs and symptoms  Outcome: Ongoing     Problem: Infection - Surgical Site:  Goal: Will show no infection signs and symptoms  Description: Will show no infection signs and symptoms  Outcome: Ongoing     Problem: Mood - Altered:  Goal: Mood stable  Description: Mood stable  Outcome: Ongoing     Problem: Nausea/Vomiting:  Goal: Absence of nausea/vomiting  Description: Absence of nausea/vomiting  Outcome: Ongoing     Problem: Pain - Acute:  Goal: Pain level will decrease  Description: Pain level will decrease  Outcome: Ongoing     Problem: Urinary Retention:  Goal: Urinary elimination within specified parameters  Description: Urinary elimination within specified parameters  Outcome: Ongoing     Problem: Venous Thromboembolism:  Goal: Will show no signs or symptoms of venous thromboembolism  Description: Will show no signs or symptoms of venous thromboembolism  Outcome: Ongoing  Goal: Absence of signs or symptoms of impaired coagulation  Description: Absence of signs or symptoms of impaired coagulation  Outcome: Ongoing

## 2020-10-08 NOTE — ANESTHESIA PROCEDURE NOTES
Spinal Block    Start time: 10/8/2020 10:18 AM  End time: 10/8/2020 10:24 AM  Reason for block: procedure for pain  Staffing  Anesthesiologist: Reji Ramirez MD  Resident/CRNA: TRINA Morris CRNA  Other anesthesia staff: Miguelangel Baird  Performed: other anesthesia staff   Preanesthetic Checklist  Completed: patient identified, site marked, surgical consent, pre-op evaluation, timeout performed, IV checked, risks and benefits discussed, monitors and equipment checked, anesthesia consent given, oxygen available and patient being monitored  Spinal Block  Patient position: sitting  Prep: Betadine  Patient monitoring: continuous pulse ox and frequent blood pressure checks  Approach: midline  Location: L4/L5  Procedures: paresthesia technique  Provider prep: mask and sterile gloves  Local infiltration: lidocaine  Dose: 2  Agent: bupivacaine  Adjuvant: duramorph  Dose: 1.8  Dose: 1.8  Needle  Needle type: Pencan   Needle gauge: 24 G  Needle length: 4 in  Assessment  Sensory level: T4  Swirl obtained: Yes  CSF: clear  Attempts: 1  Hemodynamics: stable

## 2020-10-08 NOTE — BRIEF OP NOTE
Department of Obstetrics and Gynecology  Obstetrical Brief Operative Report  [facility]    Patient: Tyson Morton   : 1990  MRN: [id number]       Acct: [de-identified]   Date of Procedure: 10/8/20    Pre-operative Diagnosis: 26 y.o. female [id number] at 35w0d  DKA    BPP   NRFHT (variables, prolonged decelerations)  Pregestational DM   cHTN    Late prenatal care  Hx C/S x2  Hx of successful   History of pre-eclampsia w/ SF  Hx of indicated PTD    Hx of Elevated LFTs   Hx of anxiety   BMI 38.74     Post-operative Diagnosis: Same as above, living  male infant    Procedure: repeat low transverse  section with risk reducing salpingectomy     Surgeon: Dr. Claudia Vera): Sarah López; Tiki Alexis, PGY1; Luke Muñoz, OMS3    Anesthesia: spinal with Duramorph    Information for the patient's :  Tavon Tracy Boy Nae Cordero [[id number]]   male   Birth Weight: 6 lb 5.6 oz (2.88 kg)     Information for the patient's :  Abdirashid Ga [[id number]]          Findings:  Live Born 6 lb 5 oz male infant in cephalic presentation with Apgars of 8 at 1 minute and 9 at five minutes, normal appearing uterus tubes and ovaries   Quantitative Blood Loss: pending  Total IV Fluids: 2100 mL  Urine output: 175 mL clear urine   Drains:  none  Specimens:  placenta sent to pathology, cord blood and cord gases, bilateral fallopian tubes  Instrument and Sponge Count: Correct  Complications: none  Condition: Mother and infant stable to post anesthesia recovery    See operative report for full details.     Theresa Joy DO  Ob/Gyn Resident  10/8/2020, 12:02 PM

## 2020-10-08 NOTE — ANESTHESIA POSTPROCEDURE EVALUATION
Department of Anesthesiology  Postprocedure Note    Patient: Sang Vargas  MRN: [id number]  YOB: 1990  Date of evaluation: 10/8/2020  Time:  2:04 PM     Procedure Summary     Date:  10/08/20 Room / Location:  [facility]D OR [facility]    Anesthesia Start:  7977 Anesthesia Stop:  4938    Procedure:   SECTION (N/A Abdomen) Diagnosis:  (35 wks, 4/8 BPP)    Surgeon:  Eboni Guerra DO Responsible Provider:  Sid Benitez MD    Anesthesia Type:  spinal ASA Status:  3          Anesthesia Type: spinal    Cleveland Phase I: Cleveland Score: 10    Cleveland Phase II:      Last vitals: Reviewed and per EMR flowsheets.        Anesthesia Post Evaluation    Patient location during evaluation: PACU  Patient participation: complete - patient participated  Level of consciousness: awake  Pain score: 3  Airway patency: patent  Nausea & Vomiting: no vomiting and no nausea  Complications: no  Cardiovascular status: blood pressure returned to baseline  Respiratory status: acceptable  Hydration status: euvolemic

## 2020-10-08 NOTE — PROGRESS NOTES
OB/GYN PROGRESS NOTE    Deretha Primrose is a 26 y.o. female [id number] at [address] [facility] Day: 3    Subjective:   Patient has been seen and examined. She has no complaints at this time. She is resting comfortably. Patient denies any vaginal discharge and any urinary complaints. The patient reports fetal movement is present, denies contractions, denies loss of fluid, denies vaginal bleeding. Patient denies headache, vision changes, nausea, vomiting, fever, chills, shortness of breath, chest pain, RUQ pain, abdominal pain, diarrhea, change in color/amount/odor of vaginal discharge, dysuria or, hematuria.      Objective:   Vitals:  Vitals:    10/07/20 1239 10/07/20 1602 10/07/20 2056 10/08/20 0025   BP: (!) 148/95 139/76 (!) 149/88 (!) 153/72   Pulse: 82 74 69 59   Resp: 16 16 18    Temp: 98.4 °F (36.9 °C) 98.6 °F (37 °C) 97.9 °F (36.6 °C)    TempSrc: Oral Oral Oral    Weight:       Height:             FHT: 150, moderate variability with periods of minimal variablity, accelerations absent, multiple prolonged and spontaneous decels throughout the night   Contractions: none    Physical Exam:  General appearance:  no apparent distress, alert and cooperative  HEENT: head atraumatic, normocephalic, moist mucous membranes, trachea midline  Neurologic:  alert, oriented, normal speech, no focal findings or movement disorder noted  Lungs:  No increased work of breathing, good air exchange, clear to auscultation bilaterally, no crackles or wheezing  Heart:  regular rate and rhythm and no murmur    Abdomen:  soft, gravid, non-tender, no rebound, guarding, or rigidity, no RUQ or epigastric tenderness, no signs or symptoms of abruption and no signs or symptoms of chorioamnionitis  Extremities:  no calf tenderness, non edematous  Musculoskeletal: Gross strength equal and intact throughout, no gross abnormalities, range of motion normal in hips, knees, shoulders and spine, CVA tenderness: none  Psychiatric: Mood appropriate, normal affect   Rectal Exam: not indicated    DATA:  Labs:   Recent Results (from the past 12 hour(s))   POC Glucose Fingerstick    Collection Time: 10/07/20  6:43 PM   Result Value Ref Range    POC Glucose 178 (H) 65 - 105 mg/dL         Assessment/Plan:  Ammy Carson is a 26 y.o. female [id number] at 35w0d IUP admitted for DKA with NRFHT   - Rh +/ RI/ GBS unknown   - No antibiotics for GBS prophylaxis at this time   - VSS elevated. Patient is a known cHTN, Afebrile   - CS consent signed on chart   - SCDs, ASA, PNV   - MFM scans this AM   - 150, moderate variability with periods of minimal variablity, accelerations absent, multiple prolonged and spontaneous decels throughout the night     Pregestational Diabetes with DKA   - diagnosed at 6 week 2 hour GTT following last pregnancy   - Has been on NPH8 nightly. Reports compliance however states she has missed a few doses, none recently. - Fetal echo wnl   - s/p Insulin drip (47.1 units) overnight on 10/7   - Novolog 8/8/10; NPH 8U AM/12U qhs; Med ISS    - Fasting yesterday 133, breakfast 148 lunch 141 dinner 178   - Denies nausea or vomiting    Hx C/S x 2 (GG4 breech, G7 NRFHT), declining TOLAC   - Desires RRS. Consent and ethics form in chart. cHTN (no meds)   - Elevated BP however nonsevere   - Denies s/s PreE at this time   - PreE labs wnl x 1 P:C 0.26 (). Elevated LFTs at that time with negative hepatitis panel.  LFTs wnl on admission        Patient Active Problem List    Diagnosis Date Noted    History of pre-eclampsia w/ SF--G7 2018     Priority: High    High risk teen pregnancy in third trimester 10/07/2020    Pre-existing type 2 diabetes mellitus during pregnancy, antepartum     Hypertension affecting pregnancy in third trimester     Decreased fetal movement, antepartum     Fetal heart rate decelerations affecting management of mother     Encounter for routine screening for malformation using ultrasonics      screening for fetal growth retardation using ultrasonics     34 weeks gestation of pregnancy 10/06/2020    Elevated LFTs 2020     Overview Note:     20 admission: ALT/AST 46/48 >41/45>47/55>42/49, Hepatitis panel WNL       Long QT interval 2020     Overview Note:     Echo (): EF 55%, thickened mitral leaflets, mild tricuspid regurg  EKG (): Normal sinus rhythm, QTc 479 (ok)  EKG (): Sinus rhythm w/ PVCs, mod criteria for LVH, may be normal variant, Prolonged QT, QTc 578      Hypokalemia      Overview Note:     20: K 2.4>2.9>2.7>3.0>3.4>2.9>3.3>3.2, Mag 1.8>1.8>1.8>1.7 on this admission- pt has received 20 160mEQ PO and 80mEQ IV and 20 80mEQ PO and 40 mEQ IV, TSH 1.81, K/Cr: 0.18 (<1.5wnl, no renal K wasting), Ur Osm 505, Serum Osm 298 (elevated)       Hx LSIL 2020    History of anxiety 2020    Obesity affecting pregnancy in third trimester     Feeling pelvic pressure in pregnancy, antepartum     cHTN (no meds) 2020    Pre-Gestational DM (new dx) 2020     Overview Note:     18: Failed 2hr GTT  2020- MFM referral placed-SK      Late prenatal care affecting pregnancy in second trimester 2020    Abnormal uterine bleeding 2018    H/O Starvation ketoacidosis     H/O indicated  delivery 2018     Overview Note:     Suspected chorioamnionitis      Hx C/S x2 (G4-breech, G7--NRFHT) 2017     Overview Note:     H/O successful  x1  Desires TOLAC this pregnancy  2020  APPROVED OVARIAN CA RRBS FORM SCANNED INTO MEDIA-SK      H/O successful  2017     Overview Note:           BMI 39.0-39.9,adult 2017    Former smoker 2017     Overview Note:     Pt denies in current preg      History of THC abuse  2017     Overview Note:     Denies in this preg      Grand multiparity 2017       Will update Dr. Erica Huynh.      Myra Ordoñez DO  Ob/Gyn Resident  10/8/2020, 6:36 AM

## 2020-10-09 LAB
ABSOLUTE EOS #: 0.28 K/UL (ref 0–0.44)
ABSOLUTE IMMATURE GRANULOCYTE: 0.08 K/UL (ref 0–0.3)
ABSOLUTE LYMPH #: 1.61 K/UL (ref 1.1–3.7)
ABSOLUTE MONO #: 1.24 K/UL (ref 0.1–1.2)
ALBUMIN SERPL-MCNC: 2.6 G/DL (ref 3.5–5.2)
ALBUMIN/GLOBULIN RATIO: 1 (ref 1–2.5)
ALP BLD-CCNC: 102 U/L (ref 35–104)
ALT SERPL-CCNC: 16 U/L (ref 5–33)
ANION GAP SERPL CALCULATED.3IONS-SCNC: 12 MMOL/L (ref 9–17)
AST SERPL-CCNC: 22 U/L
BASOPHILS # BLD: 0 % (ref 0–2)
BASOPHILS ABSOLUTE: <0.03 K/UL (ref 0–0.2)
BILIRUB SERPL-MCNC: 0.32 MG/DL (ref 0.3–1.2)
BUN BLDV-MCNC: 4 MG/DL (ref 6–20)
BUN/CREAT BLD: ABNORMAL (ref 9–20)
CALCIUM SERPL-MCNC: 8.9 MG/DL (ref 8.6–10.4)
CHLORIDE BLD-SCNC: 102 MMOL/L (ref 98–107)
CO2: 21 MMOL/L (ref 20–31)
CREAT SERPL-MCNC: 0.66 MG/DL (ref 0.5–0.9)
DIFFERENTIAL TYPE: ABNORMAL
EOSINOPHILS RELATIVE PERCENT: 2 % (ref 1–4)
GFR AFRICAN AMERICAN: >60 ML/MIN
GFR NON-AFRICAN AMERICAN: >60 ML/MIN
GFR SERPL CREATININE-BSD FRML MDRD: ABNORMAL ML/MIN/{1.73_M2}
GFR SERPL CREATININE-BSD FRML MDRD: ABNORMAL ML/MIN/{1.73_M2}
GLUCOSE BLD-MCNC: 123 MG/DL (ref 65–105)
GLUCOSE BLD-MCNC: 147 MG/DL (ref 65–105)
GLUCOSE BLD-MCNC: 178 MG/DL (ref 65–105)
GLUCOSE BLD-MCNC: 179 MG/DL (ref 65–105)
GLUCOSE BLD-MCNC: 209 MG/DL (ref 70–99)
HCT VFR BLD CALC: 35.2 % (ref 36.3–47.1)
HEMOGLOBIN: 11.3 G/DL (ref 11.9–15.1)
IMMATURE GRANULOCYTES: 1 %
LYMPHOCYTES # BLD: 13 % (ref 24–43)
MCH RBC QN AUTO: 29.8 PG (ref 25.2–33.5)
MCHC RBC AUTO-ENTMCNC: 32.1 G/DL (ref 28.4–34.8)
MCV RBC AUTO: 92.9 FL (ref 82.6–102.9)
MONOCYTES # BLD: 10 % (ref 3–12)
NRBC AUTOMATED: 0 PER 100 WBC
PDW BLD-RTO: 15.5 % (ref 11.8–14.4)
PLATELET # BLD: 195 K/UL (ref 138–453)
PLATELET ESTIMATE: ABNORMAL
PMV BLD AUTO: 12.6 FL (ref 8.1–13.5)
POTASSIUM SERPL-SCNC: 3.2 MMOL/L (ref 3.7–5.3)
RBC # BLD: 3.79 M/UL (ref 3.95–5.11)
RBC # BLD: ABNORMAL 10*6/UL
SEG NEUTROPHILS: 74 % (ref 36–65)
SEGMENTED NEUTROPHILS ABSOLUTE COUNT: 9.44 K/UL (ref 1.5–8.1)
SODIUM BLD-SCNC: 135 MMOL/L (ref 135–144)
TOTAL PROTEIN: 5.1 G/DL (ref 6.4–8.3)
WBC # BLD: 12.7 K/UL (ref 3.5–11.3)
WBC # BLD: ABNORMAL 10*3/UL

## 2020-10-09 PROCEDURE — 80053 COMPREHEN METABOLIC PANEL: CPT

## 2020-10-09 PROCEDURE — 1220000000 HC SEMI PRIVATE OB R&B

## 2020-10-09 PROCEDURE — 6370000000 HC RX 637 (ALT 250 FOR IP): Performed by: STUDENT IN AN ORGANIZED HEALTH CARE EDUCATION/TRAINING PROGRAM

## 2020-10-09 PROCEDURE — 99253 IP/OBS CNSLTJ NEW/EST LOW 45: CPT | Performed by: INTERNAL MEDICINE

## 2020-10-09 PROCEDURE — 82947 ASSAY GLUCOSE BLOOD QUANT: CPT

## 2020-10-09 PROCEDURE — 85025 COMPLETE CBC W/AUTO DIFF WBC: CPT

## 2020-10-09 PROCEDURE — 6360000002 HC RX W HCPCS: Performed by: STUDENT IN AN ORGANIZED HEALTH CARE EDUCATION/TRAINING PROGRAM

## 2020-10-09 PROCEDURE — G0108 DIAB MANAGE TRN  PER INDIV: HCPCS

## 2020-10-09 PROCEDURE — 99024 POSTOP FOLLOW-UP VISIT: CPT | Performed by: OBSTETRICS & GYNECOLOGY

## 2020-10-09 PROCEDURE — 36415 COLL VENOUS BLD VENIPUNCTURE: CPT

## 2020-10-09 RX ORDER — OXYCODONE HYDROCHLORIDE AND ACETAMINOPHEN 5; 325 MG/1; MG/1
1 TABLET ORAL EVERY 4 HOURS PRN
Status: DISCONTINUED | OUTPATIENT
Start: 2020-10-09 | End: 2020-10-12 | Stop reason: HOSPADM

## 2020-10-09 RX ORDER — OXYCODONE HYDROCHLORIDE AND ACETAMINOPHEN 5; 325 MG/1; MG/1
2 TABLET ORAL EVERY 4 HOURS PRN
Status: DISCONTINUED | OUTPATIENT
Start: 2020-10-09 | End: 2020-10-12 | Stop reason: HOSPADM

## 2020-10-09 RX ADMIN — CEPHALEXIN 500 MG: 500 CAPSULE ORAL at 22:08

## 2020-10-09 RX ADMIN — POTASSIUM BICARBONATE 40 MEQ: 782 TABLET, EFFERVESCENT ORAL at 13:35

## 2020-10-09 RX ADMIN — INSULIN LISPRO 3 UNITS: 100 INJECTION, SOLUTION INTRAVENOUS; SUBCUTANEOUS at 12:54

## 2020-10-09 RX ADMIN — OXYCODONE HYDROCHLORIDE AND ACETAMINOPHEN 2 TABLET: 5; 325 TABLET ORAL at 00:47

## 2020-10-09 RX ADMIN — METRONIDAZOLE 500 MG: 500 TABLET, FILM COATED ORAL at 08:16

## 2020-10-09 RX ADMIN — POLYETHYLENE GLYCOL 3350 17 G: 17 POWDER, FOR SOLUTION ORAL at 08:21

## 2020-10-09 RX ADMIN — ENOXAPARIN SODIUM 30 MG: 30 INJECTION SUBCUTANEOUS at 08:14

## 2020-10-09 RX ADMIN — METRONIDAZOLE 500 MG: 500 TABLET, FILM COATED ORAL at 14:01

## 2020-10-09 RX ADMIN — KETOROLAC TROMETHAMINE 30 MG: 30 INJECTION, SOLUTION INTRAMUSCULAR; INTRAVENOUS at 02:17

## 2020-10-09 RX ADMIN — CEPHALEXIN 500 MG: 500 CAPSULE ORAL at 14:01

## 2020-10-09 RX ADMIN — NIFEDIPINE 30 MG: 30 TABLET, FILM COATED, EXTENDED RELEASE ORAL at 08:16

## 2020-10-09 RX ADMIN — OXYCODONE HYDROCHLORIDE AND ACETAMINOPHEN 2 TABLET: 5; 325 TABLET ORAL at 13:34

## 2020-10-09 RX ADMIN — DOCUSATE SODIUM 100 MG: 100 CAPSULE, LIQUID FILLED ORAL at 22:08

## 2020-10-09 RX ADMIN — OXYCODONE HYDROCHLORIDE AND ACETAMINOPHEN 2 TABLET: 5; 325 TABLET ORAL at 22:08

## 2020-10-09 RX ADMIN — INSULIN LISPRO 3 UNITS: 100 INJECTION, SOLUTION INTRAVENOUS; SUBCUTANEOUS at 08:10

## 2020-10-09 RX ADMIN — DOCUSATE SODIUM 100 MG: 100 CAPSULE, LIQUID FILLED ORAL at 08:16

## 2020-10-09 RX ADMIN — OXYCODONE HYDROCHLORIDE AND ACETAMINOPHEN 2 TABLET: 5; 325 TABLET ORAL at 04:52

## 2020-10-09 RX ADMIN — OXYCODONE HYDROCHLORIDE AND ACETAMINOPHEN 2 TABLET: 5; 325 TABLET ORAL at 18:01

## 2020-10-09 RX ADMIN — METRONIDAZOLE 500 MG: 500 TABLET, FILM COATED ORAL at 22:07

## 2020-10-09 RX ADMIN — IBUPROFEN 800 MG: 800 TABLET, FILM COATED ORAL at 16:45

## 2020-10-09 RX ADMIN — OXYCODONE HYDROCHLORIDE AND ACETAMINOPHEN 2 TABLET: 5; 325 TABLET ORAL at 09:28

## 2020-10-09 RX ADMIN — IBUPROFEN 800 MG: 800 TABLET, FILM COATED ORAL at 08:16

## 2020-10-09 RX ADMIN — ENOXAPARIN SODIUM 30 MG: 30 INJECTION SUBCUTANEOUS at 22:08

## 2020-10-09 RX ADMIN — CEPHALEXIN 500 MG: 500 CAPSULE ORAL at 08:16

## 2020-10-09 RX ADMIN — Medication 1 TABLET: at 08:16

## 2020-10-09 RX ADMIN — FAMOTIDINE 20 MG: 20 TABLET, FILM COATED ORAL at 22:07

## 2020-10-09 ASSESSMENT — PAIN SCALES - GENERAL
PAINLEVEL_OUTOF10: 8
PAINLEVEL_OUTOF10: 8
PAINLEVEL_OUTOF10: 4
PAINLEVEL_OUTOF10: 8
PAINLEVEL_OUTOF10: 10
PAINLEVEL_OUTOF10: 8
PAINLEVEL_OUTOF10: 10
PAINLEVEL_OUTOF10: 8
PAINLEVEL_OUTOF10: 8

## 2020-10-09 NOTE — PROGRESS NOTES
Inpatient Diabetes  Education     Type and Reason for Visit: Patient Education - Type 2 diabetes  Met with Conchis Hernandez in NICU briefly to discuss her blood sugars and diabetes care needs ongoing. Verbally reviewed following information with patient  Diagnosis, A1C ( current 8.8%) , Blood glucose targets of type 2 DM - 80- 130 fasting and less than 180 2 hour post meals, importance of home blood glucose monitoring, heathy eating  plate method for CHO control portions, be active as recommended by health care providers, take medications oral and or insulin as directed. Conchis Hernandez has a home BG meter and strips to check BG at home, writer explained she will need to keep checking  fasting and around her meals. Any BG over 200 to call to internal medicine MD at [facility]. Tierra Negron is working on plans for follow up care. Home medication plan has not yet been determined. Cocnhis Hernandez has been taught how to use pen device and vial and syringe for insulin self administration, but she will need clearly written instruction at time of discharge if insulin will be in the plan. With her A1C under 9% she may benefit from Metformin oral medication, she is stating she in not planning to breast feed. Written educational materials provided  _x__  Carb counting and meal planning sheet  _x__  Gestational Diabetes and type 2 diabetes eduction sheets  _x__\" Take your best shot \" insulin teaching sheet  For type 2 DM   Contact number provided on follow up instructions for diabetes education department. Conchis Hernandez could verbalize back to writer that she needs to eat healthy meals, stop all juices and pop, drink more water. She stated she can use insulin shots if needed, but will need clear plan. She expressed would like to try pill/ metformin. She aware to call if BG over 200 to  MD.    Garrison Ayers will follow up with patient on Monday if home or in patient for support plan and seek outpatient DSME referral from IM.     Gerber Kirkpatrick RN CDE

## 2020-10-09 NOTE — FLOWSHEET NOTE
Pt aware Bolton can be removed, she wanted to go down to NICU to see baby first then will remove once she comes back

## 2020-10-09 NOTE — FLOWSHEET NOTE
Patient called out c/o pain at incisional site on right side and lower abdomen, patient requested percocet for pain, patient states it does not feel like gas pain. Abdomen soft around prevena dressing. Writer spoke with Dr Ruby Vicente and  will be in to see patient.

## 2020-10-09 NOTE — FLOWSHEET NOTE
Residents notified prevena pump was beeping,  two beeps, manual stating reason is full and to shut off.

## 2020-10-09 NOTE — PROGRESS NOTES
OB/GYN Resident Interval Note    Labs reviewed. Hgb stable at 11.3, and PreE labs wnl. Blood sugar on CMP showed 209. Plan to continue to monitor blood sugars AC/HS and correct with high dose insulin sliding scale. Plan to consult Internal Medicine team for further management of pregestational diabetes. Continue routine postpartum care.      Vitals:    10/09/20 0000 10/09/20 0400 10/09/20 0800 10/09/20 1300   BP: 122/78 123/77 126/86 117/78   Pulse: 82 82 76 98   Resp: 16 16 18 18   Temp: 98.2 °F (36.8 °C) 98.5 °F (36.9 °C) 98.5 °F (36.9 °C) 98.5 °F (36.9 °C)   TempSrc: Oral Oral Oral Oral   SpO2:       Weight:       Height:         Recent Results (from the past 12 hour(s))   POC Glucose Fingerstick    Collection Time: 10/09/20  8:05 AM   Result Value Ref Range    POC Glucose 179 (H) 65 - 105 mg/dL   CBC auto differential    Collection Time: 10/09/20  9:03 AM   Result Value Ref Range    WBC 12.7 (H) 3.5 - 11.3 k/uL    RBC 3.79 (L) 3.95 - 5.11 m/uL    Hemoglobin 11.3 (L) 11.9 - 15.1 g/dL    Hematocrit 35.2 (L) 36.3 - 47.1 %    MCV 92.9 82.6 - 102.9 fL    MCH 29.8 25.2 - 33.5 pg    MCHC 32.1 28.4 - 34.8 g/dL    RDW 15.5 (H) 11.8 - 14.4 %    Platelets 735 607 - 500 k/uL    MPV 12.6 8.1 - 13.5 fL    NRBC Automated 0.0 0.0 per 100 WBC    Differential Type NOT REPORTED     Seg Neutrophils 74 (H) 36 - 65 %    Lymphocytes 13 (L) 24 - 43 %    Monocytes 10 3 - 12 %    Eosinophils % 2 1 - 4 %    Basophils 0 0 - 2 %    Immature Granulocytes 1 (H) 0 %    Segs Absolute 9.44 (H) 1.50 - 8.10 k/uL    Absolute Lymph # 1.61 1.10 - 3.70 k/uL    Absolute Mono # 1.24 (H) 0.10 - 1.20 k/uL    Absolute Eos # 0.28 0.00 - 0.44 k/uL    Basophils Absolute <0.03 0.00 - 0.20 k/uL    Absolute Immature Granulocyte 0.08 0.00 - 0.30 k/uL    WBC Morphology NOT REPORTED     RBC Morphology ANISOCYTOSIS PRESENT     Platelet Estimate NOT REPORTED    COMPREHENSIVE METABOLIC PANEL    Collection Time: 10/09/20  9:03 AM   Result Value Ref Range    Glucose 209 (H) 70 - 99 mg/dL    BUN 4 (L) 6 - 20 mg/dL    CREATININE 0.66 0.50 - 0.90 mg/dL    Bun/Cre Ratio NOT REPORTED 9 - 20    Calcium 8.9 8.6 - 10.4 mg/dL    Sodium 135 135 - 144 mmol/L    Potassium 3.2 (L) 3.7 - 5.3 mmol/L    Chloride 102 98 - 107 mmol/L    CO2 21 20 - 31 mmol/L    Anion Gap 12 9 - 17 mmol/L    Alkaline Phosphatase 102 35 - 104 U/L    ALT 16 5 - 33 U/L    AST 22 <32 U/L    Total Bilirubin 0.32 0.3 - 1.2 mg/dL    Total Protein 5.1 (L) 6.4 - 8.3 g/dL    Alb 2.6 (L) 3.5 - 5.2 g/dL    Albumin/Globulin Ratio 1.0 1.0 - 2.5    GFR Non-African American >60 >60 mL/min    GFR African American >60 >60 mL/min    GFR Comment          GFR Staging NOT REPORTED    POC Glucose Fingerstick    Collection Time: 10/09/20 12:50 PM   Result Value Ref Range    POC Glucose 147 (H) 65 - 105 mg/dL     Plan discussed with Dr. Milagros Lozano, who is agreeable.      Amaya Pabon DO  Ob/Gyn Resident [address]  10/9/2020 3:20 PM

## 2020-10-09 NOTE — CARE COORDINATION
Discharge Planning:     Anticipate DC of mother 10/12/2020 after CS on 10/8/2020 @ 35 weeks 0 days. Male Infant to NICU for hypoglycemia. After speaking w/ patient no anticipated need for HC/DME at this time. Pt has all diabetic supplies through [address] [place] [address] and no other DME needs. CM continue to follow for DC needs.

## 2020-10-09 NOTE — FLOWSHEET NOTE
Cristian Given from diabetic education seeing patient while in NICU. Lili case management to speak with patient regarding finding primary care physician and resources.

## 2020-10-09 NOTE — FLOWSHEET NOTE
Resident notified [facility] dressing has noticeable drainage and also draining blood within tube of vacuum.

## 2020-10-09 NOTE — FLOWSHEET NOTE
Dr. Yarely Wooten came over to look at [address] dressing. Says it does appear to be saturated more than normal. Will most likely change out but will she will also check  into getting another piece that connects to the tubing that helps drain it properly from central supply.

## 2020-10-09 NOTE — PROGRESS NOTES
POST OPERATIVE DAY # 1    Ayleen Ohara is a 26 y.o. female   This patient was seen and examined today. Patient is s/p RLTCS w/ RRS on 10/8/20. Her pregnancy was complicated by:   Patient Active Problem List   Diagnosis    Hx C/S x2 (G4-breech, G7--NRFHT)   [facility] H/O successful     BMI 39.0-39.9,adult    Former smoker    History of THC abuse     [address] multip[address]    H/O indicated  delivery    H/O Starvation ketoacidosis    History of pre-eclampsia w/ SF--G7    Abnormal uterine bleeding    Late prenatal care affecting pregnancy in second trimester    cHTN (no meds)    Pre-Gestational DM (new dx)    Obesity affecting pregnancy in third trimester    Feeling pelvic pressure in pregnancy, antepartum    Hx LSIL    History of anxiety    Hypokalemia    Elevated LFTs    Long QT interval    34 weeks gestation of pregnancy    High risk teen pregnancy in third trimester    Pre-existing type 2 diabetes mellitus during pregnancy, antepartum    Hypertension affecting pregnancy in third trimester    Decreased fetal movement, antepartum    Fetal heart rate decelerations affecting management of mother   [facility] Encounter for routine screening for malformation using ultrasonics     screening for fetal growth retardation using ultrasonics    35 weeks gestation of pregnancy    RLTCS w/ RRS 10/8/20 M Wt 6#5 Apg        Today she is doing well without any chief complaint. Her lochia is light. She denies headache, lightheadedness and dizziness. She is not breast feeding and she denies any signs or symptoms of mastitis. She is ambulating well. She is voiding without difficulty. She currently denies S/S of postpartum depression. Flatus absent. Bowel movement absent. She is tolerating solids. Patient denies fever, chills, chest pain, shortness of breath, nausea, vomiting, headache, vision changes, or RUQ pain, or calf pain.       Vital Signs:  Vitals:    10/08/20 1425 10/08/20 1540 10/08/20 2000 10/09/20 0000   BP: (!) 154/97 (!) 150/92 (!) 125/90 122/78   Pulse: 65 69 86 82   Resp: 16 18 18 16   Temp: 98.1 °F (36.7 °C) 98.5 °F (36.9 °C) 98.4 °F (36.9 °C) 98.2 °F (36.8 °C)   TempSrc: Oral Oral Oral Oral   SpO2: 100% 100% 99%    Weight:       Height:             Urine Input & Output last 24hrs:     Intake/Output Summary (Last 24 hours) at 10/9/2020 0127  Last data filed at 10/9/2020 0000  Gross per 24 hour   Intake 2836 ml   Output 3130 ml   Net -294 ml     Physical Exam:  General:  no apparent distress, alert and cooperative  Neurologic:  alert, oriented, normal speech, no focal findings or movement disorder noted  Lungs:  No increased work of breathing, good air exchange, clear to auscultation bilaterally, no crackles or wheezing  Heart:  Regular rate and rhythm, normal S1 and S2, no S3 or S4, and no murmur noted    Abdomen: abdomen soft, non-distended, appropriate tenderness to palpation  Fundus: non-tender, normal size, firm, below umbilicus  Incision: Prevena noted to be complete saturated and flashing yellow light, incision clean and intact, right lateral portion of incision with dark red /serosanguinous drainage which was expelled without difficult= new Prevena mask placed  Extremities:  no calf tenderness, non edematous    Labs:  Lab Results   Component Value Date    WBC 10.7 10/07/2020    HGB 12.6 10/07/2020    HCT 39.7 10/07/2020    MCV 93.4 10/07/2020     10/07/2020       Assessment/Plan:  1. Melodie Fleischer is a [id number] POD # 1 s/p RLTCS w/ RRS   - Doing well, VSS    - male infant in [address], circumcision desired   - Encourage ambulation and use of incentive spirometer   - POD#CBC  pending  2. Rh positive/Rubella immune  3. Bottle feeding   4.  Pregestational DM    - CBC, CMP pending this AM   - Blood sugars improved s/p delivery    - Continue HD SSI    - Continue checking blood sugars AC & HS   - S/p insulin drip   - S/p Novolog 8/8/10, NPH 8 qAM and 12 qHS (held at this time)   - KCl replacement ordered   - S/p diabetic education   5. cHTN (meds)   - Pt denies any s/s PreE   - BPs were elevated at 150s/90s and pt asymptomatic and so pt was started on Procardia 30 XL daily on 10/8/20    - PreE labs WNL x1, P/C 0.23 (10/7)  6. Hypokalemia (3.2)   - KCl replacement ordered   - Pt denies any s/s hypokalemia   - Pt denies any CP, SOB  7. Hx anxiety   - Stable on no medications  8. BMI 38   - Keflex/Flagl x 48hrs   - Lovenox 30 BID for DVT prophylaxis   - Virl Duvall in place and replaced on 10/9/20   9. Continue post-op care. Counseling Completed:  Secondary Smoke risks and Sudden Infant Death Syndrome were reviewed with recommendations. Infant sleeping, \"back to sleep\" and avoidance of co-sleeping recommendations were reviewed. Signs and Symptoms of Post Partum Depression were reviewed. The patient is to call if any occur. Signs and symptoms of Mastitis were reviewed. The patient is to call if any occur for follow up. Discharge instructions including pelvic rest, incision care, 15 lb weight restriction, no driving with pain medicine and office follow-up were reviewed with patient     Providers Name: Dr. Brenden Devi DO  Ob/Gyn Resident  10/9/2020, 1:27 AM      Date: 10/9/2020  Time: 3:55 PM      Patient Name: Tatum Olson  Patient : 1990  Room/Bed: 9148/0077-42  Admission Date/Time: 10/6/2020  7:20 PM        Attending Physician Statement  I have personally seen, evaluated and discussed the care of Tatum Olson, including pertinent history and exam findings with the resident. I have reviewed and edited their note in the electronic medical record. The key elements of all parts of the encounter have been performed/reviewed by me. I agree with the assessment, plan and orders as documented by the resident. The level of care submitted represents to the best of my ability the care documented in the medical record today. GC Modifier.   This service has been performed in part by a resident under the direction of a teaching physician. Attending's Name:  Evens Nixon MD        Patient doing well. Will plan for IM consult for discharge planning for DM medications as patient has had difficulty maintaining insulin use. Appreciate assistance of diabetes education. Continue routine post-partum care.

## 2020-10-09 NOTE — CARE COORDINATION
Rec'd a call from Hugo Junior ([phone]) diabetes educator requesting follow up plans. Discussed that pt would follow with [facility] at [place] for OB. Hugomyles Franzcris states that this pt does not have a PCP, only OB and MFM which is who prescribed the insulin. Discussed with Hugo Junior that this CM would approach the patient and try to see if she would be amenable to following with the Internal Medicine at the [facility] since she sees OB there and her children see Peds there. Met with Deborah Messer this time in room 733 upstairs. Deborah Messer appears frustrated with this CM questions regarding who prescribed the insulins, what her plan was prior to delivery etc.  She stated that [address] [place] [address] sent different kinds, some in a bottle and one in a pen. She recognized the name Lantus. She stated that Dr Kimberlyn Shine told her not to take 8U at night of the Lantus, but then told her to start NPH insulin a week later. NPH was ordered but required a PA so patient never received it. We discussed Lantus/NPH which are both long acting insulins, that it is taken once a day (usually at night) and works all 24 hours. Novalog is a short acting insulin that is used with meals for carb correction. There is usually a scale to determine how much to take. She had no idea about a scale or anything about determining dose. Oral medications may be a better option for this patient per Hugo Junior and since A1C is under 9. Deborah Messer agrees to see IM at the [facility] for primary care and has Stephanie's number for Diabetes Education ([phone])  WIll follow up with this patient on Monday AM to ensure appt was made and that she understands plan. Of note:  Appt scheduled for November 3, 2020 with Dr Kerrie Briggs at the [facility] at 3pm.  Will cancel if needs to be seen sooner. This was the first available appt.

## 2020-10-10 LAB
ALBUMIN SERPL-MCNC: 2.8 G/DL (ref 3.5–5.2)
ALBUMIN/GLOBULIN RATIO: 1.1 (ref 1–2.5)
ALP BLD-CCNC: 111 U/L (ref 35–104)
ALT SERPL-CCNC: 18 U/L (ref 5–33)
ANION GAP SERPL CALCULATED.3IONS-SCNC: 12 MMOL/L (ref 9–17)
AST SERPL-CCNC: 28 U/L
BILIRUB SERPL-MCNC: 0.56 MG/DL (ref 0.3–1.2)
BUN BLDV-MCNC: 3 MG/DL (ref 6–20)
BUN/CREAT BLD: ABNORMAL (ref 9–20)
CALCIUM SERPL-MCNC: 8.7 MG/DL (ref 8.6–10.4)
CHLORIDE BLD-SCNC: 104 MMOL/L (ref 98–107)
CO2: 24 MMOL/L (ref 20–31)
CREAT SERPL-MCNC: 0.48 MG/DL (ref 0.5–0.9)
GFR AFRICAN AMERICAN: >60 ML/MIN
GFR NON-AFRICAN AMERICAN: >60 ML/MIN
GFR SERPL CREATININE-BSD FRML MDRD: ABNORMAL ML/MIN/{1.73_M2}
GFR SERPL CREATININE-BSD FRML MDRD: ABNORMAL ML/MIN/{1.73_M2}
GLUCOSE BLD-MCNC: 110 MG/DL (ref 65–105)
GLUCOSE BLD-MCNC: 131 MG/DL (ref 65–105)
GLUCOSE BLD-MCNC: 143 MG/DL (ref 65–105)
GLUCOSE BLD-MCNC: 159 MG/DL (ref 70–99)
GLUCOSE BLD-MCNC: 234 MG/DL (ref 65–105)
MAGNESIUM: 1.6 MG/DL (ref 1.6–2.6)
POTASSIUM SERPL-SCNC: 3 MMOL/L (ref 3.7–5.3)
SODIUM BLD-SCNC: 140 MMOL/L (ref 135–144)
TOTAL PROTEIN: 5.3 G/DL (ref 6.4–8.3)

## 2020-10-10 PROCEDURE — 36415 COLL VENOUS BLD VENIPUNCTURE: CPT

## 2020-10-10 PROCEDURE — 6370000000 HC RX 637 (ALT 250 FOR IP): Performed by: STUDENT IN AN ORGANIZED HEALTH CARE EDUCATION/TRAINING PROGRAM

## 2020-10-10 PROCEDURE — 99232 SBSQ HOSP IP/OBS MODERATE 35: CPT | Performed by: INTERNAL MEDICINE

## 2020-10-10 PROCEDURE — 82947 ASSAY GLUCOSE BLOOD QUANT: CPT

## 2020-10-10 PROCEDURE — 80053 COMPREHEN METABOLIC PANEL: CPT

## 2020-10-10 PROCEDURE — 6360000002 HC RX W HCPCS: Performed by: STUDENT IN AN ORGANIZED HEALTH CARE EDUCATION/TRAINING PROGRAM

## 2020-10-10 PROCEDURE — 83735 ASSAY OF MAGNESIUM: CPT

## 2020-10-10 PROCEDURE — 1220000000 HC SEMI PRIVATE OB R&B

## 2020-10-10 RX ORDER — POTASSIUM CHLORIDE 20 MEQ/1
40 TABLET, EXTENDED RELEASE ORAL 2 TIMES DAILY WITH MEALS
Status: COMPLETED | OUTPATIENT
Start: 2020-10-10 | End: 2020-10-10

## 2020-10-10 RX ORDER — OXYCODONE HYDROCHLORIDE AND ACETAMINOPHEN 5; 325 MG/1; MG/1
1 TABLET ORAL EVERY 6 HOURS PRN
Qty: 20 TABLET | Refills: 0 | Status: SHIPPED | OUTPATIENT
Start: 2020-10-10 | End: 2020-10-17

## 2020-10-10 RX ORDER — DOCUSATE SODIUM 100 MG/1
100 CAPSULE, LIQUID FILLED ORAL 2 TIMES DAILY
Qty: 60 CAPSULE | Refills: 1 | Status: SHIPPED | OUTPATIENT
Start: 2020-10-10 | End: 2020-11-09

## 2020-10-10 RX ORDER — NIFEDIPINE 30 MG/1
30 TABLET, EXTENDED RELEASE ORAL DAILY
Qty: 30 TABLET | Refills: 1 | Status: SHIPPED | OUTPATIENT
Start: 2020-10-10 | End: 2020-10-15

## 2020-10-10 RX ORDER — IBUPROFEN 600 MG/1
600 TABLET ORAL EVERY 6 HOURS PRN
Qty: 30 TABLET | Refills: 0 | Status: SHIPPED | OUTPATIENT
Start: 2020-10-10 | End: 2021-12-13

## 2020-10-10 RX ADMIN — CEPHALEXIN 500 MG: 500 CAPSULE ORAL at 06:38

## 2020-10-10 RX ADMIN — IBUPROFEN 800 MG: 800 TABLET, FILM COATED ORAL at 09:07

## 2020-10-10 RX ADMIN — DOCUSATE SODIUM 100 MG: 100 CAPSULE, LIQUID FILLED ORAL at 09:06

## 2020-10-10 RX ADMIN — ENOXAPARIN SODIUM 30 MG: 30 INJECTION SUBCUTANEOUS at 09:09

## 2020-10-10 RX ADMIN — OXYCODONE HYDROCHLORIDE AND ACETAMINOPHEN 2 TABLET: 5; 325 TABLET ORAL at 10:52

## 2020-10-10 RX ADMIN — NIFEDIPINE 30 MG: 30 TABLET, FILM COATED, EXTENDED RELEASE ORAL at 09:07

## 2020-10-10 RX ADMIN — METRONIDAZOLE 500 MG: 500 TABLET, FILM COATED ORAL at 06:37

## 2020-10-10 RX ADMIN — DOCUSATE SODIUM 100 MG: 100 CAPSULE, LIQUID FILLED ORAL at 20:54

## 2020-10-10 RX ADMIN — POTASSIUM CHLORIDE 40 MEQ: 1500 TABLET, EXTENDED RELEASE ORAL at 09:07

## 2020-10-10 RX ADMIN — SIMETHICONE 80 MG: 80 TABLET, CHEWABLE ORAL at 02:39

## 2020-10-10 RX ADMIN — Medication 1 TABLET: at 09:07

## 2020-10-10 RX ADMIN — OXYCODONE HYDROCHLORIDE AND ACETAMINOPHEN 2 TABLET: 5; 325 TABLET ORAL at 02:39

## 2020-10-10 RX ADMIN — ENOXAPARIN SODIUM 30 MG: 30 INJECTION SUBCUTANEOUS at 20:54

## 2020-10-10 RX ADMIN — METFORMIN HYDROCHLORIDE 500 MG: 500 TABLET ORAL at 20:54

## 2020-10-10 RX ADMIN — FAMOTIDINE 20 MG: 20 TABLET, FILM COATED ORAL at 20:54

## 2020-10-10 RX ADMIN — FAMOTIDINE 20 MG: 20 TABLET, FILM COATED ORAL at 09:11

## 2020-10-10 RX ADMIN — OXYCODONE HYDROCHLORIDE AND ACETAMINOPHEN 2 TABLET: 5; 325 TABLET ORAL at 23:25

## 2020-10-10 RX ADMIN — OXYCODONE HYDROCHLORIDE AND ACETAMINOPHEN 2 TABLET: 5; 325 TABLET ORAL at 14:57

## 2020-10-10 RX ADMIN — IBUPROFEN 800 MG: 800 TABLET, FILM COATED ORAL at 00:52

## 2020-10-10 RX ADMIN — METFORMIN HYDROCHLORIDE 500 MG: 500 TABLET ORAL at 10:52

## 2020-10-10 RX ADMIN — IBUPROFEN 800 MG: 800 TABLET, FILM COATED ORAL at 17:44

## 2020-10-10 RX ADMIN — OXYCODONE HYDROCHLORIDE AND ACETAMINOPHEN 2 TABLET: 5; 325 TABLET ORAL at 18:41

## 2020-10-10 RX ADMIN — POTASSIUM CHLORIDE 40 MEQ: 1500 TABLET, EXTENDED RELEASE ORAL at 17:44

## 2020-10-10 RX ADMIN — OXYCODONE HYDROCHLORIDE AND ACETAMINOPHEN 2 TABLET: 5; 325 TABLET ORAL at 06:40

## 2020-10-10 ASSESSMENT — PAIN SCALES - GENERAL
PAINLEVEL_OUTOF10: 4
PAINLEVEL_OUTOF10: 9
PAINLEVEL_OUTOF10: 9
PAINLEVEL_OUTOF10: 10
PAINLEVEL_OUTOF10: 10
PAINLEVEL_OUTOF10: 8
PAINLEVEL_OUTOF10: 6
PAINLEVEL_OUTOF10: 8
PAINLEVEL_OUTOF10: 6
PAINLEVEL_OUTOF10: 5
PAINLEVEL_OUTOF10: 8
PAINLEVEL_OUTOF10: 10
PAINLEVEL_OUTOF10: 5

## 2020-10-10 ASSESSMENT — PAIN - FUNCTIONAL ASSESSMENT
PAIN_FUNCTIONAL_ASSESSMENT: ACTIVITIES ARE NOT PREVENTED

## 2020-10-10 ASSESSMENT — PAIN DESCRIPTION - PROGRESSION
CLINICAL_PROGRESSION: GRADUALLY IMPROVING
CLINICAL_PROGRESSION: GRADUALLY WORSENING
CLINICAL_PROGRESSION: GRADUALLY IMPROVING

## 2020-10-10 ASSESSMENT — PAIN DESCRIPTION - PAIN TYPE
TYPE: SURGICAL PAIN

## 2020-10-10 ASSESSMENT — PAIN DESCRIPTION - LOCATION
LOCATION: ABDOMEN
LOCATION: ABDOMEN

## 2020-10-10 ASSESSMENT — PAIN DESCRIPTION - DESCRIPTORS
DESCRIPTORS: BURNING;DULL;ACHING
DESCRIPTORS: BURNING;DULL;ACHING
DESCRIPTORS: BURNING;ACHING;DULL

## 2020-10-10 NOTE — FLOWSHEET NOTE
Dr. Marcelo Bue over to look at dressing again. She is also aware of . No orders for coverage at this time.

## 2020-10-10 NOTE — PLAN OF CARE
Problem: Pain:  Goal: Pain level will decrease  Description: Pain level will decrease  10/10/2020 0731 by Ed Espinoza RN  Outcome: Ongoing  10/10/2020 0603 by August Zambrano RN  Outcome: Ongoing  Goal: Control of acute pain  Description: Control of acute pain  10/10/2020 0731 by Ed Espinoza RN  Outcome: Ongoing  10/10/2020 0603 by August Zambrano RN  Outcome: Ongoing  Goal: Control of chronic pain  Description: Control of chronic pain  10/10/2020 0731 by Ed Espinoza RN  Outcome: Ongoing  10/10/2020 0603 by August Zambrano RN  Outcome: Ongoing     Problem: Discharge Planning:  Goal: Discharged to appropriate level of care  Description: Discharged to appropriate level of care  10/10/2020 0731 by Ed Espinoza RN  Outcome: Ongoing  10/10/2020 0603 by August Zambrano RN  Outcome: Ongoing     Problem: Fluid Volume - Imbalance:  Goal: Absence of postpartum hemorrhage signs and symptoms  Description: Absence of postpartum hemorrhage signs and symptoms  10/10/2020 0731 by Ed Espinoza RN  Outcome: Ongoing  10/10/2020 0603 by August Zambrano RN  Outcome: Ongoing  Goal: Absence of imbalanced fluid volume signs and symptoms  Description: Absence of imbalanced fluid volume signs and symptoms  10/10/2020 0731 by Ed Espinoza RN  Outcome: Ongoing  10/10/2020 0603 by August Zambrano RN  Outcome: Ongoing     Problem: Infection - Surgical Site:  Goal: Will show no infection signs and symptoms  Description: Will show no infection signs and symptoms  10/10/2020 0731 by Ed Espinoza RN  Outcome: Ongoing  10/10/2020 0603 by August Zambrano RN  Outcome: Ongoing     Problem: Mood - Altered:  Goal: Mood stable  Description: Mood stable  10/10/2020 0731 by Ed Espinoza RN  Outcome: Ongoing  10/10/2020 0603 by August Zambrano RN  Outcome: Ongoing     Problem: Nausea/Vomiting:  Goal: Absence of nausea/vomiting  Description: Absence of nausea/vomiting  10/10/2020 0731 by Ed Espinoza RN  Outcome: Ongoing  10/10/2020 0603 by Cuba Tong JUAN Puri  Outcome: Ongoing     Problem: Pain - Acute:  Goal: Pain level will decrease  Description: Pain level will decrease  10/10/2020 0731 by Teja Hernandez RN  Outcome: Ongoing  10/10/2020 0603 by Della De Leon RN  Outcome: Ongoing     Problem: Urinary Retention:  Goal: Urinary elimination within specified parameters  Description: Urinary elimination within specified parameters  10/10/2020 0731 by Teja Hernandez RN  Outcome: Ongoing  10/10/2020 0603 by Della De Leon RN  Outcome: Ongoing     Problem: Venous Thromboembolism:  Goal: Will show no signs or symptoms of venous thromboembolism  Description: Will show no signs or symptoms of venous thromboembolism  10/10/2020 0731 by Teja Hernandez RN  Outcome: Ongoing  10/10/2020 0603 by Della De Leon RN  Outcome: Ongoing  Goal: Absence of signs or symptoms of impaired coagulation  Description: Absence of signs or symptoms of impaired coagulation  10/10/2020 0731 by Teja Hernandez RN  Outcome: Ongoing  10/10/2020 0603 by Della De Leon RN  Outcome: Ongoing

## 2020-10-10 NOTE — PROGRESS NOTES
Resp 20   Ht 5' 6\" (1.676 m)   Wt 240 lb (108.9 kg)   SpO2 100%   Breastfeeding Unknown   BMI 38.74 kg/m²     Temp (24hrs), Av.4 °F (36.9 °C), Min:98 °F (36.7 °C), Max:99 °F (37.2 °C)    No intake/output data recorded. Physical Exam:  CONSTITUTIONAL:  awake, alert, cooperative, no apparent distress  LUNGS:  No increased work of breathing, good air exchange, clear to auscultation bilaterally, no crackles or wheezing  CARDIOVASCULAR:  Normal apical impulse, regular rate and rhythm, normal S1 and S2, no S3 or S4, and no murmur noted  ABDOMEN:  normal bowel sounds, soft, non-distended  NEUROLOGIC:  Awake, alert, oriented to name, place and time.          Medications:  Scheduled Medications:    potassium chloride  40 mEq Oral BID WC    metFORMIN  500 mg Oral BID WC    sodium chloride flush  10 mL Intravenous 2 times per day    ibuprofen  800 mg Oral Q8H    docusate sodium  100 mg Oral BID    polyethylene glycol  17 g Oral Daily    prenatal vitamin  1 tablet Oral Daily    Tdap-Dtap  0.5 mL Intramuscular Prior to discharge    cephALEXin  500 mg Oral 3 times per day    And    metroNIDAZOLE  500 mg Oral 3 times per day    enoxaparin  30 mg Subcutaneous BID    NIFEdipine  30 mg Oral Daily    famotidine  20 mg Oral BID     Continuous Infusions:    oxytocin       PRN MedicationsoxyCODONE-acetaminophen, 1 tablet, Q4H PRN    Or  oxyCODONE-acetaminophen, 2 tablet, Q4H PRN  sodium chloride flush, 10 mL, PRN  sodium chloride flush, 10 mL, PRN  acetaminophen, 1,000 mg, Q4H PRN  naloxone, 0.4 mg, PRN  diphenhydrAMINE, 25 mg, Q6H PRN  simethicone, 80 mg, Q6H PRN  magnesium hydroxide, 30 mL, Daily PRN  bisacodyl, 10 mg, Daily PRN  ondansetron, 4 mg, Q6H PRN  oxytocin, 10 Units, PRN    And  oxytocin, 95 lorrie-units/min, PRN  lanolin, , Q1H PRN  potassium chloride, 40 mEq, PRN    Or  potassium alternative oral replacement, 40 mEq, PRN    Or  potassium chloride, 10 mEq, PRN  dextrose, 12.5 g, PRN  magnesium sulfate, 1 g, PRN  sodium phosphate IVPB, 10 mmol, PRN    Or  sodium phosphate IVPB, 15 mmol, PRN    Or  sodium phosphate IVPB, 20 mmol, PRN  glucose, 15 g, PRN  glucagon (rDNA), 1 mg, PRN  promethazine, 12.5 mg, Q6H PRN    Or  ondansetron, 4 mg, Q6H PRN  calcium carbonate, 500 mg, TID PRN        Diagnostic Labs:  CBC:   Recent Labs     10/09/20  0903   WBC 12.7*   RBC 3.79*   HGB 11.3*   HCT 35.2*   MCV 92.9   RDW 15.5*        BMP:   Recent Labs     10/09/20  0903 10/10/20  0451    140   K 3.2* 3.0*    104   CO2 21 24   BUN 4* 3*   CREATININE 0.66 0.48*     BNP: No results for input(s): BNP in the last 72 hours. PT/INR: No results for input(s): PROTIME, INR in the last 72 hours. APTT: No results for input(s): APTT in the last 72 hours. CARDIAC ENZYMES: No results for input(s): CKMB, CKMBINDEX, TROPONINI in the last 72 hours. Invalid input(s): CKTOTAL;3  FASTING LIPID PANEL:No results found for: CHOL, HDL, TRIG  LIVER PROFILE:   Recent Labs     10/09/20  0903 10/10/20  0451   AST 22 28   ALT 16 18   BILITOT 0.32 0.56   ALKPHOS 102 111*      MICROBIOLOGY:   Lab Results   Component Value Date/Time    CULTURE NO SIGNIFICANT GROWTH 10/06/2020 02:56 AM       Imaging:    No results found. ASSESSMENT & PLAN     ASSESSMENT / PLAN:     Type 2 diabetes mellitus versus pre-gestational diabetes  -We will start the patient on metformin 500 mg twice daily  -Monitor glucose levels every 4 hours. Glucose levels tend to drop 24 to 48 hours after delivery  -We will follow the patient in clinic for checking HbA1c      Justice Pérez MD  Internal Medicine Resident, PGY-1  [facility];  [place], [place]  10/10/2020, 11:47 AM

## 2020-10-10 NOTE — PROGRESS NOTES
POST OPERATIVE DAY # 2    Cirilo Buckner is a 26 y.o. female   This patient was seen and examined today. S/P RLTCS w/ RRS on 10/8/20. Her pregnancy was complicated by:   Patient Active Problem List   Diagnosis    Hx C/S x2 (G4-breech, G7--NRFHT)   Hailey Bertrand H/O successful     BMI 39.0-39.9,adult    Former smoker    History of THC abuse     [address]    H/O indicated  delivery    H/O Starvation ketoacidosis    History of pre-eclampsia w/ SF--G7    Abnormal uterine bleeding    Late prenatal care affecting pregnancy in second trimester    cHTN (no meds)    Pre-Gestational DM (new dx)    Obesity affecting pregnancy in third trimester    Feeling pelvic pressure in pregnancy, antepartum    Hx LSIL    History of anxiety    Hypokalemia    Elevated LFTs    Long QT interval    34 weeks gestation of pregnancy    High risk teen pregnancy in third trimester    Pre-existing type 2 diabetes mellitus during pregnancy, antepartum    Hypertension affecting pregnancy in third trimester    Decreased fetal movement, antepartum    Fetal heart rate decelerations affecting management of mother   [facility] Encounter for routine screening for malformation using ultrasonics     screening for fetal growth retardation using ultrasonics    35 weeks gestation of pregnancy    RLTCS w/ RRS 10/8/20 M Wt 6#5 Apg        Today she is doing well without any chief complaint. Her lochia is light. She denies chest pain, shortness of breath, headache, lightheadedness, blurred vision and peripheral edema. She is not breast feeding and she denies any signs or symptoms of mastitis. She is ambulating well. She is voiding without difficulty. She currently denies S/S of postpartum depression. Flatus present. Bowel movement absent. She is tolerating solids.     Vital Signs:  Vitals:    10/09/20 1300 10/09/20 1654 10/09/20 1701 10/09/20 2000   BP: 117/78 (!) 136/92 126/77 126/80   Pulse: 98 96  89   Resp: 18 18  18 Temp: 98.5 °F (36.9 °C) 98.4 °F (36.9 °C)  99 °F (37.2 °C)   TempSrc: Oral Oral  Oral   SpO2:   100%    Weight:       Height:           Urine Input & Output last 24hrs:     Intake/Output Summary (Last 24 hours) at 10/9/2020 9939  Last data filed at 10/9/2020 0800  Gross per 24 hour   Intake --   Output 2550 ml   Net -2550 ml       Physical Exam:  General:  no apparent distress, alert and cooperative  Neurologic:  alert, oriented, normal speech, no focal findings or movement disorder noted  Lungs:  No increased work of breathing, good air exchange, clear to auscultation bilaterally, no crackles or wheezing  Heart:  regular rate and rhythm    Abdomen: abdomen soft, non-distended, non-tender  Fundus: non-tender, normal size, firm, below umbilicus  Incision: Prevena dressing in place with adequate seal   Extremities:  no calf tenderness, non edematous    Labs:  Lab Results   Component Value Date    WBC 12.7 (H) 10/09/2020    HGB 11.3 (L) 10/09/2020    HCT 35.2 (L) 10/09/2020    MCV 92.9 10/09/2020     10/09/2020       Assessment/Plan:  1. Jeffery Whitten is a [id number] POD # 2 s/p RLTCS w/ RRS on 10/8/20   - Doing well, VSS    - Male infant in NICU, circumcision desired   - Encourage ambulation and use of incentive spirometer    - CBC completed, Hgb stable at 11.3   - Pain control: Percocet/Motrin   - Antibiotics: Keflex/Flagyl 500 mg TID x 48 hours postpartum    - DVT prophylaxis: Lovenox 30 mg BID    - Prevena dressing with underlying sutures   2. Rh positive/Rubella immune  3. Bottle feeding     - Denies s/s mastitis   4.  Pregestational DM (Class B)   - Patient had presented in DKA and is s/p insulin gtt   - HgbA1c 8.8 on admission    - Patient reports her insulin regimen prior to admission was NPH 8U qhs   - Internal Medicine consulted, appreciate recommendations    - Continue to monitor blood sugars AC/HS and cover with HD ISS as needed    - Goal blood sugar < 180   - IM team plans to start metformin pending glucose readings postpartum    - S/P diabetic education    - Plan to have patient follow with [facility] after discharge to recheck HgA1c   5. Chronic Hypertension (on medications)    - Blood pressures stable overnight, no severe range blood pressures    - Continue Procardia 30 mg XL qd (started on 10/8/20)   - PreE labs wnl x2, P/C 0.23 (10/7)    - Denies s/s preE    - Follow up at [facility] in one week for BP recheck   6. Hypokalemia   - K 3.2>3.2   - KCL replacement ordered   - Denies s/s hypokalemia   - CMP pending this AM   7. Hx Anxiety   - Patient reports her mood is stable with no medications   - Denies s/s postpartum depression   - Denies SI/HI  8. Continue post-op care. Counseling Completed:  Secondary Smoke risks and Sudden Infant Death Syndrome were reviewed with recommendations. Infant sleeping, \"back to sleep\" and avoidance of co-sleeping recommendations were reviewed. Signs and Symptoms of Post Partum Depression were reviewed. The patient is to call if any occur. Signs and symptoms of Mastitis were reviewed. The patient is to call if any occur for follow up.   Discharge instructions including pelvic rest, incision care, 15 lb weight restriction, no driving with pain medicine and office follow-up were reviewed with patient     Attending Physician: Dr. Jade Fatima DO  Ob/Gyn Resident  10/9/2020, 11:59 PM

## 2020-10-10 NOTE — CONSULTS
[address]     Department of Internal Medicine - Staff Internal Medicine Service          CONSULTATION NOTE      RESAON FOR CONSULTATION:  Hyperglycemia    HISTORY OBTAIN FROM:  Patient     HISTORY OF PRESENT ILLNESS:      Thank you for allowing us to see Pawel Vera in consultation for diabetes management. As you know, Pawel Vera is a pleasant 26 y.o. female with significant past medical history of abnormal uterine bleeding, THC abuse presented with hyperglycemia during pregnancy. Patient is seen s/p  section and has been consulted for further diabetes management. Patient also mentions that she has been feeling thirsty, with increased frequency of micturition. Patient denies any past history of diabetes not taking any diabetic medications at home. Patient has significant family history of diabetes mellitus with diabetes and father, maternal grandmother, maternal grandfather, cousin. Patient's blood glucose on admission 485 with HbA1c 8.8. PAST MEDICAL HISTORY:        Diagnosis Date    cHTN (no meds) 2020    GDMA1 5/15/2018    Headache(784.0)     Pityriasis rosea     Right sciatic nerve pain 2018    Noted in previous pregnancy, was prescribed belt but it wasn't covered by insurance Progressively worsening symptoms noted -- Referred to PT 18    THC abuse      THC abuse      Traumatic injury  6/3/2018    Trichomonas 2013    treated       PAST SURGICAL HISTORY:        Procedure Laterality Date     SECTION       SECTION N/A 10/8/2020     SECTION performed by Sharon Corrigan DO at [facility]    CHOLECYSTECTOMY     [facility] N/A 2018     SECTION performed by Sharon Corrigan DO at [facility]       Allergies:  Patient has no known allergies. SOCIAL HISTORY:   TOBACCO:  Former smoker.   Type of tobacco used:  THC, cigars  ETOH:  Never drank alcohol    FAMILY HISTORY:   DM -paternal grandfather, maternal grandmother, father, cousin  Hypertension -mother    REVIEW OF SYSTEMS:  CONSTITUTIONAL:  negative for  fevers and chills, positive for polydipsia  RESPIRATORY:  negative for  dry cough, dyspnea, wheezing and chest pain  CARDIOVASCULAR: negative for  chest pain, palpitations, exertional chest pressure/discomfort  GASTROINTESTINAL: negative for nausea, vomiting, change in bowel habits and abdominal pain, positive for polyuria  MUSCULOSKELETAL: negative for  pain and muscle weakness  NEUROLOGICAL:  negative for headaches, dizziness and syncope    PHYSICAL EXAM:  /77 Comment: pressure recheck  Pulse 96   Temp 98.4 °F (36.9 °C) (Oral)   Resp 18   Ht 5' 6\" (1.676 m)   Wt 240 lb (108.9 kg)   SpO2 100%   Breastfeeding Unknown   BMI 38.74 kg/m²      CONSTITUTIONAL:  awake, alert, cooperative, no apparent distress  LUNGS:  No increased work of breathing, good air exchange, clear to auscultation bilaterally, no crackles or wheezing  CARDIOVASCULAR:  Normal apical impulse, regular rate and rhythm, normal S1 and S2, no S3 or S4, and no murmur noted  ABDOMEN:  normal bowel sounds, soft, non-distended  NEUROLOGIC:  Awake, alert, oriented to name, place and time. DATA:  Urine Culture:  No components found for: CURINE  Blood Culture:  No components found for: CBLOOD, CFUNGUSBL      ASSESSMENT/PLAN:    Type 2 diabetes mellitus versus pre-gestational diabetes  -Likely type 2 diabetes mellitus with blood glucose on admission 485, HbA1c 8.8  -We will continue on insulin sliding scale for management of patient's blood glucose  -Monitor glucose levels every 4 hours.  Glucose levels tend to drop 24 to 48 hours after delivery  -We will plan to start metformin after monitoring glucose readings overnight  -We will follow the patient in clinic for checking HbA1c      Jacinta Cheadle, PGY-1,   Internal Medicine Resident  [address], [place]

## 2020-10-10 NOTE — PLAN OF CARE
Problem: Pain:  Goal: Pain level will decrease  Description: Pain level will decrease  10/10/2020 1829 by Morenita Weber RN  Outcome: Met This Shift  10/10/2020 0731 by Morenita Weber RN  Outcome: Ongoing  10/10/2020 0603 by Ayah Mosqueda RN  Outcome: Ongoing  Goal: Control of acute pain  Description: Control of acute pain  10/10/2020 1829 by Morenita Weber RN  Outcome: Met This Shift  10/10/2020 0731 by Morenita Weber RN  Outcome: Ongoing  10/10/2020 0603 by Ayah Mosqueda RN  Outcome: Ongoing  Goal: Control of chronic pain  Description: Control of chronic pain  10/10/2020 1829 by Morenita Weber RN  Outcome: Met This Shift  10/10/2020 0731 by Morenita Weber RN  Outcome: Ongoing  10/10/2020 0603 by Ayha Mosqueda RN  Outcome: Ongoing     Problem: Discharge Planning:  Goal: Discharged to appropriate level of care  Description: Discharged to appropriate level of care  10/10/2020 1829 by Morenita Weber RN  Outcome: Met This Shift  10/10/2020 0731 by Morenita Weber RN  Outcome: Ongoing  10/10/2020 0603 by Ayah Mosqueda RN  Outcome: Ongoing     Problem: Fluid Volume - Imbalance:  Goal: Absence of postpartum hemorrhage signs and symptoms  Description: Absence of postpartum hemorrhage signs and symptoms  10/10/2020 1829 by Morenita Weber RN  Outcome: Met This Shift  10/10/2020 0731 by Morenita Weber RN  Outcome: Ongoing  10/10/2020 0603 by Ayah Mosqueda RN  Outcome: Ongoing  Goal: Absence of imbalanced fluid volume signs and symptoms  Description: Absence of imbalanced fluid volume signs and symptoms  10/10/2020 1829 by Morenita Weber RN  Outcome: Met This Shift  10/10/2020 0731 by Morenita Weber RN  Outcome: Ongoing  10/10/2020 0603 by Ayah Mosqueda RN  Outcome: Ongoing     Problem: Infection - Surgical Site:  Goal: Will show no infection signs and symptoms  Description: Will show no infection signs and symptoms  10/10/2020 1829 by Morenita Weber RN  Outcome: Met This Shift  10/10/2020 0731 by Morenita Weber RN  Outcome:

## 2020-10-11 LAB
ANION GAP SERPL CALCULATED.3IONS-SCNC: 10 MMOL/L (ref 9–17)
BUN BLDV-MCNC: 3 MG/DL (ref 6–20)
BUN/CREAT BLD: ABNORMAL (ref 9–20)
CALCIUM SERPL-MCNC: 8.8 MG/DL (ref 8.6–10.4)
CHLORIDE BLD-SCNC: 104 MMOL/L (ref 98–107)
CO2: 25 MMOL/L (ref 20–31)
CREAT SERPL-MCNC: 0.52 MG/DL (ref 0.5–0.9)
GFR AFRICAN AMERICAN: >60 ML/MIN
GFR NON-AFRICAN AMERICAN: >60 ML/MIN
GFR SERPL CREATININE-BSD FRML MDRD: ABNORMAL ML/MIN/{1.73_M2}
GFR SERPL CREATININE-BSD FRML MDRD: ABNORMAL ML/MIN/{1.73_M2}
GLUCOSE BLD-MCNC: 106 MG/DL (ref 65–105)
GLUCOSE BLD-MCNC: 114 MG/DL (ref 65–105)
GLUCOSE BLD-MCNC: 125 MG/DL (ref 65–105)
GLUCOSE BLD-MCNC: 133 MG/DL (ref 65–105)
GLUCOSE BLD-MCNC: 136 MG/DL (ref 70–99)
POTASSIUM SERPL-SCNC: 3.4 MMOL/L (ref 3.7–5.3)
SODIUM BLD-SCNC: 139 MMOL/L (ref 135–144)

## 2020-10-11 PROCEDURE — 80048 BASIC METABOLIC PNL TOTAL CA: CPT

## 2020-10-11 PROCEDURE — 1220000000 HC SEMI PRIVATE OB R&B

## 2020-10-11 PROCEDURE — 6360000002 HC RX W HCPCS: Performed by: STUDENT IN AN ORGANIZED HEALTH CARE EDUCATION/TRAINING PROGRAM

## 2020-10-11 PROCEDURE — 99232 SBSQ HOSP IP/OBS MODERATE 35: CPT | Performed by: INTERNAL MEDICINE

## 2020-10-11 PROCEDURE — 6370000000 HC RX 637 (ALT 250 FOR IP): Performed by: STUDENT IN AN ORGANIZED HEALTH CARE EDUCATION/TRAINING PROGRAM

## 2020-10-11 PROCEDURE — 82947 ASSAY GLUCOSE BLOOD QUANT: CPT

## 2020-10-11 PROCEDURE — 36415 COLL VENOUS BLD VENIPUNCTURE: CPT

## 2020-10-11 RX ORDER — POTASSIUM CHLORIDE 20 MEQ/1
40 TABLET, EXTENDED RELEASE ORAL 2 TIMES DAILY WITH MEALS
Status: COMPLETED | OUTPATIENT
Start: 2020-10-11 | End: 2020-10-11

## 2020-10-11 RX ADMIN — POTASSIUM CHLORIDE 40 MEQ: 1500 TABLET, EXTENDED RELEASE ORAL at 17:11

## 2020-10-11 RX ADMIN — METFORMIN HYDROCHLORIDE 500 MG: 500 TABLET ORAL at 08:42

## 2020-10-11 RX ADMIN — FAMOTIDINE 20 MG: 20 TABLET, FILM COATED ORAL at 20:53

## 2020-10-11 RX ADMIN — POTASSIUM CHLORIDE 40 MEQ: 1500 TABLET, EXTENDED RELEASE ORAL at 09:05

## 2020-10-11 RX ADMIN — METFORMIN HYDROCHLORIDE 500 MG: 500 TABLET ORAL at 17:11

## 2020-10-11 RX ADMIN — DOCUSATE SODIUM 100 MG: 100 CAPSULE, LIQUID FILLED ORAL at 20:53

## 2020-10-11 RX ADMIN — IBUPROFEN 800 MG: 800 TABLET, FILM COATED ORAL at 09:07

## 2020-10-11 RX ADMIN — OXYCODONE HYDROCHLORIDE AND ACETAMINOPHEN 2 TABLET: 5; 325 TABLET ORAL at 04:35

## 2020-10-11 RX ADMIN — IBUPROFEN 800 MG: 800 TABLET, FILM COATED ORAL at 17:11

## 2020-10-11 RX ADMIN — FAMOTIDINE 20 MG: 20 TABLET, FILM COATED ORAL at 08:43

## 2020-10-11 RX ADMIN — ENOXAPARIN SODIUM 30 MG: 30 INJECTION SUBCUTANEOUS at 08:42

## 2020-10-11 RX ADMIN — ENOXAPARIN SODIUM 30 MG: 30 INJECTION SUBCUTANEOUS at 20:53

## 2020-10-11 RX ADMIN — OXYCODONE HYDROCHLORIDE AND ACETAMINOPHEN 2 TABLET: 5; 325 TABLET ORAL at 13:01

## 2020-10-11 RX ADMIN — OXYCODONE HYDROCHLORIDE AND ACETAMINOPHEN 1 TABLET: 5; 325 TABLET ORAL at 17:14

## 2020-10-11 RX ADMIN — OXYCODONE HYDROCHLORIDE AND ACETAMINOPHEN 2 TABLET: 5; 325 TABLET ORAL at 09:05

## 2020-10-11 RX ADMIN — OXYCODONE HYDROCHLORIDE AND ACETAMINOPHEN 2 TABLET: 5; 325 TABLET ORAL at 21:21

## 2020-10-11 RX ADMIN — NIFEDIPINE 30 MG: 30 TABLET, FILM COATED, EXTENDED RELEASE ORAL at 08:45

## 2020-10-11 RX ADMIN — IBUPROFEN 800 MG: 800 TABLET, FILM COATED ORAL at 01:24

## 2020-10-11 RX ADMIN — Medication 1 TABLET: at 08:43

## 2020-10-11 ASSESSMENT — PAIN SCALES - GENERAL
PAINLEVEL_OUTOF10: 8
PAINLEVEL_OUTOF10: 9
PAINLEVEL_OUTOF10: 6
PAINLEVEL_OUTOF10: 8
PAINLEVEL_OUTOF10: 10
PAINLEVEL_OUTOF10: 7

## 2020-10-11 NOTE — PROGRESS NOTES
Ob/Gyn Resident Interval Note       Patient's lab results reviewed, significant for BMP with K+ 3.4, improved from 3.0. Patient received KClor BID with meals, and will continue today for oral repletion.       Vitals:    10/10/20 0000 10/10/20 0800 10/10/20 2000 10/11/20 0830   BP: 116/71 (!) 138/95 135/85 135/89   Pulse: 81 80 85 83   Resp: 18 20 18 16   Temp: 98 °F (36.7 °C) 98.1 °F (36.7 °C) 98.1 °F (36.7 °C) 98.6 °F (37 °C)   TempSrc: Oral Oral Oral Oral   SpO2:       Weight:       Height:             Recent Results (from the past 12 hour(s))   BASIC METABOLIC PANEL    Collection Time: 10/11/20  7:24 AM   Result Value Ref Range    Glucose 136 (H) 70 - 99 mg/dL    BUN 3 (L) 6 - 20 mg/dL    CREATININE 0.52 0.50 - 0.90 mg/dL    Bun/Cre Ratio NOT REPORTED 9 - 20    Calcium 8.8 8.6 - 10.4 mg/dL    Sodium 139 135 - 144 mmol/L    Potassium 3.4 (L) 3.7 - 5.3 mmol/L    Chloride 104 98 - 107 mmol/L    CO2 25 20 - 31 mmol/L    Anion Gap 10 9 - 17 mmol/L    GFR Non-African American >60 >60 mL/min    GFR African American >60 >60 mL/min    GFR Comment          GFR Staging NOT REPORTED    POC Glucose Fingerstick    Collection Time: 10/11/20  7:24 AM   Result Value Ref Range    POC Glucose 125 (H) 65 - 105 mg/dL           Dorothy Naidu DO  Ob/Gyn Resident  Pager: [phone] [facility]   10/11/099043:06 AM

## 2020-10-11 NOTE — PROGRESS NOTES
POST OPERATIVE DAY # 3    [place] Payer is a 26 y.o. female   This patient was seen and examined today. Her pregnancy was complicated by:   Patient Active Problem List   Diagnosis    Hx C/S x2 (G4-breech, G7--NRFHT)   [facility] H/O successful     BMI 39.0-39.9,adult    Former smoker    History of THC abuse     [address]    H/O indicated  delivery    H/O Starvation ketoacidosis    History of pre-eclampsia w/ SF--G7    Abnormal uterine bleeding    Late prenatal care affecting pregnancy in second trimester    cHTN (no meds)    Pre-Gestational DM (new dx)    Obesity affecting pregnancy in third trimester    Feeling pelvic pressure in pregnancy, antepartum    Hx LSIL    History of anxiety    Hypokalemia    Elevated LFTs    Long QT interval    34 weeks gestation of pregnancy    High risk teen pregnancy in third trimester    Pre-existing type 2 diabetes mellitus during pregnancy, antepartum    Hypertension affecting pregnancy in third trimester    Decreased fetal movement, antepartum    Fetal heart rate decelerations affecting management of mother   [facility] Encounter for routine screening for malformation using ultrasonics     screening for fetal growth retardation using ultrasonics    35 weeks gestation of pregnancy    RLTCS w/ RRS 10/8/20 M Wt 6#5 Apg        Today she is doing well without any chief complaint. Her lochia is light. She denies chest pain, shortness of breath, headache and lightheadedness. She is  Bottle feeding and she denies any signs or symptoms of mastitis. She is ambulating well. She is voiding without difficulty. She currently denies S/S of postpartum depression. Flatus present. Bowel movement absent. She is tolerating solids.     Vital Signs:  Vitals:    10/09/20 2000 10/10/20 0000 10/10/20 0800 10/10/20 2000   BP: 126/80 116/71 (!) 138/95 135/85   Pulse: 89 81 80 85   Resp: 18 18 20 18   Temp: 99 °F (37.2 °C) 98 °F (36.7 °C) 98.1 °F (36.7 °C) 98.1 Completed:  Secondary Smoke risks and Sudden Infant Death Syndrome were reviewed with recommendations. Infant sleeping, \"back to sleep\" and avoidance of co-sleeping recommendations were reviewed. Signs and Symptoms of Post Partum Depression were reviewed. The patient is to call if any occur. Signs and symptoms of Mastitis were reviewed. The patient is to call if any occur for follow up.   Discharge instructions including pelvic rest, incision care, 15 lb weight restriction, no driving with pain medicine and office follow-up were reviewed with patient     Attending Physician: Dr. Fernando Grimes DO  Ob/Gyn Resident  10/11/2020, 1:12 AM

## 2020-10-11 NOTE — PROGRESS NOTES
[facility]  Internal Medicine Teaching Residency Program  Inpatient Daily Progress Note  ______________________________________________________________________________    Patient: Cirilo Buckner  YOB: 1990   BQ    Acct: [de-identified]     Room: -  Admit date: 10/6/2020  Today's date: 10/11/20  Number of days in the hospital: 4    SUBJECTIVE   Admitting Diagnosis: Status post repeat low transverse  section  CC: Hyperglycemia    Pt examined at bedside. Chart & results reviewed. Hemodynamically stable. Afebrile. No acute complaints  Patient's blood glucose 136 today. Patient denies any abdominal pain, nausea vomiting. ROS:  Constitutional:  negative for chills, fevers, sweats  Respiratory:  negative for cough, dyspnea on exertion, hemoptysis, shortness of breath, wheezing  Cardiovascular:  negative for chest pain, chest pressure/discomfort, lower extremity edema, palpitations  Gastrointestinal:  negative for abdominal pain, constipation, diarrhea, nausea, vomiting  Neurological:  negative for dizziness, headache  BRIEF HISTORY     Cirilo Buckner is a pleasant 26 y.o. female with significant past medical history of abnormal uterine bleeding, THC abuse presented with hyperglycemia during pregnancy. Patient is seen s/p  section and has been consulted for further diabetes management.     Patient also mentions that she has been feeling thirsty, with increased frequency of micturition. Patient denies any past history of diabetes not taking any diabetic medications at home. Patient has significant family history of diabetes mellitus with diabetes and father, maternal grandmother, maternal grandfather, cousin.     Patient's blood glucose on admission 485 with HbA1c 8.8.     OBJECTIVE     Vital Signs:  /85   Pulse 85   Temp 98.1 °F (36.7 °C) (Oral)   Resp 18   Ht 5' 6\" (1.676 m)   Wt 240 lb (108.9 kg)   SpO2 100%   Breastfeeding Unknown   BMI 38.74 kg/m²     Temp (24hrs), Av.1 °F (36.7 °C), Min:98.1 °F (36.7 °C), Max:98.1 °F (36.7 °C)    No intake/output data recorded. Physical Exam:  CONSTITUTIONAL:  awake, alert, cooperative, no apparent distress  LUNGS:  No increased work of breathing, good air exchange, clear to auscultation bilaterally, no crackles or wheezing  CARDIOVASCULAR:  Normal apical impulse, regular rate and rhythm, normal S1 and S2, no S3 or S4, and no murmur noted  ABDOMEN:  normal bowel sounds, soft, non-distended  NEUROLOGIC:  Awake, alert, oriented to name, place and time.          Medications:  Scheduled Medications:    metFORMIN  500 mg Oral BID WC    sodium chloride flush  10 mL Intravenous 2 times per day    ibuprofen  800 mg Oral Q8H    docusate sodium  100 mg Oral BID    polyethylene glycol  17 g Oral Daily    prenatal vitamin  1 tablet Oral Daily    Tdap-Dtap  0.5 mL Intramuscular Prior to discharge    enoxaparin  30 mg Subcutaneous BID    NIFEdipine  30 mg Oral Daily    famotidine  20 mg Oral BID     Continuous Infusions:    oxytocin       PRN MedicationsoxyCODONE-acetaminophen, 1 tablet, Q4H PRN    Or  oxyCODONE-acetaminophen, 2 tablet, Q4H PRN  sodium chloride flush, 10 mL, PRN  sodium chloride flush, 10 mL, PRN  acetaminophen, 1,000 mg, Q4H PRN  naloxone, 0.4 mg, PRN  diphenhydrAMINE, 25 mg, Q6H PRN  simethicone, 80 mg, Q6H PRN  magnesium hydroxide, 30 mL, Daily PRN  bisacodyl, 10 mg, Daily PRN  ondansetron, 4 mg, Q6H PRN  oxytocin, 10 Units, PRN    And  oxytocin, 95 lorrie-units/min, PRN  lanolin, , Q1H PRN  potassium chloride, 40 mEq, PRN    Or  potassium alternative oral replacement, 40 mEq, PRN    Or  potassium chloride, 10 mEq, PRN  dextrose, 12.5 g, PRN  magnesium sulfate, 1 g, PRN  sodium phosphate IVPB, 10 mmol, PRN    Or  sodium phosphate IVPB, 15 mmol, PRN    Or  sodium phosphate IVPB, 20 mmol, PRN  glucose, 15 g, PRN  glucagon (rDNA), 1 mg, PRN  promethazine,

## 2020-10-11 NOTE — PLAN OF CARE
Problem: Pain:  Goal: Pain level will decrease  Description: Pain level will decrease  10/11/2020 0533 by Anny Telles RN  Outcome: Ongoing  10/10/2020 1829 by Poornima Weiner RN  Outcome: Met This Shift  Goal: Control of acute pain  Description: Control of acute pain  10/11/2020 0533 by Anny Telles RN  Outcome: Ongoing  10/10/2020 1829 by Poornima Weiner RN  Outcome: Met This Shift  Goal: Control of chronic pain  Description: Control of chronic pain  10/11/2020 0533 by Anny Telles RN  Outcome: Ongoing  10/10/2020 1829 by Poornima Weiner RN  Outcome: Met This Shift     Problem: Discharge Planning:  Goal: Discharged to appropriate level of care  Description: Discharged to appropriate level of care  10/11/2020 0533 by Anny Telles RN  Outcome: Ongoing  10/10/2020 1829 by Poornima Weiner RN  Outcome: Met This Shift     Problem: Fluid Volume - Imbalance:  Goal: Absence of postpartum hemorrhage signs and symptoms  Description: Absence of postpartum hemorrhage signs and symptoms  10/11/2020 0533 by Anny Telles RN  Outcome: Ongoing  10/10/2020 1829 by Poornima Weiner RN  Outcome: Met This Shift  Goal: Absence of imbalanced fluid volume signs and symptoms  Description: Absence of imbalanced fluid volume signs and symptoms  10/11/2020 0533 by Anny Telles RN  Outcome: Ongoing  10/10/2020 1829 by Poornima Weiner RN  Outcome: Met This Shift     Problem: Infection - Surgical Site:  Goal: Will show no infection signs and symptoms  Description: Will show no infection signs and symptoms  10/11/2020 0533 by Anny Telles RN  Outcome: Ongoing  10/10/2020 1829 by Poornima Weiner RN  Outcome: Met This Shift     Problem: Mood - Altered:  Goal: Mood stable  Description: Mood stable  10/11/2020 0533 by Anny Telles RN  Outcome: Ongoing  10/10/2020 1829 by Poornima Weiner RN  Outcome: Met This Shift     Problem: Nausea/Vomiting:  Goal: Absence of nausea/vomiting  Description: Absence of nausea/vomiting  10/11/2020 0533 by Jannet Angulo JUAN Puri  Outcome: Ongoing  10/10/2020 1829 by Daksha Copeland RN  Outcome: Met This Shift     Problem: Pain - Acute:  Goal: Pain level will decrease  Description: Pain level will decrease  10/11/2020 0533 by Ana Haque RN  Outcome: Ongoing  10/10/2020 1829 by Daksha Copeland RN  Outcome: Met This Shift     Problem: Urinary Retention:  Goal: Urinary elimination within specified parameters  Description: Urinary elimination within specified parameters  10/11/2020 0533 by Ana Haque RN  Outcome: Ongoing  10/10/2020 1829 by Daksha Copeland RN  Outcome: Met This Shift     Problem: Venous Thromboembolism:  Goal: Will show no signs or symptoms of venous thromboembolism  Description: Will show no signs or symptoms of venous thromboembolism  10/11/2020 0533 by Ana Haque RN  Outcome: Ongoing  10/10/2020 1829 by Daksha Copeland RN  Outcome: Met This Shift  Goal: Absence of signs or symptoms of impaired coagulation  Description: Absence of signs or symptoms of impaired coagulation  10/11/2020 0533 by Ana Haque RN  Outcome: Ongoing  10/10/2020 1829 by Daksha Copeland RN  Outcome: Met This Shift

## 2020-10-11 NOTE — PLAN OF CARE
RN  Outcome: Ongoing  10/11/2020 0533 by Azul Lozano RN  Outcome: Ongoing     Problem: Pain - Acute:  Goal: Pain level will decrease  Description: Pain level will decrease  10/11/2020 1604 by Oneal Martinez RN  Outcome: Ongoing  10/11/2020 0533 by Azul Lozano RN  Outcome: Ongoing     Problem: Urinary Retention:  Goal: Urinary elimination within specified parameters  Description: Urinary elimination within specified parameters  10/11/2020 1604 by Oneal Martinez RN  Outcome: Ongoing  10/11/2020 0533 by Azul Lozano RN  Outcome: Ongoing     Problem: Venous Thromboembolism:  Goal: Will show no signs or symptoms of venous thromboembolism  Description: Will show no signs or symptoms of venous thromboembolism  10/11/2020 1604 by Oneal Martinez RN  Outcome: Ongoing  10/11/2020 0533 by Azul Lozano RN  Outcome: Ongoing  Goal: Absence of signs or symptoms of impaired coagulation  Description: Absence of signs or symptoms of impaired coagulation  10/11/2020 1604 by Oneal Martinez RN  Outcome: Ongoing  10/11/2020 0533 by Azul Lozano RN  Outcome: Ongoing

## 2020-10-12 VITALS
DIASTOLIC BLOOD PRESSURE: 90 MMHG | TEMPERATURE: 98.4 F | OXYGEN SATURATION: 100 % | RESPIRATION RATE: 16 BRPM | HEIGHT: 66 IN | WEIGHT: 240 LBS | HEART RATE: 77 BPM | BODY MASS INDEX: 38.57 KG/M2 | SYSTOLIC BLOOD PRESSURE: 138 MMHG

## 2020-10-12 LAB
ANION GAP SERPL CALCULATED.3IONS-SCNC: 11 MMOL/L (ref 9–17)
BUN BLDV-MCNC: 4 MG/DL (ref 6–20)
BUN/CREAT BLD: ABNORMAL (ref 9–20)
CALCIUM SERPL-MCNC: 8.7 MG/DL (ref 8.6–10.4)
CHLORIDE BLD-SCNC: 105 MMOL/L (ref 98–107)
CO2: 24 MMOL/L (ref 20–31)
CREAT SERPL-MCNC: 0.55 MG/DL (ref 0.5–0.9)
GFR AFRICAN AMERICAN: >60 ML/MIN
GFR NON-AFRICAN AMERICAN: >60 ML/MIN
GFR SERPL CREATININE-BSD FRML MDRD: ABNORMAL ML/MIN/{1.73_M2}
GFR SERPL CREATININE-BSD FRML MDRD: ABNORMAL ML/MIN/{1.73_M2}
GLUCOSE BLD-MCNC: 104 MG/DL (ref 65–105)
GLUCOSE BLD-MCNC: 124 MG/DL (ref 70–99)
POTASSIUM SERPL-SCNC: 3.4 MMOL/L (ref 3.7–5.3)
SODIUM BLD-SCNC: 140 MMOL/L (ref 135–144)
SURGICAL PATHOLOGY REPORT: NORMAL

## 2020-10-12 PROCEDURE — 6370000000 HC RX 637 (ALT 250 FOR IP): Performed by: STUDENT IN AN ORGANIZED HEALTH CARE EDUCATION/TRAINING PROGRAM

## 2020-10-12 PROCEDURE — 36415 COLL VENOUS BLD VENIPUNCTURE: CPT

## 2020-10-12 PROCEDURE — 6360000002 HC RX W HCPCS: Performed by: STUDENT IN AN ORGANIZED HEALTH CARE EDUCATION/TRAINING PROGRAM

## 2020-10-12 PROCEDURE — 80048 BASIC METABOLIC PNL TOTAL CA: CPT

## 2020-10-12 PROCEDURE — 99232 SBSQ HOSP IP/OBS MODERATE 35: CPT | Performed by: INTERNAL MEDICINE

## 2020-10-12 PROCEDURE — 82947 ASSAY GLUCOSE BLOOD QUANT: CPT

## 2020-10-12 RX ORDER — POTASSIUM CHLORIDE 20 MEQ/1
40 TABLET, EXTENDED RELEASE ORAL 2 TIMES DAILY WITH MEALS
Status: DISCONTINUED | OUTPATIENT
Start: 2020-10-12 | End: 2020-10-12 | Stop reason: HOSPADM

## 2020-10-12 RX ORDER — DIPHENHYDRAMINE HCL 25 MG
25 TABLET ORAL EVERY 6 HOURS PRN
Status: DISCONTINUED | OUTPATIENT
Start: 2020-10-12 | End: 2020-10-12 | Stop reason: HOSPADM

## 2020-10-12 RX ADMIN — OXYCODONE HYDROCHLORIDE AND ACETAMINOPHEN 2 TABLET: 5; 325 TABLET ORAL at 10:06

## 2020-10-12 RX ADMIN — OXYCODONE HYDROCHLORIDE AND ACETAMINOPHEN 2 TABLET: 5; 325 TABLET ORAL at 06:05

## 2020-10-12 RX ADMIN — DOCUSATE SODIUM 100 MG: 100 CAPSULE, LIQUID FILLED ORAL at 09:18

## 2020-10-12 RX ADMIN — Medication 1 TABLET: at 09:18

## 2020-10-12 RX ADMIN — POTASSIUM CHLORIDE 40 MEQ: 1500 TABLET, EXTENDED RELEASE ORAL at 09:18

## 2020-10-12 RX ADMIN — OXYCODONE HYDROCHLORIDE AND ACETAMINOPHEN 2 TABLET: 5; 325 TABLET ORAL at 01:10

## 2020-10-12 RX ADMIN — DIPHENHYDRAMINE HCL 25 MG: 25 TABLET ORAL at 01:11

## 2020-10-12 RX ADMIN — ENOXAPARIN SODIUM 30 MG: 30 INJECTION SUBCUTANEOUS at 09:19

## 2020-10-12 RX ADMIN — IBUPROFEN 800 MG: 800 TABLET, FILM COATED ORAL at 01:10

## 2020-10-12 RX ADMIN — FAMOTIDINE 20 MG: 20 TABLET, FILM COATED ORAL at 09:18

## 2020-10-12 RX ADMIN — METFORMIN HYDROCHLORIDE 500 MG: 500 TABLET ORAL at 08:27

## 2020-10-12 RX ADMIN — IBUPROFEN 800 MG: 800 TABLET, FILM COATED ORAL at 09:18

## 2020-10-12 RX ADMIN — NIFEDIPINE 30 MG: 30 TABLET, FILM COATED, EXTENDED RELEASE ORAL at 09:18

## 2020-10-12 ASSESSMENT — PAIN SCALES - GENERAL
PAINLEVEL_OUTOF10: 10
PAINLEVEL_OUTOF10: 7
PAINLEVEL_OUTOF10: 8
PAINLEVEL_OUTOF10: 7

## 2020-10-12 NOTE — PROGRESS NOTES
POST OPERATIVE DAY # 4    Asad Mchugh is a 26 y.o. female   This patient was seen and examined today. Her pregnancy was complicated by:   Patient Active Problem List   Diagnosis    Hx C/S x2 (G4-breech, G7--NRFHT)   [facility] H/O successful     BMI 39.0-39.9,adult    Former smoker    History of THC abuse     [address] multip[address]    H/O indicated  delivery    H/O Starvation ketoacidosis    History of pre-eclampsia w/ SF--G7    Abnormal uterine bleeding    Late prenatal care affecting pregnancy in second trimester    cHTN (no meds)    Pre-Gestational DM (new dx)    Obesity affecting pregnancy in third trimester    Feeling pelvic pressure in pregnancy, antepartum    Hx LSIL    History of anxiety    Hypokalemia    Elevated LFTs    Long QT interval    34 weeks gestation of pregnancy    High risk teen pregnancy in third trimester    Pre-existing type 2 diabetes mellitus during pregnancy, antepartum    Hypertension affecting pregnancy in third trimester    Decreased fetal movement, antepartum    Fetal heart rate decelerations affecting management of mother   [facility] Encounter for routine screening for malformation using ultrasonics     screening for fetal growth retardation using ultrasonics    35 weeks gestation of pregnancy    RLTCS w/ RRS 10/8/20 M Wt 6#5 Apg        Today she is doing well without any chief complaint. Her lochia is light. She denies chest pain, shortness of breath, headache, lightheadedness and blurred vision. She is not breast feeding and she denies any signs or symptoms of mastitis. She is ambulating well. She is voiding without difficulty. She currently denies S/S of postpartum depression. Flatus present. Bowel movement absent. She is tolerating solids.     Vital Signs:  Vitals:    10/10/20 0800 10/10/20 2000 10/11/20 0830 10/11/20 2030   BP: (!) 138/95 135/85 135/89 134/83   Pulse: 80 85 83 90   Resp: 20 18 16 18   Temp: 98.1 °F (36.7 °C) 98.1 °F (36.7 °C) 98.6 °F (37 °C) 98.6 °F (37 °C)   TempSrc: Oral Oral Oral Oral   SpO2:       Weight:       Height:             Urine Input & Output last 24hrs:   No intake or output data in the 24 hours ending 10/12/20 0109    Physical Exam:  General:  no apparent distress, alert and cooperative  Neurologic:  alert, oriented, normal speech, no focal findings or movement disorder noted  Lungs:  No increased work of breathing, good air exchange, clear to auscultation bilaterally, no crackles or wheezing  Heart:  regular rate and rhythm    Abdomen: abdomen soft, non-distended, non-tender  Fundus: non-tender, normal size, firm, below umbilicus  Incision: Prevena in place and functioning  Extremities:  no calf tenderness, non edematous    Labs:  Lab Results   Component Value Date    WBC 12.7 (H) 10/09/2020    HGB 11.3 (L) 10/09/2020    HCT 35.2 (L) 10/09/2020    MCV 92.9 10/09/2020     10/09/2020       Assessment/Plan:  1. Tatum Olson is a [id number] POD # 4 s/p RLTCS w/ RRS   - Doing well, VSS    - male infant in NICU, circumcision desired   - Encourage ambulation and use of incentive spirometer   - CBC completed, Hgb 11.3  2. Rh positive/Rubella immune  3. Bottle feeding    - Denies s/s mastitis  4. PPH (QBL 1155mL)  - VSS  - Hgb 11.3  - denies LH/dizziness  5. Pregestational DM  - Patient presented in DKA, s/p insulin drip  - HgbA1c 8.8 on admission  - Patient on 8U NPH prior to admission  - Checking BS AC/HS  - IM consulted, started patient on Metformin 500mg BID, recommended f/u outpatient for HgbA1c  - Goal BS <180  - s/p diabetic education  6. cHTN (meds)  - blood pressures stable, no severe ranges  - Continue Procardia 30XL  - PreE labs wnl x2, P/C 0.23  - Denies s/s PreE  7. Hypokalemia  - KCl replacement ordered  - Patient with profound hx of hypokalemia  - K: 3.2>3.2>3.0>3.4  - BMP pending this AM  8. Anxiety  - no meds  - mood stable  - denies SI/HI  9.  BMI 38.7  - s/p Keflex/Flagyl x48h  - Lovenox 30mg BID  10. Continue post-op care. Counseling Completed:  Secondary Smoke risks and Sudden Infant Death Syndrome were reviewed with recommendations. Infant sleeping, \"back to sleep\" and avoidance of co-sleeping recommendations were reviewed. Signs and Symptoms of Post Partum Depression were reviewed. The patient is to call if any occur. Signs and symptoms of Mastitis were reviewed. The patient is to call if any occur for follow up. Discharge instructions including pelvic rest, incision care, 15 lb weight restriction, no driving with pain medicine and office follow-up were reviewed with patient     Attending Physician: Dr. Rayray Cai DO  Ob/Gyn Resident  10/12/2020, 1:09 AM     Date: 10/12/2020  Time: 9:19 AM      Patient Name: Raleigh Rizo  Patient : 1990  Room/Bed: 5478/2931-40  Admission Date/Time: 10/6/2020  7:20 PM        Attending Physician Statement  I have personally seen, evaluated and discussed the care of Raleigh Rizo, including pertinent history and exam findings with the resident. I have reviewed and edited their note in the electronic medical record. The key elements of all parts of the encounter have been performed/reviewed by me. I agree with the assessment, plan and orders as documented by the resident. The level of care submitted represents to the best of my ability the care documented in the medical record today. GC Modifier. This service has been performed in part by a resident under the direction of a teaching physician. Attending's Name:  Eusebio Hodgkin, MD        Patient doing well. She has no concerns today. Stable for discharge with close follow up and continued outpatient diabetes management. Recovering well post-partum.

## 2020-10-12 NOTE — PLAN OF CARE
Problem: Pain:  Goal: Pain level will decrease  Description: Pain level will decrease  10/12/2020 0500 by Levi Fox RN  Outcome: Ongoing  10/11/2020 1604 by Theresa Chavez RN  Outcome: Ongoing  Goal: Control of acute pain  Description: Control of acute pain  10/12/2020 0500 by Levi Fox RN  Outcome: Ongoing  10/11/2020 1604 by Theresa Chavez RN  Outcome: Ongoing  Goal: Control of chronic pain  Description: Control of chronic pain  10/12/2020 0500 by Levi Fox RN  Outcome: Ongoing  10/11/2020 1604 by Theresa Chavez RN  Outcome: Ongoing     Problem: Discharge Planning:  Goal: Discharged to appropriate level of care  Description: Discharged to appropriate level of care  10/12/2020 0500 by Levi Fox RN  Outcome: Ongoing  10/11/2020 1604 by Theresa Chavez RN  Outcome: Ongoing     Problem: Fluid Volume - Imbalance:  Goal: Absence of postpartum hemorrhage signs and symptoms  Description: Absence of postpartum hemorrhage signs and symptoms  10/12/2020 0500 by Levi Fox RN  Outcome: Ongoing  10/11/2020 1604 by Theresa Chavez RN  Outcome: Ongoing  Goal: Absence of imbalanced fluid volume signs and symptoms  Description: Absence of imbalanced fluid volume signs and symptoms  10/12/2020 0500 by Levi Fox RN  Outcome: Ongoing  10/11/2020 1604 by Theresa Chavez RN  Outcome: Ongoing     Problem: Infection - Surgical Site:  Goal: Will show no infection signs and symptoms  Description: Will show no infection signs and symptoms  10/12/2020 0500 by Levi Fox RN  Outcome: Ongoing  10/11/2020 1604 by Theresa Chavez RN  Outcome: Ongoing     Problem: Mood - Altered:  Goal: Mood stable  Description: Mood stable  10/12/2020 0500 by Levi Fox RN  Outcome: Ongoing  10/11/2020 1604 by Theresa Chavez RN  Outcome: Ongoing     Problem: Nausea/Vomiting:  Goal: Absence of nausea/vomiting  Description: Absence of nausea/vomiting  10/12/2020 0500 by Levi Fox RN  Outcome: Ongoing  10/11/2020 1604 by Unknown Saint, RN  Outcome: Ongoing     Problem: Pain - Acute:  Goal: Pain level will decrease  Description: Pain level will decrease  10/12/2020 0500 by Maritza Jacques RN  Outcome: Ongoing  10/11/2020 1604 by Unknown Saint, RN  Outcome: Ongoing     Problem: Urinary Retention:  Goal: Urinary elimination within specified parameters  Description: Urinary elimination within specified parameters  10/12/2020 0500 by Maritza Jacques RN  Outcome: Ongoing  10/11/2020 1604 by Unknown Saint, RN  Outcome: Ongoing     Problem: Venous Thromboembolism:  Goal: Will show no signs or symptoms of venous thromboembolism  Description: Will show no signs or symptoms of venous thromboembolism  10/12/2020 0500 by Maritza Jacques RN  Outcome: Ongoing  10/11/2020 1604 by Unknown Saint, RN  Outcome: Ongoing  Goal: Absence of signs or symptoms of impaired coagulation  Description: Absence of signs or symptoms of impaired coagulation  10/12/2020 0500 by Maritza Jacques RN  Outcome: Ongoing  10/11/2020 1604 by Unknown Saint, RN  Outcome: Ongoing

## 2020-10-12 NOTE — FLOWSHEET NOTE
I have reviewed all [facility] Post-Birth Warning Signs and essential teaching points for pulmonary embolism, cardiac disease, hypertensive disorders of pregnancy, obstetric hemorrhage, venous thromboembolism, infection, and postpartum depression with the patient . I have informed the patient on when to call their healthcare provider and when to call 911. I have discussed with the patient  the importance of scheduling a follow-up visit with their physician, nurse practitioner or midwife and provided them with correct contact information for appointment. I have provided the patient with a copy of the \"Save Your Life\" handout. The patient has acknowledged receiving and understanding this education with her signature.

## 2020-10-12 NOTE — PLAN OF CARE
by Dakotah Chavez RN  Outcome: Met This Shift  10/12/2020 0500 by Melissa Perez RN  Outcome: Ongoing     Problem: Pain - Acute:  Goal: Pain level will decrease  Description: Pain level will decrease  10/12/2020 1024 by Dakotah Chavez RN  Outcome: Met This Shift  10/12/2020 0500 by Melissa Perez RN  Outcome: Ongoing     Problem: Urinary Retention:  Goal: Urinary elimination within specified parameters  Description: Urinary elimination within specified parameters  10/12/2020 1024 by Dakotah Chavez RN  Outcome: Met This Shift  10/12/2020 0500 by Melissa Preez RN  Outcome: Ongoing     Problem: Venous Thromboembolism:  Goal: Will show no signs or symptoms of venous thromboembolism  Description: Will show no signs or symptoms of venous thromboembolism  10/12/2020 1024 by Dakotah Chavez RN  Outcome: Met This Shift  10/12/2020 0500 by Melissa Perez RN  Outcome: Ongoing  Goal: Absence of signs or symptoms of impaired coagulation  Description: Absence of signs or symptoms of impaired coagulation  10/12/2020 1024 by Dakotah Chavez RN  Outcome: Met This Shift  10/12/2020 0500 by Melissa Perez RN  Outcome: Ongoing

## 2020-10-12 NOTE — PLAN OF CARE
1024 by Carlota Brannon RN  Outcome: Met This Shift  10/12/2020 0500 by Reji Nixon RN  Outcome: Ongoing     Problem: Mood - Altered:  Goal: Mood stable  Description: Mood stable  10/12/2020 1320 by Carlota Brannon RN  Outcome: Completed  10/12/2020 1024 by Carlota Brannon RN  Outcome: Met This Shift  10/12/2020 0500 by Reji Nixon RN  Outcome: Ongoing     Problem: Nausea/Vomiting:  Goal: Absence of nausea/vomiting  Description: Absence of nausea/vomiting  10/12/2020 1320 by Carlota Brannon RN  Outcome: Completed  10/12/2020 1024 by Carlota Brannon RN  Outcome: Met This Shift  10/12/2020 0500 by Reji Nixon RN  Outcome: Ongoing     Problem: Pain - Acute:  Goal: Pain level will decrease  Description: Pain level will decrease  10/12/2020 1320 by Carlota Brannon RN  Outcome: Completed  10/12/2020 1024 by Carlota Brannon RN  Outcome: Met This Shift  10/12/2020 0500 by Reji Nixon RN  Outcome: Ongoing     Problem: Urinary Retention:  Goal: Urinary elimination within specified parameters  Description: Urinary elimination within specified parameters  10/12/2020 1320 by Carlota Brannon RN  Outcome: Completed  10/12/2020 1024 by Carlota Brannon RN  Outcome: Met This Shift  10/12/2020 0500 by Reji Nixon RN  Outcome: Ongoing     Problem: Venous Thromboembolism:  Goal: Will show no signs or symptoms of venous thromboembolism  Description: Will show no signs or symptoms of venous thromboembolism  10/12/2020 1320 by Carlota Brannon RN  Outcome: Completed  10/12/2020 1024 by Carlota Brannon RN  Outcome: Met This Shift  10/12/2020 0500 by Reji Nixon RN  Outcome: Ongoing  Goal: Absence of signs or symptoms of impaired coagulation  Description: Absence of signs or symptoms of impaired coagulation  10/12/2020 1320 by Carlota Barnnon RN  Outcome: Completed  10/12/2020 1024 by Carlota Brannon RN  Outcome: Met This Shift  10/12/2020 0500 by Reji Nixon RN  Outcome: Ongoing

## 2020-10-12 NOTE — PROGRESS NOTES
OB/GYN Resident Interval Note    Labs reviewed. Potassium stable at 3.4. Will continue potassium replacement KCl 40 mg BID. Continue postpartum care. Patient stable for discharge home later today.      Vitals:    10/10/20 0800 10/10/20 2000 10/11/20 0830 10/11/20 2030   BP: (!) 138/95 135/85 135/89 134/83   Pulse: 80 85 83 90   Resp: 20 18 16 18   Temp: 98.1 °F (36.7 °C) 98.1 °F (36.7 °C) 98.6 °F (37 °C) 98.6 °F (37 °C)   TempSrc: Oral Oral Oral Oral   SpO2:       Weight:       Height:         Recent Results (from the past 12 hour(s))   POC Glucose Fingerstick    Collection Time: 10/11/20  8:57 PM   Result Value Ref Range    POC Glucose 114 (H) 65 - 105 mg/dL   Basic Metabolic Panel    Collection Time: 10/12/20  6:03 AM   Result Value Ref Range    Glucose 124 (H) 70 - 99 mg/dL    BUN 4 (L) 6 - 20 mg/dL    CREATININE 0.55 0.50 - 0.90 mg/dL    Bun/Cre Ratio NOT REPORTED 9 - 20    Calcium 8.7 8.6 - 10.4 mg/dL    Sodium 140 135 - 144 mmol/L    Potassium 3.4 (L) 3.7 - 5.3 mmol/L    Chloride 105 98 - 107 mmol/L    CO2 24 20 - 31 mmol/L    Anion Gap 11 9 - 17 mmol/L    GFR Non-African American >60 >60 mL/min    GFR African American >60 >60 mL/min    GFR Comment          GFR Staging NOT REPORTED      Driss Deleon DO  Ob/Gyn Resident [address]   10/12/2020 8:15 AM

## 2020-10-12 NOTE — PROGRESS NOTES
[facility]  Internal Medicine Teaching Residency Program  Inpatient Daily Progress Note  ______________________________________________________________________________    Patient: Asad Mchugh  YOB: 1990   [id number]    Acct: [de-identified]     Room: -  Admit date: 10/6/2020  Today's date: 10/12/20  Number of days in the hospital: 5    SUBJECTIVE   Admitting Diagnosis: Status post repeat low transverse  section  CC: Hyperglycemia    Pt examined at bedside. Chart & results reviewed. Hemodynamically stable. Afebrile. No acute complaints  Patient's blood glucose 124 today. Patient denies any abdominal pain, nausea vomiting. ROS:  Constitutional:  negative for chills, fevers, sweats  Respiratory:  negative for cough, dyspnea on exertion, hemoptysis, shortness of breath, wheezing  Cardiovascular:  negative for chest pain, chest pressure/discomfort, lower extremity edema, palpitations  Gastrointestinal:  negative for abdominal pain, constipation, diarrhea, nausea, vomiting  Neurological:  negative for dizziness, headache  BRIEF HISTORY     Asad Mchugh is a pleasant 26 y.o. female with significant past medical history of abnormal uterine bleeding, THC abuse presented with hyperglycemia during pregnancy. Patient is seen s/p  section and has been consulted for further diabetes management.     Patient also mentions that she has been feeling thirsty, with increased frequency of micturition. Patient denies any past history of diabetes not taking any diabetic medications at home. Patient has significant family history of diabetes mellitus with diabetes and father, maternal grandmother, maternal grandfather, cousin.     Patient's blood glucose on admission 485 with HbA1c 8.8.     OBJECTIVE     Vital Signs:  BP (!) 138/90   Pulse 77   Temp 98.4 °F (36.9 °C) (Oral)   Resp 16   Ht 5' 6\" (1.676 m)   Wt 240 lb (108.9 kg)   SpO2 mmol, PRN  glucose, 15 g, PRN  glucagon (rDNA), 1 mg, PRN  promethazine, 12.5 mg, Q6H PRN    Or  ondansetron, 4 mg, Q6H PRN  calcium carbonate, 500 mg, TID PRN        Diagnostic Labs:  CBC:   Recent Labs     10/09/20  0903   WBC 12.7*   RBC 3.79*   HGB 11.3*   HCT 35.2*   MCV 92.9   RDW 15.5*        BMP:   Recent Labs     10/10/20  0451 10/11/20  0724 10/12/20  0603    139 140   K 3.0* 3.4* 3.4*    104 105   CO2 24 25 24   BUN 3* 3* 4*   CREATININE 0.48* 0.52 0.55     BNP: No results for input(s): BNP in the last 72 hours. PT/INR: No results for input(s): PROTIME, INR in the last 72 hours. APTT: No results for input(s): APTT in the last 72 hours. CARDIAC ENZYMES: No results for input(s): CKMB, CKMBINDEX, TROPONINI in the last 72 hours. Invalid input(s): CKTOTAL;3  FASTING LIPID PANEL:No results found for: CHOL, HDL, TRIG  LIVER PROFILE:   Recent Labs     10/09/20  0903 10/10/20  0451   AST 22 28   ALT 16 18   BILITOT 0.32 0.56   ALKPHOS 102 111*      MICROBIOLOGY:   Lab Results   Component Value Date/Time    CULTURE NO SIGNIFICANT GROWTH 10/06/2020 02:56 AM       Imaging:    No results found. ASSESSMENT & PLAN     ASSESSMENT / PLAN:     Type 2 diabetes mellitus versus pre-gestational diabetes  -Patient blood sugar 124 this morning.  -Patient to continue on metformin 500 mg twice daily. -Monitor glucose levels every 4 hours.   -We will follow the patient in clinic for checking HbA1c  -Potassium replaced      Mehran Menard MD  Internal Medicine Resident, PGY-1  [facility];  [place], [place]  10/12/2020, 8:55 AM

## 2020-10-12 NOTE — LACTATION NOTE
Initiation of Electric Breast Pumping     Pumping Initiated at 0900  Initiated due to    [x]   Baby in NICU   []   Plans exclusive pumping   []   Infant weight loss(supplement)   []   Baby not latching well    Flange Size    Right:   Left:     []   24    []   24     [x]   27    [x]   27     []   30    []   30     []   36    []   36  Instructions   [x]   Verbal instructions on how to setup pump and how to use initiation phase   [x]   Written sheet\" How to keep your breast pump kit clean\"   [x]   Expectation sheet for Breastfeeding mothers with pumping log   [x]   Frequency of pumping   [x]   Collection,labeling and storage of colostrum and milk    Supplies Provided   [x]   Pump initiation kit   [x]   Cleaning supplies (basin and soap)   []   Additional flange size   [x]   Oral syringes/snappies   [x]   Patient labels       -

## 2020-10-12 NOTE — CARE COORDINATION
Discharge Planning:      Notes reviewed and discussed pt at resident rounds. IM recommended oral metformin for patient. Discussed medication with patient and she is aware of how to take it and side effects. No insulin will be required for homegoing at this time. Pt will need to follow up on her A1C outpatient. Appt has already been made with the [facility] Internal Medicine on November 3 at 3 PM with Dr Solomon Yadav. Pt requested to be set up with IM at the [facility]. She is aware of this appointment and agrees to follow. Baby will be going to [facility] once discharge from the NICU.

## 2020-10-12 NOTE — PROGRESS NOTES
CLINICAL PHARMACY NOTE: MEDS TO [address] Select Patient?: Yes  Total # of Prescriptions Filled: 5   The following medications were delivered to the patient:  · Metformin 500mg  · Ibuprofen 600mg  · Percocet 5-325mg  · Dok 100mg  · Nifedipine 30mg ER  Total # of Interventions Completed: 0  Time Spent (min): 60    Additional Documentation:

## 2020-10-15 ENCOUNTER — POSTPARTUM VISIT (OUTPATIENT)
Dept: OBGYN | Age: 30
End: 2020-10-15
Payer: COMMERCIAL

## 2020-10-15 VITALS
HEIGHT: 66 IN | HEART RATE: 88 BPM | SYSTOLIC BLOOD PRESSURE: 135 MMHG | BODY MASS INDEX: 35.03 KG/M2 | DIASTOLIC BLOOD PRESSURE: 90 MMHG | WEIGHT: 218 LBS

## 2020-10-15 PROBLEM — O09.893 HIGH RISK TEEN PREGNANCY IN THIRD TRIMESTER: Status: RESOLVED | Noted: 2020-10-07 | Resolved: 2020-10-15

## 2020-10-15 PROCEDURE — 99211 OFF/OP EST MAY X REQ PHY/QHP: CPT | Performed by: STUDENT IN AN ORGANIZED HEALTH CARE EDUCATION/TRAINING PROGRAM

## 2020-10-15 PROCEDURE — 4004F PT TOBACCO SCREEN RCVD TLK: CPT | Performed by: STUDENT IN AN ORGANIZED HEALTH CARE EDUCATION/TRAINING PROGRAM

## 2020-10-15 PROCEDURE — G8484 FLU IMMUNIZE NO ADMIN: HCPCS | Performed by: STUDENT IN AN ORGANIZED HEALTH CARE EDUCATION/TRAINING PROGRAM

## 2020-10-15 PROCEDURE — G8427 DOCREV CUR MEDS BY ELIG CLIN: HCPCS | Performed by: STUDENT IN AN ORGANIZED HEALTH CARE EDUCATION/TRAINING PROGRAM

## 2020-10-15 PROCEDURE — G8417 CALC BMI ABV UP PARAM F/U: HCPCS | Performed by: STUDENT IN AN ORGANIZED HEALTH CARE EDUCATION/TRAINING PROGRAM

## 2020-10-15 PROCEDURE — 1111F DSCHRG MED/CURRENT MED MERGE: CPT | Performed by: STUDENT IN AN ORGANIZED HEALTH CARE EDUCATION/TRAINING PROGRAM

## 2020-10-15 PROCEDURE — 99213 OFFICE O/P EST LOW 20 MIN: CPT | Performed by: STUDENT IN AN ORGANIZED HEALTH CARE EDUCATION/TRAINING PROGRAM

## 2020-10-15 RX ORDER — NIFEDIPINE 60 MG/1
60 TABLET, EXTENDED RELEASE ORAL DAILY
Qty: 30 TABLET | Refills: 1 | Status: SHIPPED | OUTPATIENT
Start: 2020-10-15 | End: 2020-11-11 | Stop reason: SDUPTHER

## 2020-10-15 NOTE — PROGRESS NOTES
Obesity affecting pregnancy in third trimester    Feeling pelvic pressure in pregnancy, antepartum    Hx LSIL    History of anxiety    Hypokalemia    Elevated LFTs    Long QT interval    34 weeks gestation of pregnancy    Pre-existing type 2 diabetes mellitus during pregnancy, antepartum    Hypertension affecting pregnancy in third trimester    Decreased fetal movement, antepartum    Fetal heart rate decelerations affecting management of mother    Encounter for routine screening for malformation using ultrasonics     screening for fetal growth retardation using ultrasonics    35 weeks gestation of pregnancy    RLTCS w/ RRS 10/8/20 M Wt 6#5 Apg        Vitals:   Blood pressure (!) 135/90, pulse 88, height 5' 6\" (1.676 m), weight 218 lb (98.9 kg), unknown if currently breastfeeding. Physical Exam:  General:  no apparent distress, alert and cooperative  Lungs:  No increased work of breathing, good air exchange, clear to auscultation bilaterally, no crackles or wheezing  Heart:  regular rate and rhythm and no murmur    Abdomen: Abdomen soft, non-tender. BS normal. No masses,  No organomegaly  Fundus: non-tender, normal size, firm, below umbilicus  Perineum: not inspected  Incision: clean, dry and intact, prevena dressing removed  Extremities:  no calf tenderness, non edematous. DTRs 2+ bilaterally    Assessment:  Sang Vargas is a 26 y.o. female [id number], 1 week Post Partum s/p repeat  section, low transverse incision RRS on 10/8/20   - Doing well, continue routine post partum care   - EPDS: 1   - Lochia light    - Bottle feeding: baby in NICU   - Continue postop care and restrictions, follow up in 4 weeks    Chronic HTN   - BP 130s/90 today   - Denies s/s of preE   - Increase procardia to 60mg XL.  Rx sent to pharmacy    - PreE precautions discussed   - Follow up with internal medicine on 11/3 and [facility] in 4 weeks     Pre-gestational DM   - Reports BG have been controlled   - Continue metformin BID per IM   - Follow up with IM     Patient Active Problem List    Diagnosis Date Noted    History of pre-eclampsia w/ SF--G7 2018     Priority: High    RLTCS w/ RRS 10/8/20 M Wt 6#5 Apg 8/9 10/08/2020    35 weeks gestation of pregnancy     Pre-existing type 2 diabetes mellitus during pregnancy, antepartum     Hypertension affecting pregnancy in third trimester     Decreased fetal movement, antepartum     Fetal heart rate decelerations affecting management of mother     Encounter for routine screening for malformation using ultrasonics      screening for fetal growth retardation using ultrasonics     34 weeks gestation of pregnancy 10/06/2020    Elevated LFTs 2020 admission: ALT/AST 46/48 >41/45>47/55>42/49, Hepatitis panel WNL       Long QT interval 2020     Echo (): EF 55%, thickened mitral leaflets, mild tricuspid regurg  EKG (): Normal sinus rhythm, QTc 479 (ok)  EKG (): Sinus rhythm w/ PVCs, mod criteria for LVH, may be normal variant, Prolonged QT, QTc 578      Hypokalemia      20: K 2.4>2.9>2.7>3.0>3.4>2.9>3.3>3.2, Mag 1.8>1.8>1.8>1.7 on this admission- pt has received 20 160mEQ PO and 80mEQ IV and 20 80mEQ PO and 40 mEQ IV, TSH 1.81, K/Cr: 0.18 (<1.5wnl, no renal K wasting), Ur Osm 505, Serum Osm 298 (elevated)       Hx LSIL 2020    History of anxiety 2020    Obesity affecting pregnancy in third trimester     Feeling pelvic pressure in pregnancy, antepartum     cHTN (no meds) 2020    Pre-Gestational DM (new dx) 2020:  Failed 2hr GTT  2020- MFM referral placed-SK      Late prenatal care affecting pregnancy in second trimester 2020    Abnormal uterine bleeding 2018    H/O Starvation ketoacidosis     H/O indicated  delivery 2018     Suspected chorioamnionitis      Hx C/S x2 (G4-breech, G7--[facility]) 2017     H/O successful  x1  Desires TOLAC this pregnancy  2020  APPROVED OVARIAN CA RRBS FORM SCANNED INTO MEDIA-SK      H/O successful  2017      BMI 39.0-39.9,adult 2017    Former smoker 2017     Pt denies in current preg      History of THC abuse  2017     Denies in this preg      [address] multiparity 2017     Return in about 4 weeks (around 2020) for postpartum, BP check. Counseling Completed:  · Signs & Symptoms of mastitis and when to notify office discussed.   · Secondary smoke risks including increased risks of respiratory problems, Sudden infant death syndrome, and potential malignancies discussed  · Abstinence, family planning counseling and STD counseling discussed  · Continue with post operative restrictions until 6 weeks post partum  · No heavy lifting or Goulds until 6 weeks post partum    Gutierrez Landry DO  Ob/Gyn Resident  [facility], [facility]  10/15/2020, 1:45 PM

## 2020-11-11 RX ORDER — NIFEDIPINE 60 MG/1
60 TABLET, EXTENDED RELEASE ORAL DAILY
Qty: 30 TABLET | Refills: 1 | Status: SHIPPED | OUTPATIENT
Start: 2020-11-11 | End: 2022-05-17

## 2020-11-11 NOTE — TELEPHONE ENCOUNTER
Left voice message that prescription was sent In with 1 refill and she will need to follow up with PCP for any further refills.

## 2020-11-11 NOTE — TELEPHONE ENCOUNTER
Rx sent however please call patient and inform her she must follow up with [facility]. It appears she missed an appointment with them on 11/3.

## 2020-11-16 ENCOUNTER — POSTPARTUM VISIT (OUTPATIENT)
Dept: OBGYN | Age: 30
End: 2020-11-16
Payer: COMMERCIAL

## 2020-11-16 VITALS
SYSTOLIC BLOOD PRESSURE: 130 MMHG | TEMPERATURE: 97.8 F | DIASTOLIC BLOOD PRESSURE: 93 MMHG | HEART RATE: 64 BPM | WEIGHT: 205 LBS | BODY MASS INDEX: 33.09 KG/M2

## 2020-11-16 PROBLEM — N93.9 ABNORMAL UTERINE BLEEDING: Status: RESOLVED | Noted: 2018-09-19 | Resolved: 2020-11-16

## 2020-11-16 PROBLEM — Z3A.34 34 WEEKS GESTATION OF PREGNANCY: Status: RESOLVED | Noted: 2020-10-06 | Resolved: 2020-11-16

## 2020-11-16 PROCEDURE — G8427 DOCREV CUR MEDS BY ELIG CLIN: HCPCS | Performed by: STUDENT IN AN ORGANIZED HEALTH CARE EDUCATION/TRAINING PROGRAM

## 2020-11-16 PROCEDURE — 4004F PT TOBACCO SCREEN RCVD TLK: CPT | Performed by: STUDENT IN AN ORGANIZED HEALTH CARE EDUCATION/TRAINING PROGRAM

## 2020-11-16 PROCEDURE — G8417 CALC BMI ABV UP PARAM F/U: HCPCS | Performed by: STUDENT IN AN ORGANIZED HEALTH CARE EDUCATION/TRAINING PROGRAM

## 2020-11-16 PROCEDURE — G8484 FLU IMMUNIZE NO ADMIN: HCPCS | Performed by: STUDENT IN AN ORGANIZED HEALTH CARE EDUCATION/TRAINING PROGRAM

## 2020-11-16 NOTE — PROGRESS NOTES
pre-eclampsia w/ SF--G7    Late prenatal care affecting pregnancy in second trimester    cHTN (no meds)    Pre-Gestational DM (new dx)    Hx LSIL    History of anxiety    Hypokalemia    Elevated LFTs    Long QT interval    Pre-existing type 2 diabetes mellitus during pregnancy, antepartum    RLTCS w/ RRS 10/8/20 M Wt 6#5 Apg        Vitals:   Blood pressure (!) 130/93, pulse 64, temperature 97.8 °F (36.6 °C), weight 205 lb (93 kg), unknown if currently breastfeeding. Physical Exam:  General:  no apparent distress, alert and cooperative  Lungs:  No increased work of breathing, good air exchange, clear to auscultation bilaterally, no crackles or wheezing  Heart:  regular rate and rhythm and no murmur    Abdomen: Abdomen soft, non-tender.  BS normal. No masses,  No organomegaly, no RUQ pain   Fundus: non-tender, normal size, firm, below umbilicus  Perineum: not inspected  Incision: clean, dry and intact  Extremities:  no calf tenderness, non edematous, DTR: +2/4 bilaterally     Assessment:  Cirilo Buckner is a 26 y.o. female [id number], 6 weeks Post Partum s/p repeat  section, low transverse incision w/ risk reducing bilateral salpingectomy on 10/8/20   - Doing well, continue routine post partum care   - Lochia light, denies lightheadedness or SOB   - Bottle feeding, denies s/s mastitis    - EPDS: 0, denies s/s postpartum depression   - Incision C/D/I   - Return to office in one year for annual exam     Chronic Hypertension (on medications)   - BP elevated today (/94 and 130/93)   - Patient currently taking Procardia 60 mg XL qd (reports she has not taken her medications today)   - Denies s/s preE including HA, VC, CP, SOB, RUQ pain, N/V or increased peripheral edema   - Reflexes +2/4 bilaterally    - Patient [facility]   - Instructed patient to present to ED immediately if experiencing s/s preE     Pre-gestational DM   - Patient reports her controlled (BS 90-120s)    - Continue metformin 500 mg BID    - Patient reports she has an appointment with PCP REBE[facility]) on 20    Patient Active Problem List    Diagnosis Date Noted    History of pre-eclampsia w/ SF--G7 2018     Priority: High    RLTCS w/ RRS 10/8/20 M Wt 6#5 Apg 8/9 10/08/2020    Pre-existing type 2 diabetes mellitus during pregnancy, antepartum     Elevated LFTs 2020 admission: ALT/AST 46/48 >41/45>47/55>42/49, Hepatitis panel WNL       Long QT interval 2020     Echo (): EF 55%, thickened mitral leaflets, mild tricuspid regurg  EKG (): Normal sinus rhythm, QTc 479 (ok)  EKG (): Sinus rhythm w/ PVCs, mod criteria for LVH, may be normal variant, Prolonged QT, QTc 578      Hypokalemia      20: K 2.4>2.9>2.7>3.0>3.4>2.9>3.3>3.2, Mag 1.8>1.8>1.8>1.7 on this admission- pt has received 20 160mEQ PO and 80mEQ IV and 20 80mEQ PO and 40 mEQ IV, TSH 1.81, K/Cr: 0.18 (<1.5wnl, no renal K wasting), Ur Osm 505, Serum Osm 298 (elevated)       Hx LSIL 2020    History of anxiety 2020    cHTN (no meds) 2020    Pre-Gestational DM (new dx) 2020:  Failed 2hr GTT  2020- MFM referral placed-SK      Late prenatal care affecting pregnancy in second trimester 2020    H/O Starvation ketoacidosis     H/O indicated  delivery 2018     Suspected chorioamnionitis      Hx C/S x2 (G4-breech, G7--NRFHT) 2017     H/O successful  x1  Desires TOLAC this pregnancy  2020  APPROVED OVARIAN CA RRBS FORM SCANNED INTO MEDIA-SK      H/O successful  2017      BMI 39.0-39.9,adult 2017    Former smoker 2017     Pt denies in current preg      History of THC abuse  2017     Denies in this preg      [address] multiparity 2017     Return in about 1 year (around 2021) for Annual Exam.    Counseling Completed:  · Signs & Symptoms of mastitis and when to notify office discussed.   · Secondary smoke risks including increased risks of respiratory problems, Sudden infant death syndrome, and potential malignancies discussed  · Abstinence, family planning counseling and STD counseling discussed  · Continue with post operative restrictions until 6 weeks post partum  · No heavy lifting or [place] until 6 weeks post partum    Clayton Mcclellan DO  Ob/Gyn Resident  [facility], [address]  11/16/2020, 5:30 PM

## 2020-11-17 ENCOUNTER — TELEPHONE (OUTPATIENT)
Dept: INTERNAL MEDICINE | Age: 30
End: 2020-11-17

## 2020-12-07 ENCOUNTER — HOSPITAL ENCOUNTER (OUTPATIENT)
Dept: DIABETES SERVICES | Age: 30
Setting detail: THERAPIES SERIES
Discharge: HOME | End: 2020-12-07
Payer: COMMERCIAL

## 2020-12-07 PROCEDURE — G0108 DIAB MANAGE TRN  PER INDIV: HCPCS

## 2020-12-07 SDOH — ECONOMIC STABILITY: FOOD INSECURITY: ADDITIONAL INFORMATION: NO

## 2020-12-07 ASSESSMENT — PROBLEM AREAS IN DIABETES QUESTIONNAIRE (PAID)
FEELING SCARED WHEN YOU THINK ABOUT LIVING WITH DIABETES: 3
FEELING DEPRESSED WHEN YOU THINK ABOUT LIVING WITH DIABETES: 0

## 2020-12-07 NOTE — PROGRESS NOTES
Diabetes Self- Management Education Program Assessment - PHONE  This visit was done on the telephone  (During OKBMX-55 public health emergency). Pursuant to the emergency declaration under the [facility], [address] [place] [address] authority and the [employer] Act, this visit was conducted with patient's (and/or legal guardian's) consent, to reduce the patient's risk of exposure to COVID-19 and provide necessary medical care. The patient (and/or legal guardian) has also been advised to contact this office for worsening conditions or problems, and seek emergency medical treatment and/or call 911 if deemed necessary. Patient identification was verified at the start of the visit: Yes    Total time spent for this encounter: 45 min  - this encounter was done as one on one virtual /  phone visit as no group sessions being offered for 2 months due to covid - 19 pandemic  12/7/20CB this session by phone x 45 min. Pt forgot and very busy with kids, will try doxy video for Class 1. Did not receive packet yet. Services were provided through a phone discussion to substitute for in-person clinic visit. Patient and provider were located at their individual home/office.       Also see Diabetic Screening  Patient, Wilton Taylor, contacted via phone call encounters for diabetes self-management education assessment on 12/7/20       MEDICAL HISTORY:  Past Medical History:   Diagnosis Date    cHTN (no meds) 8/19/2020    GDMA1 5/15/2018    Headache(784.0)     Pityriasis rosea     Right sciatic nerve pain 4/19/2018    Noted in previous pregnancy, was prescribed belt but it wasn't covered by insurance Progressively worsening symptoms noted -- Referred to PT 4/19/18    THC abuse      THC abuse      Traumatic injury  6/3/2018    Trichomonas 6/2013    treated     Family History   Problem Relation Age of Onset    High Blood Pressure Mother Patient has no known allergies. Immunization History   Administered Date(s) Administered    Influenza Virus Vaccine 10/28/2019    Influenza, Dulcy Cocks, IM, (6 mo and older Fluzone, Flulaval, Fluarix and 3 yrs and older Afluria) 02/05/2018    Influenza, Quadv, IM, PF (6 mo and older Fluzone, Flulaval, Fluarix, and 3 yrs and older Afluria) 12/05/2018    MMR 07/27/2000    Tdap (Boostrix, Adacel) 04/05/2018, 08/23/2020     Current Medications  Current Outpatient Medications   Medication Sig Dispense Refill    NIFEdipine (NIFEDICAL XL) 60 MG extended release tablet Take 1 tablet by mouth daily 30 tablet 1    metFORMIN (GLUCOPHAGE) 500 MG tablet Take 1 tablet by mouth 2 times daily (with meals) 60 tablet 1    ibuprofen (ADVIL;MOTRIN) 600 MG tablet Take 1 tablet by mouth every 6 hours as needed for Pain 30 tablet 0    Alcohol Swabs (ALCOHOL PREP) PADS Please use on finger before check blood sugar 100 each 0    glucose monitoring kit (FREESTYLE) monitoring kit 1 kit by Does not apply route daily 1 kit 0    blood glucose monitor strips Test 4 times a day & as needed for symptoms of irregular blood glucose. Dispense sufficient amount for indicated testing frequency plus additional to accommodate PRN testing needs.  100 strip 0    Docosahexaenoic Acid (PRENATAL DHA) 200 MG CAPS Take 1 capsule by mouth Daily 30 capsule 12    Prenatal Vit-Fe Fumarate-FA (PRENATAL VITAMIN) 27-1 MG TABS tablet Take 1 tablet by mouth daily 30 tablet 11    insulin NPH (HUMULIN N;NOVOLIN N) 100 UNIT/ML injection vial Inject 8 Units into the skin every morning for 8 days (Patient not taking: Reported on 10/15/2020) 1 vial 3    insulin NPH (HUMULIN N;NOVOLIN N) 100 UNIT/ML injection vial Inject 8 Units into the skin nightly for 8 days (Patient not taking: Reported on 10/15/2020) 1 vial 3    Lancets MISC 1 each by Does not apply route 5 times daily for 8 days (Patient not taking: Reported on 10/15/2020) 100 each 1    Needles & Syringes insulin, only Metformin but has been out. Has not been able to f/u with PCP yet due to difficulty having someone care for kids. Pt did not receive packet in mail yet. Strong family Hx with parents and recently lost 14y niece to DM in Oct this year. 20CB   Incorporating nutritional management into lifestyle: Describe effect of type, amount & timing of food on blood glucose; Describe basic meal planning techniques & current nutrition guideline   1 patient very vague on eating, states she cooks for kids but too busy and not hungry ,\" will eat when she feels like it\" and \"whatever she wants to eat\" She is aware of what foods are CHO and avoiding any sugary beverages  Drinks > 64oz water, occ diet Dr. Amaya Du or Coke 0    Asked pt to fill out diet Hx and mail back in once she receives it in her packet.     What to eat - Food groups, When to eat - timing of meals and snacks, and How much to eat - portions control. calories/ day   CHO choices/ meal   CHO choices/  day   grams of protein /day   gram of fat /day     Correctly read food labels & demonstrate CHO counting & portion control with personalized meal plan. Identify dining out strategies, & dietary sick day guidelines. 1       Incorporating physical activity into lifestyle:   Verbalize effect of exercise on blood glucose levels; benefits of regular exercise; safety considerations; contraindications; maintenance of activity. 1    Very active with kids and father of baby not involved. Has 6y, 2y and NB Parents  but has other family support \"but they have lives of their own\"   Using medications safely:  Identify effects of diabetes medicines on blood glucose levels; List diabetes medication taken, action & side effects;    1    Metformin 500 mg 2x/d but currently out of meds with no refills as she needs to f/u with PCP.     Insulin / Injectable - Appropriate injection sites; proper storage; supplies needed; proper technique; safe needle disposal guidelines. 1    Took insulin during pregnancy and Eben Moore worked with her in the hospital but went home just on Metformin. 12/7/20CB   Monitoring blood glucose, interpreting and using results:  Identify recommended & personal blood glucose targets; importance of testing; testing supplies; HgbA1C target levels; Factors affecting blood glucose; Importance of logging blood glucose levels for pattern recognition; ketone testing; safe lancet disposal.   1    Has meter and continues to check BG fasting and 2h pp breakfast, lunch, dinner. BG fasting , 2hpp 125-130. A1C 8.8% on 10/7/20  12/7/20CB   Prevention, detection & treatment of acute complications:  Identify symptoms of hyper & hypoglycemia, and prevention & treatment strategies. 1    Denies at this time   Describe sick day guidelines & indications for  physician notification. Identify short term consequences of poor control. Disaster preparedness strategies    1       Prevention, detection & treatment of chronic complications:  Define the natural course of diabetes & describe the relationship of blood glucose levels to long term complications of diabetes. Identify preventative measures & standards of care. 1    Has not seen eye Dr, or foot Dr but states she has foot problems and back problems. Has not seen dentist in a while. 12/7/20CB   Developing strategies to address psychosocial issues:  Describe feelings about living with diabetes; Describe how stress, depression & anxiety affect blood glucose; Identify coping strategies; Identify support needed & support network available. 1    Patient very defensive at first with\"personal questions\" about family Hx. Reassured patient that we are not here just for education but support as well. Pt more appreciative at end of interview. 12/7/20CB   Developing strategies to promote health/change behavior: Identify 7 self-care behaviors; Personal health risk factors;  Benefits, challenges & strategies for behavioral change; Individualized goal selection. 1 unable to set goals at this time     My goal , to help me improve my health, I will:   1.      2.       Plan  Follow-up Appointments planned for doxy video for 12/16/20 @ 1:30p with Monica Dahl for Class 1     Instruction Method: [x]Lecture/Discussion  []Power Point Presentation  [x]Handouts  []Return Demonstration    Education Materials/Equipment Provided (VIA Mail for phone visits)  :    [x]Self-Management - Initial assessment - Enrolment in to [facility]  Where do I Begin, Living with Type 2 diabetes ADA home support program and  handout on diabetes education classes. 12/7/20CB    []Self-Management  Class 1 -Self-Management  Class 1 - \"Diabetes - your take control guide\" - ADA booklet -  o  \"ready, set, start counting\" teaching sheet in diet section   o  GLP-1 understanding 8 core deficits puzzle sheet in the medication section  o one day food diary and envelope for return of diet HX   o  You tube and website resource sheet-Understanding Type 2 Diabetes from Animated Diabetes Patient https://[email or web address] be/[email or web address]      [] Self-Management  Class 2 - Meal Plan and handout for serving sizes, smarter snacking, Ready Set Carb Counting / Plate Method, Nutrient Conversion and International Diabetes [address] Eating for People with Diabetes and Nutrition in the [employer] - fast facts about fast food    [] Self-Management  Class 3 -  Diabetes your take control guide pages 20 - 30,  type 2 diabetes and the role of GLP- 1,  Individualized Diabetes report card     [] Self-Management Class 4 - BD Booklet  Sick Day Rules and  Dinning Out Guide , recipe hand outs and tips, diabetes Cookbooks  ( when available)     []Self-Management - 3 month follow -  AADE7 Self care behaviors work sheets, 2020 [employer],  Online resource list - March 2020      []Self-Management  Gestational - RN class -Resource materials sent out : care booklet - \" Gestational Diabetes Mellitus ( GDM) toolkit form [place] gestational diabetes postpartum care learning collaborative 2018. \"Simple Guidelines for meal planning with gestational diabetes. SMBG sheets to fax back to MFM weekly. BD  healthy injection site selection and rotation with 6 mm insulin syringe and 4 mm pen needle. Gestational diabetes handout from [facility] 2016. Did you have gestational diabetes when you were pregnant? Handout from [facility]  April 2014    []Self-Management Gestational - RD class - My Food Plan for Gestational diabetes    []Glucose Meter     []Insulin Kit     []Other      Encounter Type Date Start Time End Time Comments No Show Dates   Assessment 12/7/20CB   1:30 2:15   []In Person  [x]Telephone    Class 1 - Understanding diabetes     []TelephoneAmerican Diabetes Association  www. diabetes. org    Class 2- Nutrition and diabetes      []Telephone  Healthy Eating with Diabetes- Automatic Data of Diabetes and Digestive and Kidney   https://youtu. be/[email or web address]    Class 3 - Preventing Complications     []Telephone    Class 4 -  In depth Nutrition and sick day care    []Telephone  Diabetes Food hub  www. diabetesfoodhub.org     Class 5 - 3 month follow up / goal reassessment        Gestational - RN         Gestational - RD        Individual MNT         Shared Med Appt         Yearly Follow-up        Meter Instrx      How to Measure Your Blood Sugar - [facility] Patient Education  https://[email or web address] be/[email or web address]    Insulin Instrx      []Pen  []Vial & Syringe   BD Diabetes Care: How to Inject Insulin with a Pen Needle  https://youtu. be/[email or web address]    Diabetes Care: How to Inject Insulin with a Syringe  https://youtu. be/[email or web address]       DSMS Support :   [] MNT      [] Annual update     [] Starting Fresh  adults living with diabetes or pre diabetes.  [address] [place], [place]    [id number]-3:30pm- on 6 week rotation  Free -  REGISTRATION IS REQUIRED - CALL [phone] call for dates    []  Diabetes Group at  [facility] 79 6 week diabetes education support   classes - contact : [address] [facility] [phone]  for dates and locations      []ADA  Where do I Begin, Living with Type 2 diabetes ADA home support program    Web site: diabetes. org/living    Call: 1800 DIABETES  e-mail: Pierre@Spool. org     []  Internet web sites - ADAWeb site: www.diabetes. org       [] [facility] opens at 8 30 am daily for walkers, shops open at 10 am   [facility], [facility]0 [facility]   [phone] Daily - 8:30am - 10am    3rd Tuesday of every month [facility] expert speakers visit from 9 - 10am        [] [facility], [facility], [facility], [place], [facility] delgado (site rotate)  Call [phone] or visit   website: [email or web address] for more details   Check web site for updated times/ dates       [] [address]  [address] [facility], [address] [address] Tuesday and Thursday -   9 :30 am- 10:30am - ongoing   [] [facility]  [facility], [address] [address] [facility] Thursday 11:00am - 11:45am     [] Third Wednesday Cooking  Class  Free  Registration is required     [facility]. [address] [facility], 125 [facility] [phone] or   Email [email or web address]   Wednesdays-5:00- 6:00 pm       [] Eat Smart  Be Active & Learn How - Free   Families & grandparents with children in home 0- 25 & pregnant moms          Various sites in community - call to find next session near you. [facility] extension office for future sessions - Jania Castillo  Email : [email or web address]  Phone: [phone]     [] (SNAP-Ed)   - free nutrition education   - adults and youth, who are eligible for the Supplemental Nutrition Assistance Program    Various sites in community - call to find next session near you. [facility] SNAP-Ed  contact Prabhakar Thomas, Email:juan [email or web address]           Post Education Referrals:      [] [place] Tobacco Quit information sheet and [address] , 21       [] Dental care - [facility]     [] [facility] ([facility]) link  phone number - for information and referral to [facility]  Clinically  [address] [place], WEIGHT MANAGEMENT        []Other  Barbara Preston RN

## 2020-12-07 NOTE — LETTER
[facility]  [facility]  Phone: [phone]         December 7, 2020    To Shena Puente MD  [address]    From: Yo Al RN    Patient: Melodie Fleischer   YOB: 1990   Date of Visit: 12/7/20     An initial assessment to determine diabetes education needs was completed on 12/7/20. Education plan includes :referred to Diabetes Educator, interpretation of lab results, blood sugar goals, complications of diabetes mellitus, hypoglycemia prevention and treatment, exercise, illness management, self-monitoring of blood glucose skills, nutrition and carbohydrate counting. An ongoing plan was created to include: follow up per doxy video on 12/16/20 @ 1:30p    Thank you for the opportunity to provide Diabetes Self Management Education to your patient.

## 2021-01-11 DIAGNOSIS — O09.93 HIGH-RISK PREGNANCY IN THIRD TRIMESTER: ICD-10-CM

## 2021-01-12 RX ORDER — PRENATAL WITH FERROUS FUM AND FOLIC ACID 3080; 920; 120; 400; 22; 1.84; 3; 20; 10; 1; 12; 200; 27; 25; 2 [IU]/1; [IU]/1; MG/1; [IU]/1; MG/1; MG/1; MG/1; MG/1; MG/1; MG/1; UG/1; MG/1; MG/1; MG/1; MG/1
1 TABLET ORAL DAILY
Qty: 30 TABLET | Refills: 11 | Status: SHIPPED | OUTPATIENT
Start: 2021-01-12 | End: 2021-10-04

## 2021-10-01 NOTE — PROGRESS NOTES
Complications: Maternal fever affecting labor,Acute URI,Fetal tachycardia affecting care of mother, delivered   4 Term 09 37w0d  5 lb 8 oz (2.495 kg) F CS-LTranv Spinal N PRANAY      Birth Comments: Breech, SROM, FOB#2   3 Term 08 40w0d  7 lb 15 oz (3.6 kg) F Vag-Spont EPI N PRANAY      Birth Comments: FOB#3   2 Term 07 40w0d  10 lb (4.536 kg) M Vag-Spont EPI N PRANAY      Birth Comments: FOB#2   1 Term 03 40w0d  6 lb 15 oz (3.147 kg) F Vag-Spont EPI N PRANAY      Birth Comments: FOB#1      Obstetric Comments   New partner in current preg    G5- indicated  delivery d/t suspected chorioamnionitis       PAST MEDICAL HISTORY:      Diagnosis Date    GDMA1 5/15/2018    Headache(784.0)     Pityriasis rosea     Traumatic injury  6/3/2018    Trichomonas 2013    treated       PAST SURGICAL HISTORY:                                                                    Procedure Laterality Date     SECTION      CHOLECYSTECTOMY      [place]  DELIVERY ONLY N/A 2018     SECTION performed by Lola Everett DO at [facility] L&D OR       MEDICATIONS:  Current Outpatient Prescriptions   Medication Sig Dispense Refill    tranexamic acid (LYSTEDA) 650 MG TABS tablet Take 2 tablets by mouth 3 times daily for 10 days 60 tablet 0    Prenatal Vit-Fe Fumarate-FA (PRENATAL VITAMIN) 27-1 MG TABS tablet Take 1 tablet by mouth daily 30 tablet 6    acetaminophen (TYLENOL) 325 MG tablet Take 2 tablets by mouth every 6 hours as needed for Pain 30 tablet 0    medroxyPROGESTERone (DEPO-PROVERA) 150 MG/ML injection Inject 1 mL into the muscle once for 1 dose 1 mL 3    Acetaminophen 500 MG CAPS Take 1,000 mg by mouth every 6 hours as needed for Pain 60 capsule 2    ibuprofen (ADVIL;MOTRIN) 800 MG tablet Take 1 tablet by mouth every 8 hours as needed for Pain or Fever 60 tablet 0    docusate sodium (COLACE, DULCOLAX) 100 MG CAPS Take 100 mg by mouth 2 times daily 60 capsule 0  potassium chloride (KLOR-CON M) 20 MEQ extended release tablet Take 1 tablet by mouth daily 60 tablet 3    ondansetron (ZOFRAN) 4 MG tablet Take 1 tablet by mouth daily as needed for Nausea or Vomiting 30 tablet 0    vitamin B-6 (PYRIDOXINE) 50 MG tablet Take 1 tablet by mouth daily 30 tablet 3     No current facility-administered medications for this visit. ALLERGIES:   Allergies as of 09/19/2018    (No Known Allergies)                                   VITALS:  Vitals:    09/19/18 1002   BP: (!) 153/97   Pulse: 66   Weight: 201 lb (91.2 kg)   Height: 5' 6\" (1.676 m)                                                                                                                                                                     PHYSICAL EXAM:   General Appearance: Appears healthy. Alert; in no acute distress. Pleasant. HEENT: normocephalic and atraumatic, Thyroid normal to inspection and palpation  Respiratory: clear to auscultation, no wheezes, rales or rhonchi, symmetric air entry  Cardiovascular: regular rate and rhythm  Breast:  Deferred to annual exam   Abdomen: soft, non-tender, non-distended, no right upper quadrant tenderness and no CVA tenderness, Pfannensteil abdominal scars  Pelvic Exam: patient declined   Extremities: non-tender BLE and non-edematous  Musculoskeletal: no gross abnormalities  Psych: Alert and oriented, appropriate affect. ASSESSMENT & PLAN:    Agata Munson is a 25 y.o. female [id number] with Heavy Vaginal Bleeding on Depo-provera since delivery 6/14/18   - Multiple options discussed with patient. Patient is not a good candidate for estrogen supplementation secondary to age, HTN, and smoking status. Discussed Mirena IUD with patient. She states she had one in the past and declines. - Will try TXA for 10 days.   - Patient to follow up in 2 weeks. If patient continues to have vaginal bleeding will proceed with TVUS.  Patient states understanding and is agreeable to Smoker 2017     Pt counseled on maternal/fetal risk factors. Pt verbalizes understanding     The patient was counseled on tobacco abuse. Maternal and fetal risks were reviewed. The patient was instructed to stop smoking. Morbidity, mortality, and cessation programs were reviewed. Risks reviewed include but are not limited to  labor,  delivery, premature rupture of membranes, intrauterine growth restriction, intrauterine fetal demise, and abruptio placenta. Secondary smoke risks were reviewed. Increased risks of respiratory problems, asthma,cancer and sudden infant death syndrome were reviewed.  THC abuse  2017     Pt counseled not recommended in pregnancy. Maternal/Fetal risks reviewed. Pt verbalizes understanding.  Pityriasis rosea 2017     Pt prescribed Zyrtec for relief       Grand multiparity 2017       Return in about 2 weeks (around 10/3/2018) for Recheck Dr. Neema Cormier. Patient was seen with total face to face time of 20 minutes. More than 50% of this visit was on counseling and education regarding her diagnose(s) as listed below and options. She was also counseled on her preventative health maintenance recommendations and follow-up. Diagnosis Orders   1. Abnormal uterine bleeding (AUB)     2. Abdominal pain, unspecified abdominal location     3.  Abnormal uterine bleeding         Lincoln Hogan DO  Ob/Gyn   [facility], [address]  2018, 10:37 AM PAST MEDICAL HISTORY:  No pertinent past medical history

## 2021-10-04 DIAGNOSIS — O09.93 HIGH-RISK PREGNANCY IN THIRD TRIMESTER: ICD-10-CM

## 2021-10-04 RX ORDER — METFORMIN HYDROCHLORIDE 500 MG/1
TABLET, EXTENDED RELEASE ORAL
Qty: 30 TABLET | Refills: 12 | Status: SHIPPED | OUTPATIENT
Start: 2021-10-04 | End: 2022-05-17

## 2021-12-13 ENCOUNTER — HOSPITAL ENCOUNTER (EMERGENCY)
Age: 31
Discharge: HOME | End: 2021-12-13
Attending: EMERGENCY MEDICINE
Payer: COMMERCIAL

## 2021-12-13 VITALS
OXYGEN SATURATION: 98 % | HEART RATE: 76 BPM | RESPIRATION RATE: 18 BRPM | TEMPERATURE: 97 F | DIASTOLIC BLOOD PRESSURE: 104 MMHG | SYSTOLIC BLOOD PRESSURE: 157 MMHG

## 2021-12-13 DIAGNOSIS — K08.89 PAIN, DENTAL: Primary | ICD-10-CM

## 2021-12-13 PROCEDURE — 6370000000 HC RX 637 (ALT 250 FOR IP): Performed by: STUDENT IN AN ORGANIZED HEALTH CARE EDUCATION/TRAINING PROGRAM

## 2021-12-13 PROCEDURE — 99285 EMERGENCY DEPT VISIT HI MDM: CPT

## 2021-12-13 RX ORDER — IBUPROFEN 800 MG/1
800 TABLET ORAL 2 TIMES DAILY PRN
Qty: 20 TABLET | Refills: 0 | Status: SHIPPED | OUTPATIENT
Start: 2021-12-13 | End: 2022-05-17 | Stop reason: SINTOL

## 2021-12-13 RX ORDER — PENICILLIN V POTASSIUM 500 MG/1
500 TABLET ORAL 4 TIMES DAILY
Qty: 28 TABLET | Refills: 0 | Status: SHIPPED | OUTPATIENT
Start: 2021-12-13 | End: 2021-12-20

## 2021-12-13 RX ORDER — ACETAMINOPHEN 500 MG
1000 TABLET ORAL ONCE
Status: COMPLETED | OUTPATIENT
Start: 2021-12-13 | End: 2021-12-13

## 2021-12-13 RX ORDER — PENICILLIN V POTASSIUM 250 MG/1
500 TABLET ORAL ONCE
Status: COMPLETED | OUTPATIENT
Start: 2021-12-13 | End: 2021-12-13

## 2021-12-13 RX ORDER — IBUPROFEN 800 MG/1
800 TABLET ORAL ONCE
Status: COMPLETED | OUTPATIENT
Start: 2021-12-13 | End: 2021-12-13

## 2021-12-13 RX ORDER — ACETAMINOPHEN 500 MG
500 TABLET ORAL 4 TIMES DAILY PRN
Qty: 20 TABLET | Refills: 0 | Status: SHIPPED | OUTPATIENT
Start: 2021-12-13

## 2021-12-13 RX ADMIN — IBUPROFEN 800 MG: 800 TABLET, FILM COATED ORAL at 03:02

## 2021-12-13 RX ADMIN — PENICILLIN V POTASSIUM 500 MG: 250 TABLET ORAL at 03:02

## 2021-12-13 RX ADMIN — ACETAMINOPHEN 1000 MG: 500 TABLET ORAL at 03:02

## 2021-12-13 ASSESSMENT — PAIN DESCRIPTION - FREQUENCY: FREQUENCY: CONTINUOUS

## 2021-12-13 ASSESSMENT — PAIN DESCRIPTION - LOCATION: LOCATION: TEETH

## 2021-12-13 ASSESSMENT — PAIN DESCRIPTION - ORIENTATION: ORIENTATION: LEFT

## 2021-12-13 ASSESSMENT — PAIN DESCRIPTION - DESCRIPTORS: DESCRIPTORS: THROBBING;SHARP

## 2021-12-13 ASSESSMENT — PAIN SCALES - GENERAL
PAINLEVEL_OUTOF10: 10
PAINLEVEL_OUTOF10: 9

## 2021-12-13 ASSESSMENT — PAIN DESCRIPTION - PAIN TYPE: TYPE: ACUTE PAIN

## 2021-12-13 NOTE — ED NOTES
Pt has c/o of left upper and lower dental pain. Pt states she has a long history of dental issues and rotting teeth.      Deon More, CONY  50/66/82 [id number]

## 2021-12-13 NOTE — ED NOTES
Pt A&O x 4, does not appear in acute distress, RR even and unlabored, resting comfortably on stretcher with eyes open and call light in reach. Initial assessment performed by physician, Oleg Reynoso will carry out initial orders/tasks and reassess pt.      Js Cheung LPN  36/48/98 [id number]

## 2021-12-13 NOTE — ED PROVIDER NOTES
Health     Financial Resource Strain:     Difficulty of Paying Living Expenses: Not on file   Food Insecurity:     Worried About Running Out of Food in the Last Year: Not on file    En of Food in the Last Year: Not on file   Transportation Needs:     Lack of Transportation (Medical): Not on file    Lack of Transportation (Non-Medical): Not on file   Physical Activity:     Days of Exercise per Week: Not on file    Minutes of Exercise per Session: Not on file   Stress:     Feeling of Stress : Not on file   Social Connections:     Frequency of Communication with Friends and Family: Not on file    Frequency of Social Gatherings with Friends and Family: Not on file    Attends Congregation Services: Not on file    Active Member of [address] [place] [address] [employer] or Organizations: Not on file    Attends Club or Organization Meetings: Not on file    Marital Status: Not on file   Intimate Partner Violence:     Fear of Current or Ex-Partner: Not on file    Emotionally Abused: Not on file    Physically Abused: Not on file    Sexually Abused: Not on file   Housing Stability:     Unable to Pay for Housing in the Last Year: Not on file    Number of [place] in the Last Year: Not on file    Unstable Housing in the Last Year: Not on file       I counseled the patient against using tobacco products. Family History   Problem Relation Age of Onset    High Blood Pressure Mother      No other pertinent FamHx on review with patient/guardian. Allergies:  Patient has no known allergies. Home Medications:  Prior to Admission medications    Medication Sig Start Date End Date Taking?  Authorizing Provider   Prenatal Vit-Fe Fumarate-FA (NIVA-PLUS) 27-1 MG TABS TAKE 1 TABLET DAILY 10/4/21   Inessa Jones MD   NIFEdipine (NIFEDICAL XL) 60 MG extended release tablet Take 1 tablet by mouth daily 88/07/58   Robert Corbin DO   metFORMIN (GLUCOPHAGE) 500 MG tablet Take 1 tablet by mouth 2 times daily (with meals) 10/12/20   Kun Mosqueda Mark Zavala, DO   ibuprofen (ADVIL;MOTRIN) 600 MG tablet Take 1 tablet by mouth every 6 hours as needed for Pain 10/10/20   Koko Washington, DO   insulin NPH (HUMULIN N;NOVOLIN N) 100 UNIT/ML injection vial Inject 8 Units into the skin every morning for 8 days  Patient not taking: Reported on 10/15/2020 10/8/20 10/16/20  Kristin Narayan, DO   insulin NPH (HUMULIN N;NOVOLIN N) 100 UNIT/ML injection vial Inject 8 Units into the skin nightly for 8 days  Patient not taking: Reported on 10/15/2020 10/7/20 10/15/20  Alanis Doan, DO   Lancets MISC 1 each by Does not apply route 5 times daily for 8 days  Patient not taking: Reported on 10/15/2020 10/7/20 10/15/20  Alanis Doan, DO   Alcohol Swabs (ALCOHOL PREP) PADS Please use on finger before check blood sugar 10/7/20   Alanis Doan, DO   Needles & Syringes MISC 1 each by Does not apply route 5 times daily  Patient not taking: Reported on 12/7/2020 10/7/20   Kristin Narayan, DO   glucose monitoring kit (FREESTYLE) monitoring kit 1 kit by Does not apply route daily 10/7/20   Kristin Narayan, DO   blood glucose monitor strips Test 4 times a day & as needed for symptoms of irregular blood glucose. Dispense sufficient amount for indicated testing frequency plus additional to accommodate PRN testing needs.  10/7/20   Kristin Nicholson, DO   aspirin EC 81 MG EC tablet Take 1 tablet by mouth daily 8/25/20 11/17/20  Alanis Doan DO   potassium chloride (KLOR-CON M) 20 MEQ extended release tablet Take 2 tablets by mouth 2 times daily 8/25/20 11/17/20  Kristin Nicholson DO   Docosahexaenoic Acid (PRENATAL DHA) 200 MG CAPS Take 1 capsule by mouth Daily 8/25/20 8/25/21  Alanis Doan, DO   acetaminophen (ACETAMINOPHEN EXTRA STRENGTH) 500 MG tablet TAKE (1) TABLET BY MOUTH EVERY 4 HOURS AS NEEDED FOR PAIN  Patient not taking: Reported on 12/7/2020 12/17/19   Stephanie Begum MD   acetaminophen (TYLENOL) 325 MG tablet Take 2 tablets by mouth every 6 hours as needed for Pain  Patient not taking: Reported on 12/7/2020 9/20/17   Daniella Garcia PA-C       REVIEW OF SYSTEMS    (2-9 systems for level 4, 10 ormore for level 5)      Review of Systems    PHYSICAL EXAM   (up to 7 for level 4, 8 or more for level 5)      INITIAL VITALS:   BP (!) 157/104   Pulse 76   Temp 97 °F (36.1 °C) (Oral)   Resp 18   LMP 12/01/2021   SpO2 98%     Physical Exam    DIFFERENTIAL  DIAGNOSIS     PLAN (LABS / IMAGING / EKG):  No orders of the defined types were placed in this encounter. MEDICATIONS ORDERED:  No orders of the defined types were placed in this encounter. DIAGNOSTIC RESULTS / EMERGENCY DEPARTMENT COURSE / MDM     LABS:  No results found for this visit on 12/13/21. IMPRESSION/MDM/ED COURSE:  33 y.o. female presented with ***    I did not order a pregnancy test because ***           RADIOLOGY:  No orders to display       EKG  None***    All EKG's are interpreted by the Emergency Department Physician who either signs or Co-signs this chart in the absence of a cardiologist.    PROCEDURES:  None***    CONSULTS:  None    FINAL IMPRESSION      No diagnosis found. DISPOSITION / PLAN     DISPOSITION        PATIENT REFERREDTO:  No follow-up provider specified.     DISCHARGE MEDICATIONS:  New Prescriptions    No medications on file       Cindy Kelly DO  PGY 2  Resident Physician Emergency Medicine  12/13/21 1:53 AM    (Please note that portions of this note were completed with a voice recognition program.Efforts were made to edit the dictations but occasionally words are mis-transcribed.)

## 2021-12-15 ASSESSMENT — ENCOUNTER SYMPTOMS
ABDOMINAL PAIN: 0
VOMITING: 0
NAUSEA: 0
FACIAL SWELLING: 0
SHORTNESS OF BREATH: 0
VOICE CHANGE: 0
TROUBLE SWALLOWING: 0

## 2021-12-15 NOTE — ED PROVIDER NOTES
[facility]  ED  Emergency Department Encounter  Emergency Medicine Resident     Pt Name: Jerzy Hensley  SUSANA:[id number]  Birthdate 1990  Date of evaluation: 12/15/21  PCP:  No primary care provider on file. CHIEF COMPLAINT       Chief Complaint   Patient presents with    Dental Pain       HISTORY OF PRESENT ILLNESS  (Location/Symptom, Timing/Onset, Context/Setting, Quality, Duration, ModifyingFactors, Severity.)      Jerzy Hensley is a 33 y.o. female who presents for evaluation of dental pain. Patient reports that her teeth have been rotting since childhood but are getting progressively worse. Patient does have a dentist but they were unable to pull teeth due to uncontrolled hypertension. Complaining of pain primarily on the left side of mouth. Complains of difficulty eating due to pain. No fever, chills, trismus, difficulty swallowing, ear pain, chest pain, shortness of breath. PAST MEDICAL / SURGICAL / SOCIAL /FAMILY HISTORY      has a past medical history of cHTN (no meds), GDMA1, Headache(784.0), Pityriasis rosea, Right sciatic nerve pain, THC abuse , THC abuse , Traumatic injury , and Trichomonas. No other pertinent PMH on review with patient/guardian. has a past surgical history that includes Cholecystectomy ();  section (); pr  delivery only (N/A, 2018); and  section (N/A, 10/8/2020). No other pertinent PSH on review with patient/guardian.   Social History     Socioeconomic History    Marital status: Single     Spouse name: Not on file    Number of children: Not on file    Years of education: Not on file    Highest education level: Not on file   Occupational History    Not on file   Tobacco Use    Smoking status: Current Every Day Smoker     Packs/day: 0.03     Years: 5.00     Pack years: 0.15     Types: Cigars    Smokeless tobacco: Never Used    Tobacco comment: \"black and mild - 3/day\"  8/10/2020   Vaping Use    Vaping Use: Never used   Substance and Sexual Activity    Alcohol use: Not Currently     Comment: occasionally when not preg     Drug use: Not Currently     Types: Marijuana Ayde Coil)     Comment: used end of 11/2017    Sexual activity: Not Currently     Partners: Male     Comment: New Partner    Other Topics Concern    Not on file   Social History Narrative    Not on file     Social Determinants of Health     Financial Resource Strain:     Difficulty of Paying Living Expenses: Not on file   Food Insecurity:     Worried About Running Out of Food in the Last Year: Not on file    En of Food in the Last Year: Not on file   Transportation Needs:     Lack of Transportation (Medical): Not on file    Lack of Transportation (Non-Medical): Not on file   Physical Activity:     Days of Exercise per Week: Not on file    Minutes of Exercise per Session: Not on file   Stress:     Feeling of Stress : Not on file   Social Connections:     Frequency of Communication with Friends and Family: Not on file    Frequency of Social Gatherings with Friends and Family: Not on file    Attends Yarsanism Services: Not on file    Active Member of [address] [place] [address] [employer] or Organizations: Not on file    Attends Club or Organization Meetings: Not on file    Marital Status: Not on file   Intimate Partner Violence:     Fear of Current or Ex-Partner: Not on file    Emotionally Abused: Not on file    Physically Abused: Not on file    Sexually Abused: Not on file   Housing Stability:     Unable to Pay for Housing in the Last Year: Not on file    Number of [place] in the Last Year: Not on file    Unstable Housing in the Last Year: Not on file       I counseled the patient against using tobacco products. Family History   Problem Relation Age of Onset    High Blood Pressure Mother      No other pertinent FamHx on review with patient/guardian. Allergies:  Patient has no known allergies.     Home Medications:  Prior to Admission medications Medication Sig Start Date End Date Taking? Authorizing Provider   penicillin v potassium (VEETID) 500 MG tablet Take 1 tablet by mouth 4 times daily for 7 days 12/13/21 12/20/21 Yes Khadijah Dela Cruz DO   acetaminophen (TYLENOL) 500 MG tablet Take 1 tablet by mouth 4 times daily as needed for Pain 12/13/21  Yes Khadijah Dela Cruz DO   ibuprofen (ADVIL;MOTRIN) 800 MG tablet Take 1 tablet by mouth 2 times daily as needed for Pain 12/13/21  Yes Khadijah Dela Cruz DO   Prenatal Vit-Fe Fumarate-FA (NIVA-PLUS) 27-1 MG TABS TAKE 1 TABLET DAILY 10/4/21   Benjamin Kilpatrick MD   NIFEdipine (NIFEDICAL XL) 60 MG extended release tablet Take 1 tablet by mouth daily 66/92/12   Neva Matias DO   metFORMIN (GLUCOPHAGE) 500 MG tablet Take 1 tablet by mouth 2 times daily (with meals) 10/12/20   Eulalia Jimenez, DO   insulin NPH (HUMULIN N;NOVOLIN N) 100 UNIT/ML injection vial Inject 8 Units into the skin every morning for 8 days  Patient not taking: Reported on 10/15/2020 10/8/20 10/16/20  Kristin Rexine Pack, DO   insulin NPH (HUMULIN N;NOVOLIN N) 100 UNIT/ML injection vial Inject 8 Units into the skin nightly for 8 days  Patient not taking: Reported on 10/15/2020 10/7/20 10/15/20  Fabiola Potter, DO   Lancets MISC 1 each by Does not apply route 5 times daily for 8 days  Patient not taking: Reported on 10/15/2020 10/7/20 10/15/20  Fabiola Potter, DO   Alcohol Swabs (ALCOHOL PREP) PADS Please use on finger before check blood sugar 10/7/20   Fabiola Potter, DO   Needles & Syringes MISC 1 each by Does not apply route 5 times daily  Patient not taking: Reported on 12/7/2020 10/7/20   Kristin Rexine Pack, DO   glucose monitoring kit (FREESTYLE) monitoring kit 1 kit by Does not apply route daily 10/7/20   Kristin Rexine Pack, DO   blood glucose monitor strips Test 4 times a day & as needed for symptoms of irregular blood glucose. Dispense sufficient amount for indicated testing frequency plus additional to accommodate PRN testing needs.  10/7/20   Fabiola Potter, DO aspirin EC 81 MG EC tablet Take 1 tablet by mouth daily 8/25/20 11/17/20  Charlie Almeida, DO   potassium chloride (KLOR-CON M) 20 MEQ extended release tablet Take 2 tablets by mouth 2 times daily 8/25/20 11/17/20  Kristin Nicholson,    Docosahexaenoic Acid (PRENATAL DHA) 200 MG CAPS Take 1 capsule by mouth Daily 8/25/20 8/25/21  Charlie Almeida DO       REVIEW OF SYSTEMS    (2-9 systems for level 4, 10 ormore for level 5)      Review of Systems   Constitutional: Negative for chills and fever. HENT: Positive for dental problem. Negative for facial swelling, mouth sores, trouble swallowing and voice change. Eyes: Negative for visual disturbance. Respiratory: Negative for shortness of breath. Cardiovascular: Negative for chest pain. Gastrointestinal: Negative for abdominal pain, nausea and vomiting. Skin: Negative for rash. Allergic/Immunologic: Negative for immunocompromised state. Neurological: Negative for headaches. Hematological: Does not bruise/bleed easily. PHYSICAL EXAM   (up to 7 for level 4, 8 or more for level 5)      INITIAL VITALS:   BP (!) 157/104   Pulse 76   Temp 97 °F (36.1 °C) (Oral)   Resp 18   LMP 12/01/2021   SpO2 98%     Physical Exam  Constitutional:       General: She is not in acute distress. Appearance: Normal appearance. She is not ill-appearing, toxic-appearing or diaphoretic. HENT:      Head: Normocephalic and atraumatic. Right Ear: External ear normal.      Left Ear: External ear normal.      Mouth/Throat:      Comments: Poor dentition with multiple missing and decaying teeth. Left inferior third molar fractured and tender to palpation. No abscess. No trismus. Floor of mouth is soft. Eyes:      General:         Right eye: No discharge. Left eye: No discharge. Cardiovascular:      Rate and Rhythm: Normal rate. Pulmonary:      Effort: Pulmonary effort is normal. No respiratory distress. Skin:     Coloration: Skin is not jaundiced. Neurological:      General: No focal deficit present. Mental Status: She is alert. DIFFERENTIAL  DIAGNOSIS     PLAN (LABS / IMAGING / EKG):  No orders of the defined types were placed in this encounter. MEDICATIONS ORDERED:  Orders Placed This Encounter   Medications    penicillin v potassium (VEETID) 500 MG tablet     Sig: Take 1 tablet by mouth 4 times daily for 7 days     Dispense:  28 tablet     Refill:  0    acetaminophen (TYLENOL) 500 MG tablet     Sig: Take 1 tablet by mouth 4 times daily as needed for Pain     Dispense:  20 tablet     Refill:  0    ibuprofen (ADVIL;MOTRIN) 800 MG tablet     Sig: Take 1 tablet by mouth 2 times daily as needed for Pain     Dispense:  20 tablet     Refill:  0    acetaminophen (TYLENOL) tablet 1,000 mg    ibuprofen (ADVIL;MOTRIN) tablet 800 mg    penicillin v potassium (VEETID) tablet 500 mg     Order Specific Question:   Antimicrobial Indications     Answer: Other     Order Specific Question:   Other Abx Indication     Answer:   Dental       DIAGNOSTIC RESULTS / EMERGENCY DEPARTMENT COURSE / MDM     LABS:  No results found for this visit on 12/13/21. IMPRESSION/MDM/ED COURSE:  33 y.o. female presented with chronic dental pain worse today. Patient afebrile. Hypertensive on exam but also tearful and appearing comfortable. Vitals otherwise WNL. Patient nontoxic-appearing. Poor dentition with multiple missing and decaying teeth. Left inferior third molar fractured and tender to palpation. No voice changes or difficulty swallowing. No gingival erythema or warmth concerning for periapical abscess. Floor of mouth is soft without concern for Constantine's. Temporary filling was placed over left inferior third molar. Tylenol and/ or Ibuprofen as needed for pain. Patient was given contact information to schedule a dental appointment. Recommend follow up as soon as possible. Discussed signs symptoms that would require reevaluation in the ED.   Patient expressed understanding and agreement with plan. All questions answered. RADIOLOGY:  No orders to display       EKG  None    All EKG's are interpreted by the Emergency Department Physician who either signs or Co-signs this chart in the absence of a cardiologist.    PROCEDURES:  None    CONSULTS:  None    FINAL IMPRESSION      1. Pain, dental          DISPOSITION / PLAN     DISPOSITION Decision To Discharge 12/13/2021 02:54:54 AM      PATIENT REFERREDTO:  No follow-up provider specified.     DISCHARGE MEDICATIONS:  Discharge Medication List as of 12/13/2021  2:59 AM      START taking these medications    Details   penicillin v potassium (VEETID) 500 MG tablet Take 1 tablet by mouth 4 times daily for 7 days, Disp-28 tablet, R-0Print             Yrn Damon DO  PGY 2  Resident Physician Emergency Medicine  12/15/21 4:31 AM    (Please note that portions of this note were completed with a voice recognition program.Efforts were made to edit the dictations but occasionally words are mis-transcribed.)       Karina Grant DO  Resident  12/15/21 [id number]

## 2021-12-21 NOTE — ED PROVIDER NOTES
[facility]   Emergency Department  Faculty Attestation       I performed a history and physical examination of the patient and discussed management with the resident. I reviewed the residents note and agree with the documented findings including all diagnostic interpretations and plan of care. Any areas of disagreement are noted on the chart. I was personally present for the key portions of any procedures. I have documented in the chart those procedures where I was not present during the key portions. I have reviewed the emergency nurses triage note. I agree with the chief complaint, past medical history, past surgical history, allergies, medications, social and family history as documented unless otherwise noted below. Documentation of the HPI, Physical Exam and Medical Decision Making performed by scribhoang is based on my personal performance of the HPI, PE and MDM. For Physician Assistant/ Nurse Practitioner cases/documentation I have personally evaluated this patient and have completed at least one if not all key elements of the E/M (history, physical exam, and MDM). Additional findings are as noted. Pertinent Comments     Primary Care Physician: No primary care provider on file. ED Triage Vitals [12/13/21 0142]   BP Temp Temp Source Pulse Resp SpO2 Height Weight   (!) 157/104 97 °F (36.1 °C) Oral 76 18 98 % -- --        This is a 33 y.o. female who presents to the Emergency Department with complaint of dental pain. On exam she has poor denition with mult[ple cavities. Does also have a broken tooth on the left lower molar. No  Submandibular or sublingual sweling. No elevation of the tongue and controlling secretion. No clinical s/s of Ludwigs. Will put  Temporary covering over left posterior molar. Antibiotics. Dental f/u. [id number] [address] for d/c.        Critical Care: None     Tone Leon MD  Attending Emergency Physician         Tone Leon MD  12/21/21 [id number]

## 2022-03-11 ENCOUNTER — HOSPITAL ENCOUNTER (EMERGENCY)
Age: 32
Discharge: HOME | End: 2022-03-11
Attending: EMERGENCY MEDICINE
Payer: COMMERCIAL

## 2022-03-11 VITALS
TEMPERATURE: 99.3 F | HEIGHT: 66 IN | HEART RATE: 86 BPM | WEIGHT: 205 LBS | OXYGEN SATURATION: 98 % | RESPIRATION RATE: 20 BRPM | SYSTOLIC BLOOD PRESSURE: 149 MMHG | BODY MASS INDEX: 32.95 KG/M2 | DIASTOLIC BLOOD PRESSURE: 108 MMHG

## 2022-03-11 DIAGNOSIS — K02.9 DENTAL DECAY: ICD-10-CM

## 2022-03-11 DIAGNOSIS — K08.89 PAIN, DENTAL: Primary | ICD-10-CM

## 2022-03-11 PROCEDURE — 99284 EMERGENCY DEPT VISIT MOD MDM: CPT

## 2022-03-11 RX ORDER — PENICILLIN V POTASSIUM 500 MG/1
500 TABLET ORAL 4 TIMES DAILY
Qty: 28 TABLET | Refills: 0 | Status: SHIPPED | OUTPATIENT
Start: 2022-03-11 | End: 2022-03-18

## 2022-03-11 ASSESSMENT — ENCOUNTER SYMPTOMS
VOMITING: 0
NAUSEA: 0
SHORTNESS OF BREATH: 0
COUGH: 0
VOICE CHANGE: 0
TROUBLE SWALLOWING: 0

## 2022-03-11 ASSESSMENT — PAIN DESCRIPTION - DESCRIPTORS: DESCRIPTORS: CONSTANT

## 2022-03-11 ASSESSMENT — PAIN DESCRIPTION - ORIENTATION: ORIENTATION: LEFT

## 2022-03-11 ASSESSMENT — PAIN DESCRIPTION - FREQUENCY: FREQUENCY: CONTINUOUS

## 2022-03-11 ASSESSMENT — PAIN DESCRIPTION - PAIN TYPE: TYPE: ACUTE PAIN

## 2022-03-11 ASSESSMENT — PAIN SCALES - GENERAL: PAINLEVEL_OUTOF10: 10

## 2022-03-11 NOTE — ED NOTES
Writer met with patient at bedside to assist with scheduled dental appointment. Patient completed referral process with writer, referral faxed to [address] [place] [address] of [address] [place] [address]. Patient scheduled for Monday 3/14/22 at 1100.      ANDREW Cheng  03/11/22 [id number]

## 2022-03-11 NOTE — ED PROVIDER NOTES
[facility]     Emergency Department     Faculty Attestation    I performed a history and physical examination of the patient and discussed management with the resident. I reviewed the residents note and agree with the documented findings and plan of care. Any areas of disagreement are noted on the chart. I was personally present for the key portions of any procedures. I have documented in the chart those procedures where I was not present during the key portions. I have reviewed the emergency nurses triage note. I agree with the chief complaint, past medical history, past surgical history, allergies, medications, social and family history as documented unless otherwise noted below. For Physician Assistant/ Nurse Practitioner cases/documentation I have personally evaluated this patient and have completed at least one if not all key elements of the E/M (history, physical exam, and MDM). Additional findings are as noted. Primary Care Physician:  No primary care provider on file.     CHIEF COMPLAINT       Chief Complaint   Patient presents with    Dental Pain     Left side of jaw       RECENT VITALS:   Temp: 99.3 °F (37.4 °C),  Pulse: 86, Resp: 20, BP: (!) 149/108    LABS:  Labs Reviewed - No data to display      PERTINENT ATTENDING PHYSICIAN COMMENTS:    Patient is here with a complaint of dental pain she has chronic dental pain she has had to go to the dental clinics on multiple occasions she says she needs extractions were because of her blood pressure and she is adopted able to do any surgery on exam she has multiple caries somewhat teeth around the gumline she is missing multiple teeth seems to have an erosion next to one of the upper molars on the left there is no trismus there is no evidence of abscess we will treat her with antibiotics and give her a dental appointment    [address], MD,  MD, [facility]  Attending Emergency  Physician Khurram Davison MD  03/11/22 2824

## 2022-03-11 NOTE — ED NOTES
Pt presents to the ED with C/O having dental pain. Pt stated the pain is on the left lower jaw. Pt stated that she needs to get this tooth pulled. Pt stated she has a HX of having HTN. Pt stated that she is having trouble swallowing. PT stated she is breathing as normal.   Will continue to monitor and reassess.       Dany King RN  03/11/22 [id number]

## 2022-03-11 NOTE — ED PROVIDER NOTES
[facility]  ED  Emergency Department Encounter  EmergencyMedicine Resident     Pt Jayda Manning  MRN: [id number]  [place] 1990  Date of evaluation: 3/11/22  PCP:  No primary care provider on file. CHIEF COMPLAINT       Chief Complaint   Patient presents with    Dental Pain     Left side of jaw       HISTORY OF PRESENT ILLNESS  (Location/Symptom, Timing/Onset, Context/Setting, Quality, Duration, Modifying Factors, Severity.)      Mela Rico is a 33 y.o. female who presents with chronic dental pain in the top left molar. This is been going on for several months. Patient states that she has noticed some increased swelling and states that she did have a foul taste in her mouth today with bloody purulent material.  Patient has no difficulty swallowing no change in voice. Denies any fever chills. Patient states that she is unable to get into her dentist.  Patient is requesting that we admit her to the hospital for emergency tooth extraction. PAST MEDICAL / SURGICAL / SOCIAL / FAMILY HISTORY      has a past medical history of cHTN (no meds), GDMA1, Headache(784.0), Pityriasis rosea, Right sciatic nerve pain, THC abuse , THC abuse , Traumatic injury , and Trichomonas. has a past surgical history that includes Cholecystectomy ();  section (); pr  delivery only (N/A, 2018); and  section (N/A, 10/8/2020).     Social History     Socioeconomic History    Marital status: Single     Spouse name: Not on file    Number of children: Not on file    Years of education: Not on file    Highest education level: Not on file   Occupational History    Not on file   Tobacco Use    Smoking status: Current Every Day Smoker     Packs/day: 0.03     Years: 5.00     Pack years: 0.15     Types: Cigars    Smokeless tobacco: Never Used    Tobacco comment: \"black and mild - 3/day\"  8/10/2020   Vaping Use    Vaping Use: Never used   Substance and Sexual Activity    Alcohol use: Not Currently     Comment: occasionally when not preg     Drug use: Not Currently     Types: Marijuana Veldon Bunting)     Comment: used end of 11/2017    Sexual activity: Not Currently     Partners: Male     Comment: New Partner    Other Topics Concern    Not on file   Social History Narrative    Not on file     Social Determinants of Health     Financial Resource Strain:     Difficulty of Paying Living Expenses: Not on file   Food Insecurity:     Worried About Running Out of Food in the Last Year: Not on file    En of Food in the Last Year: Not on file   Transportation Needs:     Lack of Transportation (Medical): Not on file    Lack of Transportation (Non-Medical): Not on file   Physical Activity:     Days of Exercise per Week: Not on file    Minutes of Exercise per Session: Not on file   Stress:     Feeling of Stress : Not on file   Social Connections:     Frequency of Communication with Friends and Family: Not on file    Frequency of Social Gatherings with Friends and Family: Not on file    Attends Protestant Services: Not on file    Active Member of [address] [place] [address] or Organizations: Not on file    Attends Club or Organization Meetings: Not on file    Marital Status: Not on file   Intimate Partner Violence:     Fear of Current or Ex-Partner: Not on file    Emotionally Abused: Not on file    Physically Abused: Not on file    Sexually Abused: Not on file   Housing Stability:     Unable to Pay for Housing in the Last Year: Not on file    Number of [place] in the Last Year: Not on file    Unstable Housing in the Last Year: Not on file       Family History   Problem Relation Age of Onset    High Blood Pressure Mother        Allergies:  Patient has no known allergies. Home Medications:  Prior to Admission medications    Medication Sig Start Date End Date Taking?  Authorizing Provider   penicillin v potassium (VEETID) 500 MG tablet Take 1 tablet by mouth 4 times daily for 7 days 3/11/22 3/18/22 Yes Alessia Mckeon DO   acetaminophen (TYLENOL) 500 MG tablet Take 1 tablet by mouth 4 times daily as needed for Pain 12/13/21   Skyler Briseno DO   ibuprofen (ADVIL;MOTRIN) 800 MG tablet Take 1 tablet by mouth 2 times daily as needed for Pain 12/13/21   Skyler Briseno DO   Prenatal Vit-Fe Fumarate-FA (NIVA-PLUS) 27-1 MG TABS TAKE 1 TABLET DAILY 10/4/21   Nany Plascencia MD   NIFEdipine (NIFEDICAL XL) 60 MG extended release tablet Take 1 tablet by mouth daily 27/73/83   Mary Heard DO   metFORMIN (GLUCOPHAGE) 500 MG tablet Take 1 tablet by mouth 2 times daily (with meals) 10/12/20   Renu Nick DO   insulin NPH (HUMULIN N;NOVOLIN N) 100 UNIT/ML injection vial Inject 8 Units into the skin every morning for 8 days  Patient not taking: Reported on 10/15/2020 10/8/20 10/16/20  Kristin Nicholson DO   insulin NPH (HUMULIN N;NOVOLIN N) 100 UNIT/ML injection vial Inject 8 Units into the skin nightly for 8 days  Patient not taking: Reported on 10/15/2020 10/7/20 10/15/20  Michael Arevalo, DO   Lancets MISC 1 each by Does not apply route 5 times daily for 8 days  Patient not taking: Reported on 10/15/2020 10/7/20 10/15/20  Michael Arevalo, DO   Alcohol Swabs (ALCOHOL PREP) PADS Please use on finger before check blood sugar 10/7/20   Michael Arevalo, DO   Needles & Syringes MISC 1 each by Does not apply route 5 times daily  Patient not taking: Reported on 12/7/2020 10/7/20   Kristin Dodson DO   glucose monitoring kit (FREESTYLE) monitoring kit 1 kit by Does not apply route daily 10/7/20   Kristin Dodson DO   blood glucose monitor strips Test 4 times a day & as needed for symptoms of irregular blood glucose. Dispense sufficient amount for indicated testing frequency plus additional to accommodate PRN testing needs.  10/7/20   Kristin Nicholson DO   aspirin EC 81 MG EC tablet Take 1 tablet by mouth daily 8/25/20 11/17/20  Kristin Nicholson DO   potassium chloride (KLOR-CON M) 20 MEQ extended release tablet Take 2 tablets by mouth 2 times daily 8/25/20 11/17/20  Corie Palacio, DO   Docosahexaenoic Acid (PRENATAL DHA) 200 MG CAPS Take 1 capsule by mouth Daily 8/25/20 8/25/21  Corie Palacio DO       REVIEW OF SYSTEMS    (2-9 systems for level 4, 10 or more for level 5)      Review of Systems   Constitutional: Negative for activity change, appetite change, chills and fever. HENT: Positive for dental problem. Negative for trouble swallowing and voice change. Respiratory: Negative for cough and shortness of breath. Gastrointestinal: Negative for nausea and vomiting. Musculoskeletal: Negative for neck pain. Neurological: Negative for headaches. PHYSICAL EXAM   (up to 7 for level 4, 8 or more for level 5)      INITIAL VITALS:   BP (!) 149/108   Pulse 86   Temp 99.3 °F (37.4 °C) (Oral)   Resp 20   Ht 5' 6\" (1.676 m)   Wt 205 lb (93 kg)   SpO2 98%   BMI 33.09 kg/m²     Physical Exam  Vitals reviewed. Constitutional:       General: She is not in acute distress. Appearance: Normal appearance. She is not ill-appearing, toxic-appearing or diaphoretic. HENT:      Head: Normocephalic and atraumatic. Mouth/Throat:      Comments: Poor dental hygiene, there is dental decay noted on several teeth no obvious abscess, no trismus, no swelling of the retropharynx uvula is midline, tongue is not elevated submental area soft no erythema no warmth  Eyes:      Extraocular Movements: Extraocular movements intact. Pupils: Pupils are equal, round, and reactive to light. Cardiovascular:      Rate and Rhythm: Normal rate. Pulmonary:      Comments: Breathing comfortable room air, symmetric chest rise, speaking full sentences, no evidence respiratory stress  Neurological:      Mental Status: She is alert. Gait: Gait normal.   Psychiatric:         Mood and Affect: Mood normal.         Behavior: Behavior normal.         Thought Content:  Thought content normal.         Judgment: Judgment normal. DIFFERENTIAL  DIAGNOSIS     PLAN (LABS / IMAGING / EKG):  No orders of the defined types were placed in this encounter. MEDICATIONS ORDERED:  Orders Placed This Encounter   Medications    penicillin v potassium (VEETID) 500 MG tablet     Sig: Take 1 tablet by mouth 4 times daily for 7 days     Dispense:  28 tablet     Refill:  0       DDX: Dental decay, dental abscess, low suspicion for [place]igs, RPA, PTA    DIAGNOSTIC RESULTS / EMERGENCY DEPARTMENT COURSE / MDM   :  No results found for this visit on 03/11/22. RADIOLOGY:  None    EKG  None    All EKG's are interpreted by the Emergency Department Physician who either signs or Co-signs this chart in the absence of a cardiologist.    EMERGENCY DEPARTMENT COURSE/IMPRESSION: 68-year-old female with poor dental hygiene presenting with dental pain over the last several months, no obvious abscess, noted dental decay no trismus no change in voice, patient requesting admission for dental extraction educate patient that we do not have oral surgery here and there is no sign of abscess. Patient will be given penicillin,  made appointment at [facility] clinic for patient on 3/14/2022. Patient educated to follow-up with dentist primary care provider and ED return precautions were discussed. PROCEDURES:  None    CONSULTS:  None    CRITICAL CARE:  None    FINAL IMPRESSION      1. Pain, dental    2.  Dental decay          DISPOSITION / PLAN     DISPOSITION        PATIENT REFERRED TO:  CARLA [facility]ILLARREAL [facility]  [address] [place] [address], [phone] [id number]  [phone]    As scheduled    [facility]  [address] [place] [address]  [phone]    As needed, If symptoms worsen      DISCHARGE MEDICATIONS:  New Prescriptions    PENICILLIN V POTASSIUM (VEETID) 500 MG TABLET    Take 1 tablet by mouth 4 times daily for 7 days       Jessica Lopez DO  Emergency Medicine Resident    (Please note that portions of thisnote were completed with a voice recognition program.  Efforts were made to edit the dictations but occasionally words are mis-transcribed.)      Hoang Pereira DO  Resident  03/11/22 [facility] DO Derik  Resident  03/11/22 6112

## 2022-05-17 ENCOUNTER — OFFICE VISIT (OUTPATIENT)
Dept: INTERNAL MEDICINE | Age: 32
End: 2022-05-17
Payer: COMMERCIAL

## 2022-05-17 VITALS
BODY MASS INDEX: 36.64 KG/M2 | HEART RATE: 90 BPM | HEIGHT: 66 IN | DIASTOLIC BLOOD PRESSURE: 102 MMHG | OXYGEN SATURATION: 98 % | TEMPERATURE: 98.4 F | SYSTOLIC BLOOD PRESSURE: 150 MMHG | WEIGHT: 228 LBS

## 2022-05-17 DIAGNOSIS — Z86.59 HISTORY OF ANXIETY: ICD-10-CM

## 2022-05-17 DIAGNOSIS — F12.10 MILD TETRAHYDROCANNABINOL (THC) ABUSE: ICD-10-CM

## 2022-05-17 DIAGNOSIS — N93.9 ABNORMAL UTERINE BLEEDING: ICD-10-CM

## 2022-05-17 DIAGNOSIS — Z13.220 SCREENING FOR HYPERLIPIDEMIA: ICD-10-CM

## 2022-05-17 DIAGNOSIS — I10 PRIMARY HYPERTENSION: Primary | ICD-10-CM

## 2022-05-17 DIAGNOSIS — F17.200 SMOKER: ICD-10-CM

## 2022-05-17 DIAGNOSIS — O24.119 PRE-EXISTING TYPE 2 DIABETES MELLITUS DURING PREGNANCY, ANTEPARTUM: ICD-10-CM

## 2022-05-17 DIAGNOSIS — I10 CHRONIC HYPERTENSION: ICD-10-CM

## 2022-05-17 LAB — HBA1C MFR BLD: 5.6 %

## 2022-05-17 PROCEDURE — 83036 HEMOGLOBIN GLYCOSYLATED A1C: CPT | Performed by: STUDENT IN AN ORGANIZED HEALTH CARE EDUCATION/TRAINING PROGRAM

## 2022-05-17 PROCEDURE — 3044F HG A1C LEVEL LT 7.0%: CPT | Performed by: STUDENT IN AN ORGANIZED HEALTH CARE EDUCATION/TRAINING PROGRAM

## 2022-05-17 PROCEDURE — 99203 OFFICE O/P NEW LOW 30 MIN: CPT | Performed by: STUDENT IN AN ORGANIZED HEALTH CARE EDUCATION/TRAINING PROGRAM

## 2022-05-17 PROCEDURE — 99211 OFF/OP EST MAY X REQ PHY/QHP: CPT | Performed by: INTERNAL MEDICINE

## 2022-05-17 RX ORDER — MULTIVIT-MIN/IRON/FOLIC ACID/K 18-600-40
50 CAPSULE ORAL DAILY
COMMUNITY
Start: 2022-03-21

## 2022-05-17 RX ORDER — AMLODIPINE BESYLATE 5 MG/1
5 TABLET ORAL DAILY
Qty: 30 TABLET | Refills: 3 | Status: SHIPPED | OUTPATIENT
Start: 2022-05-17 | End: 2022-09-10

## 2022-05-17 RX ORDER — LISINOPRIL 20 MG/1
20 TABLET ORAL DAILY
COMMUNITY
Start: 2022-05-09

## 2022-05-17 SDOH — ECONOMIC STABILITY: FOOD INSECURITY: WITHIN THE PAST 12 MONTHS, YOU WORRIED THAT YOUR FOOD WOULD RUN OUT BEFORE YOU GOT MONEY TO BUY MORE.: NEVER TRUE

## 2022-05-17 SDOH — ECONOMIC STABILITY: FOOD INSECURITY: WITHIN THE PAST 12 MONTHS, THE FOOD YOU BOUGHT JUST DIDN'T LAST AND YOU DIDN'T HAVE MONEY TO GET MORE.: NEVER TRUE

## 2022-05-17 ASSESSMENT — PATIENT HEALTH QUESTIONNAIRE - PHQ9
10. IF YOU CHECKED OFF ANY PROBLEMS, HOW DIFFICULT HAVE THESE PROBLEMS MADE IT FOR YOU TO DO YOUR WORK, TAKE CARE OF THINGS AT HOME, OR GET ALONG WITH OTHER PEOPLE: 0
9. THOUGHTS THAT YOU WOULD BE BETTER OFF DEAD, OR OF HURTING YOURSELF: 0
2. FEELING DOWN, DEPRESSED OR HOPELESS: 0
SUM OF ALL RESPONSES TO PHQ QUESTIONS 1-9: 6
5. POOR APPETITE OR OVEREATING: 3
4. FEELING TIRED OR HAVING LITTLE ENERGY: 0
1. LITTLE INTEREST OR PLEASURE IN DOING THINGS: 0
SUM OF ALL RESPONSES TO PHQ9 QUESTIONS 1 & 2: 0
3. TROUBLE FALLING OR STAYING ASLEEP: 3
8. MOVING OR SPEAKING SO SLOWLY THAT OTHER PEOPLE COULD HAVE NOTICED. OR THE OPPOSITE, BEING SO FIGETY OR RESTLESS THAT YOU HAVE BEEN MOVING AROUND A LOT MORE THAN USUAL: 0
SUM OF ALL RESPONSES TO PHQ QUESTIONS 1-9: 6
6. FEELING BAD ABOUT YOURSELF - OR THAT YOU ARE A FAILURE OR HAVE LET YOURSELF OR YOUR FAMILY DOWN: 0
7. TROUBLE CONCENTRATING ON THINGS, SUCH AS READING THE NEWSPAPER OR WATCHING TELEVISION: 0

## 2022-05-17 ASSESSMENT — SOCIAL DETERMINANTS OF HEALTH (SDOH): HOW HARD IS IT FOR YOU TO PAY FOR THE VERY BASICS LIKE FOOD, HOUSING, MEDICAL CARE, AND HEATING?: NOT HARD AT ALL

## 2022-05-17 NOTE — PROGRESS NOTES
Attending Physician Statement  I have discussed the care of Ayanna White, including pertinent history and exam findings with the resident. I have reviewed the key elements of all parts of the encounter with the resident. I have seen and examined the patient with the resident and the key elements of all parts of the encounter have been performed by me. Added history includes history of hypertension since gestational HTN. On lisinopril. HCTZ stopped in past because of hypokalemia. Add norvasc. Check routine labs. Advised to stop NSAIDs. DASH diet pamphlet to be given. GYNB for AUB. Will consider wup for secondary causes next visit. I agree with the assessment, and status of the problem list as documented. The plan and orders should include   Orders Placed This Encounter   Procedures    Lipid, Fasting    CBC with Auto Differential    Comprehensive Metabolic Panel    TSH With Reflex Ft4    Protime-INR    Srini Farris MD, OB/GYN, [facility] POCT glycosylated hemoglobin (Hb A1C)    and this was also documented by the resident. I agree with the referral to GYN. The medication list was reviewed with the resident and is up to date. The return visit should be in 2 weeks .     Jarrod Frye MD  Attending Physician,  Department of Internal Medicine  [facility] Internal Medicine  [address] [facility] [facility]      5/17/2022, 9:46 AM

## 2022-05-17 NOTE — PROGRESS NOTES
Internal Medicine Clinic New Patient Note    Date of patient's visit: 5/17/2022  Name:  Chelsea Og  Primary Care Physician: Clement Barry MD    Reason for visit: First Visit, establish care     HISTORY OF PRESENTING ILLNESS:    History was obtained from the patient. Chelsea Og is a 33 y.o. is here to establish care. Her blood pressure is 160/100, heart rate 90, repeat check 150/102 heart rate 90  Patient takes lisinopril 20 mg at home and states that she is compliant with her medication. She was initially on hydrochlorothiazide but was taken off during her hospital stay when she was found to be hypokalemic. Patient has 7 kids and history of bilateral salpingectomy done in 2020 there after. She has history of preeclampsia and diabetes diagnosed during pregnancy. Her HbA1c at that time was 8.8, today her HbA1c is 5.6. Patient was taking metformin at that time and has not been taking for a long time now. She also used to follow with [facility] for her anxiety/depression issues, states that she would like to see them again, requesting help with making a referral.  Today patient's only complaint is heavy menstrual bleeding which has been occurring for a while now, she states that she bleeds for 7 days straight and has to change her pad every time she goes to the restroom. She used to follow-up with of the GYN downstairs has not seen them in a while, requesting follow-up. Denies any constipation/diarrhea/hair loss/excessive fatigue/weight gain    And does smoke black and mild about 5 cigarettes a day, occasionally uses marijuana as well  Denies drinking alcohol or using other illicit drugs. Denies getting any vaccines today    Echo reviewed from 2020 showed EF of 55% with mild MR and TR, thickened mitral valve leaflets, seen by cardiology during that hospital visit.     PAST MEDICAL HISTORY:          Diagnosis Date    cHTN (no meds) 8/19/2020    GDMA1 5/15/2018    LRBFSBVO(116.6)     Pityriasis rosea     Right sciatic nerve pain 2018    Noted in previous pregnancy, was prescribed belt but it wasn't covered by insurance Progressively worsening symptoms noted -- Referred to PT 18    THC abuse      THC abuse      Traumatic injury  6/3/2018    Trichomonas 2013    treated       PAST SURGICAL HISTORY:          Procedure Laterality Date     SECTION       SECTION N/A 10/8/2020     SECTION performed by Connor White DO at [place] [facility]    CHOLECYSTECTOMY     [facility] N/A 2018     SECTION performed by Connor White DO at [place] [facility]       ALLERGIES:    No Known Allergies      HOME MEDICATION:      Current Outpatient Medications on File Prior to Visit   Medication Sig Dispense Refill    Vitamin D, Cholecalciferol, 50 MCG ( UT) CAPS Take 50 mcg by mouth daily      lisinopril (PRINIVIL;ZESTRIL) 20 MG tablet Take 20 mg by mouth daily      acetaminophen (TYLENOL) 500 MG tablet Take 1 tablet by mouth 4 times daily as needed for Pain 20 tablet 0     No current facility-administered medications on file prior to visit. SOCIAL HISTORY:    TOBACCO:   reports that she has been smoking cigars. She has a 0.15 pack-year smoking history. She has never used smokeless tobacco.  ETOH:   reports previous alcohol use. DRUGS:  reports previous drug use. Drug: Marijuana Elibnda Nate).   OCCUPATION:      FAMILY HISTORY:          Problem Relation Age of Onset    High Blood Pressure Mother        REVIEW OF SYSTEMS:    ENT: negative  Respiratory: no cough, shortness of breath, or wheezing  Cardiovascular: no chest pain or dyspnea on exertion  Gastrointestinal: no abdominal pain, black or bloody stools  Neurological: no TIA or stroke symptoms  Endocrine: negative  Genito-Urinary: no dysuria, trouble voiding, or hematuria  Allergy and Immunology: negative  Hematological and Lymphatic: negative  Musculoskeletal: negative  Dermatological: negative    PHYSICAL EXAM:      Vitals:    05/17/22 0856   BP: (!) 150/102   Pulse: 90   Temp:    SpO2:       Physician Exam:  General appearance - alert, well appearing, and in no distress  Mental status - alert, oriented to person, place, and time  Eyes - pupils equal and reactive, extraocular eye movements intact  Mouth - mucous membranes moist, pharynx normal without lesions  Chest - clear to auscultation, no wheezes, rales or rhonchi, symmetric air entry  Heart - normal rate, regular rhythm, normal S1, S2, no murmurs, rubs, clicks or gallops  Abdomen - soft, nontender, nondistended, no masses or organomegaly  Neurological - alert, oriented, normal speech, no focal findings or movement disorder noted  Musculoskeletal - no joint tenderness, deformity or swelling  Extremities - peripheral pulses normal, no pedal edema, no clubbing or cyanosis  Skin - normal coloration and turgor, no rashes, no suspicious skin lesions noted    LABORATORY FINDINGS:    CBC:   Lab Results   Component Value Date    WBC 12.7 10/09/2020    HGB 11.3 10/09/2020     10/09/2020     BMP:    Lab Results   Component Value Date     10/12/2020    K 3.4 10/12/2020     10/12/2020    CO2 24 10/12/2020    BUN 4 10/12/2020    CREATININE 0.55 10/12/2020    GLUCOSE 124 10/12/2020     Hemoglobin A1C:   Lab Results   Component Value Date    LABA1C 5.6 05/17/2022     Lipid profile: No results found for: CHOL, TRIG, HDL, LDL  Thyroid functions:   Lab Results   Component Value Date    TSH 1.81 08/20/2020      Hepatic functions:   Lab Results   Component Value Date    ALT 18 10/10/2020    AST 28 10/10/2020    PROT 5.3 10/10/2020    BILITOT 0.56 10/10/2020    BILIDIR 0.10 06/02/2013    LABALBU 2.8 10/10/2020       Any additional findings:    Assessment and Plan:   1. Primary hypertension  - Comprehensive Metabolic Panel; Future  - amLODIPine (NORVASC) 5 MG tablet;  Take 1 tablet by mouth daily  Dispense: 30 tablet; Refill: 3    2. Screening for hyperlipidemia  - Lipid, Fasting; Future    3. Abnormal uterine bleeding  - CBC with Auto Differential; Future  - TSH With Reflex Ft4; Future  - Trish - Alessia Carson MD, OB/GYN, [address] [place] [address]; Future    4. Pre-existing type 2 diabetes mellitus during pregnancy, antepartum  - POCT glycosylated hemoglobin (Hb A1C)    5. History of anxiety  - [facility]    6. History of THC abuse   -counseled    7. Smoker  -counseled    8. BMI 39.0-39.9,adult  - counseled    9. Chronic hypertension  - BP meds adjusted, DASH diet      Follow-up:   1. Idalia Hassan received counseling on the following healthy behaviors: nutrition, medication adherence and tobacco cessation    If yes, see orders or list here. Discussed use, benefit, and side effects of prescribed medications. Barriers to medication compliance addressed. All patient questions answered. Pt voiced understanding.      Taiwo Urias MD  Internal Medicine Resident, PGY-2  [facility]  3/09/0195,2:18 AM

## 2022-05-17 NOTE — PATIENT INSTRUCTIONS
Patient Education        DASH Diet: Care Instructions  Your Care Instructions     The DASH diet is an eating plan that can help lower your blood pressure. DASH stands for Dietary Approaches to Stop Hypertension. Hypertension is high bloodpressure. The DASH diet focuses on eating foods that are high in calcium, potassium, and magnesium. These nutrients can lower blood pressure. The foods that are highest in these nutrients are fruits, vegetables, low-fat dairy products, nuts, seeds, and legumes. But taking calcium, potassium, and magnesium supplements instead of eating foods that are high in those nutrients does not have the same effect. The DASH diet also includes whole grains, fish, and poultry. The DASH diet is one of several lifestyle changes your doctor may recommend to lower your high blood pressure. Your doctor may also want you to decrease the amount of sodium in your diet. Lowering sodium while following the DASH dietcan lower blood pressure even further than just the DASH diet alone. Follow-up care is a key part of your treatment and safety. Be sure to make and go to all appointments, and call your doctor if you are having problems. It's also a good idea to know your test results and keep alist of the medicines you take. How can you care for yourself at home? Following the DASH diet   Eat 4 to 5 servings of fruit each day. A serving is 1 medium-sized piece of fruit, ½ cup chopped or canned fruit, 1/4 cup dried fruit, or 4 ounces (½ cup) of fruit juice. Choose fruit more often than fruit juice.  Eat 4 to 5 servings of vegetables each day. A serving is 1 cup of lettuce or raw leafy vegetables, ½ cup of chopped or cooked vegetables, or 4 ounces (½ cup) of vegetable juice. Choose vegetables more often than vegetable juice.  Get 2 to 3 servings of low-fat and fat-free dairy each day. A serving is 8 ounces of milk, 1 cup of yogurt, or 1 ½ ounces of cheese.  Eat 6 to 8 servings of grains each day.  A serving is 1 slice of bread, 1 ounce of dry cereal, or ½ cup of cooked rice, pasta, or cooked cereal. Try to choose whole-grain products as much as possible.  Limit lean meat, poultry, and fish to 2 servings each day. A serving is 3 ounces, about the size of a deck of cards.  Eat 4 to 5 servings of nuts, seeds, and legumes (cooked dried beans, lentils, and split peas) each week. A serving is 1/3 cup of nuts, 2 tablespoons of seeds, or ½ cup of cooked beans or peas.  Limit fats and oils to 2 to 3 servings each day. A serving is 1 teaspoon of vegetable oil or 2 tablespoons of salad dressing.  Limit sweets and added sugars to 5 servings or less a week. A serving is 1 tablespoon jelly or jam, ½ cup sorbet, or 1 cup of lemonade.  Eat less than 2,300 milligrams (mg) of sodium a day. If you limit your sodium to 1,500 mg a day, you can lower your blood pressure even more.  Be aware that all of these are the suggested number of servings for people who eat 1,800 to 2,000 calories a day. Your recommended number of servings may be different if you need more or fewer calories. Tips for success   Start small. Do not try to make dramatic changes to your diet all at once. You might feel that you are missing out on your favorite foods and then be more likely to not follow the plan. Make small changes, and stick with them. Once those changes become habit, add a few more changes.  Try some of the following:  ? Make it a goal to eat a fruit or vegetable at every meal and at snacks. This will make it easy to get the recommended amount of fruits and vegetables each day. ? Try yogurt topped with fruit and nuts for a snack or healthy dessert. ? Add lettuce, tomato, cucumber, and onion to sandwiches. ? Combine a ready-made pizza crust with low-fat mozzarella cheese and lots of vegetable toppings. Try using tomatoes, squash, spinach, broccoli, carrots, cauliflower, and onions. ?  Have a variety of cut-up vegetables with a low-fat dip as an appetizer instead of chips and dip. ? Sprinkle sunflower seeds or chopped almonds over salads. Or try adding chopped walnuts or almonds to cooked vegetables. ? Try some vegetarian meals using beans and peas. Add garbanzo or kidney beans to salads. Make burritos and tacos with mashed ware beans or black beans. Where can you learn more? Go to https://[email or web address]. org and sign in to your [employer] account. Enter F300 in the [employer] box to learn more about \"DASH Diet: Care Instructions. \"     If you do not have an account, please click on the \"Sign Up Now\" link. Current as of: January 10, 2022               Content Version: 13.2  © 7396-4688 Healthwise, Incorporated. Care instructions adapted under license by [facility] ([facility]). If you have questions about a medical condition or this instruction, always ask your healthcare professional. [address] any warranty or liability for your use of this information. An After Visit Summary was printed and given to the patient.   ANITA

## 2022-06-28 ENCOUNTER — OFFICE VISIT (OUTPATIENT)
Dept: OBGYN | Age: 32
End: 2022-06-28
Payer: COMMERCIAL

## 2022-06-28 VITALS
SYSTOLIC BLOOD PRESSURE: 139 MMHG | DIASTOLIC BLOOD PRESSURE: 93 MMHG | HEART RATE: 82 BPM | WEIGHT: 224 LBS | HEIGHT: 66 IN | BODY MASS INDEX: 36 KG/M2

## 2022-06-28 DIAGNOSIS — N93.9 ABNORMAL UTERINE BLEEDING (AUB): Primary | ICD-10-CM

## 2022-06-28 PROCEDURE — 99213 OFFICE O/P EST LOW 20 MIN: CPT | Performed by: OBSTETRICS & GYNECOLOGY

## 2022-06-28 PROCEDURE — G8417 CALC BMI ABV UP PARAM F/U: HCPCS | Performed by: OBSTETRICS & GYNECOLOGY

## 2022-06-28 PROCEDURE — G8427 DOCREV CUR MEDS BY ELIG CLIN: HCPCS | Performed by: OBSTETRICS & GYNECOLOGY

## 2022-06-28 PROCEDURE — 4004F PT TOBACCO SCREEN RCVD TLK: CPT | Performed by: OBSTETRICS & GYNECOLOGY

## 2022-06-28 NOTE — PROGRESS NOTES
Asad Mchugh  2022    YOB: 1990          The patient was seen today. She is here regarding AUB . Her bowels are regular and she is voiding without difficulty. HPI:  Asad Mchugh is a 33 y.o. female [id number]     Patient reports that after her last delivery, csection with bilateral salpingectomy, she has been having heavy vaginal bleeding with her periods. Her periods are occurring once a month, but they are lasting 7 days and she is having heavy bleeding with clots. Pt will go through 6 pads in a day. Pt denies any lightheadedness/dizziness. Pt reports prior to her last pregnancy, her periods only lasted 5 days and were much lighter than this. We discussed ordering some labs and an ultrasound to assess for other causes of the bleeding. Pt is also overdue for a pap smear. She would like to return to have this done.        OB History    Para Term  AB Living   8 7 5 2 1 7   SAB IAB Ectopic Molar Multiple Live Births   1 0 0 0 0 7      # Outcome Date GA Lbr [place]/2nd Weight Sex Delivery Anes PTL Lv   8  10/08/20 35w0d  6 lb 5.6 oz (2.88 kg) M CS-LVertical Spinal N PRANAY      Name: Prudencio Guillen: 8  Apgar5: 9   7 Term 18 37w2d  8 lb 10.1 oz (3.915 kg) F CS-LTranv EPI N PRANAY      Complications: Fetal Intolerance      Name: Schuyler Abdi: 8  Apgar5: 9   6 SAB            5  14 36w0d  9 lb 1 oz (4.111 kg) F  EPI N PRANAY      Complications: Maternal fever affecting labor, Acute URI, Fetal tachycardia affecting care of mother, delivered   4 Term 09 37w0d  5 lb 8 oz (2.495 kg) F CS-LTranv Spinal N PRANAY      Birth Comments: Breech, SROM, FOB#2   3 Term 08 40w0d  7 lb 15 oz (3.6 kg) F Vag-Spont EPI N PRANAY      Birth Comments: FOB#3   2 Term 07 40w0d  10 lb (4.536 kg) M Vag-Spont EPI N PRANAY      Birth Comments: FOB#2   1 Term 03 40w0d  6 lb 15 oz (3.147 kg) F Vag-Spont EPI N PRANAY Birth Comments: FOB#1      Obstetric Comments   New partner in current preg    G5- indicated  delivery d/t suspected chorioamnionitis       Past Medical History:   Diagnosis Date    cHTN (no meds) 2020    GDMA1 5/15/2018    Headache(784.0)     Pityriasis rosea     Right sciatic nerve pain 2018    Noted in previous pregnancy, was prescribed belt but it wasn't covered by insurance Progressively worsening symptoms noted -- Referred to PT 18    THC abuse      THC abuse      Traumatic injury  6/3/2018    Trichomonas 2013    treated       Past Surgical History:   Procedure Laterality Date     SECTION       SECTION N/A 10/8/2020     SECTION performed by Le[facility] DO at [facility]    CHOLECYSTECTOMY      CT  DELIVERY ONLY N/A 2018     SECTION performed by Leobardo Helms DO at [facility]       Family History   Problem Relation Age of Onset    High Blood Pressure Mother        Social History     Socioeconomic History    Marital status: Single     Spouse name: Not on file    Number of children: Not on file    Years of education: Not on file    Highest education level: Not on file   Occupational History    Not on file   Tobacco Use    Smoking status: Current Every Day Smoker     Packs/day: 0.03     Years: 5.00     Pack years: 0.15     Types: Cigars    Smokeless tobacco: Never Used    Tobacco comment: \"black and mild - 3/day\"  8/10/2020   Vaping Use    Vaping Use: Never used   Substance and Sexual Activity    Alcohol use: Not Currently     Comment: occasionally when not preg     Drug use: Yes     Types: Marijuana (Weed)     Comment: used end of 2017    Sexual activity: Not Currently     Partners: Male     Comment: New Partner    Other Topics Concern    Not on file   Social History Narrative    Not on file     Social Determinants of Health     Financial Resource Strain: Low Risk     Difficulty of Paying Living Expenses: Not hard at all   Food Insecurity: No Food Insecurity    Worried About Running Out of Food in the Last Year: Never true    Ran Out of Food in the Last Year: Never true   Transportation Needs:     Lack of Transportation (Medical): Not on file    Lack of Transportation (Non-Medical): Not on file   Physical Activity:     Days of Exercise per Week: Not on file    Minutes of Exercise per Session: Not on file   Stress:     Feeling of Stress : Not on file   Social Connections:     Frequency of Communication with Friends and Family: Not on file    Frequency of Social Gatherings with Friends and Family: Not on file    Attends Sikh Services: Not on file    Active Member of [address] [place] [address] [employer] or Organizations: Not on file    Attends Club or Organization Meetings: Not on file    Marital Status: Not on file   Intimate Partner Violence:     Fear of Current or Ex-Partner: Not on file    Emotionally Abused: Not on file    Physically Abused: Not on file    Sexually Abused: Not on file   Housing Stability:     Unable to Pay for Housing in the Last Year: Not on file    Number of [place] in the Last Year: Not on file    Unstable Housing in the Last Year: Not on file         MEDICATIONS:  Current Outpatient Medications   Medication Sig Dispense Refill    amLODIPine (NORVASC) 5 MG tablet Take 1 tablet by mouth daily 30 tablet 3    Vitamin D, Cholecalciferol, 50 MCG (2000 UT) CAPS Take 50 mcg by mouth daily      lisinopril (PRINIVIL;ZESTRIL) 20 MG tablet Take 20 mg by mouth daily (Patient not taking: Reported on 6/28/2022)      acetaminophen (TYLENOL) 500 MG tablet Take 1 tablet by mouth 4 times daily as needed for Pain 20 tablet 0     No current facility-administered medications for this visit. ALLERGIES:  Allergies as of 06/28/2022    (No Known Allergies)         REVIEW OF SYSTEMS:       A minimum of an eleven point review of systems was completed.     Review Of Systems (11 point):  Constitutional: No fever, chills or malaise; No weight change or fatigue  Head and Eyes: No vision, Headache, Dizziness or trauma in last 12 months  ENT ROS: No hearing, Tinnitis, sinus or taste problems  Hematological and Lymphatic ROS:No Lymphoma, Von Willebrand's, Hemophillia or Bleeding History  Psych ROS: No Depression, Homicidal thoughts,suicidal thoughts, or anxiety  Breast ROS: No prior breast abnormalities or lumps  Respiratory ROS: No SOB, Pneumoniae,Cough, or Pulmonary Embolism History  Cardiovascular ROS: No Chest Pain with Exertion, Palpitations, Syncope, Edema, Arrhythmia  Gastrointestinal ROS: No Indigestion, Heartburn, Nausea, vomiting, Diarrhea, Constipation,or Bowel Changes; No Bloody Stools or melena  Genito-Urinary ROS:+AUB. No Dysuria, Hematuria or Nocturia. No Urinary Incontinence or Vaginal Discharge  Musculoskeletal ROS: No Arthralgia, Arthritis,Gout,Osteoporosis or Rheumatism  Neurological ROS: No CVA, Migraines, Epilepsy, Seizure Hx, or Limb Weakness  Dermatological ROS: No Rash, Itching, Hives, Mole Changes or Cancer          Blood pressure (!) 135/91, pulse 86, height 5' 6\" (1.676 m), weight 224 lb (101.6 kg), last menstrual period 06/24/2022, unknown if currently breastfeeding. Abdomen: Soft non-tender; good bowel sounds. No guarding, rebound or rigidity. No CVA tenderness bilaterally. Extremities: No calf tenderness, DTR 2/4, and No edema bilaterally          Assessment:   Diagnosis Orders   1.  Abnormal uterine bleeding (AUB)  US PELVIS COMPLETE    US NON OB TRANSVAGINAL    CBC with Auto Differential    TSH with Reflex    Hemoglobin A1C     Patient Active Problem List    Diagnosis Date Noted    History of pre-eclampsia w/ SF--G7 06/13/2018     Priority: High    RLTCS w/ RRS 10/8/20 M Wt 6#5 Apg 8/9 10/08/2020    Pre-existing type 2 diabetes mellitus during pregnancy, antepartum     Elevated LFTs 08/23/2020 8/18/20 admission: ALT/AST 46/48 >41/45>47/55>42/49, Hepatitis panel WNL       Long QT interval 2020     Echo (): EF 55%, thickened mitral leaflets, mild tricuspid regurg  EKG (): Normal sinus rhythm, QTc 479 (ok)  EKG (): Sinus rhythm w/ PVCs, mod criteria for LVH, may be normal variant, Prolonged QT, QTc 578      Hypokalemia      20: K 2.4>2.9>2.7>3.0>3.4>2.9>3.3>3.2, Mag 1.8>1.8>1.8>1.7 on this admission- pt has received 20 160mEQ PO and 80mEQ IV and 20 80mEQ PO and 40 mEQ IV, TSH 1.81, K/Cr: 0.18 (<1.5wnl, no renal K wasting), Ur Osm 505, Serum Osm 298 (elevated)       Hx LSIL 2020    History of anxiety 2020    cHTN (no meds) 2020    Pre-Gestational DM (new dx) 2020: Failed 2hr GTT  2020- MFM referral placed-SK      Late prenatal care affecting pregnancy in second trimester 2020    H/O Starvation ketoacidosis     H/O indicated  delivery 2018     Suspected chorioamnionitis      Hx C/S x2 (G4-breech, G7--NRFHT) 2017     H/O successful  x1  Desires TOLAC this pregnancy  2020  APPROVED OVARIAN CA RRBS FORM SCANNED INTO MEDIA-SK      H/O successful  2017      BMI 39.0-39.9,adult 2017    Former smoker 2017     Pt denies in current preg      History of THC abuse  2017     Denies in this preg     Brisas [id number] multiparity 2017           PLAN:  Ordered TSH, CBC and HgbA1c and a pelvic ultrasound to assess her bleeding issues. Will have pt return once these are done for an annual exam.      Started discussing possible medical management options for her AUB assuming this workup is unremarkable. Pt to consider options, will discuss further at follow up. Return 4 weeks, for annual exam and pap, and to discus results of labs and ultrasound. Repeat Annual every 1 year  Cervical Cytology Evaluation begins at 24years old. If Negative Cytology, Follow-up screening per current guidelines.

## 2022-09-02 DIAGNOSIS — I10 PRIMARY HYPERTENSION: ICD-10-CM

## 2022-09-02 NOTE — TELEPHONE ENCOUNTER
E-scribing request for amLODIPine . Pt has future appt.        Health Maintenance   Topic Date Due    COVID-19 Vaccine (1) Never done    Pneumococcal 0-64 years Vaccine (1 - PCV) Never done    Diabetic foot exam  Never done    Lipids  Never done    Diabetic microalbuminuria test  Never done    Diabetic retinal exam  Never done    Flu vaccine (1) 09/01/2022    Hepatitis B vaccine (1 of 3 - Risk 3-dose series) 05/17/2023 (Originally 8/3/2007)    A1C test (Diabetic or Prediabetic)  05/17/2023    Depression Screen  05/17/2023    Cervical cancer screen  07/22/2025    DTaP/Tdap/Td vaccine (3 - Td or Tdap) 08/23/2030    Hepatitis C screen  Completed    HIV screen  Completed    Hepatitis A vaccine  Aged Out    Hib vaccine  Aged Out    Meningococcal (ACWY) vaccine  Aged Out    Varicella vaccine  Discontinued             (applicable per patient's age: Cancer Screenings, Depression Screening, Fall Risk Screening, Immunizations)    Hemoglobin A1C (%)   Date Value   05/17/2022 5.6   10/07/2020 8.8 (H)   08/20/2020 6.9 (H)     AST (U/L)   Date Value   10/10/2020 28     ALT (U/L)   Date Value   10/10/2020 18     BUN (mg/dL)   Date Value   10/12/2020 4 (L)      (goal A1C is < 7)   (goal LDL is <100) need 30-50% reduction from baseline     BP Readings from Last 3 Encounters:   06/28/22 (!) 139/93   05/17/22 (!) 150/102   03/11/22 (!) 149/108    (goal /80)      All Future Testing planned in [facility]:  Lab Frequency Next Occurrence   Lipid, Fasting Once 05/17/2022   CBC with Auto Differential Once 05/17/2022   Comprehensive Metabolic Panel Once 79/14/6165   TSH With Reflex Ft4 Once 05/17/2022   Protime-INR Once 05/17/2022   US PELVIS COMPLETE Once 07/12/2022   US NON OB TRANSVAGINAL Once 07/05/2022   TSH with Reflex Once 07/12/2022   Hemoglobin A1C Once 07/12/2022       Next Visit Date:  Future Appointments   Date Time Provider Eloisa Enrique   9/20/2022 10:00 AM SCHEDULE, P [address] [facility] [address] [place] [address] 305 OB[facility]   9/21/2022  1:00 PM Errol Davidson MD [facility]   10/13/2022  2:00 PM Ryan Lopes DO [facility] OB[facility]yn [facility]            Patient Active Problem List:     Hx C/S x2 (G4-breech, G7--NRFHT)     H/O successful      BMI 39.0-39.9,adult     Former smoker     History of THC abuse      Grand multiparity     H/O indicated  delivery     H/O Starvation ketoacidosis     History of pre-eclampsia w/ SF--G7     Late prenatal care affecting pregnancy in second trimester     cHTN (no meds)     Pre-Gestational DM (new dx)     Hx LSIL     History of anxiety     Hypokalemia     Elevated LFTs     Long QT interval     Pre-existing type 2 diabetes mellitus during pregnancy, antepartum     RLTCS w/ RRS 10/8/20 M Wt 6#5 Apg

## 2022-09-10 RX ORDER — AMLODIPINE BESYLATE 5 MG/1
5 TABLET ORAL DAILY
Qty: 30 TABLET | Refills: 3 | Status: SHIPPED | OUTPATIENT
Start: 2022-09-10

## 2022-10-13 ENCOUNTER — TELEPHONE (OUTPATIENT)
Dept: OBGYN | Age: 32
End: 2022-10-13

## 2022-10-13 NOTE — TELEPHONE ENCOUNTER
Patient no showed for 10/13/22 appointment at [address] [address] office. Writer left a voice message for patient to call the office to reschedule Annual and ultrasound appointment.

## 2022-12-28 DIAGNOSIS — I10 PRIMARY HYPERTENSION: ICD-10-CM

## 2022-12-29 RX ORDER — AMLODIPINE BESYLATE 5 MG/1
5 TABLET ORAL DAILY
Qty: 30 TABLET | Refills: 3 | OUTPATIENT
Start: 2022-12-29

## 2023-09-16 ENCOUNTER — HOSPITAL ENCOUNTER (EMERGENCY)
Age: 33
Discharge: HOME | End: 2023-09-16
Attending: EMERGENCY MEDICINE

## 2023-09-16 VITALS
OXYGEN SATURATION: 97 % | WEIGHT: 290 LBS | TEMPERATURE: 97.7 F | BODY MASS INDEX: 46.61 KG/M2 | DIASTOLIC BLOOD PRESSURE: 119 MMHG | SYSTOLIC BLOOD PRESSURE: 163 MMHG | HEIGHT: 66 IN | HEART RATE: 86 BPM | RESPIRATION RATE: 16 BRPM

## 2023-09-16 DIAGNOSIS — K08.89 PAIN, DENTAL: ICD-10-CM

## 2023-09-16 DIAGNOSIS — J06.9 VIRAL URI: Primary | ICD-10-CM

## 2023-09-16 PROCEDURE — 6370000000 HC RX 637 (ALT 250 FOR IP): Performed by: STUDENT IN AN ORGANIZED HEALTH CARE EDUCATION/TRAINING PROGRAM

## 2023-09-16 PROCEDURE — 99283 EMERGENCY DEPT VISIT LOW MDM: CPT

## 2023-09-16 RX ORDER — IBUPROFEN 600 MG/1
600 TABLET ORAL 4 TIMES DAILY PRN
Qty: 28 TABLET | Refills: 0 | Status: SHIPPED | OUTPATIENT
Start: 2023-09-16 | End: 2023-09-23

## 2023-09-16 RX ORDER — ACETAMINOPHEN 500 MG
1000 TABLET ORAL ONCE
Status: COMPLETED | OUTPATIENT
Start: 2023-09-16 | End: 2023-09-16

## 2023-09-16 RX ORDER — PENICILLIN V POTASSIUM 500 MG/1
500 TABLET ORAL 4 TIMES DAILY
Qty: 28 TABLET | Refills: 0 | Status: SHIPPED | OUTPATIENT
Start: 2023-09-16 | End: 2023-09-23

## 2023-09-16 RX ORDER — IBUPROFEN 800 MG/1
800 TABLET ORAL ONCE
Status: COMPLETED | OUTPATIENT
Start: 2023-09-16 | End: 2023-09-16

## 2023-09-16 RX ORDER — ACETAMINOPHEN 500 MG
1000 TABLET ORAL 3 TIMES DAILY
Qty: 42 TABLET | Refills: 0 | Status: SHIPPED | OUTPATIENT
Start: 2023-09-16 | End: 2023-09-23

## 2023-09-16 RX ORDER — PENICILLIN V POTASSIUM 250 MG/1
500 TABLET ORAL ONCE
Status: COMPLETED | OUTPATIENT
Start: 2023-09-16 | End: 2023-09-16

## 2023-09-16 RX ORDER — GUAIFENESIN 600 MG/1
600 TABLET, EXTENDED RELEASE ORAL ONCE
Status: COMPLETED | OUTPATIENT
Start: 2023-09-16 | End: 2023-09-16

## 2023-09-16 RX ORDER — FLUTICASONE PROPIONATE 50 MCG
1 SPRAY, SUSPENSION (ML) NASAL ONCE
Status: COMPLETED | OUTPATIENT
Start: 2023-09-16 | End: 2023-09-16

## 2023-09-16 RX ADMIN — PENICILLIN V POTASSIUM 500 MG: 250 TABLET, FILM COATED ORAL at 22:00

## 2023-09-16 RX ADMIN — ACETAMINOPHEN 1000 MG: 500 TABLET ORAL at 22:00

## 2023-09-16 RX ADMIN — GUAIFENESIN 600 MG: 600 TABLET, EXTENDED RELEASE ORAL at 22:01

## 2023-09-16 RX ADMIN — IBUPROFEN 800 MG: 800 TABLET, FILM COATED ORAL at 22:00

## 2023-09-16 RX ADMIN — FLUTICASONE PROPIONATE 1 SPRAY: 50 SPRAY, METERED NASAL at 22:01

## 2023-09-16 ASSESSMENT — PAIN SCALES - GENERAL
PAINLEVEL_OUTOF10: 10
PAINLEVEL_OUTOF10: 10

## 2023-09-16 ASSESSMENT — ENCOUNTER SYMPTOMS
VOMITING: 0
TROUBLE SWALLOWING: 0
RHINORRHEA: 1
SORE THROAT: 0
SHORTNESS OF BREATH: 0
DIARRHEA: 0
NAUSEA: 0
ABDOMINAL PAIN: 0
COUGH: 0

## 2023-09-16 ASSESSMENT — PAIN - FUNCTIONAL ASSESSMENT: PAIN_FUNCTIONAL_ASSESSMENT: 0-10

## 2023-09-17 NOTE — ED NOTES
Transportation back to address on file arranged using [employer] Insurance to contact pt at [phone] when transportation arrives.   Confirmation # [id number]     EFRAIN Rodriguez  09/16/23 [id number]

## 2023-09-17 NOTE — DISCHARGE INSTRUCTIONS
Not all dental caries requires antibiotics. Some conditions call pulpitis require you to see a dentist to provide follow up care which may require either extraction of the tooth or a root canal.     Take your medication as indicated and prescribed. If you are given an antibiotic, then make sure you get the prescription filled and take the antibiotics until finished. Drink plenty of water while taking the antibiotics. Avoid drinking alcohol or drinks that have caffeine in it while taking antibiotics. For pain use ibuprofen (Motrin / Advil) or acetaminophen (Tylenol), unless prescribed medications that have acetaminophen in it. You can take over the counter acetaminophen tablets (1 - 2 tablets of the 500-mg strength every 6 hours) or ibuprofen tablets (2 tablets every 4 hours). PLEASE RETURN TO THE EMERGENCY DEPARTMENT IMMEDIATELY for worsening symptoms, swelling to your face, redness on your face, drainage from the tooth, or if you develop any concerning symptoms such as: high fever not relieved by acetaminophen (Tylenol) and/or ibuprofen (Motrin / Advil), chills, shortness of breath, chest pain, feeling of your heart fluttering or racing, persistent nausea and/or vomiting, vomiting up blood, blood in your stool, numbness, loss of consciousness, weakness or tingling in the arms or legs or change in color of the extremities, changes in mental status, persistent headache, blurry vision, loss of bladder / bowel control, unable to follow up with your physician, or other any other care or concern. Dental Clinics:    [facility]  [address]  [address] [place] [address]  [phone]  Open 8am-4:30pm  (appt exam & xrays $37.00)    [facility]  (Service for the homeless)  [address] [facility].   [phone]    Dentist who take medicaid:    Dionisio Morales  [address]  [phone] call [phone]-[phone]  For appt.     Cindra Risk  [address]  [phone]

## 2023-09-17 NOTE — ED PROVIDER NOTES
[address] [place] [address] ED  Emergency Department Encounter  Emergency Medicine Resident     Pt Adrienne Navas  MRN: [id number]  [address] [facility] 1990  Date of evaluation: 23  PCP:  Erika Mayorga MD  Note Started: 9:31 PM EDT      CHIEF COMPLAINT       Chief Complaint   Patient presents with    Nasal Congestion    Dental Pain       HISTORY OF PRESENT ILLNESS  (Location/Symptom, Timing/Onset, Context/Setting, Quality, Duration, Modifying Factors, Severity.)      Sissy Rod is a 26 y.o. female who presents with right dental pain as well as viral URI-like symptoms. The past medical history is known for THC abuse, hypertension. Patient states that dental pain for the past few weeks. Patient is also been having viral URI-like symptoms the past 2 days. Does have young kids at home. 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 Complain of myalgias, arthralgias. Denies any fever. Does have sinus pressure as well as a mild headache. Denies any vision changes, chest pain or shortness of breath, numbness or tingling. PAST MEDICAL / SURGICAL / SOCIAL / FAMILY HISTORY      has a past medical history of cHTN (no meds), GDMA1, Headache(784.0), Pityriasis rosea, Right sciatic nerve pain, THC abuse , THC abuse , Traumatic injury , and Trichomonas. has a past surgical history that includes Cholecystectomy ();  section (); pr  delivery only (N/A, 2018); and  section (N/A, 10/8/2020).       Social History     Socioeconomic History    Marital status: Single     Spouse name: Not on file    Number of children: Not on file    Years of education: Not on file    Highest education level: Not on file   Occupational History    Not on file   Tobacco Use    Smoking status: Every Day     Packs/day: 0.03     Years: 5.00     Additional pack years: 0.00     Total pack years: 0.15     Types: Cigars, Cigarettes    Smokeless tobacco: Never    Tobacco comments:     \"black and mild - 3/day\"  8/10/2020

## 2023-12-10 ENCOUNTER — HOSPITAL ENCOUNTER (EMERGENCY)
Age: 33
Discharge: HOME | End: 2023-12-10
Attending: EMERGENCY MEDICINE
Payer: COMMERCIAL

## 2023-12-10 VITALS
SYSTOLIC BLOOD PRESSURE: 194 MMHG | WEIGHT: 239.2 LBS | BODY MASS INDEX: 38.61 KG/M2 | TEMPERATURE: 97.6 F | RESPIRATION RATE: 15 BRPM | HEART RATE: 77 BPM | DIASTOLIC BLOOD PRESSURE: 121 MMHG | OXYGEN SATURATION: 95 %

## 2023-12-10 DIAGNOSIS — I10 PRIMARY HYPERTENSION: ICD-10-CM

## 2023-12-10 DIAGNOSIS — K08.89 PAIN, DENTAL: Primary | ICD-10-CM

## 2023-12-10 PROCEDURE — 96372 THER/PROPH/DIAG INJ SC/IM: CPT

## 2023-12-10 PROCEDURE — 6360000002 HC RX W HCPCS

## 2023-12-10 PROCEDURE — 6370000000 HC RX 637 (ALT 250 FOR IP)

## 2023-12-10 PROCEDURE — 99284 EMERGENCY DEPT VISIT MOD MDM: CPT

## 2023-12-10 RX ORDER — KETOROLAC TROMETHAMINE 10 MG/1
10 TABLET, FILM COATED ORAL EVERY 6 HOURS PRN
Qty: 20 TABLET | Refills: 0 | Status: SHIPPED | OUTPATIENT
Start: 2023-12-10 | End: 2023-12-15

## 2023-12-10 RX ORDER — PENICILLIN V POTASSIUM 250 MG/1
500 TABLET ORAL ONCE
Status: COMPLETED | OUTPATIENT
Start: 2023-12-10 | End: 2023-12-10

## 2023-12-10 RX ORDER — PENICILLIN V POTASSIUM 500 MG/1
500 TABLET ORAL 4 TIMES DAILY
Qty: 28 TABLET | Refills: 0 | Status: SHIPPED | OUTPATIENT
Start: 2023-12-10 | End: 2023-12-17

## 2023-12-10 RX ORDER — ACETAMINOPHEN 500 MG
1000 TABLET ORAL 3 TIMES DAILY
Qty: 42 TABLET | Refills: 0 | Status: SHIPPED | OUTPATIENT
Start: 2023-12-10 | End: 2023-12-17

## 2023-12-10 RX ORDER — KETOROLAC TROMETHAMINE 30 MG/ML
30 INJECTION, SOLUTION INTRAMUSCULAR; INTRAVENOUS ONCE
Status: COMPLETED | OUTPATIENT
Start: 2023-12-10 | End: 2023-12-10

## 2023-12-10 RX ORDER — AMLODIPINE BESYLATE 5 MG/1
5 TABLET ORAL DAILY
Qty: 30 TABLET | Refills: 0 | Status: SHIPPED | OUTPATIENT
Start: 2023-12-10

## 2023-12-10 RX ORDER — ACETAMINOPHEN 500 MG
1000 TABLET ORAL ONCE
Status: COMPLETED | OUTPATIENT
Start: 2023-12-10 | End: 2023-12-10

## 2023-12-10 RX ADMIN — PENICILLIN V POTASSIUM 500 MG: 250 TABLET ORAL at 08:11

## 2023-12-10 RX ADMIN — BENZOCAINE 1 EACH: 220 GEL, DENTIFRICE DENTAL at 08:11

## 2023-12-10 RX ADMIN — ACETAMINOPHEN 1000 MG: 500 TABLET ORAL at 08:11

## 2023-12-10 RX ADMIN — KETOROLAC TROMETHAMINE 30 MG: 30 INJECTION, SOLUTION INTRAMUSCULAR; INTRAVENOUS at 08:09

## 2023-12-10 ASSESSMENT — PAIN - FUNCTIONAL ASSESSMENT: PAIN_FUNCTIONAL_ASSESSMENT: 0-10

## 2023-12-10 ASSESSMENT — PAIN DESCRIPTION - ORIENTATION: ORIENTATION: RIGHT

## 2023-12-10 ASSESSMENT — ENCOUNTER SYMPTOMS
SORE THROAT: 0
VOMITING: 0
NAUSEA: 0
SHORTNESS OF BREATH: 0
TROUBLE SWALLOWING: 0
FACIAL SWELLING: 0
VOICE CHANGE: 0
COUGH: 0

## 2023-12-10 ASSESSMENT — PAIN SCALES - GENERAL
PAINLEVEL_OUTOF10: 10
PAINLEVEL_OUTOF10: 10

## 2023-12-10 ASSESSMENT — PAIN DESCRIPTION - LOCATION: LOCATION: EAR;JAW;TEETH

## 2023-12-10 NOTE — DISCHARGE INSTRUCTIONS
You were seen in the emergency department for acute on chronic dental pain. Your blood pressure was elevated. Other vital signs are stable. No fever noted. We gave you IM Toradol, Tylenol, and a lidocaine for your dental pain. We also gave your first dose of antibiotics. Will discharge you with Tylenol, Toradol, and antibiotics. Please take the antibiotics as prescribed and complete the entire antibiotic course. Be sure to eat something when you take the Toradol as this can upset your stomach and stay hydrated as it can have some effect on your kidneys. I have provided the contact information for [facility] dental residency, please call tomorrow to see if you can schedule an appointment. Continue to call the dental offices that your insurance company provided to you. I have also sent your Norvasc to the pharmacy for your high blood pressure. Please take this as prescribed. Please follow-up with your primary care provider in 1 week given recent ER visit. I have also provided the contact information for [facility]. Please call tomorrow if you would like to schedule an appointment with a new family doctor. Please return to the emergency department if your pain worsens if you develop any fevers, vomiting, worsening swelling to your face, or difficulty breathing.

## 2024-05-10 ENCOUNTER — TELEPHONE (OUTPATIENT)
Dept: OBGYN | Age: 34
End: 2024-05-10

## 2024-09-01 ENCOUNTER — HOSPITAL ENCOUNTER (EMERGENCY)
Age: 34
Discharge: HOME | End: 2024-09-01
Attending: EMERGENCY MEDICINE

## 2024-09-01 VITALS
HEART RATE: 100 BPM | DIASTOLIC BLOOD PRESSURE: 116 MMHG | RESPIRATION RATE: 19 BRPM | OXYGEN SATURATION: 97 % | TEMPERATURE: 97.5 F | SYSTOLIC BLOOD PRESSURE: 168 MMHG

## 2024-09-01 DIAGNOSIS — Z20.2 POSSIBLE EXPOSURE TO STD: ICD-10-CM

## 2024-09-01 DIAGNOSIS — J06.9 VIRAL URI: Primary | ICD-10-CM

## 2024-09-01 LAB
CANDIDA SPECIES: NEGATIVE
FLUAV AG SPEC QL: NEGATIVE
FLUBV AG SPEC QL: NEGATIVE
GARDNERELLA VAGINALIS: POSITIVE
HIV 1+2 AB+HIV1 P24 AG SERPL QL IA: NONREACTIVE
SARS-COV-2 RDRP RESP QL NAA+PROBE: NOT DETECTED
SOURCE: ABNORMAL
SPECIMEN DESCRIPTION: NORMAL
T PALLIDUM AB SER QL IA: NONREACTIVE
TRICHOMONAS: NEGATIVE

## 2024-09-01 PROCEDURE — 87591 N.GONORRHOEAE DNA AMP PROB: CPT

## 2024-09-01 PROCEDURE — 6370000000 HC RX 637 (ALT 250 FOR IP)

## 2024-09-01 PROCEDURE — 86780 TREPONEMA PALLIDUM: CPT

## 2024-09-01 PROCEDURE — 2500000003 HC RX 250 WO HCPCS

## 2024-09-01 PROCEDURE — 87389 HIV-1 AG W/HIV-1&-2 AB AG IA: CPT

## 2024-09-01 PROCEDURE — 99283 EMERGENCY DEPT VISIT LOW MDM: CPT

## 2024-09-01 PROCEDURE — 87660 TRICHOMONAS VAGIN DIR PROBE: CPT

## 2024-09-01 PROCEDURE — 87635 SARS-COV-2 COVID-19 AMP PRB: CPT

## 2024-09-01 PROCEDURE — 87804 INFLUENZA ASSAY W/OPTIC: CPT

## 2024-09-01 PROCEDURE — 87480 CANDIDA DNA DIR PROBE: CPT

## 2024-09-01 PROCEDURE — 87491 CHLMYD TRACH DNA AMP PROBE: CPT

## 2024-09-01 PROCEDURE — 87510 GARDNER VAG DNA DIR PROBE: CPT

## 2024-09-01 RX ORDER — GUAIFENESIN 600 MG/1
600 TABLET, EXTENDED RELEASE ORAL 2 TIMES DAILY
Status: DISCONTINUED | OUTPATIENT
Start: 2024-09-01 | End: 2024-09-01 | Stop reason: HOSPADM

## 2024-09-01 RX ORDER — FLUTICASONE PROPIONATE 50 MCG
1 SPRAY, SUSPENSION (ML) NASAL DAILY
Status: DISCONTINUED | OUTPATIENT
Start: 2024-09-01 | End: 2024-09-01 | Stop reason: HOSPADM

## 2024-09-01 RX ORDER — GUAIFENESIN 600 MG/1
600 TABLET, EXTENDED RELEASE ORAL 2 TIMES DAILY
Qty: 30 TABLET | Refills: 0 | Status: SHIPPED | OUTPATIENT
Start: 2024-09-01 | End: 2024-09-16

## 2024-09-01 RX ORDER — FLUTICASONE PROPIONATE 50 MCG
2 SPRAY, SUSPENSION (ML) NASAL DAILY
Qty: 16 G | Refills: 0 | Status: SHIPPED | OUTPATIENT
Start: 2024-09-01

## 2024-09-01 RX ORDER — OXYMETAZOLINE HYDROCHLORIDE 0.05 G/100ML
2 SPRAY NASAL 2 TIMES DAILY
Qty: 4 ML | Refills: 0 | Status: SHIPPED | OUTPATIENT
Start: 2024-09-01 | End: 2024-09-11

## 2024-09-01 RX ADMIN — GUAIFENESIN 600 MG: 600 TABLET, EXTENDED RELEASE ORAL at 21:29

## 2024-09-01 RX ADMIN — FLUTICASONE PROPIONATE 1 SPRAY: 50 SPRAY, METERED NASAL at 21:29

## 2024-09-01 RX ADMIN — PHENYLEPHRINE HYDROCHLORIDE 1 SPRAY: 0.5 SPRAY NASAL at 21:29

## 2024-09-02 NOTE — ED PROVIDER NOTES
[facility] ED  Emergency Department Encounter  Emergency Medicine Resident     Pt Name:Raven Becerra  MRN: [id number]  Birthdate 1990  Date of evaluation: 9/1/24  PCP:  Keron Perez MD  Note Started: 8:19 PM EDT      CHIEF COMPLAINT       Chief Complaint   Patient presents with    Nasal Congestion    Facial Pain    Cough       HISTORY OF PRESENT ILLNESS  (Location/Symptom, Timing/Onset, Context/Setting, Quality, Duration, Modifying Factors, Severity.)      Raven Becerra is a 34-year-old female who presented with symptoms that began in the middle of the night. She reported waking up around 2-3 AM with a stuffy nose. Upon waking this morning, she experienced facial pain, a slight cough producing mucus, and a runny nose. She applied Vicks and took aspirin earlier, but these measures did not alleviate her symptoms. She also reported feeling gassy and experiencing stomach discomfort.     The patient vomited last night, which she attributed to eating food. She has been experiencing ear pain, describing her ears as waxy and frequently popping. She also noted chest discomfort when taking deep breaths but denied any history of cardiac issues or lung problems.     The patient mentioned a recent exposure to a congested individual while picking up her friend from [employer]. She reported coughing up phlegm with a greenish streak.     Additionally, the patient expressed concern about a potential sexually transmitted infection (STI) due to recent unprotected intercourse with her child's father, who has a history of giving her chlamydia. She denied any current symptoms of vaginal discharge, lower abdominal pain, or malodorous discharge but noted frequent urination and occasional urinary incontinence, which she attributed to post-pregnancy changes.    PAST MEDICAL / SURGICAL / SOCIAL / FAMILY HISTORY      has a past medical history of cHTN (no meds), GDMA1, Headache(784.0), Pityriasis rosea,

## 2024-09-02 NOTE — DISCHARGE INSTRUCTIONS
-You were seen and evaluated by emergency medicine physicians at [facility].    -Please follow-up with your primary care physician and/or with the referrals to specialist.    -You were diagnosed with: Viral URI, possible STD exposure    -You were treated w/ symptomatic control of your viral infection.  Lab work was taken for possible STD exposure.  [facility].  If any of the results come back positive, please seek treatment with your PCP or return to the ED for treatment.    -Please return to the Emergency Department if you are experiencing the following symptoms acutely:  Headache, fever, chills, nausea, vomiting, chest pain, shortness of breath, abdominal pain, change with urination, change with bowel movements, change in your skin/hair/nail, weakness, fatigue, altered mental status and/or any change from baseline health.    -Thank you for coming to [facility].

## 2024-09-02 NOTE — ED PROVIDER NOTES
Note Started: 10:00 PM EDT         [facility]     Emergency Department     Faculty Attestation    I performed a history and physical examination of the patient and discussed management with the resident. I reviewed the resident’s note and agree with the documented findings and plan of care. Any areas of disagreement are noted on the chart. I was personally present for the key portions of any procedures. I have documented in the chart those procedures where I was not present during the key portions. I have reviewed the emergency nurses triage note. I agree with the chief complaint, past medical history, past surgical history, allergies, medications, social and family history as documented unless otherwise noted below.        For Physician Assistant/ Nurse Practitioner cases/documentation I have personally evaluated this patient and have completed at least one if not all key elements of the E/M (history, physical exam, and MDM). Additional findings are as noted.  I have personally seen and evaluated the patient.  I find the patient's history and physical exam are consistent with the NP/PA documentation.  I agree with the care provided, treatment rendered, disposition and follow-up plan.    34-year-old female presenting with possible exposure to STI.  Patient is congested, sweat started in the middle the night.  Recently exposed to someone at her son's pre-k who is ill.  Not asthmatic.  Has tried NyQuil at home without significant improvement    Exam:  General : Laying on the bed, awake, alert, and in no acute distress  CV : normal rate and regular rhythm  Lungs : Breathing comfortably on room air with no tachypnea, hypoxia, or increased work of breathing  HEENT: Maxillary sinus tenderness to palpation    DDx: COVID-19, viral URI    Plan:  STI screening including syphilis and HIV  COVID swab  Symptomatic treatment    Medical Decision Making  Amount and/or Complexity of Data Reviewed  Labs:

## 2024-09-02 NOTE — ED NOTES
Pt presents to the ER via triage ambulatory. Pt c/o cough, nasal congestion, & facial pain. Pt  otherwise A&O x4, in NAD, VSS.

## 2024-09-02 NOTE — ED NOTES
The following labs were labeled with patient stickers & tubed to lab;    []Lavender   []On Ice  []Blue  []Green/ Yellow  []Green/ Black []On Ice  []Pink  []Red  []Yellow    []COVID-19 Swab []Rapid    []Urine Sample  [x]Pelvic Cultures    []Blood Cultures

## 2024-09-03 LAB
C TRACH DNA SPEC QL PROBE+SIG AMP: NEGATIVE
N GONORRHOEA DNA SPEC QL PROBE+SIG AMP: NEGATIVE
SPECIMEN DESCRIPTION: NORMAL

## 2024-09-12 DIAGNOSIS — N76.0 BACTERIAL VAGINOSIS: Primary | ICD-10-CM

## 2024-09-12 DIAGNOSIS — B96.89 BACTERIAL VAGINOSIS: Primary | ICD-10-CM

## 2024-09-12 RX ORDER — METRONIDAZOLE 500 MG/1
500 TABLET ORAL 2 TIMES DAILY
Qty: 14 TABLET | Refills: 0 | Status: SHIPPED | OUTPATIENT
Start: 2024-09-12 | End: 2024-09-19

## 2024-09-30 ENCOUNTER — OFFICE VISIT (OUTPATIENT)
Dept: INTERNAL MEDICINE | Age: 34
End: 2024-09-30
Payer: COMMERCIAL

## 2024-09-30 ENCOUNTER — HOSPITAL ENCOUNTER (OUTPATIENT)
Age: 34
Setting detail: SPECIMEN
Discharge: HOME | End: 2024-09-30
Payer: COMMERCIAL

## 2024-09-30 VITALS
HEIGHT: 66 IN | DIASTOLIC BLOOD PRESSURE: 80 MMHG | HEART RATE: 103 BPM | TEMPERATURE: 97.3 F | OXYGEN SATURATION: 99 % | SYSTOLIC BLOOD PRESSURE: 117 MMHG | WEIGHT: 234 LBS | BODY MASS INDEX: 37.61 KG/M2 | RESPIRATION RATE: 18 BRPM

## 2024-09-30 DIAGNOSIS — Z00.00 HEALTHCARE MAINTENANCE: ICD-10-CM

## 2024-09-30 DIAGNOSIS — R71.8 MICROCYTIC ERYTHROCYTES: ICD-10-CM

## 2024-09-30 DIAGNOSIS — M54.50 CHRONIC LOW BACK PAIN WITHOUT SCIATICA, UNSPECIFIED BACK PAIN LATERALITY: ICD-10-CM

## 2024-09-30 DIAGNOSIS — I10 PRIMARY HYPERTENSION: Primary | ICD-10-CM

## 2024-09-30 DIAGNOSIS — E11.9 TYPE 2 DIABETES MELLITUS WITHOUT COMPLICATION, WITHOUT LONG-TERM CURRENT USE OF INSULIN (HCC): ICD-10-CM

## 2024-09-30 DIAGNOSIS — G89.29 CHRONIC LOW BACK PAIN WITHOUT SCIATICA, UNSPECIFIED BACK PAIN LATERALITY: ICD-10-CM

## 2024-09-30 DIAGNOSIS — R79.89 LOW VITAMIN D LEVEL: ICD-10-CM

## 2024-09-30 DIAGNOSIS — I10 PRIMARY HYPERTENSION: ICD-10-CM

## 2024-09-30 PROBLEM — O24.112 PRE-EXISTING TYPE 2 DIABETES MELLITUS DURING PREGNANCY IN SECOND TRIMESTER: Status: RESOLVED | Noted: 2020-08-19 | Resolved: 2024-09-30

## 2024-09-30 LAB
25(OH)D3 SERPL-MCNC: 15.5 NG/ML (ref 30–100)
ALBUMIN SERPL-MCNC: 4.5 G/DL (ref 3.5–5.2)
ALBUMIN/GLOB SERPL: 1 {RATIO} (ref 1–2.5)
ALP SERPL-CCNC: 89 U/L (ref 35–104)
ALT SERPL-CCNC: 6 U/L (ref 10–35)
ANION GAP SERPL CALCULATED.3IONS-SCNC: 12 MMOL/L (ref 9–16)
AST SERPL-CCNC: 11 U/L (ref 10–35)
BACTERIA URNS QL MICRO: ABNORMAL
BASOPHILS # BLD: 0.04 K/UL (ref 0–0.2)
BASOPHILS NFR BLD: 0 % (ref 0–2)
BILIRUB SERPL-MCNC: 0.3 MG/DL (ref 0–1.2)
BILIRUB UR QL STRIP: NEGATIVE
BUN SERPL-MCNC: 6 MG/DL (ref 6–20)
CALCIUM SERPL-MCNC: 10 MG/DL (ref 8.6–10.4)
CASTS #/AREA URNS LPF: ABNORMAL /LPF (ref 0–8)
CHLORIDE SERPL-SCNC: 102 MMOL/L (ref 98–107)
CLARITY UR: CLEAR
CO2 SERPL-SCNC: 28 MMOL/L (ref 20–31)
COLOR UR: ABNORMAL
CREAT SERPL-MCNC: 0.8 MG/DL (ref 0.5–0.9)
EOSINOPHIL # BLD: 0.48 K/UL (ref 0–0.44)
EOSINOPHILS RELATIVE PERCENT: 4 % (ref 1–4)
EPI CELLS #/AREA URNS HPF: ABNORMAL /HPF (ref 0–5)
ERYTHROCYTE [DISTWIDTH] IN BLOOD BY AUTOMATED COUNT: 16.2 % (ref 11.8–14.4)
EST. AVERAGE GLUCOSE BLD GHB EST-MCNC: 146 MG/DL
GFR, ESTIMATED: >90 ML/MIN/1.73M2
GLUCOSE SERPL-MCNC: 123 MG/DL (ref 74–99)
GLUCOSE UR STRIP-MCNC: NEGATIVE MG/DL
HBA1C MFR BLD: 6.7 % (ref 4–6)
HCT VFR BLD AUTO: 38.7 % (ref 36.3–47.1)
HGB BLD-MCNC: 11.4 G/DL (ref 11.9–15.1)
HGB UR QL STRIP.AUTO: NEGATIVE
IMM GRANULOCYTES # BLD AUTO: 0.04 K/UL (ref 0–0.3)
IMM GRANULOCYTES NFR BLD: 0 %
KETONES UR STRIP-MCNC: ABNORMAL MG/DL
LEUKOCYTE ESTERASE UR QL STRIP: ABNORMAL
LYMPHOCYTES NFR BLD: 2.42 K/UL (ref 1.1–3.7)
LYMPHOCYTES RELATIVE PERCENT: 21 % (ref 24–43)
MCH RBC QN AUTO: 24.2 PG (ref 25.2–33.5)
MCHC RBC AUTO-ENTMCNC: 29.5 G/DL (ref 28.4–34.8)
MCV RBC AUTO: 82.2 FL (ref 82.6–102.9)
MONOCYTES NFR BLD: 1.03 K/UL (ref 0.1–1.2)
MONOCYTES NFR BLD: 9 % (ref 3–12)
NEUTROPHILS NFR BLD: 66 % (ref 36–65)
NEUTS SEG NFR BLD: 7.54 K/UL (ref 1.5–8.1)
NITRITE UR QL STRIP: NEGATIVE
NRBC BLD-RTO: 0 PER 100 WBC
PH UR STRIP: 6.5 [PH] (ref 5–8)
PLATELET # BLD AUTO: 326 K/UL (ref 138–453)
PMV BLD AUTO: 10.9 FL (ref 8.1–13.5)
POTASSIUM SERPL-SCNC: 3.6 MMOL/L (ref 3.7–5.3)
PROT SERPL-MCNC: 7.8 G/DL (ref 6.6–8.7)
PROT UR STRIP-MCNC: ABNORMAL MG/DL
RBC # BLD AUTO: 4.71 M/UL (ref 3.95–5.11)
RBC # BLD: ABNORMAL 10*6/UL
RBC #/AREA URNS HPF: ABNORMAL /HPF (ref 0–4)
SODIUM SERPL-SCNC: 142 MMOL/L (ref 136–145)
SP GR UR STRIP: 1.02 (ref 1–1.03)
TSH SERPL DL<=0.05 MIU/L-ACNC: 0.53 UIU/ML (ref 0.27–4.2)
UROBILINOGEN UR STRIP-ACNC: NORMAL EU/DL (ref 0–1)
WBC #/AREA URNS HPF: ABNORMAL /HPF (ref 0–5)
WBC OTHER # BLD: 11.6 K/UL (ref 3.5–11.3)

## 2024-09-30 PROCEDURE — 81001 URINALYSIS AUTO W/SCOPE: CPT

## 2024-09-30 PROCEDURE — G8417 CALC BMI ABV UP PARAM F/U: HCPCS

## 2024-09-30 PROCEDURE — 3074F SYST BP LT 130 MM HG: CPT

## 2024-09-30 PROCEDURE — 84443 ASSAY THYROID STIM HORMONE: CPT

## 2024-09-30 PROCEDURE — 85025 COMPLETE CBC W/AUTO DIFF WBC: CPT

## 2024-09-30 PROCEDURE — 80053 COMPREHEN METABOLIC PANEL: CPT

## 2024-09-30 PROCEDURE — 36415 COLL VENOUS BLD VENIPUNCTURE: CPT

## 2024-09-30 PROCEDURE — 3079F DIAST BP 80-89 MM HG: CPT

## 2024-09-30 PROCEDURE — G8427 DOCREV CUR MEDS BY ELIG CLIN: HCPCS

## 2024-09-30 PROCEDURE — 99214 OFFICE O/P EST MOD 30 MIN: CPT

## 2024-09-30 PROCEDURE — 4004F PT TOBACCO SCREEN RCVD TLK: CPT

## 2024-09-30 PROCEDURE — 83036 HEMOGLOBIN GLYCOSYLATED A1C: CPT

## 2024-09-30 PROCEDURE — 82306 VITAMIN D 25 HYDROXY: CPT

## 2024-09-30 RX ORDER — ACETAMINOPHEN 500 MG
1000 TABLET ORAL 3 TIMES DAILY
Qty: 42 TABLET | Refills: 0 | Status: SHIPPED | OUTPATIENT
Start: 2024-09-30 | End: 2024-09-30

## 2024-09-30 RX ORDER — ACETAMINOPHEN 500 MG
500 TABLET ORAL EVERY 6 HOURS PRN
Qty: 30 TABLET | Refills: 1 | Status: SHIPPED | OUTPATIENT
Start: 2024-09-30 | End: 2024-10-03 | Stop reason: SDUPTHER

## 2024-09-30 SDOH — ECONOMIC STABILITY: FOOD INSECURITY: WITHIN THE PAST 12 MONTHS, YOU WORRIED THAT YOUR FOOD WOULD RUN OUT BEFORE YOU GOT MONEY TO BUY MORE.: NEVER TRUE

## 2024-09-30 SDOH — ECONOMIC STABILITY: FOOD INSECURITY: WITHIN THE PAST 12 MONTHS, THE FOOD YOU BOUGHT JUST DIDN'T LAST AND YOU DIDN'T HAVE MONEY TO GET MORE.: NEVER TRUE

## 2024-09-30 SDOH — ECONOMIC STABILITY: INCOME INSECURITY: HOW HARD IS IT FOR YOU TO PAY FOR THE VERY BASICS LIKE FOOD, HOUSING, MEDICAL CARE, AND HEATING?: NOT VERY HARD

## 2024-09-30 ASSESSMENT — ENCOUNTER SYMPTOMS
SHORTNESS OF BREATH: 0
RHINORRHEA: 0
ABDOMINAL PAIN: 0
NAUSEA: 0
WHEEZING: 0
COUGH: 0
SORE THROAT: 0
VOICE CHANGE: 0
EYE ITCHING: 0
BACK PAIN: 0
EYE DISCHARGE: 0
VOMITING: 0

## 2024-09-30 ASSESSMENT — PATIENT HEALTH QUESTIONNAIRE - PHQ9
5. POOR APPETITE OR OVEREATING: SEVERAL DAYS
6. FEELING BAD ABOUT YOURSELF - OR THAT YOU ARE A FAILURE OR HAVE LET YOURSELF OR YOUR FAMILY DOWN: NOT AT ALL
4. FEELING TIRED OR HAVING LITTLE ENERGY: NOT AT ALL
2. FEELING DOWN, DEPRESSED OR HOPELESS: NOT AT ALL
SUM OF ALL RESPONSES TO PHQ QUESTIONS 1-9: 3
SUM OF ALL RESPONSES TO PHQ QUESTIONS 1-9: 3
10. IF YOU CHECKED OFF ANY PROBLEMS, HOW DIFFICULT HAVE THESE PROBLEMS MADE IT FOR YOU TO DO YOUR WORK, TAKE CARE OF THINGS AT HOME, OR GET ALONG WITH OTHER PEOPLE: NOT DIFFICULT AT ALL
8. MOVING OR SPEAKING SO SLOWLY THAT OTHER PEOPLE COULD HAVE NOTICED. OR THE OPPOSITE, BEING SO FIGETY OR RESTLESS THAT YOU HAVE BEEN MOVING AROUND A LOT MORE THAN USUAL: NOT AT ALL
1. LITTLE INTEREST OR PLEASURE IN DOING THINGS: NOT AT ALL
7. TROUBLE CONCENTRATING ON THINGS, SUCH AS READING THE NEWSPAPER OR WATCHING TELEVISION: NOT AT ALL
SUM OF ALL RESPONSES TO PHQ QUESTIONS 1-9: 3
3. TROUBLE FALLING OR STAYING ASLEEP: MORE THAN HALF THE DAYS
SUM OF ALL RESPONSES TO PHQ9 QUESTIONS 1 & 2: 0
9. THOUGHTS THAT YOU WOULD BE BETTER OFF DEAD, OR OF HURTING YOURSELF: NOT AT ALL
SUM OF ALL RESPONSES TO PHQ QUESTIONS 1-9: 3

## 2024-09-30 NOTE — PROGRESS NOTES
Attending Physician Statement  I have discussed the care of Raven Becerra, including pertinent history and exam findings with the resident. I have reviewed the key elements of all parts of the encounter with the resident. I agree with the assessment, and status of the problem list as documented. The plan and orders should include   Orders Placed This Encounter   Procedures    CBC with Auto Differential    Comprehensive Metabolic Panel    Lipid Panel    Hemoglobin A1C [LAB90}    Vitamin D 25 Hydroxy    and this was also documented by the resident.  The medication list was reviewed with the resident and is up to date. The return visit should be in 4 weeks .      Patient is here for routine follow-up.  Patient has not seen a physician in 2 years.  She was recently in the emergency department for unrelated cause where he was found to have elevated blood pressure readings.  Today her blood pressure seems to be well-controlled.  She is not taking any medications at home.  We have encouraged her to get her ambulatory BP machine and check her blood pressures twice daily and log them.  Routine blood work has been ordered.  Prescriptions have been sent.  Close follow-up.      Alfredo Davis MD  Attending Physician,  Department of Internal Medicine  [facility] Internal Medicine  [facility]      9/30/2024, 11:34 AM

## 2024-09-30 NOTE — PROGRESS NOTES
[facility]  [facility]  [address] [place] [address]  Dept: [phone]  Dept Fax: [phone]    Office Progress/Follow Up Note  Date ofpatient's visit: 9/30/2024  Patient's Name:  Raven Becerra YOB: 1990            Patient Care Team:  Rukhsana Farris MD as PCP - General (Internal Medicine)  Mike Resendiz MD as Obstetrician (Perinatology)  ================================================================    REASON FOR VISIT/CHIEF COMPLAINT:  Established New Doctor (Patient provide new doctor. Patient states that she has been having some Sciatica pain without Radiculopathy. Patient states that sometimes it causes her pain to her Left Hip as well.)    HISTORY OF PRESENTING ILLNESS:  History was obtained from: patient. Raven Coreas a 34 y.o. is here for a new to provider appointment.  Past medical history significant for    Hypertension: Patient has history of hypertension previously on Norvasc, lisinopril.  Her blood pressure today is 117/80.  Reports that this is the first time she is seeing her blood pressure this low.  Does complain of some episodic headache but denies any blurry vision, chest pain abdominal pain.  Has not been taking any of her medications for a long time.  We will give her blood pressure cuff and patient was asked to monitor her blood pressures at home.  Will also give her blood pressure log to have a better idea of her blood pressures.  We will resume her blood pressure medications as indicated.  Will obtain blood work including CBC, BMP, TSH, UA.    Hx of Gestational diabetes mellitus: Patient has previous history of gestational diabetes mellitus.  Her A1c in 2020 was 8.8.  Repeat A1c in 2022 was 5.6.  Will repeat A1c today.    Back pain: Patient complains of lower back pain which is better than what it was during her pregnancy.  Does report sometimes going down into her right leg.  No point tenderness tenderness or paraspinal

## 2024-10-03 DIAGNOSIS — G89.29 CHRONIC LOW BACK PAIN WITHOUT SCIATICA, UNSPECIFIED BACK PAIN LATERALITY: ICD-10-CM

## 2024-10-03 DIAGNOSIS — M54.50 CHRONIC LOW BACK PAIN WITHOUT SCIATICA, UNSPECIFIED BACK PAIN LATERALITY: ICD-10-CM

## 2024-10-03 DIAGNOSIS — R79.89 LOW VITAMIN D LEVEL: ICD-10-CM

## 2024-10-03 DIAGNOSIS — M25.552 PAIN OF LEFT HIP: Primary | ICD-10-CM

## 2024-10-03 DIAGNOSIS — E11.9 TYPE 2 DIABETES MELLITUS WITHOUT COMPLICATION, WITHOUT LONG-TERM CURRENT USE OF INSULIN (HCC): ICD-10-CM

## 2024-10-03 NOTE — TELEPHONE ENCOUNTER
Request for Pt is asking for Rx be sent to [facility] not [facility] please. Pt also need the cream that was discussed at the appt. Thank You.  Requested Prescriptions     Pending Prescriptions Disp Refills    acetaminophen (TYLENOL) 500 MG tablet 30 tablet 1     Sig: Take 1 tablet by mouth every 6 hours as needed for Pain    metFORMIN (GLUCOPHAGE) 500 MG tablet 60 tablet 0     Sig: Take 1 tablet by mouth 2 times daily (with meals)    vitamin D (CHOLECALCIFEROL) 25 MCG (1000 UT) TABS tablet 90 tablet 1     Sig: Take 1 tablet by mouth daily    .      Please review and e-scribe to pharmacy listed in chart if appropriate. Thank you.      Last Visit Date: 9/30/2024  Next Visit Date: 11/11/2024    Future Appointments   Date Time Provider Department Center   11/11/2024  9:10 AM Rukhsana Farris MD [facility] DEP       Health Maintenance   Topic Date Due    Pneumococcal 0-64 years Vaccine (1 of 2 - PCV) Never done    Hepatitis B vaccine (1 of 3 - 19+ 3-dose series) Never done    Flu vaccine (1) 08/01/2024    COVID-19 Vaccine (1 - 2023-24 season) Never done    A1C test (Diabetic or Prediabetic)  12/30/2024    Cervical cancer screen  07/22/2025    Depression Screen  09/30/2025    DTaP/Tdap/Td vaccine (3 - Td or Tdap) 08/23/2030    Hepatitis C screen  Completed    HIV screen  Completed    Hepatitis A vaccine  Aged Out    Hib vaccine  Aged Out    HPV vaccine  Aged Out    Polio vaccine  Aged Out    Meningococcal (ACWY) vaccine  Aged Out    Varicella vaccine  Discontinued    GFR test (Diabetes, CKD 3-4, OR last GFR 15-59)  Discontinued       Hemoglobin A1C (%)   Date Value   09/30/2024 6.7 (H)   05/17/2022 5.6   10/07/2020 8.8 (H)             ( goal A1C is < 7)   No components found for: \"LABMICR\"  No components found for: \"LDLCHOLESTEROL\", \"LDLCALC\"    (goal LDL is <100)   AST (U/L)   Date Value   09/30/2024 11     ALT (U/L)   Date Value   09/30/2024 6 (L)     BUN (mg/dL)   Date Value   09/30/2024 6     BP

## 2024-10-04 RX ORDER — ACETAMINOPHEN 500 MG
500 TABLET ORAL EVERY 6 HOURS PRN
Qty: 30 TABLET | Refills: 1 | Status: SHIPPED | OUTPATIENT
Start: 2024-10-04

## 2024-10-08 ENCOUNTER — TELEPHONE (OUTPATIENT)
Dept: INTERNAL MEDICINE | Age: 34
End: 2024-10-08

## 2024-10-08 NOTE — TELEPHONE ENCOUNTER
Patient is calling regarding her leg/hip pain.  Patient states needs xray to be ordered.  Patient is also calling states provider was supposed to get medication for the pain and nothing was called in.  Patient states is supposed to receive medication for her low iron  Please advise

## 2024-10-08 NOTE — TELEPHONE ENCOUNTER
X-ray of the hip were already ordered on 10/4. Please ask the patient to get the iron, TIBC and ferritin levels checked which have been ordered but still pending.

## 2024-10-11 DIAGNOSIS — G89.29 CHRONIC LOW BACK PAIN WITHOUT SCIATICA, UNSPECIFIED BACK PAIN LATERALITY: Primary | ICD-10-CM

## 2024-10-11 DIAGNOSIS — M54.50 CHRONIC LOW BACK PAIN WITHOUT SCIATICA, UNSPECIFIED BACK PAIN LATERALITY: Primary | ICD-10-CM

## 2024-10-11 DIAGNOSIS — I10 PRIMARY HYPERTENSION: ICD-10-CM

## 2024-10-11 DIAGNOSIS — E11.9 TYPE 2 DIABETES MELLITUS WITHOUT COMPLICATION, WITHOUT LONG-TERM CURRENT USE OF INSULIN (HCC): ICD-10-CM

## 2024-10-11 RX ORDER — BLOOD PRESSURE TEST KIT
1 KIT MISCELLANEOUS 2 TIMES DAILY
Qty: 1 KIT | Refills: 0 | Status: SHIPPED | OUTPATIENT
Start: 2024-10-11

## 2024-10-11 NOTE — TELEPHONE ENCOUNTER
Patient is calling requesting cream provider discussed at last visit right side Sciatic nerve pain.  Patient is also requesting order for blood pressure kit and glucometer.  Order pending

## 2024-11-15 ENCOUNTER — HOSPITAL ENCOUNTER (OUTPATIENT)
Age: 34
Discharge: HOME | End: 2024-11-17

## 2024-12-09 ENCOUNTER — HOSPITAL ENCOUNTER (OUTPATIENT)
Dept: GENERAL RADIOLOGY | Age: 34
Discharge: HOME | End: 2024-12-11
Payer: COMMERCIAL

## 2024-12-09 ENCOUNTER — HOSPITAL ENCOUNTER (OUTPATIENT)
Age: 34
Discharge: HOME | End: 2024-12-11
Payer: COMMERCIAL

## 2024-12-09 DIAGNOSIS — M25.552 PAIN OF LEFT HIP: ICD-10-CM

## 2024-12-09 PROCEDURE — 73502 X-RAY EXAM HIP UNI 2-3 VIEWS: CPT

## 2025-03-12 NOTE — ED PROVIDER NOTES
This MA called parent of patient at this time to confirm upcoming appointment with Dr. Mims.     No answer, message left regarding appointment details. E-advice also sent at this time.     L/M for the 2nd time   Vit-Fe Fumarate-FA (PRENATAL VITAMIN) 27-1 MG TABS tablet Take 1 tablet by mouth daily, Disp-30 tablet, R-6Normal      doxyLAMINE succinate (GNP SLEEP AID) 25 MG tablet Take 1 tablet by mouth as needed for Sleep (nausea), Disp-30 tablet, R-0Print      vitamin B-6 (PYRIDOXINE) 50 MG tablet Take 1 tablet by mouth daily, Disp-30 tablet, R-3Print      acetaminophen (TYLENOL) 325 MG tablet Take 2 tablets by mouth every 6 hours as needed for Pain, Disp-30 tablet, R-0Print      HYDROcodone-acetaminophen (NORCO) 5-325 MG per tablet Take 1 tablet by mouth every 6 hours as needed for Pain, Disp-30 tablet, R-0      ibuprofen (ADVIL;MOTRIN) 800 MG tablet Take 1 tablet by mouth every 8 hours as needed for Pain, Disp-30 tablet, R-0      misoprostol (CYTOTEC) 200 MCG tablet Take 1 tablet by mouth once for 1 dose Take one tablet at 9 am the day prior to the procedure and the second tablet at 9 pm the night prior to the procedure., Disp-1 tablet, R-0      azithromycin (ZITHROMAX) 250 MG tablet Take 1 tablet by mouth daily, Disp-4 tablet, R-0      ondansetron (ZOFRAN ODT) 4 MG disintegrating tablet Take 1 tablet by mouth every 8 hours as needed for Nausea., Disp-20 tablet, R-0      Prenatal Vitamins (DIS) TABS Take 1 tablet by mouth daily. , Disp-30 tablet, R-0             ALLERGIES     has No Known Allergies. FAMILY HISTORY     indicated that her mother is alive. She indicated that her father is . family history includes High Blood Pressure in her mother. SOCIAL HISTORY      reports that she has been smoking Cigarettes. She has a 5.00 pack-year smoking history. She uses smokeless tobacco. She reports that she drinks alcohol. She reports that she does not use drugs. PHYSICAL EXAM     INITIAL VITALS:  height is 5' 6\" (1.676 m) and weight is 202 lb 6.4 oz (91.8 kg). Her oral temperature is 98.2 °F (36.8 °C). Her blood pressure is 116/70 and her pulse is 91. Her respiration is 16 and oxygen saturation is 100%. Physical Exam   Constitutional: She is oriented to person, place, and time. She appears well-developed and well-nourished. HENT:   Head: Normocephalic and atraumatic. Right Ear: External ear normal.   Left Ear: External ear normal.   Nose: Nose normal.   Mouth/Throat: Oropharynx is clear and moist.   Eyes: Conjunctivae and EOM are normal. Pupils are equal, round, and reactive to light. Neck: Normal range of motion. Neck supple. Cardiovascular: Normal rate, regular rhythm, normal heart sounds and intact distal pulses. No murmur heard. Pulmonary/Chest: Effort normal and breath sounds normal.   Abdominal: Soft. Bowel sounds are normal. She exhibits no distension. There is no tenderness. Musculoskeletal: Normal range of motion. Neurological: She is alert and oriented to person, place, and time. She has normal reflexes. No cranial nerve deficit. Skin: Skin is warm. Rash noted. Multiple papular rashes noted over chest, abdomen, back, proximal anterior, lateral thighs. Erythematous, blanches with pressure. DIFFERENTIAL DIAGNOSIS/MDM:   Pityriasis Rosea     DIAGNOSTIC RESULTS     EKG: All EKG's are interpreted by the Emergency Department Physician who either signs or Co-signs this chart in the absence of a cardiologist.      RADIOLOGY:   I directly visualized the following  images and reviewed the radiologist interpretations:  No orders to display           ED BEDSIDE ULTRASOUND:   N/A    LABS:  Labs Reviewed   T. PALLIDUM AB           EMERGENCY DEPARTMENT COURSE:   Vitals:    Vitals:    12/08/17 1119   BP: 116/70   Pulse: 91   Resp: 16   Temp: 98.2 °F (36.8 °C)   TempSrc: Oral   SpO2: 100%   Weight: 202 lb 6.4 oz (91.8 kg)   Height: 5' 6\" (1.676 m)       CRITICAL CARE:  N/A    CONSULTS:  None      PROCEDURES:  Procedures      FINAL IMPRESSION      1.  Pityriasis Chelsie Mclain is a 31 yo female, with gestational age of 9 weeks- here with multiple pruritic papular rashes over chest, back,

## 2025-03-18 DIAGNOSIS — R79.89 LOW VITAMIN D LEVEL: ICD-10-CM

## 2025-03-19 RX ORDER — CHOLECALCIFEROL (VITAMIN D3) 25 MCG
1 TABLET ORAL DAILY
Qty: 90 TABLET | Refills: 1 | Status: SHIPPED | OUTPATIENT
Start: 2025-03-19

## 2025-03-19 NOTE — TELEPHONE ENCOUNTER
List:     Hx C/S x2 (G4-breech, G7--NRFHT)     H/O successful      BMI 39.0-39.9,adult     Former smoker     History of THC abuse      Grand multiparity     H/O indicated  delivery     H/O Starvation ketoacidosis     History of pre-eclampsia w/ SF--G7     Late prenatal care affecting pregnancy in second trimester     Primary hypertension     Hx LSIL     History of anxiety     Hypokalemia     Elevated LFTs     Long QT interval     RLTCS w/ RRS 10/8/ M Wt 6#5 Apg

## 2025-06-15 ENCOUNTER — HOSPITAL ENCOUNTER (EMERGENCY)
Age: 35
Discharge: HOME | End: 2025-06-15
Attending: EMERGENCY MEDICINE
Payer: COMMERCIAL

## 2025-06-15 VITALS
SYSTOLIC BLOOD PRESSURE: 180 MMHG | TEMPERATURE: 98.1 F | OXYGEN SATURATION: 99 % | HEART RATE: 80 BPM | DIASTOLIC BLOOD PRESSURE: 110 MMHG | RESPIRATION RATE: 18 BRPM

## 2025-06-15 DIAGNOSIS — K02.9 DENTAL CARIES: Primary | ICD-10-CM

## 2025-06-15 DIAGNOSIS — K08.89 ODONTALGIA: ICD-10-CM

## 2025-06-15 PROCEDURE — 6360000002 HC RX W HCPCS

## 2025-06-15 PROCEDURE — 6370000000 HC RX 637 (ALT 250 FOR IP)

## 2025-06-15 PROCEDURE — 99284 EMERGENCY DEPT VISIT MOD MDM: CPT

## 2025-06-15 PROCEDURE — 96372 THER/PROPH/DIAG INJ SC/IM: CPT

## 2025-06-15 RX ORDER — IBUPROFEN 600 MG/1
600 TABLET, FILM COATED ORAL 3 TIMES DAILY PRN
Qty: 15 TABLET | Refills: 0 | Status: SHIPPED | OUTPATIENT
Start: 2025-06-15 | End: 2025-06-20

## 2025-06-15 RX ORDER — IBUPROFEN 400 MG/1
400 TABLET, FILM COATED ORAL EVERY 6 HOURS PRN
Qty: 20 TABLET | Refills: 0 | Status: SHIPPED | OUTPATIENT
Start: 2025-06-15 | End: 2025-06-15 | Stop reason: SDUPTHER

## 2025-06-15 RX ORDER — KETOROLAC TROMETHAMINE 30 MG/ML
30 INJECTION, SOLUTION INTRAMUSCULAR; INTRAVENOUS ONCE
Status: COMPLETED | OUTPATIENT
Start: 2025-06-15 | End: 2025-06-15

## 2025-06-15 RX ADMIN — KETOROLAC TROMETHAMINE 30 MG: 30 INJECTION, SOLUTION INTRAMUSCULAR at 14:46

## 2025-06-15 RX ADMIN — AMOXICILLIN AND CLAVULANATE POTASSIUM 1 TABLET: 875; 125 TABLET, FILM COATED ORAL at 15:06

## 2025-06-15 NOTE — ED PROVIDER NOTES
[facility] EMERGENCY DEPARTMENT  Emergency Department Encounter  Emergency Medicine Resident     Pt Name:Raven Becerra  MRN: [id number]  Birthdate 1990  Date of evaluation: 6/15/25  PCP:  Rukhsana Farris MD  Note Started: 2:18 PM EDT      CHIEF COMPLAINT       Chief Complaint   Patient presents with    Dental Pain       HISTORY OF PRESENT ILLNESS  (Location/Symptom, Timing/Onset, Context/Setting, Quality, Duration, Modifying Factors, Severity.)      Raven Becerra is a 34 y.o. female who presents with dental pain.  Patient reported that she has been experiencing dental pain for quite some time now and recently it got worse about 1 day before presenting to the emergency department.  Patient has dental pain on left upper jaw with some reported swelling.  Patient denies any fever or chills, any discharge or bleeding from any of these.  Patient reported that she has been not having good oral intake due to pain.    PAST MEDICAL / SURGICAL / SOCIAL / FAMILY HISTORY      has a past medical history of cHTN (no meds), GDMA1, Headache(784.0), Pityriasis rosea, Right sciatic nerve pain, THC abuse , THC abuse , Traumatic injury , and Trichomonas.       has a past surgical history that includes Cholecystectomy ();  section (); pr  delivery only (N/A, 2018); and  section (N/A, 10/8/2020).      Social History     Socioeconomic History    Marital status: Single     Spouse name: Not on file    Number of children: Not on file    Years of education: Not on file    Highest education level: Not on file   Occupational History    Not on file   Tobacco Use    Smoking status: Every Day     Current packs/day: 0.03     Average packs/day: (0.2 ttl pk-yrs)     Types: Cigars, Cigarettes    Smokeless tobacco: Never    Tobacco comments:     \"black and mild - 3/day\"  8/10/2020   Vaping Use    Vaping status: Never Used   Substance and Sexual Activity    Alcohol use: Not Currently     Comment:

## 2025-06-15 NOTE — ED PROVIDER NOTES
[facility]     Emergency Department     Faculty Attestation    I performed a history and physical examination of the patient and discussed management with the resident. I reviewed the resident's note and agree with the documented findings and plan of care. Any areas of disagreement are noted on the chart. I was personally present for the key portions of any procedures. I have documented in the chart those procedures where I was not present during the key portions. I have reviewed the emergency nurses triage note. I agree with the chief complaint, past medical history, past surgical history, allergies, medications, social and family history as documented unless otherwise noted below. For Physician Assistant/ Nurse Practitioner cases/documentation I have personally evaluated this patient and have completed at least one if not all key elements of the E/M (history, physical exam, and MDM). Additional findings are as noted.      Multiple carious teeth worse on the left upper jaw, slight swelling over the face, good airway, no sublingual swelling, chest clear, heart exam normal.     Babatunde Mcknight MD  06/15/25 3541

## 2025-06-15 NOTE — DISCHARGE INSTRUCTIONS
You have been seen in the ER today for dental pain. A history and exam was taken and we believe that the cause of this is dental caries and dental pain.    While you were here, we did the following:  Toradol    Upon discharge, you have received the following:  Prescription for Augmentin and Motrin    If you begin to experience any symptoms such as chest pain shortness of breath nausea vomiting dizziness drowsiness abdominal pain loss of consciousness or any other symptoms you find concerning please return to the ED for follow-up evaluation.    If you have been given medication please take them as prescribed. Do not take more medication than recommended at any given time. Finish all antibiotic    Please follow-up with your primary care provider within 5 to 7 days for continued care.     Please feel free return to the hospital if your symptoms worsen or any new concerning symptoms develop.  Follow-up with your primary care physician as needed for all other the concerns.

## (undated) DEVICE — SUTURE VCRL SZ 2-0 L36IN ABSRB VLT L36MM CT-1 1/2 CIR J345H

## (undated) DEVICE — DUP USE 240185 SOLUTION IV IRRIG WATER 1000ML IRRIG BAG 2B7114

## (undated) DEVICE — TOWEL SURG W16XL26IN WHT NONFENESTRATED ST 2 PER PK

## (undated) DEVICE — KENDALL SCD EXPRESS SLEEVES, KNEE LENGTH, MEDIUM: Brand: KENDALL SCD